# Patient Record
Sex: FEMALE | Race: WHITE | NOT HISPANIC OR LATINO | Employment: FULL TIME | URBAN - METROPOLITAN AREA
[De-identification: names, ages, dates, MRNs, and addresses within clinical notes are randomized per-mention and may not be internally consistent; named-entity substitution may affect disease eponyms.]

---

## 2017-01-10 ENCOUNTER — ALLSCRIPTS OFFICE VISIT (OUTPATIENT)
Dept: OTHER | Facility: OTHER | Age: 67
End: 2017-01-10

## 2017-03-15 ENCOUNTER — GENERIC CONVERSION - ENCOUNTER (OUTPATIENT)
Dept: OTHER | Facility: OTHER | Age: 67
End: 2017-03-15

## 2017-03-15 LAB
A/G RATIO (HISTORICAL): 2 (ref 1.2–2.2)
ALBUMIN SERPL BCP-MCNC: 4.2 G/DL (ref 3.6–4.8)
ALP SERPL-CCNC: 92 IU/L (ref 39–117)
ALT SERPL W P-5'-P-CCNC: 13 IU/L (ref 0–32)
AST SERPL W P-5'-P-CCNC: 17 IU/L (ref 0–40)
BILIRUB SERPL-MCNC: 0.5 MG/DL (ref 0–1.2)
BUN SERPL-MCNC: 14 MG/DL (ref 8–27)
BUN/CREA RATIO (HISTORICAL): 23 (ref 11–26)
CALCIUM SERPL-MCNC: 9.5 MG/DL (ref 8.7–10.3)
CHLORIDE SERPL-SCNC: 103 MMOL/L (ref 96–106)
CHOLEST SERPL-MCNC: 193 MG/DL (ref 100–199)
CHOLEST/HDLC SERPL: 2.9 RATIO UNITS (ref 0–4.4)
CO2 SERPL-SCNC: 24 MMOL/L (ref 18–29)
CREAT SERPL-MCNC: 0.61 MG/DL (ref 0.57–1)
EGFR AFRICAN AMERICAN (HISTORICAL): 108 ML/MIN/1.73
EGFR-AMERICAN CALC (HISTORICAL): 93 ML/MIN/1.73
GLUCOSE SERPL-MCNC: 101 MG/DL (ref 65–99)
HDLC SERPL-MCNC: 67 MG/DL
INTERPRETATION (HISTORICAL): NORMAL
LDLC SERPL CALC-MCNC: 110 MG/DL (ref 0–99)
POTASSIUM SERPL-SCNC: 3.9 MMOL/L (ref 3.5–5.2)
SODIUM SERPL-SCNC: 142 MMOL/L (ref 134–144)
TOT. GLOBULIN, SERUM (HISTORICAL): 2.1 G/DL (ref 1.5–4.5)
TOTAL PROTEIN (HISTORICAL): 6.3 G/DL (ref 6–8.5)
TRIGL SERPL-MCNC: 80 MG/DL (ref 0–149)
VLDLC SERPL CALC-MCNC: 16 MG/DL (ref 5–40)

## 2017-03-16 ENCOUNTER — GENERIC CONVERSION - ENCOUNTER (OUTPATIENT)
Dept: OTHER | Facility: OTHER | Age: 67
End: 2017-03-16

## 2017-03-21 ENCOUNTER — ALLSCRIPTS OFFICE VISIT (OUTPATIENT)
Dept: OTHER | Facility: OTHER | Age: 67
End: 2017-03-21

## 2017-03-21 DIAGNOSIS — Z12.31 ENCOUNTER FOR SCREENING MAMMOGRAM FOR MALIGNANT NEOPLASM OF BREAST: ICD-10-CM

## 2017-03-24 ENCOUNTER — HOSPITAL ENCOUNTER (EMERGENCY)
Facility: HOSPITAL | Age: 67
Discharge: HOME | End: 2017-03-24
Attending: EMERGENCY MEDICINE
Payer: COMMERCIAL

## 2017-03-24 VITALS
RESPIRATION RATE: 20 BRPM | HEART RATE: 89 BPM | SYSTOLIC BLOOD PRESSURE: 162 MMHG | TEMPERATURE: 98.6 F | DIASTOLIC BLOOD PRESSURE: 77 MMHG | OXYGEN SATURATION: 98 %

## 2017-03-24 DIAGNOSIS — B02.9 SHINGLES RASH: Primary | ICD-10-CM

## 2017-03-24 PROCEDURE — 99282 EMERGENCY DEPT VISIT SF MDM: CPT

## 2017-03-24 RX ORDER — ACYCLOVIR 800 MG/1
800 TABLET ORAL
Qty: 35 TABLET | Refills: 0 | Status: ON HOLD | OUTPATIENT
Start: 2017-03-24 | End: 2018-01-12 | Stop reason: ALTCHOICE

## 2017-03-24 RX ORDER — LISINOPRIL 10 MG/1
10 TABLET ORAL DAILY
COMMUNITY
End: 2018-04-25 | Stop reason: SDUPTHER

## 2017-03-24 RX ORDER — SIMVASTATIN 10 MG
20 TABLET ORAL
COMMUNITY
End: 2018-04-21 | Stop reason: SDUPTHER

## 2017-03-30 ENCOUNTER — ALLSCRIPTS OFFICE VISIT (OUTPATIENT)
Dept: OTHER | Facility: OTHER | Age: 67
End: 2017-03-30

## 2017-06-19 ENCOUNTER — GENERIC CONVERSION - ENCOUNTER (OUTPATIENT)
Dept: OTHER | Facility: OTHER | Age: 67
End: 2017-06-19

## 2017-06-20 ENCOUNTER — GENERIC CONVERSION - ENCOUNTER (OUTPATIENT)
Dept: OTHER | Facility: OTHER | Age: 67
End: 2017-06-20

## 2017-06-20 LAB
A/G RATIO (HISTORICAL): 2 (ref 1.2–2.2)
ALBUMIN SERPL BCP-MCNC: 4.3 G/DL (ref 3.6–4.8)
ALP SERPL-CCNC: 86 IU/L (ref 39–117)
ALT SERPL W P-5'-P-CCNC: 18 IU/L (ref 0–32)
AST SERPL W P-5'-P-CCNC: 21 IU/L (ref 0–40)
BILIRUB SERPL-MCNC: 0.4 MG/DL (ref 0–1.2)
BUN SERPL-MCNC: 15 MG/DL (ref 8–27)
BUN/CREA RATIO (HISTORICAL): 24 (ref 12–28)
CALCIUM SERPL-MCNC: 9.6 MG/DL (ref 8.7–10.3)
CHLORIDE SERPL-SCNC: 102 MMOL/L (ref 96–106)
CHOLEST SERPL-MCNC: 192 MG/DL (ref 100–199)
CHOLEST/HDLC SERPL: 2.3 RATIO UNITS (ref 0–4.4)
CO2 SERPL-SCNC: 22 MMOL/L (ref 18–29)
CREAT SERPL-MCNC: 0.62 MG/DL (ref 0.57–1)
EGFR AFRICAN AMERICAN (HISTORICAL): 106 ML/MIN/1.73
EGFR-AMERICAN CALC (HISTORICAL): 92 ML/MIN/1.73
GLUCOSE SERPL-MCNC: 97 MG/DL (ref 65–99)
HDLC SERPL-MCNC: 82 MG/DL
INTERPRETATION (HISTORICAL): NORMAL
LDLC SERPL CALC-MCNC: 99 MG/DL (ref 0–99)
POTASSIUM SERPL-SCNC: 4.3 MMOL/L (ref 3.5–5.2)
SODIUM SERPL-SCNC: 144 MMOL/L (ref 134–144)
TOT. GLOBULIN, SERUM (HISTORICAL): 2.2 G/DL (ref 1.5–4.5)
TOTAL PROTEIN (HISTORICAL): 6.5 G/DL (ref 6–8.5)
TRIGL SERPL-MCNC: 53 MG/DL (ref 0–149)
VLDLC SERPL CALC-MCNC: 11 MG/DL (ref 5–40)

## 2017-06-30 ENCOUNTER — ALLSCRIPTS OFFICE VISIT (OUTPATIENT)
Dept: OTHER | Facility: OTHER | Age: 67
End: 2017-06-30

## 2017-11-20 ENCOUNTER — HOSPITAL ENCOUNTER (OUTPATIENT)
Dept: RADIOLOGY | Facility: HOSPITAL | Age: 67
Discharge: HOME | End: 2017-11-20
Attending: FAMILY MEDICINE
Payer: COMMERCIAL

## 2017-11-20 DIAGNOSIS — Z12.31 ENCOUNTER FOR SCREENING MAMMOGRAM FOR MALIGNANT NEOPLASM OF BREAST: ICD-10-CM

## 2017-11-20 PROCEDURE — 77063 BREAST TOMOSYNTHESIS BI: CPT

## 2017-11-20 PROCEDURE — G0202 SCR MAMMO BI INCL CAD: HCPCS

## 2017-11-22 ENCOUNTER — GENERIC CONVERSION - ENCOUNTER (OUTPATIENT)
Dept: OTHER | Facility: OTHER | Age: 67
End: 2017-11-22

## 2017-11-22 DIAGNOSIS — R92.8 OTHER ABNORMAL AND INCONCLUSIVE FINDINGS ON DIAGNOSTIC IMAGING OF BREAST: ICD-10-CM

## 2017-11-24 ENCOUNTER — HOSPITAL ENCOUNTER (OUTPATIENT)
Dept: RADIOLOGY | Facility: HOSPITAL | Age: 67
Discharge: HOME | End: 2017-11-24
Attending: FAMILY MEDICINE
Payer: COMMERCIAL

## 2017-11-24 ENCOUNTER — GENERIC CONVERSION - ENCOUNTER (OUTPATIENT)
Dept: OTHER | Facility: OTHER | Age: 67
End: 2017-11-24

## 2017-11-24 DIAGNOSIS — R92.8 ABNORMAL SCREENING MAMMOGRAM: ICD-10-CM

## 2017-11-24 PROCEDURE — 76642 ULTRASOUND BREAST LIMITED: CPT

## 2017-11-30 ENCOUNTER — GENERIC CONVERSION - ENCOUNTER (OUTPATIENT)
Dept: OTHER | Facility: OTHER | Age: 67
End: 2017-11-30

## 2017-11-30 ENCOUNTER — GENERIC CONVERSION - ENCOUNTER (OUTPATIENT)
Dept: FAMILY MEDICINE CLINIC | Facility: CLINIC | Age: 67
End: 2017-11-30

## 2017-11-30 ENCOUNTER — HOSPITAL ENCOUNTER (OUTPATIENT)
Dept: RADIOLOGY | Facility: HOSPITAL | Age: 67
Discharge: HOME | End: 2017-11-30
Attending: FAMILY MEDICINE
Payer: COMMERCIAL

## 2017-11-30 ENCOUNTER — HOSPITAL ENCOUNTER (OUTPATIENT)
Dept: RADIOLOGY | Facility: HOSPITAL | Age: 67
Discharge: HOME | End: 2017-11-30
Payer: COMMERCIAL

## 2017-11-30 VITALS
RESPIRATION RATE: 18 BRPM | HEART RATE: 80 BPM | SYSTOLIC BLOOD PRESSURE: 163 MMHG | OXYGEN SATURATION: 99 % | DIASTOLIC BLOOD PRESSURE: 68 MMHG

## 2017-11-30 DIAGNOSIS — N63.20 BREAST MASS, LEFT: ICD-10-CM

## 2017-11-30 PROCEDURE — 88341 IMHCHEM/IMCYTCHM EA ADD ANTB: CPT | Performed by: RADIOLOGY

## 2017-11-30 PROCEDURE — 88361 TUMOR IMMUNOHISTOCHEM/COMPUT: CPT | Performed by: RADIOLOGY

## 2017-11-30 PROCEDURE — 19083 BX BREAST 1ST LESION US IMAG: CPT

## 2017-11-30 PROCEDURE — 88342 IMHCHEM/IMCYTCHM 1ST ANTB: CPT | Performed by: RADIOLOGY

## 2017-11-30 PROCEDURE — 88305 TISSUE EXAM BY PATHOLOGIST: CPT | Performed by: RADIOLOGY

## 2017-11-30 NOTE — SEDATION DOCUMENTATION
Left breast mass bx done  Hydromark marker placed  Pt  Tolerated well  Steristrips and DSD to site after pressure to site  No bleeding noted  Discharge instructions reviewed with pt  And copy given to her  Ice pack provided

## 2017-12-06 ENCOUNTER — GENERIC CONVERSION - ENCOUNTER (OUTPATIENT)
Dept: OTHER | Facility: OTHER | Age: 67
End: 2017-12-06

## 2017-12-13 ENCOUNTER — GENERIC CONVERSION - ENCOUNTER (OUTPATIENT)
Dept: OTHER | Facility: OTHER | Age: 67
End: 2017-12-13

## 2017-12-27 RX ORDER — RIBOFLAVIN (VITAMIN B2) 100 MG
100 TABLET ORAL DAILY
COMMUNITY
End: 2018-02-22 | Stop reason: ALTCHOICE

## 2017-12-27 RX ORDER — MELATONIN
1000 3 TIMES WEEKLY
COMMUNITY
End: 2018-04-26 | Stop reason: ALTCHOICE

## 2017-12-27 NOTE — PRE-PROCEDURE INSTRUCTIONS
Pre-Surgery Instructions:   Medication Instructions    Ascorbic Acid (VITAMIN C) 100 MG tablet Instructed patient per Anesthesia Guidelines   cholecalciferol (VITAMIN D3) 1,000 units tablet Instructed patient per Anesthesia Guidelines   lisinopril (ZESTRIL) 10 mg tablet Instructed patient per Anesthesia Guidelines   simvastatin (ZOCOR) 10 mg tablet Instructed patient per Anesthesia Guidelines      Pre-op and bathing instructions given

## 2017-12-28 ENCOUNTER — TRANSCRIBE ORDERS (OUTPATIENT)
Dept: LAB | Facility: CLINIC | Age: 67
End: 2017-12-28

## 2017-12-28 ENCOUNTER — GENERIC CONVERSION - ENCOUNTER (OUTPATIENT)
Dept: OTHER | Facility: OTHER | Age: 67
End: 2017-12-28

## 2017-12-28 ENCOUNTER — APPOINTMENT (OUTPATIENT)
Dept: RADIOLOGY | Facility: CLINIC | Age: 67
End: 2017-12-28
Payer: COMMERCIAL

## 2017-12-28 ENCOUNTER — GENERIC CONVERSION - ENCOUNTER (OUTPATIENT)
Dept: SURGICAL ONCOLOGY | Facility: CLINIC | Age: 67
End: 2017-12-28

## 2017-12-28 ENCOUNTER — OFFICE VISIT (OUTPATIENT)
Dept: LAB | Facility: CLINIC | Age: 67
End: 2017-12-28
Payer: COMMERCIAL

## 2017-12-28 DIAGNOSIS — C50.212 MALIGNANT NEOPLASM OF UPPER-INNER QUADRANT OF LEFT FEMALE BREAST, UNSPECIFIED ESTROGEN RECEPTOR STATUS (HCC): Primary | ICD-10-CM

## 2017-12-28 DIAGNOSIS — C50.212 MALIGNANT NEOPLASM OF UPPER-INNER QUADRANT OF LEFT FEMALE BREAST, UNSPECIFIED ESTROGEN RECEPTOR STATUS (HCC): ICD-10-CM

## 2017-12-28 PROCEDURE — 93005 ELECTROCARDIOGRAM TRACING: CPT

## 2017-12-28 PROCEDURE — 71020 HB X-RAY EXAM CHEST 2 VIEWS: CPT

## 2017-12-29 ENCOUNTER — GENERIC CONVERSION - ENCOUNTER (OUTPATIENT)
Dept: OTHER | Facility: OTHER | Age: 67
End: 2017-12-29

## 2017-12-29 LAB
ATRIAL RATE: 60 BPM
BASOPHILS # BLD AUTO: 0 %
BASOPHILS # BLD AUTO: 0 X10E3/UL (ref 0–0.2)
DEPRECATED RDW RBC AUTO: 13.6 % (ref 12.3–15.4)
EOSINOPHIL # BLD AUTO: 0.1 X10E3/UL (ref 0–0.4)
EOSINOPHIL # BLD AUTO: 2 %
HCT VFR BLD AUTO: 40.6 % (ref 34–46.6)
HGB BLD-MCNC: 13.8 G/DL (ref 11.1–15.9)
IMM.GRANULOCYTES (CD4/8) (HISTORICAL): 0 %
IMM.GRANULOCYTES (CD4/8) (HISTORICAL): 0 X10E3/UL (ref 0–0.1)
LYMPHOCYTES # BLD AUTO: 1.6 X10E3/UL (ref 0.7–3.1)
LYMPHOCYTES # BLD AUTO: 35 %
MCH RBC QN AUTO: 31.3 PG (ref 26.6–33)
MCHC RBC AUTO-ENTMCNC: 34 G/DL (ref 31.5–35.7)
MCV RBC AUTO: 92 FL (ref 79–97)
MONOCYTES # BLD AUTO: 0.4 X10E3/UL (ref 0.1–0.9)
MONOCYTES (HISTORICAL): 8 %
NEUTROPHILS # BLD AUTO: 2.5 X10E3/UL (ref 1.4–7)
NEUTROPHILS # BLD AUTO: 55 %
P AXIS: 58 DEGREES
PLATELET # BLD AUTO: 241 X10E3/UL (ref 150–379)
PR INTERVAL: 218 MS
QRS AXIS: 40 DEGREES
QRSD INTERVAL: 90 MS
QT INTERVAL: 416 MS
QTC INTERVAL: 416 MS
RBC (HISTORICAL): 4.41 X10E6/UL (ref 3.77–5.28)
T WAVE AXIS: 56 DEGREES
VENTRICULAR RATE: 60 BPM
WBC # BLD AUTO: 4.6 X10E3/UL (ref 3.4–10.8)

## 2017-12-30 LAB
A/G RATIO (HISTORICAL): 2.4 (ref 1.2–2.2)
ALBUMIN SERPL BCP-MCNC: 4.4 G/DL (ref 3.6–4.8)
ALP SERPL-CCNC: 92 IU/L (ref 39–117)
ALT SERPL W P-5'-P-CCNC: 36 IU/L (ref 0–32)
AST SERPL W P-5'-P-CCNC: 31 IU/L (ref 0–40)
BILIRUB SERPL-MCNC: 0.5 MG/DL (ref 0–1.2)
BUN SERPL-MCNC: 13 MG/DL (ref 8–27)
BUN/CREA RATIO (HISTORICAL): 22 (ref 12–28)
CALCIUM SERPL-MCNC: 9.7 MG/DL (ref 8.7–10.3)
CHLORIDE SERPL-SCNC: 100 MMOL/L (ref 96–106)
CO2 SERPL-SCNC: 27 MMOL/L (ref 18–29)
CREAT SERPL-MCNC: 0.6 MG/DL (ref 0.57–1)
EGFR AFRICAN AMERICAN (HISTORICAL): 108 ML/MIN/1.73
EGFR-AMERICAN CALC (HISTORICAL): 93 ML/MIN/1.73
GLUCOSE SERPL-MCNC: 108 MG/DL (ref 65–99)
POTASSIUM SERPL-SCNC: 4.5 MMOL/L (ref 3.5–5.2)
SODIUM SERPL-SCNC: 141 MMOL/L (ref 134–144)
TOT. GLOBULIN, SERUM (HISTORICAL): 1.8 G/DL (ref 1.5–4.5)
TOTAL PROTEIN (HISTORICAL): 6.2 G/DL (ref 6–8.5)

## 2018-01-05 ENCOUNTER — GENERIC CONVERSION - ENCOUNTER (OUTPATIENT)
Dept: OTHER | Facility: OTHER | Age: 68
End: 2018-01-05

## 2018-01-05 ENCOUNTER — APPOINTMENT (OUTPATIENT)
Dept: RADIATION ONCOLOGY | Facility: HOSPITAL | Age: 68
End: 2018-01-05
Attending: RADIOLOGY
Payer: COMMERCIAL

## 2018-01-05 PROCEDURE — G0463 HOSPITAL OUTPT CLINIC VISIT: HCPCS | Performed by: RADIOLOGY

## 2018-01-05 PROCEDURE — 99215 OFFICE O/P EST HI 40 MIN: CPT | Performed by: RADIOLOGY

## 2018-01-10 NOTE — RESULT NOTES
Message   pt has an appt on 5/16 with me   will discuss at that time  RL     Verified Results  (1) CBC/PLT/DIFF [id number] 07:46AM [facility]     Test Name Result Flag Reference   WBC 4 3 x10E3/uL  3 4-10 8   RBC 4 29 x10E6/uL  3 77-5 28   Hemoglobin 13 4 g/dL  11 1-15 9   Hematocrit 40 4 %  34 0-46  6   MCV 94 fL  79-97   MCH 31 2 pg  26 6-33 0   MCHC 33 2 g/dL  31 5-35 7   RDW 13 8 %  12 3-15 4   Platelets 244 L19H2/PT  150-379   Neutrophils 54 %     Lymphs 36 %     Monocytes 9 %     Eos 1 %     Basos 0 %     Neutrophils (Absolute) 2 3 x10E3/uL  1 4-7 0   Lymphs (Absolute) 1 6 x10E3/uL  0 7-3 1   Monocytes(Absolute) 0 4 x10E3/uL  0 1-0 9   Eos (Absolute) 0 1 x10E3/uL  0 0-0 4   Baso (Absolute) 0 0 x10E3/uL  0 0-0 2   Immature Granulocytes 0 %     Immature Grans (Abs) 0 0 x10E3/uL  0 0-0 1     (1) LIPID PANEL FASTING W DIRECT LDL REFLEX [id number] 07:46AM [facility]     Test Name Result Flag Reference   Cholesterol, Total 221 mg/dL H 100-199   Triglycerides 48 mg/dL  0-149   HDL Cholesterol 87 mg/dL  >39   According to ATP-III Guidelines, HDL-C >59 mg/dL is considered a  negative risk factor for CHD     LDL Cholesterol Calc 124 mg/dL H 0-99     (1) TSH [id number] 07:46AM [facility]     Test Name Result Flag Reference   TSH 2 030 uIU/mL  0 450-4 500     (1) URINALYSIS (will reflex a microscopy if leukocytes, occult blood, protein or nitrites are not within normal limits) [id number] 07:46AM [facility]     Test Name Result Flag Reference   Specific Gravity 1 005  1 005-1 030   pH 7 0  5 0-7 5   Urine-Color Yellow  Yellow   Appearance Clear  Clear   WBC Esterase Trace A Negative   Protein Negative  Negative/Trace   Glucose Negative  Negative   Ketones Negative  Negative   Occult Blood Negative  Negative   Bilirubin Negative  Negative   Urobilinogen,Semi-Qn 0 2 EU/dL  0 2-1 0   Nitrite, Urine Negative  Negative   Microscopic Examination See below:     WBC 0-5 /hpf  0 -  5   RBC 0-2 /hpf  0 -  2 Epithelial Cells (non renal) None seen /hpf  0 - 10   Mucus Threads Present  Not Estab  Bacteria None seen  None seen/Few     (1) VITAMIN D 25-HYDROXY [id number] 07:46AM Scarlet Khanna     Test Name Result Flag Reference   Vitamin D, 25-Hydroxy 33 9 ng/mL  30 0-100 0   Vitamin D deficiency has been defined by the [address] practice guideline as a  level of serum 25-OH vitamin D less than 20 ng/mL (1,2)  The Endocrine Society went on to further define vitamin D  insufficiency as a level between 21 and 29 ng/mL (2)  1  IOM ([place] of Medicine)  2010  Dietary reference     intakes for calcium and D  [facility]: The     [place]  2  Marisabel MF, Liz TAMAYO, Geraldine FIELD, et al      Evaluation, treatment, and prevention of vitamin D     deficiency: an Endocrine Society clinical practice     guideline  JC  2011 Jul; 96(7):1911-30       (1) HEMOGLOBIN A1C [id number] 07:46AM Scarlet Khanna     Test Name Result Flag Reference   Hemoglobin A1c 6 0 % H 4 8-5 6   Pre-diabetes: 5 7 - 6 4           Diabetes: >6 4           Glycemic control for adults with diabetes: <7 0     (1) COMPREHENSIVE METABOLIC PANEL [id number] 45:52SO Scarlet Khanna     Test Name Result Flag Reference   Glucose, Serum 116 mg/dL H 65-99   BUN 15 mg/dL  8-27   Creatinine, Serum 0 59 mg/dL  0 57-1 00   eGFR If NonAfricn Am 94 mL/min/1 73  >59   eGFR If Africn Am 109 mL/min/1 73  >59   BUN/Creatinine Ratio 25  11-26   Sodium, Serum 139 mmol/L  134-144   Potassium, Serum 3 9 mmol/L  3 5-5 2   Chloride, Serum 102 mmol/L     Carbon Dioxide, Total 22 mmol/L  18-29   Calcium, Serum 9 3 mg/dL  8 7-10 3   Protein, Total, Serum 6 5 g/dL  6 0-8 5   Albumin, Serum 4 6 g/dL  3 6-4 8   Globulin, Total 1 9 g/dL  1 5-4 5   A/G Ratio 2 4  1 1-2 5   Bilirubin, Total 0 5 mg/dL  0 0-1 2   Alkaline Phosphatase, S 104 IU/L     AST (SGOT) 38 IU/L  0-40   ALT (SGPT) 42 IU/L H 0-32 Signatures   Electronically signed by : Yonatan Bishop DO; May 11 2016  8:14AM EST                       (Author)

## 2018-01-10 NOTE — RESULT NOTES
Verified Results  US GUIDED BREAST BIOPSY LEFT COMPLETE [id number] 10:23AM Cass Wilson     Test Name Result Flag Reference   US GUIDED BREAST BIOPSY LEFT COMPLETE (Report)     Post biopsy  US Guided Breast Biopsy Left: November 30, 2017 - Check In #:    [de-identified]   Technologist: Ricky Betancourt, [facility]   75-year-old woman presenting for ultrasound-guided biopsy left    breast 11:00 mass  After informed consent was obtained, the patient was positioned    for an ultrasound-guided core biopsy of the left breast  The    previously described 11:00 mass was again identified with    ultrasound  Chlorhexidine was used as sterile prep and 1%    lidocaine as local anesthetic  After a small skin incision was    made, a Apertioe 12 gauge core needle was advanced to the    lesion under ultrasound guidance from a lateral approach  3 core   biopsies were then obtained with ultrasound confirming the core    needle traversing the lesion  The core specimens were then sent    to Pathology for further evaluation  A hydromark open coil clip    was then deployed in order to clare the biopsy site to facilitate    future intervention if necessary  Post-biopsy mammogram demonstrates clip concordance with    previously described mammographic finding  The patient tolerated the procedure well and left the department    in stable condition  Impression:   1  Uncomplicated ultrasound-guided core needle biopsy left    breast 11:00 mass, with placement of a hydromark open coil clip  Pathology Results: Pending     Mammo Post Biopsy: Left Breast - November 30, 2017 - Check In #:    [de-identified]   CC and MLO view(s) were taken of the left breast        Recommendation:   Pathology pending       Transcription Location: [facility]: [id number]

## 2018-01-12 ENCOUNTER — HOSPITAL ENCOUNTER (OUTPATIENT)
Facility: HOSPITAL | Age: 68
Setting detail: OUTPATIENT SURGERY
Discharge: HOME | End: 2018-01-12
Attending: SURGERY | Admitting: SURGERY
Payer: COMMERCIAL

## 2018-01-12 ENCOUNTER — APPOINTMENT (OUTPATIENT)
Dept: RADIATION ONCOLOGY | Facility: HOSPITAL | Age: 68
End: 2018-01-12
Attending: RADIOLOGY
Payer: COMMERCIAL

## 2018-01-12 ENCOUNTER — HOSPITAL ENCOUNTER (OUTPATIENT)
Dept: NUCLEAR MEDICINE | Facility: HOSPITAL | Age: 68
Setting detail: OUTPATIENT SURGERY
Discharge: HOME | End: 2018-01-12
Attending: SURGERY
Payer: COMMERCIAL

## 2018-01-12 ENCOUNTER — APPOINTMENT (OUTPATIENT)
Dept: MAMMOGRAPHY | Facility: HOSPITAL | Age: 68
End: 2018-01-12
Payer: COMMERCIAL

## 2018-01-12 ENCOUNTER — CONVERSION ENCOUNTER (OUTPATIENT)
Dept: MAMMOGRAPHY | Facility: HOSPITAL | Age: 68
End: 2018-01-12

## 2018-01-12 ENCOUNTER — GENERIC CONVERSION - ENCOUNTER (OUTPATIENT)
Dept: OTHER | Facility: OTHER | Age: 68
End: 2018-01-12

## 2018-01-12 ENCOUNTER — HOSPITAL ENCOUNTER (OUTPATIENT)
Dept: MAMMOGRAPHY | Facility: HOSPITAL | Age: 68
Setting detail: OUTPATIENT SURGERY
Discharge: HOME | End: 2018-01-12
Attending: SURGERY
Payer: COMMERCIAL

## 2018-01-12 ENCOUNTER — ANESTHESIA EVENT (OUTPATIENT)
Dept: PERIOP | Facility: HOSPITAL | Age: 68
End: 2018-01-12
Payer: COMMERCIAL

## 2018-01-12 ENCOUNTER — ANESTHESIA (OUTPATIENT)
Dept: PERIOP | Facility: HOSPITAL | Age: 68
End: 2018-01-12
Payer: COMMERCIAL

## 2018-01-12 ENCOUNTER — GENERIC CONVERSION - ENCOUNTER (OUTPATIENT)
Dept: SURGICAL ONCOLOGY | Facility: CLINIC | Age: 68
End: 2018-01-12

## 2018-01-12 VITALS
HEART RATE: 70 BPM | BODY MASS INDEX: 25.1 KG/M2 | DIASTOLIC BLOOD PRESSURE: 65 MMHG | HEIGHT: 64 IN | OXYGEN SATURATION: 98 % | RESPIRATION RATE: 16 BRPM | WEIGHT: 147 LBS | SYSTOLIC BLOOD PRESSURE: 144 MMHG | TEMPERATURE: 98.3 F

## 2018-01-12 VITALS
WEIGHT: 142 LBS | TEMPERATURE: 97 F | RESPIRATION RATE: 16 BRPM | DIASTOLIC BLOOD PRESSURE: 80 MMHG | SYSTOLIC BLOOD PRESSURE: 130 MMHG | HEART RATE: 82 BPM | HEIGHT: 63 IN | BODY MASS INDEX: 25.16 KG/M2

## 2018-01-12 DIAGNOSIS — C50.212 BREAST CANCER OF UPPER-INNER QUADRANT OF LEFT FEMALE BREAST (HCC): ICD-10-CM

## 2018-01-12 DIAGNOSIS — C50.212 MALIGNANT NEOPLASM OF UPPER-INNER QUADRANT OF LEFT FEMALE BREAST (HCC): ICD-10-CM

## 2018-01-12 PROCEDURE — 88307 TISSUE EXAM BY PATHOLOGIST: CPT | Performed by: SURGERY

## 2018-01-12 PROCEDURE — 88361 TUMOR IMMUNOHISTOCHEM/COMPUT: CPT | Performed by: SURGERY

## 2018-01-12 PROCEDURE — 88367 INSITU HYBRIDIZATION AUTO: CPT | Performed by: SURGERY

## 2018-01-12 PROCEDURE — 88341 IMHCHEM/IMCYTCHM EA ADD ANTB: CPT | Performed by: SURGERY

## 2018-01-12 PROCEDURE — 88333 PATH CONSLTJ SURG CYTO XM 1: CPT | Performed by: SURGERY

## 2018-01-12 PROCEDURE — 88342 IMHCHEM/IMCYTCHM 1ST ANTB: CPT | Performed by: SURGERY

## 2018-01-12 PROCEDURE — 19281 PERQ DEVICE BREAST 1ST IMAG: CPT

## 2018-01-12 PROCEDURE — A9541 TC99M SULFUR COLLOID: HCPCS

## 2018-01-12 PROCEDURE — 78195 LYMPH SYSTEM IMAGING: CPT

## 2018-01-12 RX ORDER — MIDAZOLAM HYDROCHLORIDE 1 MG/ML
INJECTION INTRAMUSCULAR; INTRAVENOUS AS NEEDED
Status: DISCONTINUED | OUTPATIENT
Start: 2018-01-12 | End: 2018-01-12 | Stop reason: SURG

## 2018-01-12 RX ORDER — SODIUM CHLORIDE 9 MG/ML
75 INJECTION, SOLUTION INTRAVENOUS CONTINUOUS
Status: DISCONTINUED | OUTPATIENT
Start: 2018-01-12 | End: 2018-01-12 | Stop reason: HOSPADM

## 2018-01-12 RX ORDER — SODIUM CHLORIDE 9 MG/ML
100 INJECTION, SOLUTION INTRAVENOUS CONTINUOUS
Status: DISCONTINUED | OUTPATIENT
Start: 2018-01-12 | End: 2018-01-12 | Stop reason: HOSPADM

## 2018-01-12 RX ORDER — OXYCODONE HYDROCHLORIDE AND ACETAMINOPHEN 5; 325 MG/1; MG/1
1 TABLET ORAL EVERY 4 HOURS PRN
Status: DISCONTINUED | OUTPATIENT
Start: 2018-01-12 | End: 2018-01-12 | Stop reason: HOSPADM

## 2018-01-12 RX ORDER — FENTANYL CITRATE/PF 50 MCG/ML
25 SYRINGE (ML) INJECTION
Status: DISCONTINUED | OUTPATIENT
Start: 2018-01-12 | End: 2018-01-12 | Stop reason: HOSPADM

## 2018-01-12 RX ORDER — SODIUM CHLORIDE 9 MG/ML
INJECTION, SOLUTION INTRAVENOUS CONTINUOUS PRN
Status: DISCONTINUED | OUTPATIENT
Start: 2018-01-12 | End: 2018-01-12 | Stop reason: SURG

## 2018-01-12 RX ORDER — FENTANYL CITRATE 50 UG/ML
INJECTION, SOLUTION INTRAMUSCULAR; INTRAVENOUS AS NEEDED
Status: DISCONTINUED | OUTPATIENT
Start: 2018-01-12 | End: 2018-01-12 | Stop reason: SURG

## 2018-01-12 RX ORDER — LIDOCAINE WITH 8.4% SOD BICARB 0.9%(10ML)
5 SYRINGE (ML) INJECTION ONCE
Status: COMPLETED | OUTPATIENT
Start: 2018-01-12 | End: 2018-01-12

## 2018-01-12 RX ORDER — LIDOCAINE HYDROCHLORIDE 10 MG/ML
INJECTION, SOLUTION INFILTRATION; PERINEURAL AS NEEDED
Status: DISCONTINUED | OUTPATIENT
Start: 2018-01-12 | End: 2018-01-12 | Stop reason: SURG

## 2018-01-12 RX ORDER — ONDANSETRON 2 MG/ML
INJECTION INTRAMUSCULAR; INTRAVENOUS AS NEEDED
Status: DISCONTINUED | OUTPATIENT
Start: 2018-01-12 | End: 2018-01-12 | Stop reason: SURG

## 2018-01-12 RX ORDER — ALBUTEROL SULFATE 2.5 MG/3ML
2.5 SOLUTION RESPIRATORY (INHALATION) ONCE AS NEEDED
Status: DISCONTINUED | OUTPATIENT
Start: 2018-01-12 | End: 2018-01-12 | Stop reason: HOSPADM

## 2018-01-12 RX ORDER — PROPOFOL 10 MG/ML
INJECTION, EMULSION INTRAVENOUS AS NEEDED
Status: DISCONTINUED | OUTPATIENT
Start: 2018-01-12 | End: 2018-01-12 | Stop reason: SURG

## 2018-01-12 RX ORDER — EPHEDRINE SULFATE 50 MG/ML
INJECTION, SOLUTION INTRAVENOUS AS NEEDED
Status: DISCONTINUED | OUTPATIENT
Start: 2018-01-12 | End: 2018-01-12 | Stop reason: SURG

## 2018-01-12 RX ORDER — BUPIVACAINE HYDROCHLORIDE AND EPINEPHRINE 2.5; 5 MG/ML; UG/ML
INJECTION, SOLUTION INFILTRATION; PERINEURAL AS NEEDED
Status: DISCONTINUED | OUTPATIENT
Start: 2018-01-12 | End: 2018-01-12 | Stop reason: HOSPADM

## 2018-01-12 RX ORDER — METHYLENE BLUE 10 MG/ML
INJECTION INTRAVENOUS AS NEEDED
Status: DISCONTINUED | OUTPATIENT
Start: 2018-01-12 | End: 2018-01-12 | Stop reason: HOSPADM

## 2018-01-12 RX ADMIN — FENTANYL CITRATE 25 MCG: 50 INJECTION INTRAMUSCULAR; INTRAVENOUS at 13:38

## 2018-01-12 RX ADMIN — MIDAZOLAM HYDROCHLORIDE 2 MG: 1 INJECTION, SOLUTION INTRAMUSCULAR; INTRAVENOUS at 13:01

## 2018-01-12 RX ADMIN — FENTANYL CITRATE 25 MCG: 50 INJECTION, SOLUTION INTRAMUSCULAR; INTRAVENOUS at 15:28

## 2018-01-12 RX ADMIN — ONDANSETRON 4 MG: 2 INJECTION INTRAMUSCULAR; INTRAVENOUS at 14:45

## 2018-01-12 RX ADMIN — LIDOCAINE HYDROCHLORIDE 50 MG: 10 INJECTION, SOLUTION INFILTRATION; PERINEURAL at 13:15

## 2018-01-12 RX ADMIN — SODIUM CHLORIDE: 0.9 INJECTION, SOLUTION INTRAVENOUS at 10:51

## 2018-01-12 RX ADMIN — DEXAMETHASONE SODIUM PHOSPHATE 8 MG: 10 INJECTION INTRAMUSCULAR; INTRAVENOUS at 13:30

## 2018-01-12 RX ADMIN — HYDROMORPHONE HYDROCHLORIDE 0.5 MG: 1 INJECTION, SOLUTION INTRAMUSCULAR; INTRAVENOUS; SUBCUTANEOUS at 15:00

## 2018-01-12 RX ADMIN — SODIUM CHLORIDE: 0.9 INJECTION, SOLUTION INTRAVENOUS at 13:15

## 2018-01-12 RX ADMIN — EPHEDRINE SULFATE 5 MG: 50 INJECTION, SOLUTION INTRAMUSCULAR; INTRAVENOUS; SUBCUTANEOUS at 13:40

## 2018-01-12 RX ADMIN — FENTANYL CITRATE 25 MCG: 50 INJECTION INTRAMUSCULAR; INTRAVENOUS at 13:45

## 2018-01-12 RX ADMIN — EPHEDRINE SULFATE 5 MG: 50 INJECTION, SOLUTION INTRAMUSCULAR; INTRAVENOUS; SUBCUTANEOUS at 14:35

## 2018-01-12 RX ADMIN — LIDOCAINE HYDROCHLORIDE 2 ML: 10 INJECTION, SOLUTION INFILTRATION; PERINEURAL at 11:45

## 2018-01-12 RX ADMIN — EPHEDRINE SULFATE 5 MG: 50 INJECTION, SOLUTION INTRAMUSCULAR; INTRAVENOUS; SUBCUTANEOUS at 14:26

## 2018-01-12 RX ADMIN — EPHEDRINE SULFATE 5 MG: 50 INJECTION, SOLUTION INTRAMUSCULAR; INTRAVENOUS; SUBCUTANEOUS at 14:22

## 2018-01-12 RX ADMIN — OXYCODONE HYDROCHLORIDE AND ACETAMINOPHEN 1 TABLET: 5; 325 TABLET ORAL at 16:15

## 2018-01-12 RX ADMIN — FENTANYL CITRATE 25 MCG: 50 INJECTION INTRAMUSCULAR; INTRAVENOUS at 14:41

## 2018-01-12 RX ADMIN — CEFAZOLIN SODIUM 1000 MG: 1 SOLUTION INTRAVENOUS at 13:15

## 2018-01-12 RX ADMIN — EPHEDRINE SULFATE 10 MG: 50 INJECTION, SOLUTION INTRAMUSCULAR; INTRAVENOUS; SUBCUTANEOUS at 14:11

## 2018-01-12 RX ADMIN — FENTANYL CITRATE 25 MCG: 50 INJECTION INTRAMUSCULAR; INTRAVENOUS at 13:23

## 2018-01-12 RX ADMIN — PROPOFOL 200 MG: 10 INJECTION, EMULSION INTRAVENOUS at 13:15

## 2018-01-12 RX ADMIN — FENTANYL CITRATE 25 MCG: 50 INJECTION, SOLUTION INTRAMUSCULAR; INTRAVENOUS at 15:21

## 2018-01-12 NOTE — RESULT NOTES
Discussion/Summary   Please schedule an office appointment for followup with Dr Pinon Fearing  Verified Results  (1) COMPREHENSIVE METABOLIC PANEL [id number] 18:72UI Aminta Serve     Test Name Result Flag Reference   Glucose, Serum 97 mg/dL  65-99   BUN 15 mg/dL  8-27   Creatinine, Serum 0 62 mg/dL  0 57-1 00   BUN/Creatinine Ratio 24  12-28   Sodium, Serum 144 mmol/L  134-144   Potassium, Serum 4 3 mmol/L  3 5-5 2   Chloride, Serum 102 mmol/L     Carbon Dioxide, Total 22 mmol/L  18-29   Calcium, Serum 9 6 mg/dL  8 7-10 3   Protein, Total, Serum 6 5 g/dL  6 0-8 5   Albumin, Serum 4 3 g/dL  3 6-4 8   Globulin, Total 2 2 g/dL  1 5-4 5   A/G Ratio 2 0  1 2-2 2   Bilirubin, Total 0 4 mg/dL  0 0-1 2   Alkaline Phosphatase, S 86 IU/L     AST (SGOT) 21 IU/L  0-40   ALT (SGPT) 18 IU/L  0-32   eGFR If NonAfricn Am 92 mL/min/1 73  >59   eGFR If Africn Am 106 mL/min/1 73  >59     (1) LIPID PANEL, FASTING [id number] 08:34AM Aminta Serve     Test Name Result Flag Reference   Cholesterol, Total 192 mg/dL  100-199   Triglycerides 53 mg/dL  0-149   HDL Cholesterol 82 mg/dL  >39   VLDL Cholesterol Kayode 11 mg/dL  5-40   LDL Cholesterol Calc 99 mg/dL  0-99   T  Chol/HDL Ratio 2 3 ratio units  0 0-4 4   T  Chol/HDL Ratio                                                             Men  Women                                               1/2 Avg  Risk  3 4    3 3                                                   Avg Risk  5 0    4 4                                                2X Avg  Risk  9 6    7 1                                                3X Avg  Risk 23 4   11 0

## 2018-01-12 NOTE — DISCHARGE INSTRUCTIONS
POST-OPERATIVE CARE INSTRUCTIONS    Wound Closure/Dressing: Your wound is closed with:                ?  Steri strips-white pieces of tape that hold your incision together along with surgical glue                ? Dissolvable sutures-underneath the skin       Wound care:                  ? Steri strips will fall off on their own in 1-2 weeks                ? You may shower using soap and water to clean your wound  Gently pat dry  Other:                   ? You can begin wearing your own bra when it feels comfortable  ?  You may resume using deodorant the day after surgery                   Call our office at [phone] if you have any  of the followin  Redness, swelling, heat, unusual drainage or heavy bleeding from wound  2  Fever greater than 101 °  3  Pain not relieved by medication        POST OP VISIT ON    Call office [phone] for your post op visit appointment

## 2018-01-12 NOTE — OP NOTE
OPERATIVE REPORT  PATIENT NAME: Lesley Payne    :  1950  MRN: [id number]  Pt Location: AN OR ROOM 02    SURGERY DATE: 2018    Surgeon(s) and Role:     * Kimani Negrete MD - Milly Deleon MD - Primary    Preop Diagnosis:  Malignant neoplasm of 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 ([facility] [address] ) [C50 212]    Post-Op Diagnosis Codes:     * Malignant neoplasm of 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 ([facility] [address] ) [C50 212]    Procedure(s) (LRB):  BREAST LUMPECTOMY; BREAST NEEDLE LOCALIZATION (NEEDLE LOC AT 1130);  FROZEN SECTION (Left)  LYMPHOSCINTIGRAPHY; INTRAOPERATIVE LYMPHATIC MAPPING; SENTINEL LYMPH NODE BIOPSY (INJECT AT 1215) (Left)  BREAST IORT BY DR PATEL (Left)    Specimen(s):  ID Type Source Tests Collected by Time Destination   1 : Left Axillary [place] Lymph Node Tissue Axillary lymph node TISSUE EXAM Gonzalo Keane MD 2018 1344    2 : Left Breast Lumpectomy Tissue Breast, Left TISSUE EXAM Gonzalo Keane MD 2018 1346    3 : Left Breast Lumpectomy New Margin Lateral Tissue Breast, Left TISSUE EXAM Gonzalo Keane MD 2018 1347    4 : Left Breast Lumpectomy New Margin Medial Tissue Breast, Left TISSUE EXAM Gonzalo Keane MD 2018 1347    5 : Left Breast Lumpectomy New Margin Posterior Tissue Breast, Left TISSUE EXAM Gonzalo Keane MD 2018 1347    6 : Left Breast Lumpectomy New Margin Inferior Tissue Breast, Left TISSUE Edwin Day MD 2018 1347    7 : Left Breast Lumpectomy New Margin Superior Tissue Breast, Left TISSUE EXAM Gonzalo Keane MD 2018 1347        Estimated Blood Loss:   Minimal    Drains:       Anesthesia Type:   General    Operative Indications:  Malignant neoplasm of 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 ([facility] [address] ) [C50 212]      Operative Findings:  sln blue and hot and negative on touch prep, grossly normal, good specimen radiograph, IORT 4 cm applicator    Complications:   None    Procedure and Technique:  Lumpectomy  The patient was brought to the operation room and placed under general anesthesia  Attention was paid to ensure appropriate padding and correct positioning  The left breast was injected intradermally with 0 2cc of methylene blue  This area was vigorously massaged to facilitate identification of the sentinel lymph node (SLN)  The breast and axillary region were then prepped and draped in a sterile fashion  I initiated a time-out, identifying the patient, the correct side and the above procedure  All parties agreed with the time out  The planned incision was then injected with 0 25% Marcaine and epinephrine for local anesthesia  The incision was sharply incised  The localizing wire was used to guide the dissection  Superior, inferior, medial and lateral planes were developed around the localizing wire and the specimen truncated posteriorly with electrocautery just beyond the tip of the wire  The specimen was then inked for orientation purposes  A specimen radiograph was taken in two dimensions and additional margins were removed to optimize complete removal of the tumor  The additional margins were inked on the most distal area  Final posterior is muscle, anterior is skin  All specimens were sent to pathology for permanent analysis  Superficial bleeding controlled with electrocautery and the wound was extensively irrigated  SLN Biopsy  The previously marked location of the sentinel lymph node was injected with 0 25% Marcaine and epinephrine  A curivilinear incision was made and dissection was taken down into the axillar proper with electrocautery  The meron counter was used to help localize the SLN  The sentinel lymph node was identified and removed with electrocautery  SLN Findings  The SLN was blue and radioactive  The lymph node was grossly normal and sent for for touch prep which showed no cancer        IORT and intraoperative Ultrasound  The lumpectomy cavity was measured and the 4 o cm IORT applicator was chosen, positioned and the applicator filled the cavity very well  The applicator was attached to IORT arm/generator and positioned in the wound  A prolene pursestring suture was placed and secured over the applicator  Intraoperative ultrasound was used to measure skin to applicator distances in the superior, medial, inferior and lateral projections  The closest measurement was medial which was 1 64 cm  Radiation oncology approved the treatment plan and shielding was placed over the breast   IORT was initiated and delivered 20 Gy  Following this, the applicator was removed and the breast and sentinel node site were extensively irrigated and closed in 2 layers         I was present for the entire procedure    Patient Disposition:  PACU     SIGNATURE: Leslie Gómez MD  DATE: January 12, 2018  TIME: 3:05 PM

## 2018-01-12 NOTE — ANESTHESIA POSTPROCEDURE EVALUATION
Post-Op Assessment Note      CV Status:  Stable    Mental Status:  Alert and awake    Hydration Status:  Euvolemic    PONV Controlled:  Controlled    Airway Patency:  Patent    Post Op Vitals Reviewed: Yes          Staff: CRNA           BP   153/69   Temp  97 1   Pulse  78   Resp   18   SpO2   100%

## 2018-01-13 VITALS
BODY MASS INDEX: 26.4 KG/M2 | DIASTOLIC BLOOD PRESSURE: 80 MMHG | TEMPERATURE: 97.4 F | HEART RATE: 84 BPM | HEIGHT: 63 IN | WEIGHT: 149 LBS | SYSTOLIC BLOOD PRESSURE: 126 MMHG | RESPIRATION RATE: 18 BRPM

## 2018-01-13 NOTE — RESULT NOTES
Verified Results  (LC) Hgb A1c with eAG Estimation [id number] 07:44AM Unruly Juvenal     Test Name Result Flag Reference   Hemoglobin A1c 5 9 % H 4 8-5 6   Pre-diabetes: 5 7 - 6 4           Diabetes: >6 4           Glycemic control for adults with diabetes: <7 0   Estim  Avg Glu (eAG) 123 mg/dL       (1) COMPREHENSIVE METABOLIC PANEL [id number] 61:12JQ [employer]     Test Name Result Flag Reference   Glucose, Serum 99 mg/dL  65-99   BUN 17 mg/dL  8-27   Creatinine, Serum 0 64 mg/dL  0 57-1 00   eGFR If NonAfricn Am 92 mL/min/1 73  >59   eGFR If Africn Am 106 mL/min/1 73  >59   BUN/Creatinine Ratio 27 H 11-26   Sodium, Serum 140 mmol/L  136-144   **Effective December 12, 2016 the reference interval**                   for Sodium, Serum will be changing to:                                                             134 - 144   Potassium, Serum 4 4 mmol/L  3 5-5 2   Chloride, Serum 100 mmol/L     **Effective December 12, 2016 the reference interval**                   for Chloride, Serum will be changing to:                                                              96 - 106   Carbon Dioxide, Total 25 mmol/L  18-29   Calcium, Serum 9 3 mg/dL  8 7-10 3   Protein, Total, Serum 6 3 g/dL  6 0-8 5   Albumin, Serum 4 6 g/dL  3 6-4 8   Globulin, Total 1 7 g/dL  1 5-4 5   A/G Ratio 2 7 H 1 1-2 5   Bilirubin, Total 0 5 mg/dL  0 0-1 2   Alkaline Phosphatase, S 91 IU/L     AST (SGOT) 30 IU/L  0-40   ALT (SGPT) 32 IU/L  0-32     (LC) Lipid Panel [id number] 07:44AM [facility]     Test Name Result Flag Reference   Cholesterol, Total 212 mg/dL H 100-199   Triglycerides 73 mg/dL  0-149   HDL Cholesterol 79 mg/dL  >39   VLDL Cholesterol Kayode 15 mg/dL  5-40   LDL Cholesterol Calc 118 mg/dL H 0-99       Discussion/Summary   Your labs are stable  Please follow up as we discussed at your last appointment    Dr Tre Hernandez

## 2018-01-14 VITALS
BODY MASS INDEX: 26.58 KG/M2 | SYSTOLIC BLOOD PRESSURE: 138 MMHG | TEMPERATURE: 98.9 F | HEIGHT: 63 IN | DIASTOLIC BLOOD PRESSURE: 70 MMHG | RESPIRATION RATE: 16 BRPM | HEART RATE: 82 BPM | WEIGHT: 150 LBS

## 2018-01-14 VITALS
SYSTOLIC BLOOD PRESSURE: 130 MMHG | HEIGHT: 63 IN | BODY MASS INDEX: 26.58 KG/M2 | RESPIRATION RATE: 18 BRPM | TEMPERATURE: 98.2 F | HEART RATE: 76 BPM | DIASTOLIC BLOOD PRESSURE: 72 MMHG | WEIGHT: 150 LBS

## 2018-01-15 NOTE — RESULT NOTES
Verified Results  (1) COMPREHENSIVE METABOLIC PANEL [id number] 61:78TX [facility]     Test Name Result Flag Reference   ALT (SGPT) 13 IU/L  0-32   AST (SGOT) 17 IU/L  0-40   Alkaline Phosphatase, S 92 IU/L     Bilirubin, Total 0 5 mg/dL  0 0-1 2   A/G Ratio 2 0  1 2-2 2   **Please note reference interval change**   Globulin, Total 2 1 g/dL  1 5-4 5   Albumin, Serum 4 2 g/dL  3 6-4 8   Protein, Total, Serum 6 3 g/dL  6 0-8 5   Calcium, Serum 9 5 mg/dL  8 7-10 3   Carbon Dioxide, Total 24 mmol/L  18-29   Chloride, Serum 103 mmol/L     Potassium, Serum 3 9 mmol/L  3 5-5 2   Sodium, Serum 142 mmol/L  134-144   BUN/Creatinine Ratio 23  11-26   eGFR If Africn Am 108 mL/min/1 73  >59   eGFR If NonAfricn Am 93 mL/min/1 73  >59   Creatinine, Serum 0 61 mg/dL  0 57-1 00   BUN 14 mg/dL  8-27   Glucose, Serum 101 mg/dL H 65-99     () Cardiovascular Risk Assessment [id number] 09:09AM [facility]     Test Name Result Flag Reference   PDF Image   Interpretation Note     Supplement report is available  (1) LIPID PANEL, FASTING [id number] 09:09AM [facility]     Test Name Result Flag Reference   T  Chol/HDL Ratio 2 9 ratio units  0 0-4 4   T  Chol/HDL Ratio                                                             Men  Women                                               1/2 Avg  Risk  3 4    3 3                                                   Avg Risk  5 0    4 4                                                2X Avg  Risk  9 6    7 1                                                3X Avg  Risk 23 4   11 0   LDL Cholesterol Calc 110 mg/dL H 0-99   VLDL Cholesterol Kayode 16 mg/dL  5-40   HDL Cholesterol 67 mg/dL  >39   Triglycerides 80 mg/dL  0-149   Cholesterol, Total 193 mg/dL  100-199       Discussion/Summary   Please keep your office visit as scheduled with Dr Didier Fox to review your test results

## 2018-01-16 NOTE — RESULT NOTES
Discussion/Summary   further tetsing ordered see task from today     Verified Results  [address] & CAD 20Nov2017 04:57PM Karel Colindres Order Number: [id number]    - Patient Instructions: To schedule this appointment, please contact Central Scheduling at [phone]  Do not wear any perfume, powder, lotion or deodorant on breast or underarm area  Please bring your doctors order, referral (if needed) and insurance information with you on the day of the test  Failure to bring this information may result in this test being rescheduled  Arrive 15 minutes prior to your appointment time to register  On the day of your test, please bring any prior mammogram or breast studies with you that were not performed at a [facility]  Failure to bring prior exams may result in your test needing to be rescheduled  Test Name Result Flag Reference   MAMMO SCREENING BILATERAL W 3D & CAD (Report)     Patient History:   Patient is postmenopausal    Family history of breast cancer under age 48 in maternal aunt,    breast cancer under age 48 in maternal aunt, ovarian cancer at    age 48 or over in maternal aunt  Benign FNA biopsy of the right breast    Patient's BMI is 25 7  Reason for exam: screening, asymptomatic  Screening     Mammo Screening Bilateral W DBT and CAD: November 20, 2017 -    Check In #: [de-identified]   2D/3D Procedure   3D views: Bilateral MLO, CC, and XCCL view(s) were taken  2D views: Bilateral MLO and CC view(s) were taken  Technologist: Loretta Veloz   Prior study comparison: March 3, 2016, mammo screening bilateral    W CAD performed at [place] [address]  February 11, 2015, bilateral screening mammogram performed at [place] [address]  February 11, 2013, bilateral screening   mammogram performed at [place] [address]  There are scattered fibroglandular densities   No dominant soft tissue mass, architectural distortion or suspicious    calcifications are noted in the right breast  The skin and    nipple contours are within normal limits  Within the left breast, a suspicious spiculated density at the    12:00 location noted, 4 cm posterior to the nipple, for which    ultrasound recommended  If this is sonographically occult,    follow-up compression and lateral mammography of the breast    recommended  Needs additional imaging  ACR BI-RADSï¾® Assessments: BiRad:0 - Incomplete: needs additional    imaging evaluation - Left     Recommendation:   Further imaging of the left breast    Routine screening mammogram of the right breast in 1 year  A breast health care nurse from our facility will be contacting    the patient regarding the need for additional imaging  The patient is scheduled in a reminder system for screening    mammography  8-10% of cancers will be missed on mammography  Management of a    palpable abnormality must be based on clinical grounds  Patients    will be notified of their results via letter from our facility  Accredited by [employer] of Radiology and FDA  Transcription Location: [facility] 98: [id number]     Risk Value(s):   Tyrer-Cuzick 10 Year: 2 800%, Tyrer-Cuzick Lifetime: 4 800%,    Myriad Table: 7 2%, CHERIE 5 Year: 1 4%, NCI Lifetime: 4 4%, MRS    : Based on personal and/or family history,    consideration of hereditary risk assessment may be warranted     Signed by:   Chaka Jessica MD   11/21/17

## 2018-01-17 NOTE — RESULT NOTES
Verified Results  MAMMO POST BIOPSY [id number] 11:16AM Clarisa Ovalle     Test Name Result Flag Reference   MAMMO POST BIOPSY (Report)     Post biopsy  US Guided Breast Biopsy Left: November 30, 2017 - Check In #:    [de-identified]   Technologist: Maco Diego RDMS   68-year-old woman presenting for ultrasound-guided biopsy left    breast 11:00 mass  After informed consent was obtained, the patient was positioned    for an ultrasound-guided core biopsy of the left breast  The    previously described 11:00 mass was again identified with    ultrasound  Chlorhexidine was used as sterile prep and 1%    lidocaine as local anesthetic  After a small skin incision was    made, a [employer] 12 gauge core needle was advanced to the    lesion under ultrasound guidance from a lateral approach  3 core   biopsies were then obtained with ultrasound confirming the core    needle traversing the lesion  The core specimens were then sent    to Pathology for further evaluation  A hydromark open coil clip    was then deployed in order to clare the biopsy site to facilitate    future intervention if necessary  Post-biopsy mammogram demonstrates clip concordance with    previously described mammographic finding  The patient tolerated the procedure well and left the department    in stable condition  Impression:   1  Uncomplicated ultrasound-guided core needle biopsy left    breast 11:00 mass, with placement of a hydromark open coil clip  Pathology Results: Pending     Mammo Post Biopsy: Left Breast - November 30, 2017 - Check In #:    [de-identified]   CC and MLO view(s) were taken of the left breast        Recommendation:   Pathology pending       Transcription Location: [facility] Sifuentes 98: [id number]

## 2018-01-18 NOTE — RESULT NOTES
Discussion/Summary   called pt to discuss results  left message for her to call back  looks like she is aware of the results and the need for a biopsy as the report states that they discussed this with her already  I will order the study in the computer and she can go forward as needed     Verified Results  *US BREAST LEFT LIMITED (DIAGNOSTIC) [id number] 02:32PM Elaine Sherman     Test Name Result Flag Reference   US BREAST LEFT LIMITED (Report)     Patient History:   Patient is postmenopausal    Family history of breast cancer under age 48 in maternal aunt,    breast cancer under age 48 in maternal aunt, ovarian cancer at    age 48 or over in maternal aunt  Benign FNA biopsy of the right breast    Patient's BMI is 25 7  Reason for exam: addl eval requested from prior study  Abnormal mammogram  Attention left upper breast      US Breast Left Limited: November 24, 2017 - Check In #: [de-identified]   Standard views  Technologist: Kathi Bae RDMS   Ultrasound of the left upper breast was performed and correlated    with recent screening mammogram which demonstrated spiculated    small masslike density at about the 11 o'clock position of the    left breast      The ultrasound demonstrates a concordant concerning appearing    hypoechoic slightly shadowing irregular mass at the 11 o'clock    position 4 cm from nipple  It measures 10 x 7 x 8 mm  It    appears to disrupt a tissue plane and is therefore concerning for   malignancy  Ultrasound-guided core biopsy is recommended for    confirmation  Suspicious appearing small hypoechoic mass at the 11    o'clock position of the left breast      ACR BI-RADSï¾® Assessments: BiRad:5 - Highly suggestive of    malignancy     Recommendation:   Ultrasound-guided biopsy of the left breast    I discussed today's findings and recommendations with the    patient  The patient is scheduled in a reminder system for screening    mammography       Transcription Location: [facility]: [id number]     Risk Value(s):   Tyrer-Cuzick 10 Year: 2 800%, Tyrer-Cuzick Lifetime: 4 800%,    Myriad Table: 7 2%, CHERIE 5 Year: 1 4%, NCI Lifetime: 4 4%, MRS    : Based on personal and/or family history,    consideration of hereditary risk assessment may be warranted     Signed by:   Kala Tse MD   17       Plan  Abnormal mammogram    · US GUIDED BREAST BIOPSY LEFT COMPLETE; Status:Hold For - Scheduling;  Requested [id number];

## 2018-01-23 VITALS
WEIGHT: 150.38 LBS | RESPIRATION RATE: 16 BRPM | TEMPERATURE: 97.6 F | HEART RATE: 72 BPM | HEIGHT: 64 IN | SYSTOLIC BLOOD PRESSURE: 132 MMHG | BODY MASS INDEX: 25.67 KG/M2 | OXYGEN SATURATION: 99 % | DIASTOLIC BLOOD PRESSURE: 62 MMHG

## 2018-01-23 NOTE — RESULT NOTES
Discussion/Summary   pt will be seeing DR Duc Zafar - i discussed with her yesterday     Verified Results  MAMMO PATHOLOGY REPORT (NO CHARGE) [id number] 09:47AM Rene Maki     Test Name Result Flag Reference   MAMMO PATHOLOGY REPORT (NO CHARGE) (Report)     Mammo Pathology Letter: Left Breast - November 30, 2017 - Check    In #: [de-identified]   Technologist: Jaden Mendoza,     Thank you for the recent referral of the above named patient [facility]    ultrasound-guided core biopsy performed by my colleague, Dr Nirali Silva  The results of her recent procedure have been made    available to me from pathology  The results are consistent with invasive breast carcinoma of no    special type  Receptor studies are pending  The pathology is concordant with the radiographic appearance  The   recommendation is for surgical consultation  I discussed this    personally with the patient at 12:17 PM on 12/4/2017  Our nurse navigators, depending on your known office preferences,   will ensure that the results and recommendations have been    communicated to the patient  If there is any clarification    needed, please do not hesitate to contact the Elissa [facility] D[facility] Hooper at [phone]       Thank you,     Lorri Manley MD     Transcription Location: [facility] 98: [id number]

## 2018-01-24 VITALS
TEMPERATURE: 97.9 F | BODY MASS INDEX: 26.4 KG/M2 | RESPIRATION RATE: 16 BRPM | DIASTOLIC BLOOD PRESSURE: 80 MMHG | SYSTOLIC BLOOD PRESSURE: 154 MMHG | HEART RATE: 78 BPM | WEIGHT: 149 LBS | HEIGHT: 63 IN

## 2018-01-26 ENCOUNTER — OFFICE VISIT (OUTPATIENT)
Dept: SURGICAL ONCOLOGY | Facility: CLINIC | Age: 68
End: 2018-01-26

## 2018-01-26 VITALS
HEIGHT: 64 IN | WEIGHT: 147 LBS | HEART RATE: 65 BPM | TEMPERATURE: 97.8 F | RESPIRATION RATE: 16 BRPM | BODY MASS INDEX: 25.1 KG/M2 | DIASTOLIC BLOOD PRESSURE: 80 MMHG | SYSTOLIC BLOOD PRESSURE: 100 MMHG

## 2018-01-26 DIAGNOSIS — C50.211 MALIGNANT NEOPLASM OF UPPER-INNER QUADRANT OF RIGHT BREAST IN FEMALE, ESTROGEN RECEPTOR POSITIVE (HCC): Primary | ICD-10-CM

## 2018-01-26 DIAGNOSIS — Z17.0 MALIGNANT NEOPLASM OF UPPER-INNER QUADRANT OF RIGHT BREAST IN FEMALE, ESTROGEN RECEPTOR POSITIVE (HCC): Primary | ICD-10-CM

## 2018-01-26 PROBLEM — C50.212 MALIGNANT NEOPLASM OF UPPER-INNER QUADRANT OF LEFT BREAST IN FEMALE, ESTROGEN RECEPTOR POSITIVE (HCC): Status: ACTIVE | Noted: 2017-12-07

## 2018-01-26 PROCEDURE — 99024 POSTOP FOLLOW-UP VISIT: CPT | Performed by: SURGERY

## 2018-01-26 RX ORDER — ASCORBIC ACID 500 MG
1 TABLET ORAL DAILY
COMMUNITY
End: 2018-01-26 | Stop reason: SDUPTHER

## 2018-01-26 RX ORDER — SIMVASTATIN 20 MG
1 TABLET ORAL DAILY
COMMUNITY
End: 2018-01-26 | Stop reason: SDUPTHER

## 2018-01-26 RX ORDER — MULTIVITAMIN WITH IRON
1 TABLET ORAL DAILY
COMMUNITY
End: 2018-02-22 | Stop reason: ALTCHOICE

## 2018-01-26 RX ORDER — LISINOPRIL 10 MG/1
1 TABLET ORAL DAILY
COMMUNITY
Start: 2014-11-06 | End: 2018-01-26 | Stop reason: SDUPTHER

## 2018-01-26 NOTE — LETTER
January 26, 2018     Eros Shah DO  [address] [address]    Patient: Terrell Hawthorne   YOB: 1950   Date of Visit: 1/26/2018       Dear Dr Mayra Serra:    Thank you for referring Darryle Mayco to me for evaluation  Below are my notes for this consultation  If you have questions, please do not hesitate to call me  I look forward to following your patient along with you  Sincerely,        Ina Reed MD        CC: No Recipients  Ina Reed MD  1/26/2018 12:48 PM  Sign at close encounter               Surgical Oncology Follow Up       [address] [place] [address]  1950  [id number]  [facility]  [facility] [address] [phone]    Chief Complaint   Patient presents with    Post-op     Pt has no new complains today  Assessment/Plan     Advance Care Planning/Advance Directives:  Discussed disease status, cancer treatment plans and/or cancer treatment goals with the patient  Malignant neoplasm of 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, estrogen receptor positive ([facility] [address] )    1/12/2018 Surgery     Left needle localization lumpectomy with sentinel lymph node biopsy  Grade 3  T1c NO Grade 3   Repeat HER 2 indicated and was Negative  ER 90  TX 90  Her 2 negative           Malignant neoplasm of upper-inner quadrant of right breast in female, estrogen receptor positive ([address] )    11/30/2017 Initial Diagnosis     Malignant neoplasm of upper-inner quadrant of right breast in female, estrogen receptor positive ([facility] [address] )         1/12/2018 Surgery     Left needle localization, lumpectomy and sentinel lymph node biopsy  ER 90  TX 90  Her 2 retested due to change in grade, negative            History of Present Illness:  Patient presents for a postoperative visit    Her tumor size was 11 mm staging information as above  She had no problems with her surgery  Review of Systems   Constitutional: Negative for activity change, appetite change, fatigue and unexpected weight change  HENT: Negative for ear pain, tinnitus, trouble swallowing and voice change  Eyes: Negative for pain and visual disturbance  Respiratory: Negative for cough, shortness of breath, wheezing and stridor  Cardiovascular: Negative for chest pain, palpitations and leg swelling  Gastrointestinal: Negative for abdominal distention, abdominal pain and blood in stool  Endocrine: Negative for cold intolerance and heat intolerance  Genitourinary: Negative for difficulty urinating, dysuria, flank pain and hematuria  Musculoskeletal: Negative for arthralgias, back pain, gait problem and joint swelling  Skin: Negative for color change, rash and wound  Allergic/Immunologic: Negative for immunocompromised state  Neurological: Negative for dizziness, seizures, speech difficulty, weakness and headaches  Hematological: Negative for adenopathy  Psychiatric/Behavioral: Negative for confusion           Patient Active Problem List   Diagnosis    Anxiety    Benign essential HTN    Elevated HDL    Hyperlipidemia LDL goal <130    Insomnia    Leiomyoma of uterus    Malignant neoplasm of 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, estrogen receptor positive ([address] )    Osteopenia    Vitamin D deficiency    Malignant neoplasm of upper-inner quadrant of right breast in female, estrogen receptor positive ([address] )     Past Medical History:   Diagnosis Date    Cancer ([facility] [address] )     left breast    Hemorrhoid     Hyperlipidemia     Hypertension     Insomnia     Leiomyoma of uterus     Osteopenia     Shingles     Tick bite      Past Surgical History:   Procedure Laterality Date    BREAST BIOPSY Left     BREAST LUMPECTOMY Left     CYST REMOVAL      right eyelid    DILATION AND CURETTAGE OF UTERUS      INTRAOPERATIVE RADIATION THERAPY (IORT) Left 1/12/2018    Procedure: BREAST IORT BY DR Robbin Allen;  Surgeon: Enedelia Hewitt MD;  [facility];  Service: Surgical Oncology    TN BIOPSY/EXCISION, LYMPH NODE(S) Left 1/12/2018    Procedure: LYMPHOSCINTIGRAPHY; INTRAOPERATIVE LYMPHATIC MAPPING; SENTINEL LYMPH NODE BIOPSY (INJECT AT 8047); Surgeon: Enedelia Hewitt MD;  [facility];  Service: Surgical Oncology    TN PERQ DEVICE PLACEMT BREAST LOC 1ST LES W YOLISE Left 1/12/2018    Procedure: BREAST LUMPECTOMY; BREAST NEEDLE LOCALIZATION (NEEDLE LOC AT 5363); FROZEN SECTION;  Surgeon: Enedelia Hewitt MD;  [facility];  Service: Surgical Oncology    SENTINEL LYMPH NODE BIOPSY Left      Family History   Problem Relation Age of Onset    Colon cancer Paternal Grandfather     Breast cancer Maternal Aunt     Prostate cancer Maternal Uncle     Colon cancer Maternal Uncle      Social History     Social History    Marital status: /Civil Union     Spouse name: N/A    Number of children: N/A    Years of education: N/A     Occupational History    Not on file       Social History Main Topics    Smoking status: Former Smoker    Smokeless tobacco: Never Used      Comment: quit 10 years ago    Alcohol use 4 2 oz/week     4 Glasses of wine, 3 Cans of beer per week    Drug use: No    Sexual activity: Not on file     Other Topics Concern    Not on file     Social History Narrative    No narrative on file       Current Outpatient Prescriptions:     lisinopril (ZESTRIL) 10 mg tablet, Take 10 mg by mouth daily, Disp: , Rfl:     Ascorbic Acid (VITAMIN C) 100 MG tablet, Take 100 mg by mouth daily, Disp: , Rfl:     cholecalciferol (VITAMIN D3) 1,000 units tablet, Take 1,000 Units by mouth daily, Disp: , Rfl:     Magnesium 250 MG TABS, Take 1 tablet by mouth daily, Disp: , Rfl:     simvastatin (ZOCOR) 10 mg tablet, Take 20 mg by mouth daily at bedtime  , Disp: , Rfl:   No Known Allergies  Vitals:    01/26/18 1110   BP: 100/80   Pulse: 65   Resp: 16 Temp: 97 8 °F (36 6 °C)       Physical Exam   Pulmonary/Chest: Left breast exhibits skin change  Discussion/Summary:    Pathologically this is a stage I T1 N0 breast cancer  The patient was originally grade 2-3 now she has a grade 3  She has a follow-up visit with Radiation Oncology are ready scheduled  I will coordinate appointment with Dr Manisha Llanes  Given her grade 3 but node negative I have recommended a mammaprint test to help determine her risk for distant recurrent disease  I will see her back in 3 months  We went through the process of informed consent for the mammo print trial   All questions were answered the patient's satisfaction  She has a copy of the consent form

## 2018-01-26 NOTE — PROGRESS NOTES
Surgical Oncology Follow Up       [address] [facility]  [facility] [place]  [address] [place] [address]  1950  [id number]  [address] [facility]  [facility] [place] [id number] [place] [id number]    Chief Complaint   Patient presents with    Post-op     Pt has no new complains today  Assessment/Plan     Advance Care Planning/Advance Directives:  Discussed disease status, cancer treatment plans and/or cancer treatment goals with the patient  Malignant neoplasm of 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, estrogen receptor positive ([facility] [address] )    1/12/2018 Surgery     Left needle localization lumpectomy with sentinel lymph node biopsy  Grade 3  T1c NO Grade 3   Repeat HER 2 indicated and was Negative  ER 90  TN 90  Her 2 negative           Malignant neoplasm of upper-inner quadrant of right breast in female, estrogen receptor positive ([facility] [address] )    11/30/2017 Initial Diagnosis     Malignant neoplasm of upper-inner quadrant of right breast in female, estrogen receptor positive ([facility] [address] )         1/12/2018 Surgery     Left needle localization, lumpectomy and sentinel lymph node biopsy  ER 90  TN 90  Her 2 retested due to change in grade, negative            History of Present Illness:  Patient presents for a postoperative visit  Her tumor size was 11 mm staging information as above  She had no problems with her surgery  Review of Systems   Constitutional: Negative for activity change, appetite change, fatigue and unexpected weight change  HENT: Negative for ear pain, tinnitus, trouble swallowing and voice change  Eyes: Negative for pain and visual disturbance  Respiratory: Negative for cough, shortness of breath, wheezing and stridor  Cardiovascular: Negative for chest pain, palpitations and leg swelling     Gastrointestinal: Negative for abdominal distention, abdominal pain and blood in stool    Endocrine: Negative for cold intolerance and heat intolerance  Genitourinary: Negative for difficulty urinating, dysuria, flank pain and hematuria  Musculoskeletal: Negative for arthralgias, back pain, gait problem and joint swelling  Skin: Negative for color change, rash and wound  Allergic/Immunologic: Negative for immunocompromised state  Neurological: Negative for dizziness, seizures, speech difficulty, weakness and headaches  Hematological: Negative for adenopathy  Psychiatric/Behavioral: Negative for confusion  Patient Active Problem List   Diagnosis    Anxiety    Benign essential HTN    Elevated HDL    Hyperlipidemia LDL goal <130    Insomnia    Leiomyoma of uterus    Malignant neoplasm of 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, estrogen receptor positive ([facility] [address] )    Osteopenia    Vitamin D deficiency    Malignant neoplasm of upper-inner quadrant of right breast in female, estrogen receptor positive ([facility] [address] )     Past Medical History:   Diagnosis Date    Cancer ([facility] )     left breast    Hemorrhoid     Hyperlipidemia     Hypertension     Insomnia     Leiomyoma of uterus     Osteopenia     Shingles     Tick bite      Past Surgical History:   Procedure Laterality Date    BREAST BIOPSY Left     BREAST LUMPECTOMY Left     CYST REMOVAL      right eyelid    DILATION AND CURETTAGE OF UTERUS      INTRAOPERATIVE RADIATION THERAPY (IORT) Left 1/12/2018    Procedure: BREAST IORT BY DR Napoleon Rangel;  Surgeon: Olivier Mercer MD;  [facility];  Service: Surgical Oncology    UT BIOPSY/EXCISION, LYMPH NODE(S) Left 1/12/2018    Procedure: LYMPHOSCINTIGRAPHY; INTRAOPERATIVE LYMPHATIC MAPPING; SENTINEL LYMPH NODE BIOPSY (INJECT AT 1215); Surgeon: Olivier Mercer MD;  [facility];  Service: Surgical Oncology    UT PERQ DEVICE PLACEMT BREAST LOC 1ST LES W ALINNCE Left 1/12/2018    Procedure: BREAST LUMPECTOMY; BREAST NEEDLE LOCALIZATION (NEEDLE LOC AT 2330);  FROZEN SECTION;  Surgeon: Delmy Rios MD;  [facility];  Service: Surgical Oncology    SENTINEL LYMPH NODE BIOPSY Left      Family History   Problem Relation Age of Onset    Colon cancer Paternal Grandfather     Breast cancer Maternal Aunt     Prostate cancer Maternal Uncle     Colon cancer Maternal Uncle      Social History     Social History    Marital status: /Civil Union     Spouse name: N/A    Number of children: N/A    Years of education: N/A     Occupational History    Not on file  Social History Main Topics    Smoking status: Former Smoker    Smokeless tobacco: Never Used      Comment: quit 10 years ago    Alcohol use 4 2 oz/week     4 Glasses of wine, 3 Cans of beer per week    Drug use: No    Sexual activity: Not on file     Other Topics Concern    Not on file     Social History Narrative    No narrative on file       Current Outpatient Prescriptions:     lisinopril (ZESTRIL) 10 mg tablet, Take 10 mg by mouth daily, Disp: , Rfl:     Ascorbic Acid (VITAMIN C) 100 MG tablet, Take 100 mg by mouth daily, Disp: , Rfl:     cholecalciferol (VITAMIN D3) 1,000 units tablet, Take 1,000 Units by mouth daily, Disp: , Rfl:     Magnesium 250 MG TABS, Take 1 tablet by mouth daily, Disp: , Rfl:     simvastatin (ZOCOR) 10 mg tablet, Take 20 mg by mouth daily at bedtime  , Disp: , Rfl:   No Known Allergies  Vitals:    01/26/18 1110   BP: 100/80   Pulse: 65   Resp: 16   Temp: 97 8 °F (36 6 °C)       Physical Exam   Pulmonary/Chest: Left breast exhibits skin change  Discussion/Summary:    Pathologically this is a stage I T1 N0 breast cancer  The patient was originally grade 2-3 now she has a grade 3  She has a follow-up visit with Radiation Oncology are ready scheduled  I will coordinate appointment with Dr Jaxon Giraldo  Given her grade 3 but node negative I have recommended a mammaprint test to help determine her risk for distant recurrent disease  I will see her back in 3 months  We went through the process of informed consent for the mammo print trial   All questions were answered the patient's satisfaction  She has a copy of the consent form

## 2018-01-31 ENCOUNTER — DOCUMENTATION (OUTPATIENT)
Dept: HEMATOLOGY ONCOLOGY | Facility: CLINIC | Age: 68
End: 2018-01-31

## 2018-01-31 NOTE — PROGRESS NOTES
Cancer Counselor note: reviewed patient's Distress Screening-currently rates her distress at #0-available for supportive services for patient as indicated

## 2018-02-06 LAB — SCAN RESULT: NORMAL

## 2018-02-09 ENCOUNTER — RADIATION ONCOLOGY FOLLOW-UP (OUTPATIENT)
Dept: RADIATION ONCOLOGY | Facility: HOSPITAL | Age: 68
End: 2018-02-09

## 2018-02-09 ENCOUNTER — APPOINTMENT (OUTPATIENT)
Dept: RADIATION ONCOLOGY | Facility: HOSPITAL | Age: 68
End: 2018-02-09
Attending: RADIOLOGY

## 2018-02-09 VITALS
TEMPERATURE: 97.7 F | DIASTOLIC BLOOD PRESSURE: 78 MMHG | BODY MASS INDEX: 25.4 KG/M2 | WEIGHT: 148.8 LBS | HEIGHT: 64 IN | SYSTOLIC BLOOD PRESSURE: 134 MMHG | RESPIRATION RATE: 16 BRPM | HEART RATE: 66 BPM

## 2018-02-09 DIAGNOSIS — Z17.0 MALIGNANT NEOPLASM OF UPPER-INNER QUADRANT OF LEFT BREAST IN FEMALE, ESTROGEN RECEPTOR POSITIVE (HCC): Primary | ICD-10-CM

## 2018-02-09 DIAGNOSIS — C50.212 MALIGNANT NEOPLASM OF UPPER-INNER QUADRANT OF LEFT BREAST IN FEMALE, ESTROGEN RECEPTOR POSITIVE (HCC): Primary | ICD-10-CM

## 2018-02-09 NOTE — PROGRESS NOTES
Follow-Up - Radiation Oncology   Diana Hampton 1950 69 y o  female [id number]      History of Present Illness       HPI: Diana Hampton returns today following her lumpectomy, sentinel node biopsy, and intraoperative radiation  She denies any significant residual discomfort in the breast and is essentially without complaints  Historical Information   Previous Oncology History:      Malignant neoplasm of 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, estrogen receptor positive ([facility] [place] [address] )    1/12/2018 Surgery     Left needle localization lumpectomy with sentinel lymph node biopsy  Grade 3  T1c NO Grade 3   Repeat HER 2 indicated and was Negative  ER 90  OR 90  Her 2 negative         1/12/2018 -  Radiation     IORT left breast to a dose of 2000cGy              Past Medical History:   Diagnosis Date    Cancer ([facility] [place] [address] )     left breast    Hemorrhoid     Hyperlipidemia     Hypertension     Insomnia     Leiomyoma of uterus     Osteopenia     Shingles     Tick bite      Past Surgical History:   Procedure Laterality Date    BREAST BIOPSY Left     BREAST LUMPECTOMY Left     CYST REMOVAL      right eyelid    DILATION AND CURETTAGE OF UTERUS      INTRAOPERATIVE RADIATION THERAPY (IORT) Left 1/12/2018    Procedure: BREAST IORT BY DR Son Godoy;  Surgeon: Leonard Pinzon MD;  [facility];  Service: Surgical Oncology    OR BIOPSY/EXCISION, LYMPH NODE(S) Left 1/12/2018    Procedure: LYMPHOSCINTIGRAPHY; INTRAOPERATIVE LYMPHATIC MAPPING; SENTINEL LYMPH NODE BIOPSY (INJECT AT 1215); Surgeon: Leonard Pinzon MD;  [facility];  Service: Surgical Oncology    OR PERQ DEVICE PLACEMT BREAST LOC 1ST LES W [place] Left 1/12/2018    Procedure: BREAST LUMPECTOMY; BREAST NEEDLE LOCALIZATION (NEEDLE LOC AT 5608);  FROZEN SECTION;  Surgeon: Leonard Pinzon MD;  [facility];  Service: Surgical Oncology    SENTINEL LYMPH NODE BIOPSY Left        Social History   History   Alcohol Use    4 2 oz/week    4 Glasses of wine, 3 Cans of beer per week     History   Drug Use No     History   Smoking Status    Former Smoker   Smokeless Tobacco    Never Used     Comment: quit 10 years ago         Meds/Allergies     Current Outpatient Prescriptions:     Ascorbic Acid (VITAMIN C) 100 MG tablet, Take 100 mg by mouth daily, Disp: , Rfl:     cholecalciferol (VITAMIN D3) 1,000 units tablet, Take 1,000 Units by mouth daily, Disp: , Rfl:     lisinopril (ZESTRIL) 10 mg tablet, Take 10 mg by mouth daily, Disp: , Rfl:     Magnesium 250 MG TABS, Take 1 tablet by mouth daily, Disp: , Rfl:     simvastatin (ZOCOR) 10 mg tablet, Take 20 mg by mouth daily at bedtime  , Disp: , Rfl:   No Known Allergies      Review of Systems  Review of Systems   Constitutional: Negative  HENT: Negative  Eyes: Negative  Respiratory: Negative  Cardiovascular: Negative  Gastrointestinal: Negative  Endocrine: Negative  Musculoskeletal: Negative  Skin: Negative  Allergic/Immunologic: Negative  Neurological: Negative  Hematological: Negative  Psychiatric/Behavioral: Sleep disturbance: Improved since her surgery  Objective   /78 (BP Location: Right arm, Patient Position: Sitting, Cuff Size: Standard)   Pulse 66   Temp 97 7 °F (36 5 °C) (Temporal)   Resp 16   Ht 5' 4" (1 626 m)   Wt 67 5 kg (148 lb 12 8 oz)   BMI 25 54 kg/m²   Karnofsky: 90 - Able to carry on normal activity; minor signs or symptoms of disease   Physical Exam  The patient presents today no apparent distress  Sclera anicteric  No cervical or supraclavicular lymphadenopathy  Normal S1-S2 regular rate and rhythm  Lungs clear to auscultation bilaterally  The right breast is within normal limits  There is a well-healed lumpectomy scar in the upper outer quadrant of the left breast with no significant underlying palpable seroma or hematoma  No axillary lymphadenopathy  Small residual axillary seroma      No results found for this or any previous visit (from the past 672 hour(s))  Nm Lymphatic Breast    Result Date: 1/12/2018  Narrative: SENTINEL NODE LYMPHOSCINTIGRAPHY INDICATION: Left breast carcinoma  Localize sentinel node FINDINGS: 0 501 mCi Tc-99m sulfur colloid (0 6 cc volume) was administered in divided doses by myself intradermally in the left breast  Scintigraphic images were obtained over the left hemithorax and axilla in multiple projections  A left axillary sentinel node was identified  Using scintigraphic guidance, the corresponding skin site was marked with an indelible marker  The patient was transferred to the operating room in satisfactory condition  Impression: [place] lymph node localized to the left axilla  Workstation performed: [id number]     Mammo Needle Localization Left ([employer])    Result Date: 1/15/2018  Narrative: Mammo Needle Localization Left ([employer]): January 12, 2018 - Check In #: [de-identified] CC, CC, ML, and ML view(s) were taken of the left breast  Technologist: KAYDEN Regalado (KAYDEN)(M) Indication: 69year old; left breast carcinoma  Preoperative needle localization  Comparison: Prior imaging studies of the left breast Procedure: After verifying patient and side of interest and initialing side of concern (time out procedure), utilizing digital mammographic guidance and sterile technique, a needle localization procedure of the stereotactic clip in the 11 o'clock position was performed from a superior approach  The localizing wire was positioned immediately adjacent to the clip  The patient tolerated the procedure well, leaving the department in satisfactory condition, with annotated guidance images available on PACS   Impression: Successful needle localization procedure of left breast stereotactic clip Transcription Location: KAYDEN [address]: [id number]    Mammo (historical)    Result Date: 1/15/2018  Narrative: Mammo Needle Localization Left ([employer]): January 12, 2018 -  Check In #: [de-identified]  CC, CC, ML, and ML view(s) were taken of the left breast   Technologist: KAYDEN Marie (R)(M)  Indication:  69year old; left breast carcinoma  Preoperative needle localization  Comparison:  Prior imaging studies of the left breast  Procedure:  After verifying patient and side of interest and initialing side  of concern (time out procedure), utilizing digital mammographic  guidance and sterile technique, a needle localization procedure  of the stereotactic clip in the 11 o'clock position was performed  from a superior approach  The localizing wire was positioned  immediately adjacent to the clip  The patient tolerated the  procedure well, leaving the department in satisfactory condition,  with annotated guidance images available on PACS  Impression:  Successful needle localization procedure of left breast  stereotactic clip  Transcription Location: KAYDEN [address]: [id number]    Mammo Breast Specimen (no Charge)    Result Date: 1/15/2018  Narrative: Mammo Breast Specimen No Charge: Left Breast - January 12, 2018 - Check In #: [de-identified] CC and ML view(s) were taken of the left breast  INDICATION: Evaluation of left breast surgical specimen TECHNIQUE: 2 digital images submitted; images marked with respect to specimen margins  FINDINGS: The specimen includes the localizing wire  The stereotactic clip in question is included in the specimen  Surrounding soft tissue density noted IMPRESSION: Left breast surgical specimen includes the localizing wire and stereotactic clip Transcription Location: [address] [facility] Signing Station: [id number]          Assessment/Plan      Patsy Terry returns today approximately 1 month after lumpectomy, sentinel node biopsy, and intraoperative radiation for a stage I ER/IN positive, Her 2-, grade 3 invasive mammary carcinoma of the left breast   Her disease was actually multi focal but all final margins were negative  There is no evidence of lymphovascular or perineural invasion    Due to the fact that the tumor was grade 3, a Mammaprint test has been sent and she will be following up with Medical Oncology within the next few weeks  Should her tumor return as genetically high risk, she may receive adjuvant systemic therapy  Should she have a high risk mammo print, we would also recommend adjuvant whole breast radiation following completion of systemic therapy  If her mammaprint returns as low risk, and she proceeds with hormonal therapy alone, then we would not recommend any additional radiation  She has been given a tentative follow-up appointment for 3 months

## 2018-02-09 NOTE — PROGRESS NOTES
Rodríguez White  1950   Ms Mallory Murrieta is a 69 y o  female       Chief Complaint   Patient presents with    Breast Cancer       Malignant neoplasm of 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, estrogen receptor positive ([facility] [address] )    Staging form: Breast, AJCC 8th Edition    - Pathologic stage from 1/12/2018: Stage IA (pT1c, pN0(sn), cM0, G3, ER: Positive, WV: Positive, HER2: Negative) - Unsigned    Staging form: Breast, AJCC 7th Edition    - Clinical: No stage assigned - Unsigned    Malignant neoplasm of upper-inner quadrant of right breast in female, estrogen receptor positive ([facility] [address] )    Staging form: Breast, AJCC 8th Edition    - Clinical stage from 11/30/2017: Stage IA (cT1c, cN0, cM0, G3, ER: Positive, WV: Positive, HER2: Negative) - Unsigned    - Pathologic stage from 1/12/2018: Stage IA (pT1c, pN0(sn), cM0, G3, ER: Positive, WV: Positive, HER2: Negative) - Unsigned    Oncology History     1/12/18 lumpectomy with IORT    2/16/17 Appointment with Dr Dee Oropeza        Malignant neoplasm of 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, estrogen receptor positive ([facility] [address] )    1/12/2018 Surgery     Left needle localization lumpectomy with sentinel lymph node biopsy  Grade 3  T1c NO Grade 3   Repeat HER 2 indicated and was Negative  ER 90  WV 90  Her 2 negative         1/12/2018 -  Radiation     IORT left breast to a dose of 2000cGy            Interval History: 1/12/18 lumpectomy with IORT    2/16/17 Appointment with Dr Dee Oropeza        [unfilled]  [facility]   Topic Date Due    Hepatitis C Screening  1950    PAP SMEAR  1950    DTaP,Tdap,and Td Vaccines (1 - Tdap) 03/30/1955    GLAUCOMA SCREENING 67+ YR  03/30/2015    INFLUENZA VACCINE  09/01/2017    COLONOSCOPY  02/20/2018    MAMMOGRAM  01/12/2019    PNEUMOCOCCAL POLYSACCHARIDE VACCINE AGE 72 AND OVER  Completed       Patient Active Problem List   Diagnosis    Anxiety    Benign essential HTN    Elevated HDL    Hyperlipidemia LDL goal <130    Insomnia    Leiomyoma of uterus    Malignant neoplasm of 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, estrogen receptor positive ([facility] [address]    Osteopenia    Vitamin D deficiency    Malignant neoplasm of upper-inner quadrant of right breast in female, estrogen receptor positive ([facility] [address] )     Past Medical History:   Diagnosis Date    [facility]     left breast    Hemorrhoid     Hyperlipidemia     Hypertension     Insomnia     Leiomyoma of uterus     Osteopenia     Shingles     Tick bite      Past Surgical History:   Procedure Laterality Date    BREAST BIOPSY Left     BREAST LUMPECTOMY Left     CYST REMOVAL      right eyelid    DILATION AND CURETTAGE OF UTERUS      INTRAOPERATIVE RADIATION THERAPY (IORT) Left 1/12/2018    Procedure: BREAST IORT BY DR Rachel Carreno;  Surgeon: Kevin Ayon MD;  [facility];  Service: Surgical Oncology    MS BIOPSY/EXCISION, LYMPH NODE(S) Left 1/12/2018    Procedure: LYMPHOSCINTIGRAPHY; INTRAOPERATIVE LYMPHATIC MAPPING; SENTINEL LYMPH NODE BIOPSY (INJECT AT 1215); Surgeon: Kevin Ayon MD;  [facility];  Service: Surgical Oncology    MS PERQ DEVICE PLACEMT BREAST LOC 1ST LES W [place] Left 1/12/2018    Procedure: BREAST LUMPECTOMY; BREAST NEEDLE LOCALIZATION (NEEDLE LOC AT 3699); FROZEN SECTION;  Surgeon: Kevin Ayon MD;  [facility];  Service: Surgical Oncology    SENTINEL LYMPH NODE BIOPSY Left      Family History   Problem Relation Age of Onset    Colon cancer Paternal Grandfather     Breast cancer Maternal Aunt     Prostate cancer Maternal Uncle     Colon cancer Maternal Uncle      Social History     Social History    Marital status: /Civil Union     Spouse name: N/A    Number of children: N/A    Years of education: N/A     Occupational History    Not on file       Social History Main Topics    Smoking status: Former Smoker    Smokeless tobacco: Never Used      Comment: quit 10 years ago    Alcohol use 4 2 oz/week     4 Glasses of wine, 3 Cans of beer per week    Drug use: No    Sexual activity: Not on file     Other Topics Concern    Not on file     Social History Narrative    No narrative on file       Current Outpatient Prescriptions:     Ascorbic Acid (VITAMIN C) 100 MG tablet, Take 100 mg by mouth daily, Disp: , Rfl:     cholecalciferol (VITAMIN D3) 1,000 units tablet, Take 1,000 Units by mouth daily, Disp: , Rfl:     lisinopril (ZESTRIL) 10 mg tablet, Take 10 mg by mouth daily, Disp: , Rfl:     Magnesium 250 MG TABS, Take 1 tablet by mouth daily, Disp: , Rfl:     simvastatin (ZOCOR) 10 mg tablet, Take 20 mg by mouth daily at bedtime  , Disp: , Rfl:   No Known Allergies    Review of Systems:  Review of Systems   Constitutional: Negative  HENT: Negative  Eyes: Negative  Respiratory: Negative  Cardiovascular: Negative  Gastrointestinal: Negative  Endocrine: Negative  Musculoskeletal: Negative  Skin: Negative  Allergic/Immunologic: Negative  Neurological: Negative  Hematological: Negative  Psychiatric/Behavioral: Sleep disturbance: Improved since her surgery  Vitals:    02/09/18 1541   BP: 134/78   BP Location: Right arm   Patient Position: Sitting   Cuff Size: Standard   Pulse: 66   Resp: 16   Temp: 97 7 °F (36 5 °C)   TempSrc: Temporal   Weight: 67 5 kg (148 lb 12 8 oz)   Height: 5' 4" (1 626 m)       Imaging:Nm Lymphatic Breast    Result Date: 1/12/2018  Narrative: SENTINEL NODE LYMPHOSCINTIGRAPHY INDICATION: Left breast carcinoma  Localize sentinel node FINDINGS: 0 501 mCi Tc-99m sulfur colloid (0 6 cc volume) was administered in divided doses by myself intradermally in the left breast  Scintigraphic images were obtained over the left hemithorax and axilla in multiple projections  A left axillary sentinel node was identified  Using scintigraphic guidance, the corresponding skin site was marked with an indelible marker    The patient was transferred to the operating room in satisfactory condition  Impression: [place] lymph node localized to the left axilla  Workstation performed: [id number]     Mammo Needle Localization Left ([employer])    Result Date: 1/15/2018  Narrative: Mammo Needle Localization Left ([employer]): January 12, 2018 - Check In #: [de-identified] CC, CC, ML, and ML view(s) were taken of the left breast  Technologist: KAYDEN Euceda (KAYDEN)(M) Indication: 69year old; left breast carcinoma  Preoperative needle localization  Comparison: Prior imaging studies of the left breast Procedure: After verifying patient and side of interest and initialing side of concern (time out procedure), utilizing digital mammographic guidance and sterile technique, a needle localization procedure of the stereotactic clip in the 11 o'clock position was performed from a superior approach  The localizing wire was positioned immediately adjacent to the clip  The patient tolerated the procedure well, leaving the department in satisfactory condition, with annotated guidance images available on PACS  Impression: Successful needle localization procedure of left breast stereotactic clip Transcription Location: KAYDEN [address]: [id number]    Mammo (historical)    Result Date: 1/15/2018  Narrative: Mammo Needle Localization Left ([employer]): January 12, 2018 -  Check In #: [de-identified]  CC, CC, ML, and ML view(s) were taken of the left breast   Technologist: KAYDEN Euceda (KAYDEN)(M)  Indication:  69year old; left breast carcinoma  Preoperative needle localization  Comparison:  Prior imaging studies of the left breast  Procedure:  After verifying patient and side of interest and initialing side  of concern (time out procedure), utilizing digital mammographic  guidance and sterile technique, a needle localization procedure  of the stereotactic clip in the 11 o'clock position was performed  from a superior approach  The localizing wire was positioned  immediately adjacent to the clip   The patient tolerated the  procedure well, leaving the department in satisfactory condition,  with annotated guidance images available on PACS  Impression:  Successful needle localization procedure of left breast  stereotactic clip  Transcription Location: KAYDEN [address]: [id number]    Mammo Breast Specimen (no Charge)    Result Date: 1/15/2018  Narrative: Mammo Breast Specimen No Charge: Left Breast - January 12, 2018 - Check In #: [de-identified] CC and ML view(s) were taken of the left breast  INDICATION: Evaluation of left breast surgical specimen TECHNIQUE: 2 digital images submitted; images marked with respect to specimen margins  FINDINGS: The specimen includes the localizing wire  The stereotactic clip in question is included in the specimen   Surrounding soft tissue density noted IMPRESSION: Left breast surgical specimen includes the localizing wire and stereotactic clip Transcription Location: [address] Signing Station: [id number]

## 2018-02-16 ENCOUNTER — OFFICE VISIT (OUTPATIENT)
Dept: HEMATOLOGY ONCOLOGY | Facility: CLINIC | Age: 68
End: 2018-02-16
Payer: COMMERCIAL

## 2018-02-16 VITALS
TEMPERATURE: 97.7 F | BODY MASS INDEX: 25.64 KG/M2 | DIASTOLIC BLOOD PRESSURE: 88 MMHG | RESPIRATION RATE: 16 BRPM | WEIGHT: 150.2 LBS | SYSTOLIC BLOOD PRESSURE: 134 MMHG | HEIGHT: 64 IN | HEART RATE: 69 BPM | OXYGEN SATURATION: 97 %

## 2018-02-16 DIAGNOSIS — C50.212 MALIGNANT NEOPLASM OF UPPER-INNER QUADRANT OF LEFT BREAST IN FEMALE, ESTROGEN RECEPTOR POSITIVE (HCC): Primary | ICD-10-CM

## 2018-02-16 DIAGNOSIS — Z17.0 MALIGNANT NEOPLASM OF UPPER-INNER QUADRANT OF LEFT BREAST IN FEMALE, ESTROGEN RECEPTOR POSITIVE (HCC): Primary | ICD-10-CM

## 2018-02-16 PROCEDURE — 99245 OFF/OP CONSLTJ NEW/EST HI 55: CPT | Performed by: INTERNAL MEDICINE

## 2018-02-16 RX ORDER — METOCLOPRAMIDE 10 MG/1
10 TABLET ORAL 4 TIMES DAILY
Qty: 30 TABLET | Refills: 1 | Status: SHIPPED | OUTPATIENT
Start: 2018-02-16 | End: 2018-02-22 | Stop reason: ALTCHOICE

## 2018-02-16 NOTE — LETTER
February 16, 2018     Remi Hampton MD  [facility] [id number]    Patient: Geno Cedeno   YOB: 1950   Date of Visit: 2/16/2018       Dear Dr Adonay Botello:    Thank you for referring Jon Zee to me for evaluation  Below are my notes for this consultation  If you have questions, please do not hesitate to call me  I look forward to following your patient along with you  Sincerely,        Layla Bro MD        CC: MD Layla Santiago MD  2/16/2018 12:13 PM  Sign at close encounter  Hematology / Oncology Outpatient Consult Note    Geno Cedeno 69 y o  female [id number]         Date:  2/16/2018    Assessment / Plan:  A 68-year-old postmenopausal woman with newly diagnosed stage IA left breast cancer, grade 3, ER 90% positive, MT 90% positive, HER2 negative disease  She underwent lumpectomy with sentinel lymph node biopsy, resulting in TE  Her tumor was found to be high risk, based on a MammaPrint  She presents today with her  to discuss adjuvant treatment options  We had extensive discussion regarding the diagnosis, tumor phenotype, interpretation of MammaPrint, prognosis and treatment options  With high-grade disease as well as MammaPrint high risk, adjuvant chemotherapy is indicated  I recommended her to have Taxotere and cyclophosphamide with Neulasta support every 3 weeks x4 cycles  Side effects of this regimen was thoroughly discussed, including but not limited to alopecia, nausea, vomiting, neutropenia, risk of infection, allergic reaction, neuropathy as well as peripheral edema  She understood and wished to proceed  After the chemotherapy, she would need radiation therapy as well as adjuvant hormonal therapy with aromatase inhibitor  She was instructed to give us a call immediately if she has fever above 100 4  She is She is going to start adjuvant chemotherapy and February 22, 2018    All the patient and her 's questions were answered to their satisfaction  Subjective:     HPI:  A 68-year-old postmenopausal woman who was found to have abnormality in her left breast, based on a screening mammography  Therefore, she underwent ultrasound-guided biopsy in November 30, 2017  She was found to have invasive ductal carcinoma, grade 2  This was ER 90% positive, IN 90% positive, HER2 negative disease  Subsequently, she was seen by Dr Cecile Ricci who did lumpectomy in January 12, 2018  Lumpectomy specimen showed 11 x 10 x 8 mm of invasive ductal carcinoma, grade 3  There was no evidence of lymphovascular invasion  One sentinel lymph node was negative for metastatic disease  Because of the grade escalation, HER2 IHC was repeated which was 2+  However, HER2 fish was negative for gene amplification  Dr Cecile Ricci sent her tumor tissue for MammaPrint which came back high risk disease  She presents today to discuss adjuvant treatment options  She has no complaint of pain  Her weight is stable  She denied any respiratory symptoms  She has no significant past medical history  She has no major surgery in the past   Her performance status is normal         Interval History:          Objective:     Primary Diagnosis:    Left breast cancer, stage IA, (pT1c, pN0, M0) grade 3, ER 90% positive, IN 90% positive, HER2 negative disease  MammaPrint high risk  Diagnosed in January 2018      Cancer Staging:  Malignant neoplasm of 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, estrogen receptor positive ([facility] [address] )    Staging form: Breast, AJCC 8th Edition    - Pathologic stage from 1/12/2018: Stage IA (pT1c, pN0(sn), cM0, G3, ER: Positive, IN: Positive, HER2: Negative) - Unsigned    Staging form: Breast, AJCC 7th Edition    - Clinical: No stage assigned - Unsigned    Malignant neoplasm of upper-inner quadrant of right breast in female, estrogen receptor positive ([facility] [address] )    Staging form: Breast, AJCC 8th Edition    - Clinical stage from 11/30/2017: Stage IA (cT1c, cN0, cM0, G3, ER: Positive, IL: Positive, HER2: Negative) - Unsigned    - Pathologic stage from 1/12/2018: Stage IA (pT1c, pN0(sn), cM0, G3, ER: Positive, IL: Positive, HER2: Negative) - Unsigned      Previous Hematologic/ Oncologic Treatment:         Current Hematologic/ Oncologic Treatment:      Adjuvant chemotherapy with Taxotere and cyclophosphamide to be started in February 22, 2018  Disease Status:     TE status post lumpectomy with sentinel lymph node biopsy  Test Results:    Pathology:    11 x 10 x 8 mm of invasive ductal carcinoma, grade 3  No evidence of lymphovascular invasion  One sentinel lymph node was negative for metastatic disease  ER 90% positive, IL 90% positive, her 2 2+ disease  HER2 fish was negative for gene amplification  MammaPrint high risk  Stage IA (pT1c, pN0, M0)    Radiology:    Chest x-ray was negative for pulmonary disease  Laboratory:    CBC and CMP are within normal limits  Physical Exam:      General Appearance:    Alert, oriented        Eyes:    PERRL   Ears:    Normal external ear canals, both ears   Nose:   Nares normal, septum midline   Throat:   Mucosa moist  Pharynx without injection  Neck:   Supple       Lungs:     Clear to auscultation bilaterally   Chest Wall:    No tenderness or deformity    Heart:    Regular rate and rhythm       Abdomen:     Soft, non-tender, bowel sounds +, no organomegaly           Extremities:   Extremities no cyanosis or edema       Skin:   no rash or icterus  Lymph nodes:   Cervical, supraclavicular, and axillary nodes normal   Neurologic:   CNII-XII intact, normal strength, sensation and reflexes     Throughout          Breast exam: Lumpectomy scar at upper aspect her left breast with no palpable abnormalities  Right breast exam is negative  ROS: Review of Systems   All other systems reviewed and are negative  Imaging: No results found        Labs:   Lab Results   Component Value Date    WBC 4 6 12/29/2017    HGB 13 8 12/29/2017    HCT 40 6 12/29/2017    MCV 92 12/29/2017     12/29/2017     Lab Results   Component Value Date     12/29/2017    K 4 5 12/29/2017     12/29/2017    CO2 27 12/29/2017    BUN 13 12/29/2017    CREATININE 0 60 12/29/2017    GLUCOSE 108 (H) 12/29/2017    CALCIUM 9 7 12/29/2017    AST 31 12/29/2017    ALT 36 (H) 12/29/2017    ALKPHOS 92 12/29/2017    PROT 6 2 12/29/2017    BILITOT 0 5 12/29/2017         Vital Sign:    Body surface area is 1 73 meters squared      Wt Readings from Last 3 Encounters:   02/16/18 68 1 kg (150 lb 3 2 oz)   02/09/18 67 5 kg (148 lb 12 8 oz)   01/26/18 66 7 kg (147 lb)        Temp Readings from Last 3 Encounters:   02/16/18 97 7 °F (36 5 °C) (Tympanic)   02/09/18 97 7 °F (36 5 °C) (Temporal)   01/26/18 97 8 °F (36 6 °C)        BP Readings from Last 3 Encounters:   02/16/18 134/88   02/09/18 134/78   01/26/18 100/80         Pulse Readings from Last 3 Encounters:   02/16/18 69   02/09/18 66   01/26/18 65     @LASTSAO2(3)@    Active Problems:   Patient Active Problem List   Diagnosis    Anxiety    Benign essential HTN    Elevated HDL    Hyperlipidemia LDL goal <130    Insomnia    Leiomyoma of uterus    Malignant neoplasm of 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, estrogen receptor positive ([address] )    Osteopenia    Vitamin D deficiency    Malignant neoplasm of upper-inner quadrant of right breast in female, estrogen receptor positive ([address] )       Past Medical History:   Past Medical History:   Diagnosis Date    Cancer ([address] )     left breast    Hemorrhoid     Hyperlipidemia     Hypertension     Insomnia     Leiomyoma of uterus     Osteopenia     Shingles     Tick bite        Surgical History:   Past Surgical History:   Procedure Laterality Date    BREAST BIOPSY Left     BREAST LUMPECTOMY Left     CYST REMOVAL      right eyelid    DILATION AND CURETTAGE OF UTERUS      INTRAOPERATIVE RADIATION THERAPY (IORT) Left 1/12/2018    Procedure: BREAST IORT BY DR Adrian Vidales;  Surgeon: Kym Griffiths MD;  [facility];  Service: Surgical Oncology    IA BIOPSY/EXCISION, LYMPH NODE(S) Left 1/12/2018    Procedure: LYMPHOSCINTIGRAPHY; INTRAOPERATIVE LYMPHATIC MAPPING; SENTINEL LYMPH NODE BIOPSY (INJECT AT 2199); Surgeon: Kym Griffiths MD;  [facility];  Service: Surgical Oncology    IA PERQ DEVICE PLACEMT BREAST LOC 1ST LES W GUIDNCE Left 1/12/2018    Procedure: BREAST LUMPECTOMY; BREAST NEEDLE LOCALIZATION (NEEDLE LOC AT 5625); FROZEN SECTION;  Surgeon: Kym Griffiths MD;  [facility];  Service: Surgical Oncology    SENTINEL LYMPH NODE BIOPSY Left        Family History:    Family History   Problem Relation Age of Onset    Colon cancer Paternal Grandfather     Breast cancer Maternal Aunt     Ovarian cancer Maternal Aunt     Prostate cancer Maternal Uncle     Colon cancer Maternal Uncle     Hypertension Mother     Heart disease Mother     Thyroid disease Mother     Leukemia Father        Cancer-related family history includes Breast cancer in her maternal aunt; Colon cancer in her maternal uncle and paternal grandfather; Ovarian cancer in her maternal aunt; Prostate cancer in her maternal uncle  Social History:   Social History     Social History    Marital status: /Civil Union     Spouse name: N/A    Number of children: N/A    Years of education: N/A     Occupational History    Not on file       Social History Main Topics    Smoking status: Former Smoker    Smokeless tobacco: Former User      Comment: quit 10 years ago    Alcohol use 4 2 oz/week     4 Glasses of wine, 3 Cans of beer per week    Drug use: No    Sexual activity: Not on file     Other Topics Concern    Not on file     Social History Narrative    No narrative on file       Current Medications:   Current Outpatient Prescriptions   Medication Sig Dispense Refill    cholecalciferol (VITAMIN D3) 1,000 units tablet Take 1,000 Units by mouth daily      lisinopril (ZESTRIL) 10 mg tablet Take 10 mg by mouth daily      simvastatin (ZOCOR) 10 mg tablet Take 20 mg by mouth daily at bedtime        Ascorbic Acid (VITAMIN C) 100 MG tablet Take 100 mg by mouth daily      Magnesium 250 MG TABS Take 1 tablet by mouth daily      metoclopramide (REGLAN) 10 mg tablet Take 1 tablet (10 mg total) by mouth 4 (four) times a day 30 tablet 1     No current facility-administered medications for this visit          Allergies: No Known Allergies

## 2018-02-16 NOTE — PROGRESS NOTES
Hematology / Oncology Outpatient Consult Note    Sherry Moore 69 y o  female [id number]         Date:  2/16/2018    Assessment / Plan:  A 77-year-old postmenopausal woman with newly diagnosed stage IA left breast cancer, grade 3, ER 90% positive, MN 90% positive, HER2 negative disease  She underwent lumpectomy with sentinel lymph node biopsy, resulting in TE  Her tumor was found to be high risk, based on a MammaPrint  She presents today with her  to discuss adjuvant treatment options  We had extensive discussion regarding the diagnosis, tumor phenotype, interpretation of MammaPrint, prognosis and treatment options  With high-grade disease as well as MammaPrint high risk, adjuvant chemotherapy is indicated  I recommended her to have Taxotere and cyclophosphamide with Neulasta support every 3 weeks x4 cycles  Side effects of this regimen was thoroughly discussed, including but not limited to alopecia, nausea, vomiting, neutropenia, risk of infection, allergic reaction, neuropathy as well as peripheral edema  She understood and wished to proceed  After the chemotherapy, she would need radiation therapy as well as adjuvant hormonal therapy with aromatase inhibitor  She was instructed to give us a call immediately if she has fever above 100 4  She is She is going to start adjuvant chemotherapy and February 22, 2018  All the patient and her 's questions were answered to their satisfaction  Subjective:     HPI:  A 77-year-old postmenopausal woman who was found to have abnormality in her left breast, based on a screening mammography  Therefore, she underwent ultrasound-guided biopsy in November 30, 2017  She was found to have invasive ductal carcinoma, grade 2  This was ER 90% positive, MN 90% positive, HER2 negative disease  Subsequently, she was seen by Dr Kristina Noyola who did lumpectomy in January 12, 2018    Lumpectomy specimen showed 11 x 10 x 8 mm of invasive ductal carcinoma, grade 3  There was no evidence of lymphovascular invasion  One sentinel lymph node was negative for metastatic disease  Because of the grade escalation, HER2 IHC was repeated which was 2+  However, HER2 fish was negative for gene amplification  Dr Bharathi Queen sent her tumor tissue for MammaPrint which came back high risk disease  She presents today to discuss adjuvant treatment options  She has no complaint of pain  Her weight is stable  She denied any respiratory symptoms  She has no significant past medical history  She has no major surgery in the past   Her performance status is normal         Interval History:          Objective:     Primary Diagnosis:    Left breast cancer, stage IA, (pT1c, pN0, M0) grade 3, ER 90% positive, OK 90% positive, HER2 negative disease  MammaPrint high risk  Diagnosed in January 2018  Cancer Staging:  Malignant neoplasm of 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, estrogen receptor positive ([facility] [address] )    Staging form: Breast, AJCC 8th Edition    - Pathologic stage from 1/12/2018: Stage IA (pT1c, pN0(sn), cM0, G3, ER: Positive, OK: Positive, HER2: Negative) - Unsigned    Staging form: Breast, AJCC 7th Edition    - Clinical: No stage assigned - Unsigned    Malignant neoplasm of upper-inner quadrant of right breast in female, estrogen receptor positive ([facility] [address] )    Staging form: Breast, AJCC 8th Edition    - Clinical stage from 11/30/2017: Stage IA (cT1c, cN0, cM0, G3, ER: Positive, OK: Positive, HER2: Negative) - Unsigned    - Pathologic stage from 1/12/2018: Stage IA (pT1c, pN0(sn), cM0, G3, ER: Positive, OK: Positive, HER2: Negative) - Unsigned      Previous Hematologic/ Oncologic Treatment:         Current Hematologic/ Oncologic Treatment:      Adjuvant chemotherapy with Taxotere and cyclophosphamide to be started in February 22, 2018  Disease Status:     TE status post lumpectomy with sentinel lymph node biopsy      Test Results:    Pathology:    11 x 10 x 8 mm of invasive ductal carcinoma, grade 3  No evidence of lymphovascular invasion  One sentinel lymph node was negative for metastatic disease  ER 90% positive, NJ 90% positive, her 2 2+ disease  HER2 fish was negative for gene amplification  MammaPrint high risk  Stage IA (pT1c, pN0, M0)    Radiology:    Chest x-ray was negative for pulmonary disease  Laboratory:    CBC and CMP are within normal limits  Physical Exam:      General Appearance:    Alert, oriented        Eyes:    PERRL   Ears:    Normal external ear canals, both ears   Nose:   Nares normal, septum midline   Throat:   Mucosa moist  Pharynx without injection  Neck:   Supple       Lungs:     Clear to auscultation bilaterally   Chest Wall:    No tenderness or deformity    Heart:    Regular rate and rhythm       Abdomen:     Soft, non-tender, bowel sounds +, no organomegaly           Extremities:   Extremities no cyanosis or edema       Skin:   no rash or icterus  Lymph nodes:   Cervical, supraclavicular, and axillary nodes normal   Neurologic:   CNII-XII intact, normal strength, sensation and reflexes     Throughout          Breast exam: Lumpectomy scar at upper aspect her left breast with no palpable abnormalities  Right breast exam is negative  ROS: Review of Systems   All other systems reviewed and are negative  Imaging: No results found  Labs:   Lab Results   Component Value Date    WBC 4 6 12/29/2017    HGB 13 8 12/29/2017    HCT 40 6 12/29/2017    MCV 92 12/29/2017     12/29/2017     Lab Results   Component Value Date     12/29/2017    K 4 5 12/29/2017     12/29/2017    CO2 27 12/29/2017    BUN 13 12/29/2017    CREATININE 0 60 12/29/2017    GLUCOSE 108 (H) 12/29/2017    CALCIUM 9 7 12/29/2017    AST 31 12/29/2017    ALT 36 (H) 12/29/2017    ALKPHOS 92 12/29/2017    PROT 6 2 12/29/2017    BILITOT 0 5 12/29/2017         Vital Sign:    Body surface area is 1 73 meters squared      Wt Readings from Last 3 Encounters:   02/16/18 68 1 kg (150 lb 3 2 oz)   02/09/18 67 5 kg (148 lb 12 8 oz)   01/26/18 66 7 kg (147 lb)        Temp Readings from Last 3 Encounters:   02/16/18 97 7 °F (36 5 °C) (Tympanic)   02/09/18 97 7 °F (36 5 °C) (Temporal)   01/26/18 97 8 °F (36 6 °C)        BP Readings from Last 3 Encounters:   02/16/18 134/88   02/09/18 134/78   01/26/18 100/80         Pulse Readings from Last 3 Encounters:   02/16/18 69   02/09/18 66   01/26/18 65     @LASTSAO2(3)@    Active Problems:   Patient Active Problem List   Diagnosis    Anxiety    Benign essential HTN    Elevated HDL    Hyperlipidemia LDL goal <130    Insomnia    Leiomyoma of uterus    Malignant neoplasm of 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, estrogen receptor positive ([facility] [address] )    Osteopenia    Vitamin D deficiency    Malignant neoplasm of upper-inner quadrant of right breast in female, estrogen receptor positive ([facility] [address] )       Past Medical History:   Past Medical History:   Diagnosis Date    Cancer ([facility] [address] )     left breast    Hemorrhoid     Hyperlipidemia     Hypertension     Insomnia     Leiomyoma of uterus     Osteopenia     Shingles     Tick bite        Surgical History:   Past Surgical History:   Procedure Laterality Date    BREAST BIOPSY Left     BREAST LUMPECTOMY Left     CYST REMOVAL      right eyelid    DILATION AND CURETTAGE OF UTERUS      INTRAOPERATIVE RADIATION THERAPY (IORT) Left 1/12/2018    Procedure: BREAST IORT BY DR Robbin Allen;  Surgeon: Enedelia Hewitt MD;  [facility];  Service: Surgical Oncology    UT BIOPSY/EXCISION, LYMPH NODE(S) Left 1/12/2018    Procedure: LYMPHOSCINTIGRAPHY; INTRAOPERATIVE LYMPHATIC MAPPING; SENTINEL LYMPH NODE BIOPSY (INJECT AT 1215); Surgeon: Enedelia Hewitt MD;  [facility];  Service: Surgical Oncology    UT PERQ DEVICE PLACEMT BREAST LOC 1ST LES W [place] Left 1/12/2018    Procedure: BREAST LUMPECTOMY; BREAST NEEDLE LOCALIZATION (NEEDLE LOC AT 1127);  FROZEN SECTION; Surgeon: Christel Castillo MD;  [facility];  Service: Surgical Oncology    SENTINEL LYMPH NODE BIOPSY Left        Family History:    Family History   Problem Relation Age of Onset    Colon cancer Paternal Grandfather     Breast cancer Maternal Aunt     Ovarian cancer Maternal Aunt     Prostate cancer Maternal Uncle     Colon cancer Maternal Uncle     Hypertension Mother     Heart disease Mother     Thyroid disease Mother     Leukemia Father        Cancer-related family history includes Breast cancer in her maternal aunt; Colon cancer in her maternal uncle and paternal grandfather; Ovarian cancer in her maternal aunt; Prostate cancer in her maternal uncle  Social History:   Social History     Social History    Marital status: /Civil Union     Spouse name: N/A    Number of children: N/A    Years of education: N/A     Occupational History    Not on file  Social History Main Topics    Smoking status: Former Smoker    Smokeless tobacco: Former User      Comment: quit 10 years ago    Alcohol use 4 2 oz/week     4 Glasses of wine, 3 Cans of beer per week    Drug use: No    Sexual activity: Not on file     Other Topics Concern    Not on file     Social History Narrative    No narrative on file       Current Medications:   Current Outpatient Prescriptions   Medication Sig Dispense Refill    cholecalciferol (VITAMIN D3) 1,000 units tablet Take 1,000 Units by mouth daily      lisinopril (ZESTRIL) 10 mg tablet Take 10 mg by mouth daily      simvastatin (ZOCOR) 10 mg tablet Take 20 mg by mouth daily at bedtime        Ascorbic Acid (VITAMIN C) 100 MG tablet Take 100 mg by mouth daily      Magnesium 250 MG TABS Take 1 tablet by mouth daily      metoclopramide (REGLAN) 10 mg tablet Take 1 tablet (10 mg total) by mouth 4 (four) times a day 30 tablet 1     No current facility-administered medications for this visit          Allergies: No Known Allergies

## 2018-02-19 ENCOUNTER — LAB (OUTPATIENT)
Dept: LAB | Facility: HOSPITAL | Age: 68
End: 2018-02-19
Attending: INTERNAL MEDICINE
Payer: COMMERCIAL

## 2018-02-19 ENCOUNTER — TRANSCRIBE ORDERS (OUTPATIENT)
Dept: ADMINISTRATIVE | Facility: HOSPITAL | Age: 68
End: 2018-02-19

## 2018-02-19 DIAGNOSIS — C50.212 MALIGNANT NEOPLASM OF UPPER-INNER QUADRANT OF LEFT BREAST IN FEMALE, ESTROGEN RECEPTOR POSITIVE (HCC): ICD-10-CM

## 2018-02-19 DIAGNOSIS — Z17.0 MALIGNANT NEOPLASM OF UPPER-INNER QUADRANT OF LEFT BREAST IN FEMALE, ESTROGEN RECEPTOR POSITIVE (HCC): ICD-10-CM

## 2018-02-19 LAB
ALBUMIN SERPL BCP-MCNC: 3.8 G/DL (ref 3.5–5)
ALP SERPL-CCNC: 101 U/L (ref 46–116)
ALT SERPL W P-5'-P-CCNC: 36 U/L (ref 12–78)
ANION GAP SERPL CALCULATED.3IONS-SCNC: 5 MMOL/L (ref 4–13)
AST SERPL W P-5'-P-CCNC: 22 U/L (ref 5–45)
BASOPHILS # BLD AUTO: 0 THOUSANDS/ΜL (ref 0–0.1)
BASOPHILS NFR BLD AUTO: 1 % (ref 0–1)
BILIRUB SERPL-MCNC: 0.4 MG/DL (ref 0.2–1)
BUN SERPL-MCNC: 18 MG/DL (ref 5–25)
CALCIUM SERPL-MCNC: 9.4 MG/DL (ref 8.3–10.1)
CHLORIDE SERPL-SCNC: 106 MMOL/L (ref 100–108)
CO2 SERPL-SCNC: 33 MMOL/L (ref 21–32)
CREAT SERPL-MCNC: 0.62 MG/DL (ref 0.6–1.3)
EOSINOPHIL # BLD AUTO: 0.1 THOUSAND/ΜL (ref 0–0.61)
EOSINOPHIL NFR BLD AUTO: 1 % (ref 0–6)
ERYTHROCYTE [DISTWIDTH] IN BLOOD BY AUTOMATED COUNT: 13.7 % (ref 11.6–15.1)
GFR SERPL CREATININE-BSD FRML MDRD: 92 ML/MIN/1.73SQ M
GLUCOSE SERPL-MCNC: 78 MG/DL (ref 65–140)
HCT VFR BLD AUTO: 40.5 % (ref 37–47)
HGB BLD-MCNC: 13.5 G/DL (ref 12–16)
LYMPHOCYTES # BLD AUTO: 1.6 THOUSANDS/ΜL (ref 0.6–4.47)
LYMPHOCYTES NFR BLD AUTO: 34 % (ref 14–44)
MCH RBC QN AUTO: 31.3 PG (ref 27–31)
MCHC RBC AUTO-ENTMCNC: 33.4 G/DL (ref 31.4–37.4)
MCV RBC AUTO: 94 FL (ref 82–98)
MONOCYTES # BLD AUTO: 0.4 THOUSAND/ΜL (ref 0.17–1.22)
MONOCYTES NFR BLD AUTO: 9 % (ref 4–12)
NEUTROPHILS # BLD AUTO: 2.7 THOUSANDS/ΜL (ref 1.85–7.62)
NEUTS SEG NFR BLD AUTO: 55 % (ref 43–75)
NRBC BLD AUTO-RTO: 0 /100 WBCS
PLATELET # BLD AUTO: 242 THOUSANDS/UL (ref 130–400)
PMV BLD AUTO: 7.2 FL (ref 8.9–12.7)
POTASSIUM SERPL-SCNC: 3.9 MMOL/L (ref 3.5–5.3)
PROT SERPL-MCNC: 6.9 G/DL (ref 6.4–8.2)
RBC # BLD AUTO: 4.32 MILLION/UL (ref 4.2–5.4)
SODIUM SERPL-SCNC: 144 MMOL/L (ref 136–145)
WBC # BLD AUTO: 4.8 THOUSAND/UL (ref 4.8–10.8)

## 2018-02-19 PROCEDURE — 85025 COMPLETE CBC W/AUTO DIFF WBC: CPT

## 2018-02-19 PROCEDURE — 80053 COMPREHEN METABOLIC PANEL: CPT

## 2018-02-19 PROCEDURE — 36415 COLL VENOUS BLD VENIPUNCTURE: CPT

## 2018-02-21 RX ORDER — SODIUM CHLORIDE 9 MG/ML
20 INJECTION, SOLUTION INTRAVENOUS ONCE
Status: COMPLETED | OUTPATIENT
Start: 2018-02-22 | End: 2018-02-22

## 2018-02-22 ENCOUNTER — DOCUMENTATION (OUTPATIENT)
Dept: INFUSION CENTER | Facility: CLINIC | Age: 68
End: 2018-02-22

## 2018-02-22 ENCOUNTER — HOSPITAL ENCOUNTER (OUTPATIENT)
Dept: INFUSION CENTER | Facility: HOSPITAL | Age: 68
Discharge: HOME | End: 2018-02-22
Payer: COMMERCIAL

## 2018-02-22 VITALS
BODY MASS INDEX: 25.52 KG/M2 | SYSTOLIC BLOOD PRESSURE: 159 MMHG | TEMPERATURE: 96.4 F | WEIGHT: 149.47 LBS | HEIGHT: 64 IN | DIASTOLIC BLOOD PRESSURE: 70 MMHG | HEART RATE: 89 BPM | RESPIRATION RATE: 20 BRPM | OXYGEN SATURATION: 98 %

## 2018-02-22 PROCEDURE — 96367 TX/PROPH/DG ADDL SEQ IV INF: CPT

## 2018-02-22 PROCEDURE — 96413 CHEMO IV INFUSION 1 HR: CPT

## 2018-02-22 PROCEDURE — 96417 CHEMO IV INFUS EACH ADDL SEQ: CPT

## 2018-02-22 RX ADMIN — DOCETAXEL 130 MG: 80 INJECTION, SOLUTION, CONCENTRATE INTRAVENOUS at 11:34

## 2018-02-22 RX ADMIN — SODIUM CHLORIDE 20 ML/HR: 0.9 INJECTION, SOLUTION INTRAVENOUS at 11:03

## 2018-02-22 RX ADMIN — DEXAMETHASONE SODIUM PHOSPHATE: 10 INJECTION, SOLUTION INTRAMUSCULAR; INTRAVENOUS at 11:03

## 2018-02-22 RX ADMIN — CYCLOPHOSPHAMIDE 1000 MG: 1 INJECTION, POWDER, FOR SOLUTION INTRAVENOUS; ORAL at 12:45

## 2018-02-22 NOTE — SOCIAL WORK
Pt's Distress Thermometer showed pt rated her distress level as a 4 with the following problems noted: work, fears, nervousness, worry, dry/itchy skin, sleep and tingling in hands/feet  I called the pt and reviewed my role as a cancer counselor with her  She was open to thinking about arranging a time to meet and took my contact info  Offered ongoing support to the pt

## 2018-02-23 ENCOUNTER — HOSPITAL ENCOUNTER (OUTPATIENT)
Dept: INFUSION CENTER | Facility: HOSPITAL | Age: 68
Discharge: HOME | End: 2018-02-23
Payer: COMMERCIAL

## 2018-02-23 VITALS
DIASTOLIC BLOOD PRESSURE: 68 MMHG | HEART RATE: 66 BPM | TEMPERATURE: 97.6 F | RESPIRATION RATE: 20 BRPM | OXYGEN SATURATION: 96 % | SYSTOLIC BLOOD PRESSURE: 150 MMHG

## 2018-02-23 PROCEDURE — 96372 THER/PROPH/DIAG INJ SC/IM: CPT

## 2018-02-23 RX ADMIN — PEGFILGRASTIM 6 MG: 6 INJECTION SUBCUTANEOUS at 14:03

## 2018-02-23 NOTE — PLAN OF CARE
Knowledge Deficit     Patient/family/caregiver demonstrates understanding of disease process, treatment plan, medications, and discharge instructions Progressing        SAFETY ADULT     Patient will remain free of falls Progressing

## 2018-02-26 ENCOUNTER — TELEPHONE (OUTPATIENT)
Dept: HEMATOLOGY ONCOLOGY | Facility: CLINIC | Age: 68
End: 2018-02-26

## 2018-02-26 NOTE — TELEPHONE ENCOUNTER
Has fever of 99 4, yesterday 99 8  Asking if OK to take Tylenol  Told she could take Tylenol  Also wondering if OK to use Vitamin C  She was taking extra orange juice and lime juice, and her stomach is irritated   Advised it is OK to use Vitamin C and Zinc

## 2018-03-13 ENCOUNTER — APPOINTMENT (OUTPATIENT)
Dept: LAB | Facility: HOSPITAL | Age: 68
End: 2018-03-13
Attending: INTERNAL MEDICINE
Payer: COMMERCIAL

## 2018-03-13 DIAGNOSIS — C50.919 MALIGNANT NEOPLASM OF FEMALE BREAST, UNSPECIFIED ESTROGEN RECEPTOR STATUS, UNSPECIFIED LATERALITY, UNSPECIFIED SITE OF BREAST (HCC): ICD-10-CM

## 2018-03-13 DIAGNOSIS — C50.919 MALIGNANT NEOPLASM OF FEMALE BREAST, UNSPECIFIED ESTROGEN RECEPTOR STATUS, UNSPECIFIED LATERALITY, UNSPECIFIED SITE OF BREAST (HCC): Primary | ICD-10-CM

## 2018-03-13 LAB
ALBUMIN SERPL BCP-MCNC: 3.5 G/DL (ref 3.5–5)
ALP SERPL-CCNC: 96 U/L (ref 46–116)
ALT SERPL W P-5'-P-CCNC: 37 U/L (ref 12–78)
ANION GAP SERPL CALCULATED.3IONS-SCNC: 7 MMOL/L (ref 4–13)
AST SERPL W P-5'-P-CCNC: 20 U/L (ref 5–45)
BASOPHILS # BLD AUTO: 0 THOUSANDS/ΜL (ref 0–0.1)
BASOPHILS NFR BLD AUTO: 1 % (ref 0–1)
BILIRUB SERPL-MCNC: 0.3 MG/DL (ref 0.2–1)
BUN SERPL-MCNC: 16 MG/DL (ref 5–25)
CALCIUM SERPL-MCNC: 9.3 MG/DL (ref 8.3–10.1)
CHLORIDE SERPL-SCNC: 106 MMOL/L (ref 100–108)
CO2 SERPL-SCNC: 30 MMOL/L (ref 21–32)
CREAT SERPL-MCNC: 0.52 MG/DL (ref 0.6–1.3)
EOSINOPHIL # BLD AUTO: 0 THOUSAND/ΜL (ref 0–0.61)
EOSINOPHIL NFR BLD AUTO: 0 % (ref 0–6)
ERYTHROCYTE [DISTWIDTH] IN BLOOD BY AUTOMATED COUNT: 14 % (ref 11.6–15.1)
GFR SERPL CREATININE-BSD FRML MDRD: 98 ML/MIN/1.73SQ M
GLUCOSE SERPL-MCNC: 97 MG/DL (ref 65–140)
HCT VFR BLD AUTO: 37.4 % (ref 37–47)
HGB BLD-MCNC: 12.3 G/DL (ref 12–16)
LYMPHOCYTES # BLD AUTO: 1.2 THOUSANDS/ΜL (ref 0.6–4.47)
LYMPHOCYTES NFR BLD AUTO: 28 % (ref 14–44)
MCH RBC QN AUTO: 30.8 PG (ref 27–31)
MCHC RBC AUTO-ENTMCNC: 33 G/DL (ref 31.4–37.4)
MCV RBC AUTO: 93 FL (ref 82–98)
MONOCYTES # BLD AUTO: 0.5 THOUSAND/ΜL (ref 0.17–1.22)
MONOCYTES NFR BLD AUTO: 11 % (ref 4–12)
NEUTROPHILS # BLD AUTO: 2.7 THOUSANDS/ΜL (ref 1.85–7.62)
NEUTS SEG NFR BLD AUTO: 61 % (ref 43–75)
NRBC BLD AUTO-RTO: 0 /100 WBCS
PLATELET # BLD AUTO: 346 THOUSANDS/UL (ref 130–400)
PMV BLD AUTO: 7.3 FL (ref 8.9–12.7)
POTASSIUM SERPL-SCNC: 4 MMOL/L (ref 3.5–5.3)
PROT SERPL-MCNC: 6.3 G/DL (ref 6.4–8.2)
RBC # BLD AUTO: 4 MILLION/UL (ref 4.2–5.4)
SODIUM SERPL-SCNC: 143 MMOL/L (ref 136–145)
WBC # BLD AUTO: 4.4 THOUSAND/UL (ref 4.8–10.8)

## 2018-03-13 PROCEDURE — 36415 COLL VENOUS BLD VENIPUNCTURE: CPT

## 2018-03-13 PROCEDURE — 85025 COMPLETE CBC W/AUTO DIFF WBC: CPT

## 2018-03-13 PROCEDURE — 80053 COMPREHEN METABOLIC PANEL: CPT

## 2018-03-14 ENCOUNTER — OFFICE VISIT (OUTPATIENT)
Dept: HEMATOLOGY ONCOLOGY | Facility: CLINIC | Age: 68
End: 2018-03-14
Payer: COMMERCIAL

## 2018-03-14 ENCOUNTER — TELEPHONE (OUTPATIENT)
Dept: HEMATOLOGY ONCOLOGY | Facility: CLINIC | Age: 68
End: 2018-03-14

## 2018-03-14 VITALS
SYSTOLIC BLOOD PRESSURE: 144 MMHG | DIASTOLIC BLOOD PRESSURE: 68 MMHG | HEART RATE: 76 BPM | OXYGEN SATURATION: 96 % | HEIGHT: 64 IN | RESPIRATION RATE: 18 BRPM | TEMPERATURE: 96.3 F | BODY MASS INDEX: 25.61 KG/M2 | WEIGHT: 150 LBS

## 2018-03-14 DIAGNOSIS — Z17.0 MALIGNANT NEOPLASM OF UPPER-INNER QUADRANT OF LEFT BREAST IN FEMALE, ESTROGEN RECEPTOR POSITIVE (HCC): Primary | ICD-10-CM

## 2018-03-14 DIAGNOSIS — C50.212 MALIGNANT NEOPLASM OF UPPER-INNER QUADRANT OF LEFT BREAST IN FEMALE, ESTROGEN RECEPTOR POSITIVE (HCC): Primary | ICD-10-CM

## 2018-03-14 PROCEDURE — 99214 OFFICE O/P EST MOD 30 MIN: CPT | Performed by: INTERNAL MEDICINE

## 2018-03-14 RX ORDER — SODIUM CHLORIDE 9 MG/ML
20 INJECTION, SOLUTION INTRAVENOUS ONCE
Status: COMPLETED | OUTPATIENT
Start: 2018-03-15 | End: 2018-03-15

## 2018-03-14 RX ORDER — ASCORBIC ACID 500 MG
500 TABLET ORAL DAILY
COMMUNITY
End: 2018-08-06 | Stop reason: ALTCHOICE

## 2018-03-14 NOTE — PROGRESS NOTES
Hematology / Oncology Outpatient Follow Up Note    Linus Arauz 69 y o  female  FNN:[id number]         Date:  3/14/2018    Assessment / Plan:  A 68-year-old postmenopausal woman with newly diagnosed stage IA left breast cancer, grade 3, ER 90% positive, CT 90% positive, HER2 negative disease  She underwent lumpectomy with sentinel lymph node biopsy, resulting in TE  Her tumor was found to be high risk, based on a MammaPrint  she is currently on adjuvant chemotherapy with Taxotere and cyclophosphamide which she tolerated very well  She has adequate ANC to have 2nd cycle of treatment, tomorrow followed by Neulasta support  Clinically, she has no evidence recurrent disease  I will see her again in 3 weeks before her 3rd cycle treatment  She is in agreement with my recommendations            Subjective:      HPI:  A 68-year-old postmenopausal woman who was found to have abnormality in her left breast, based on a screening mammography  Therefore, she underwent ultrasound-guided biopsy in 2017  She was found to have invasive ductal carcinoma, grade 2  This was ER 90% positive, CT 90% positive, HER2 negative disease  Subsequently, she was seen by Dr Zandra Cooks who did lumpectomy in 2018  Lumpectomy specimen showed 11 x 10 x 8 mm of invasive ductal carcinoma, grade 3  There was no evidence of lymphovascular invasion  One sentinel lymph node was negative for metastatic disease  Because of the grade escalation, HER2 IHC was repeated which was 2+  However, HER2 fish was negative for gene amplification  Dr Zandra Cooks sent her tumor tissue for MammaPrint which came back high risk disease  She presents today to discuss adjuvant treatment options  She has no complaint of pain  Her weight is stable  She denied any respiratory symptoms  She has no significant past medical history    She has no major surgery in the past   Her performance status is normal            Interval History:   A 77-year-old postmenopausal woman with newly diagnosed stage IA left breast cancer, grade 3, ER 90% positive, MO 90% positive, HER2 negative disease  She underwent lumpectomy with sentinel lymph node biopsy, resulting in TE  Her tumor was found to be high risk, based on a MammaPrint  therefore, she started adjuvant chemotherapy with Taxotere and cyclophosphamide which she tolerated very well  She has very minimal nausea  She did not throw up  She had been very minor bone achiness which lasted for a day, after the Neulasta shot  She has no history of neutropenic fever  She had no mouth sore or diarrhea  Her performance status is normal   She is going to have 2nd cycle treatment, tomorrow            Objective:      Primary Diagnosis:     Left breast cancer, stage IA, (pT1c, pN0, M0) grade 3, ER 90% positive, MO 90% positive, HER2 negative disease  MammaPrint high risk  Diagnosed in January 2018      Cancer Staging:  Malignant neoplasm of 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, estrogen receptor positive ([facility] [address] )    Staging form: Breast, AJCC 8th Edition    - Pathologic stage from 1/12/2018: Stage IA (pT1c, pN0(sn), cM0, G3, ER: Positive, MO: Positive, HER2: Negative) - Unsigned    Staging form: Breast, AJCC 7th Edition    - Clinical: No stage assigned - Unsigned     Malignant neoplasm of upper-inner quadrant of right breast in female, estrogen receptor positive ([facility] [address] )    Staging form: Breast, AJCC 8th Edition    - Clinical stage from 11/30/2017: Stage IA (cT1c, cN0, cM0, G3, ER: Positive, MO: Positive, HER2: Negative) - Unsigned    - Pathologic stage from 1/12/2018: Stage IA (pT1c, pN0(sn), cM0, G3, ER: Positive, MO: Positive, HER2: Negative) - Unsigned        Previous Hematologic/ Oncologic Treatment:            Current Hematologic/ Oncologic Treatment:       Adjuvant chemotherapy with Taxotere and cyclophosphamide    Second cycle to be given tomorrow      Disease Status:      TE status post lumpectomy with sentinel lymph node biopsy      Test Results:     Pathology:     11 x 10 x 8 mm of invasive ductal carcinoma, grade 3  No evidence of lymphovascular invasion  One sentinel lymph node was negative for metastatic disease  ER 90% positive, MI 90% positive, her 2 2+ disease  HER2 fish was negative for gene amplification  MammaPrint high risk  Stage IA (pT1c, pN0, M0)     Radiology:     Chest x-ray was negative for pulmonary disease      Laboratory:     CBC and CMP are within normal limits      Physical Exam:        General Appearance:    Alert, oriented          Eyes:    PERRL   Ears:    Normal external ear canals, both ears   Nose:   Nares normal, septum midline   Throat:   Mucosa moist  Pharynx without injection  Neck:   Supple         Lungs:     Clear to auscultation bilaterally   Chest Wall:    No tenderness or deformity    Heart:    Regular rate and rhythm         Abdomen:     Soft, non-tender, bowel sounds +, no organomegaly               Extremities:   Extremities no cyanosis or edema         Skin:   no rash or icterus  Lymph nodes:   Cervical, supraclavicular, and axillary nodes normal   Neurologic:   CNII-XII intact, normal strength, sensation and reflexes     Throughout             Breast exam: Lumpectomy scar at upper aspect her left breast with no palpable abnormalities  Right breast exam is negative  ROS: Review of Systems   All other systems reviewed and are negative  Imaging: No results found        Labs:   Lab Results   Component Value Date    WBC 4 40 (L) 03/13/2018    HGB 12 3 03/13/2018    HCT 37 4 03/13/2018    MCV 93 03/13/2018     03/13/2018     Lab Results   Component Value Date     03/13/2018    K 4 0 03/13/2018     03/13/2018    CO2 30 03/13/2018    ANIONGAP 7 03/13/2018    BUN 16 03/13/2018    CREATININE 0 52 (L) 03/13/2018    GLUCOSE 97 03/13/2018    CALCIUM 9 3 03/13/2018    AST 20 03/13/2018    ALT 37 03/13/2018 ALKPHOS 96 03/13/2018    PROT 6 3 (L) 03/13/2018    BILITOT 0 30 03/13/2018    EGFR 98 03/13/2018       No results found for: HCG    No results found for: IRON, TIBC, FERRITIN    No results found for: [id number]    No results found for: FOLATE      Current Medications: Reviewed  Allergies: Reviewed  PMH/FH/SH:  Reviewed      Vital Sign:    Body surface area is 1 73 meters squared      Wt Readings from Last 3 Encounters:   03/14/18 68 kg (150 lb)   02/22/18 67 8 kg (149 lb 7 6 oz)   02/16/18 68 1 kg (150 lb 3 2 oz)        Temp Readings from Last 3 Encounters:   03/14/18 (!) 96 3 °F (35 7 °C) (Tympanic)   02/23/18 97 6 °F (36 4 °C) (Tympanic)   02/22/18 (!) 96 4 °F (35 8 °C) (Tympanic)        BP Readings from Last 3 Encounters:   03/14/18 144/68   02/23/18 150/68   02/22/18 159/70         Pulse Readings from Last 3 Encounters:   03/14/18 76   02/23/18 66   02/22/18 89     @LASTSAO2(3)@

## 2018-03-15 ENCOUNTER — HOSPITAL ENCOUNTER (OUTPATIENT)
Dept: INFUSION CENTER | Facility: HOSPITAL | Age: 68
Discharge: HOME | End: 2018-03-15
Payer: COMMERCIAL

## 2018-03-15 VITALS
DIASTOLIC BLOOD PRESSURE: 62 MMHG | TEMPERATURE: 97.5 F | RESPIRATION RATE: 18 BRPM | BODY MASS INDEX: 25.78 KG/M2 | WEIGHT: 151.01 LBS | OXYGEN SATURATION: 99 % | HEART RATE: 76 BPM | SYSTOLIC BLOOD PRESSURE: 140 MMHG | HEIGHT: 64 IN

## 2018-03-15 PROCEDURE — 96417 CHEMO IV INFUS EACH ADDL SEQ: CPT

## 2018-03-15 PROCEDURE — 96367 TX/PROPH/DG ADDL SEQ IV INF: CPT

## 2018-03-15 PROCEDURE — 96413 CHEMO IV INFUSION 1 HR: CPT

## 2018-03-15 RX ORDER — MAGNESIUM OXIDE/MAG AA CHELATE 133 MG
1 TABLET ORAL
COMMUNITY
End: 2018-03-15 | Stop reason: CLARIF

## 2018-03-15 RX ORDER — MAGNESIUM 30 MG
30 TABLET ORAL
COMMUNITY
End: 2018-08-06 | Stop reason: ALTCHOICE

## 2018-03-15 RX ADMIN — CYCLOPHOSPHAMIDE 1000 MG: 1 INJECTION, POWDER, FOR SOLUTION INTRAVENOUS; ORAL at 13:03

## 2018-03-15 RX ADMIN — DOCETAXEL 130 MG: 80 INJECTION, SOLUTION, CONCENTRATE INTRAVENOUS at 11:46

## 2018-03-15 RX ADMIN — DEXAMETHASONE SODIUM PHOSPHATE: 10 INJECTION, SOLUTION INTRAMUSCULAR; INTRAVENOUS at 11:06

## 2018-03-15 RX ADMIN — SODIUM CHLORIDE 20 ML/HR: 0.9 INJECTION, SOLUTION INTRAVENOUS at 11:00

## 2018-03-16 ENCOUNTER — HOSPITAL ENCOUNTER (OUTPATIENT)
Dept: INFUSION CENTER | Facility: HOSPITAL | Age: 68
Discharge: HOME | End: 2018-03-16
Payer: COMMERCIAL

## 2018-03-16 VITALS
DIASTOLIC BLOOD PRESSURE: 65 MMHG | RESPIRATION RATE: 20 BRPM | TEMPERATURE: 98.4 F | HEART RATE: 73 BPM | SYSTOLIC BLOOD PRESSURE: 142 MMHG | OXYGEN SATURATION: 99 %

## 2018-03-16 PROCEDURE — 96372 THER/PROPH/DIAG INJ SC/IM: CPT

## 2018-03-16 RX ADMIN — PEGFILGRASTIM 6 MG: 6 INJECTION SUBCUTANEOUS at 11:06

## 2018-04-03 ENCOUNTER — TRANSCRIBE ORDERS (OUTPATIENT)
Dept: ADMINISTRATIVE | Facility: HOSPITAL | Age: 68
End: 2018-04-03

## 2018-04-03 ENCOUNTER — APPOINTMENT (OUTPATIENT)
Dept: LAB | Facility: HOSPITAL | Age: 68
End: 2018-04-03
Attending: INTERNAL MEDICINE
Payer: COMMERCIAL

## 2018-04-03 DIAGNOSIS — C50.919 MALIGNANT NEOPLASM OF FEMALE BREAST, UNSPECIFIED ESTROGEN RECEPTOR STATUS, UNSPECIFIED LATERALITY, UNSPECIFIED SITE OF BREAST (HCC): ICD-10-CM

## 2018-04-03 LAB
ALBUMIN SERPL BCP-MCNC: 3.6 G/DL (ref 3.5–5)
ALP SERPL-CCNC: 113 U/L (ref 46–116)
ALT SERPL W P-5'-P-CCNC: 60 U/L (ref 12–78)
ANION GAP SERPL CALCULATED.3IONS-SCNC: 6 MMOL/L (ref 4–13)
AST SERPL W P-5'-P-CCNC: 31 U/L (ref 5–45)
BASOPHILS # BLD AUTO: 0.1 THOUSANDS/ΜL (ref 0–0.1)
BASOPHILS NFR BLD AUTO: 3 % (ref 0–1)
BILIRUB SERPL-MCNC: 0.4 MG/DL (ref 0.2–1)
BUN SERPL-MCNC: 20 MG/DL (ref 5–25)
CALCIUM SERPL-MCNC: 9 MG/DL (ref 8.3–10.1)
CHLORIDE SERPL-SCNC: 105 MMOL/L (ref 100–108)
CO2 SERPL-SCNC: 29 MMOL/L (ref 21–32)
CREAT SERPL-MCNC: 0.61 MG/DL (ref 0.6–1.3)
EOSINOPHIL # BLD AUTO: 0 THOUSAND/ΜL (ref 0–0.61)
EOSINOPHIL NFR BLD AUTO: 0 % (ref 0–6)
ERYTHROCYTE [DISTWIDTH] IN BLOOD BY AUTOMATED COUNT: 15.6 % (ref 11.6–15.1)
GFR SERPL CREATININE-BSD FRML MDRD: 92 ML/MIN/1.73SQ M
GLUCOSE SERPL-MCNC: 101 MG/DL (ref 65–140)
HCT VFR BLD AUTO: 36 % (ref 37–47)
HGB BLD-MCNC: 12.2 G/DL (ref 12–16)
LYMPHOCYTES # BLD AUTO: 0.9 THOUSANDS/ΜL (ref 0.6–4.47)
LYMPHOCYTES NFR BLD AUTO: 21 % (ref 14–44)
MCH RBC QN AUTO: 31.8 PG (ref 27–31)
MCHC RBC AUTO-ENTMCNC: 33.8 G/DL (ref 31.4–37.4)
MCV RBC AUTO: 94 FL (ref 82–98)
MONOCYTES # BLD AUTO: 0.4 THOUSAND/ΜL (ref 0.17–1.22)
MONOCYTES NFR BLD AUTO: 9 % (ref 4–12)
NEUTROPHILS # BLD AUTO: 3 THOUSANDS/ΜL (ref 1.85–7.62)
NEUTS SEG NFR BLD AUTO: 67 % (ref 43–75)
NRBC BLD AUTO-RTO: 0 /100 WBCS
PLATELET # BLD AUTO: 312 THOUSANDS/UL (ref 130–400)
PMV BLD AUTO: 7.8 FL (ref 8.9–12.7)
POTASSIUM SERPL-SCNC: 3.9 MMOL/L (ref 3.5–5.3)
PROT SERPL-MCNC: 6.2 G/DL (ref 6.4–8.2)
RBC # BLD AUTO: 3.83 MILLION/UL (ref 4.2–5.4)
SODIUM SERPL-SCNC: 140 MMOL/L (ref 136–145)
WBC # BLD AUTO: 4.5 THOUSAND/UL (ref 4.8–10.8)

## 2018-04-03 PROCEDURE — 85025 COMPLETE CBC W/AUTO DIFF WBC: CPT

## 2018-04-03 PROCEDURE — 36415 COLL VENOUS BLD VENIPUNCTURE: CPT

## 2018-04-03 PROCEDURE — 80053 COMPREHEN METABOLIC PANEL: CPT

## 2018-04-04 ENCOUNTER — OFFICE VISIT (OUTPATIENT)
Dept: HEMATOLOGY ONCOLOGY | Facility: CLINIC | Age: 68
End: 2018-04-04
Payer: COMMERCIAL

## 2018-04-04 VITALS
BODY MASS INDEX: 25.61 KG/M2 | SYSTOLIC BLOOD PRESSURE: 130 MMHG | WEIGHT: 150 LBS | TEMPERATURE: 96.8 F | DIASTOLIC BLOOD PRESSURE: 62 MMHG | OXYGEN SATURATION: 98 % | HEART RATE: 65 BPM | HEIGHT: 64 IN

## 2018-04-04 DIAGNOSIS — C50.212 MALIGNANT NEOPLASM OF UPPER-INNER QUADRANT OF LEFT BREAST IN FEMALE, ESTROGEN RECEPTOR POSITIVE (HCC): Primary | ICD-10-CM

## 2018-04-04 DIAGNOSIS — Z17.0 MALIGNANT NEOPLASM OF UPPER-INNER QUADRANT OF LEFT BREAST IN FEMALE, ESTROGEN RECEPTOR POSITIVE (HCC): Primary | ICD-10-CM

## 2018-04-04 PROCEDURE — 99214 OFFICE O/P EST MOD 30 MIN: CPT | Performed by: INTERNAL MEDICINE

## 2018-04-04 RX ORDER — ANASTROZOLE 1 MG/1
1 TABLET ORAL DAILY
Qty: 90 TABLET | Refills: 1 | Status: SHIPPED | OUTPATIENT
Start: 2018-04-04 | End: 2018-08-01 | Stop reason: SDUPTHER

## 2018-04-04 RX ORDER — SODIUM CHLORIDE 9 MG/ML
20 INJECTION, SOLUTION INTRAVENOUS ONCE
Status: COMPLETED | OUTPATIENT
Start: 2018-04-05 | End: 2018-04-05

## 2018-04-04 NOTE — PROGRESS NOTES
Hematology / Oncology Outpatient Follow Up Note    Chloé Young 77 y o  female AGF: FX         Date:  2018    Assessment / Plan:  A 70-year-old postmenopausal woman with newly diagnosed stage IA left breast cancer, grade 3, ER 90% positive, OR 90% positive, HER2 negative disease   She underwent lumpectomy with sentinel lymph node biopsy, resulting in TE   Her tumor was found to be high risk, based on a MammaPrint    She is currently on adjuvant chemotherapy with Taxotere and cyclophosphamide which she tolerated very well  Clinically, she has no evidence recurrent disease  She has adequate ANC to have 3rd cycle of adjuvant chemotherapy, tomorrow followed by Neulasta support  She is going to have last cycle of adjuvant chemotherapy in 2018  I will refer her to Dr Lenell Leyden for adjuvant radiation therapy  We discussed adjuvant hormonal therapy  Since she is postmenopausal, aromatase inhibitor is indicated  I recommended her to start anastrozole 1 mg once a day in mid May 2018  Side effects of anastrozole was thoroughly discussed, including but not limited to hot flashes, musculoskeletal symptoms and bone mineral density loss  I recommended her to take calcium vitamin-D on a regular basis  I will see her again in 4 months with baseline DEXA scan    She is in agreement with my recommendations         Subjective:      HPI:  A 57-year-old postmenopausal woman who was found to have abnormality in her left breast, based on a screening mammography  Ariane Hernandez, she underwent ultrasound-guided biopsy in 2017   She was found to have invasive ductal carcinoma, grade 2   This was ER 90% positive, OR 90% positive, HER2 negative disease   Subsequently, she was seen by Dr Noah Knight who did lumpectomy in 2018   Lumpectomy specimen showed 11 x 10 x 8 mm of invasive ductal carcinoma, grade 3   There was no evidence of lymphovascular invasion   One sentinel lymph node was negative for metastatic disease   Because of the grade escalation, HER2 IHC was repeated which was 2+   However, HER2 fish was negative for gene amplification   Dr Donald Llamas sent her tumor tissue for MammaPrint which came back high risk disease   She presents today to discuss adjuvant treatment options   She has no complaint of pain   Her weight is stable   She denied any respiratory symptoms   She has no significant past medical history   She has no major surgery in the past   Her performance status is normal            Interval History:   A 68-year-old postmenopausal woman with stage IA left breast cancer, grade 3, ER 90% positive, [place] 90% positive, HER2 negative disease   She underwent lumpectomy with sentinel lymph node biopsy, resulting in TE   Her tumor was found to be high risk, based on a MammaPrint   Therefore, she started adjuvant chemotherapy with Taxotere and cyclophosphamide which she tolerated very well  She presents today for 3rd cycle of adjuvant chemotherapy  She has no history of neutropenic fever  She has no nausea or vomiting  She has mild fatigue  She denied neuropathy  She has no respiratory symptoms    Her performance status is normal            Objective:      Primary Diagnosis:     Left breast cancer, stage IA, (pT1c, pN0, M0) grade 3, ER 90% positive, [place] 90% positive, HER2 negative disease   MammaPrint high risk   Diagnosed in January 2018      Cancer Staging:  Malignant neoplasm of 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, estrogen receptor positive ([facility] [address] )    Staging form: Breast, AJCC 8th Edition    - Pathologic stage from 1/12/2018: Stage IA (pT1c, pN0(sn), cM0, G3, ER: Positive, [place]: Positive, HER2: Negative) - Unsigned    Staging form: Breast, AJCC 7th Edition    - Clinical: No stage assigned - Unsigned     Malignant neoplasm of upper-inner quadrant of right breast in female, estrogen receptor positive ([facility] [address] )    Staging form: Breast, AJCC 8th Edition    - Clinical stage from 11/30/2017: Stage IA (cT1c, cN0, cM0, G3, ER: Positive, [place]: Positive, HER2: Negative) - Unsigned    - Pathologic stage from 1/12/2018: Stage IA (pT1c, pN0(sn), cM0, G3, ER: Positive, [place]: Positive, HER2: Negative) - Unsigned        Previous Hematologic/ Oncologic Treatment:            Current Hematologic/ Oncologic Treatment:       Adjuvant chemotherapy with Taxotere and cyclophosphamide  3rd cycle to be given tomorrow      Disease Status:      TE status post lumpectomy with sentinel lymph node biopsy      Test Results:     Pathology:     11 x 10 x 8 mm of invasive ductal carcinoma, grade 3   No evidence of lymphovascular invasion   One sentinel lymph node was negative for metastatic disease   ER 90% positive, [place] 90% positive, her 2 2+ disease  Connie Gomez fish was negative for gene amplification   MammaPrint high risk  Stage IA (pT1c, pN0, M0)     Radiology:     Chest x-ray was negative for pulmonary disease      Laboratory:     CBC and CMP are within normal limits      Physical Exam:        General Appearance:    Alert, oriented          Eyes:    PERRL   Ears:    Normal external ear canals, both ears   Nose:   Nares normal, septum midline   Throat:   Mucosa moist  Pharynx without injection  Neck:   Supple         Lungs:     Clear to auscultation bilaterally   Chest Wall:    No tenderness or deformity    Heart:    Regular rate and rhythm         Abdomen:     Soft, non-tender, bowel sounds +, no organomegaly               Extremities:   Extremities no cyanosis or edema         Skin:   no rash or icterus  Lymph nodes:   Cervical, supraclavicular, and axillary nodes normal   Neurologic:   CNII-XII intact, normal strength, sensation and reflexes     Throughout             Breast exam: Lumpectomy scar at upper aspect her left breast with no palpable abnormalities   Right breast exam is negative  ROS: Review of Systems   All other systems reviewed and are negative            Imaging: No results found  Labs:   Lab Results   Component Value Date    WBC 4 50 (L) 04/03/2018    HGB 12 2 04/03/2018    HCT 36 0 (L) 04/03/2018    MCV 94 04/03/2018     04/03/2018     Lab Results   Component Value Date     04/03/2018    K 3 9 04/03/2018     04/03/2018    CO2 29 04/03/2018    ANIONGAP 6 04/03/2018    BUN 20 04/03/2018    CREATININE 0 61 04/03/2018    GLUCOSE 101 04/03/2018    CALCIUM 9 0 04/03/2018    AST 31 04/03/2018    ALT 60 04/03/2018    ALKPHOS 113 04/03/2018    PROT 6 2 (L) 04/03/2018    BILITOT 0 40 04/03/2018    EGFR 92 04/03/2018         Current Medications: Reviewed  Allergies: Reviewed  PMH/FH/SH:  Reviewed      Vital Sign:    Body surface area is 1 72 meters squared      Wt Readings from Last 3 Encounters:   04/04/18 68 kg (150 lb)   03/15/18 68 5 kg (151 lb 0 2 oz)   03/14/18 68 kg (150 lb)        Temp Readings from Last 3 Encounters:   04/04/18 (!) 96 8 °F (36 °C)   03/16/18 98 4 °F (36 9 °C) (Temporal)   03/15/18 97 5 °F (36 4 °C) (Tympanic)        BP Readings from Last 3 Encounters:   04/04/18 130/62   03/16/18 142/65   03/15/18 140/62         Pulse Readings from Last 3 Encounters:   04/04/18 65   03/16/18 73   03/15/18 76     @LASTSAO2(3)@

## 2018-04-05 ENCOUNTER — HOSPITAL ENCOUNTER (OUTPATIENT)
Dept: INFUSION CENTER | Facility: HOSPITAL | Age: 68
Discharge: HOME | End: 2018-04-05
Payer: COMMERCIAL

## 2018-04-05 VITALS
BODY MASS INDEX: 25.74 KG/M2 | SYSTOLIC BLOOD PRESSURE: 130 MMHG | HEART RATE: 61 BPM | WEIGHT: 150.79 LBS | TEMPERATURE: 96.3 F | DIASTOLIC BLOOD PRESSURE: 59 MMHG | RESPIRATION RATE: 18 BRPM | HEIGHT: 64 IN

## 2018-04-05 PROCEDURE — 96375 TX/PRO/DX INJ NEW DRUG ADDON: CPT

## 2018-04-05 PROCEDURE — 96413 CHEMO IV INFUSION 1 HR: CPT

## 2018-04-05 PROCEDURE — 96417 CHEMO IV INFUS EACH ADDL SEQ: CPT

## 2018-04-05 RX ADMIN — DOCETAXEL 130 MG: 20 INJECTION, SOLUTION, CONCENTRATE INTRAVENOUS at 11:50

## 2018-04-05 RX ADMIN — DEXAMETHASONE SODIUM PHOSPHATE: 10 INJECTION, SOLUTION INTRAMUSCULAR; INTRAVENOUS at 11:15

## 2018-04-05 RX ADMIN — CYCLOPHOSPHAMIDE 1000 MG: 1 INJECTION, POWDER, FOR SOLUTION INTRAVENOUS; ORAL at 12:51

## 2018-04-05 RX ADMIN — SODIUM CHLORIDE 20 ML/HR: 0.9 INJECTION, SOLUTION INTRAVENOUS at 11:15

## 2018-04-05 NOTE — PROGRESS NOTES
Pt tolerated Taxotere and cytoxan well  No adverse events  AVS refused   Aware of neulasta appointment tomorrow at 2pm

## 2018-04-06 ENCOUNTER — HOSPITAL ENCOUNTER (OUTPATIENT)
Dept: INFUSION CENTER | Facility: HOSPITAL | Age: 68
Discharge: HOME | End: 2018-04-06
Payer: COMMERCIAL

## 2018-04-06 VITALS
OXYGEN SATURATION: 97 % | RESPIRATION RATE: 18 BRPM | SYSTOLIC BLOOD PRESSURE: 154 MMHG | HEART RATE: 70 BPM | DIASTOLIC BLOOD PRESSURE: 71 MMHG | TEMPERATURE: 97.8 F

## 2018-04-06 PROCEDURE — 96372 THER/PROPH/DIAG INJ SC/IM: CPT

## 2018-04-06 RX ADMIN — PEGFILGRASTIM 6 MG: 6 INJECTION SUBCUTANEOUS at 13:48

## 2018-04-10 DIAGNOSIS — R52 PAIN: Primary | ICD-10-CM

## 2018-04-10 RX ORDER — OXYCODONE HYDROCHLORIDE AND ACETAMINOPHEN 5; 325 MG/1; MG/1
1 TABLET ORAL EVERY 6 HOURS PRN
Qty: 30 TABLET | Refills: 0 | Status: SHIPPED | OUTPATIENT
Start: 2018-04-10 | End: 2018-05-04 | Stop reason: ALTCHOICE

## 2018-04-18 PROBLEM — B02.9 SHINGLES: Status: ACTIVE | Noted: 2017-03-30

## 2018-04-18 PROBLEM — R25.2 MUSCLE CRAMPS AT NIGHT: Status: ACTIVE | Noted: 2017-06-30

## 2018-04-18 PROBLEM — R92.8 ABNORMAL MAMMOGRAM: Status: ACTIVE | Noted: 2017-11-22

## 2018-04-21 DIAGNOSIS — E78.5 HYPERLIPIDEMIA LDL GOAL <130: Primary | ICD-10-CM

## 2018-04-22 RX ORDER — SIMVASTATIN 10 MG
20 TABLET ORAL
Qty: 90 TABLET | Refills: 1 | Status: SHIPPED | OUTPATIENT
Start: 2018-04-22 | End: 2018-04-25 | Stop reason: SDUPTHER

## 2018-04-24 ENCOUNTER — TRANSCRIBE ORDERS (OUTPATIENT)
Dept: ADMINISTRATIVE | Facility: HOSPITAL | Age: 68
End: 2018-04-24

## 2018-04-24 ENCOUNTER — TELEPHONE (OUTPATIENT)
Dept: FAMILY MEDICINE CLINIC | Facility: CLINIC | Age: 68
End: 2018-04-24

## 2018-04-24 ENCOUNTER — APPOINTMENT (OUTPATIENT)
Dept: LAB | Facility: HOSPITAL | Age: 68
End: 2018-04-24
Attending: INTERNAL MEDICINE
Payer: COMMERCIAL

## 2018-04-24 DIAGNOSIS — C50.919 MALIGNANT NEOPLASM OF FEMALE BREAST, UNSPECIFIED ESTROGEN RECEPTOR STATUS, UNSPECIFIED LATERALITY, UNSPECIFIED SITE OF BREAST (HCC): ICD-10-CM

## 2018-04-24 DIAGNOSIS — E78.5 HYPERLIPIDEMIA LDL GOAL <130: Primary | ICD-10-CM

## 2018-04-24 LAB
ALBUMIN SERPL BCP-MCNC: 3.5 G/DL (ref 3.5–5)
ALP SERPL-CCNC: 93 U/L (ref 46–116)
ALT SERPL W P-5'-P-CCNC: 45 U/L (ref 12–78)
ANION GAP SERPL CALCULATED.3IONS-SCNC: 7 MMOL/L (ref 4–13)
AST SERPL W P-5'-P-CCNC: 29 U/L (ref 5–45)
BASOPHILS # BLD AUTO: 0 THOUSANDS/ΜL (ref 0–0.1)
BASOPHILS NFR BLD AUTO: 1 % (ref 0–1)
BILIRUB SERPL-MCNC: 0.4 MG/DL (ref 0.2–1)
BUN SERPL-MCNC: 15 MG/DL (ref 5–25)
CALCIUM SERPL-MCNC: 8.8 MG/DL (ref 8.3–10.1)
CHLORIDE SERPL-SCNC: 104 MMOL/L (ref 100–108)
CO2 SERPL-SCNC: 29 MMOL/L (ref 21–32)
CREAT SERPL-MCNC: 0.54 MG/DL (ref 0.6–1.3)
EOSINOPHIL # BLD AUTO: 0 THOUSAND/ΜL (ref 0–0.61)
EOSINOPHIL NFR BLD AUTO: 0 % (ref 0–6)
ERYTHROCYTE [DISTWIDTH] IN BLOOD BY AUTOMATED COUNT: 16.7 % (ref 11.6–15.1)
GFR SERPL CREATININE-BSD FRML MDRD: 96 ML/MIN/1.73SQ M
GLUCOSE P FAST SERPL-MCNC: 99 MG/DL (ref 65–99)
HCT VFR BLD AUTO: 34.8 % (ref 37–47)
HGB BLD-MCNC: 11.4 G/DL (ref 12–16)
LYMPHOCYTES # BLD AUTO: 1.1 THOUSANDS/ΜL (ref 0.6–4.47)
LYMPHOCYTES NFR BLD AUTO: 29 % (ref 14–44)
MCH RBC QN AUTO: 31.4 PG (ref 27–31)
MCHC RBC AUTO-ENTMCNC: 32.9 G/DL (ref 31.4–37.4)
MCV RBC AUTO: 96 FL (ref 82–98)
MONOCYTES # BLD AUTO: 0.5 THOUSAND/ΜL (ref 0.17–1.22)
MONOCYTES NFR BLD AUTO: 12 % (ref 4–12)
NEUTROPHILS # BLD AUTO: 2.3 THOUSANDS/ΜL (ref 1.85–7.62)
NEUTS SEG NFR BLD AUTO: 59 % (ref 43–75)
NRBC BLD AUTO-RTO: 0 /100 WBCS
PLATELET # BLD AUTO: 266 THOUSANDS/UL (ref 130–400)
PMV BLD AUTO: 7.7 FL (ref 8.9–12.7)
POTASSIUM SERPL-SCNC: 4 MMOL/L (ref 3.5–5.3)
PROT SERPL-MCNC: 6.4 G/DL (ref 6.4–8.2)
RBC # BLD AUTO: 3.64 MILLION/UL (ref 4.2–5.4)
SODIUM SERPL-SCNC: 140 MMOL/L (ref 136–145)
WBC # BLD AUTO: 3.9 THOUSAND/UL (ref 4.8–10.8)

## 2018-04-24 PROCEDURE — 85025 COMPLETE CBC W/AUTO DIFF WBC: CPT

## 2018-04-24 PROCEDURE — 80053 COMPREHEN METABOLIC PANEL: CPT

## 2018-04-24 PROCEDURE — 36415 COLL VENOUS BLD VENIPUNCTURE: CPT

## 2018-04-24 RX ORDER — SODIUM CHLORIDE 9 MG/ML
20 INJECTION, SOLUTION INTRAVENOUS ONCE
Status: COMPLETED | OUTPATIENT
Start: 2018-04-26 | End: 2018-04-26

## 2018-04-25 ENCOUNTER — OFFICE VISIT (OUTPATIENT)
Dept: SURGICAL ONCOLOGY | Facility: CLINIC | Age: 68
End: 2018-04-25
Payer: COMMERCIAL

## 2018-04-25 VITALS
DIASTOLIC BLOOD PRESSURE: 64 MMHG | HEIGHT: 64 IN | WEIGHT: 152 LBS | HEART RATE: 74 BPM | BODY MASS INDEX: 25.95 KG/M2 | TEMPERATURE: 98.1 F | RESPIRATION RATE: 18 BRPM | SYSTOLIC BLOOD PRESSURE: 154 MMHG

## 2018-04-25 DIAGNOSIS — I10 BENIGN ESSENTIAL HTN: Primary | ICD-10-CM

## 2018-04-25 DIAGNOSIS — C50.212 MALIGNANT NEOPLASM OF UPPER-INNER QUADRANT OF LEFT BREAST IN FEMALE, ESTROGEN RECEPTOR POSITIVE (HCC): Primary | ICD-10-CM

## 2018-04-25 DIAGNOSIS — C50.211 MALIGNANT NEOPLASM OF UPPER-INNER QUADRANT OF RIGHT BREAST IN FEMALE, ESTROGEN RECEPTOR POSITIVE (HCC): ICD-10-CM

## 2018-04-25 DIAGNOSIS — Z17.0 MALIGNANT NEOPLASM OF UPPER-INNER QUADRANT OF RIGHT BREAST IN FEMALE, ESTROGEN RECEPTOR POSITIVE (HCC): ICD-10-CM

## 2018-04-25 DIAGNOSIS — Z17.0 MALIGNANT NEOPLASM OF UPPER-INNER QUADRANT OF LEFT BREAST IN FEMALE, ESTROGEN RECEPTOR POSITIVE (HCC): Primary | ICD-10-CM

## 2018-04-25 PROCEDURE — 99214 OFFICE O/P EST MOD 30 MIN: CPT | Performed by: SURGERY

## 2018-04-25 RX ORDER — SIMVASTATIN 20 MG
20 TABLET ORAL
Qty: 90 TABLET | Refills: 1 | Status: SHIPPED | OUTPATIENT
Start: 2018-04-25 | End: 2018-11-12 | Stop reason: SDUPTHER

## 2018-04-25 RX ORDER — IBUPROFEN 200 MG
1 CAPSULE ORAL DAILY
COMMUNITY
End: 2018-04-26 | Stop reason: ALTCHOICE

## 2018-04-25 NOTE — TELEPHONE ENCOUNTER
[facility] pharmacy left a message on refill hotline regarding pt's simvastatin  It was sent over for 10 mg 2 tablets at bedtime  They stated pt usually takes 20 mg tablet once a day at bedtime  Please send a new script to the pharmacy for the 20 mg 1 daily at bedtime   Mook Paredes

## 2018-04-25 NOTE — LETTER
April 25, 2018     Cyn Lay,   [address] [facility] [address]    Patient: Kris Mccain   YOB: 1950   Date of Visit: 4/25/2018       Dear Dr Didier Fox:    Thank you for referring Roque Orr to me for evaluation  Below are my notes for this consultation  If you have questions, please do not hesitate to call me  I look forward to following your patient along with you  Sincerely,        Lars French MD        CC: No Recipients  Lars French MD  4/25/2018 11:17 AM  Sign at close encounter     Surgical Oncology Follow Up       [address] [place] [address] [address] [facility]  1950  [id number]  [address] [place] [address],[address]  [facility] [address] [address]    Chief Complaint   Patient presents with    Breast Cancer     3 month follow up          Assessment & Plan:   Assessment/Plan   No evidence of local regional discern recurrent disease, left shoulder pain, Dr Estefany Chacon aware  Plan finished chemotherapy, anastrozole, adjuvant radiation therapy whole breast, six-month follow up with us  Cancer History:        Malignant neoplasm of 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, estrogen receptor positive ([address] )    1/12/2018 Surgery     Left needle localization lumpectomy with sentinel lymph node biopsy  Grade 3  T1c NO Grade 3   Repeat HER 2 indicated and was Negative  ER 90  IN 90  Her 2 negative         1/12/2018 -  Radiation     IORT left breast to a dose of 2000cGy         2/2/2018 Genomic Testing     Mammaprint high risk           2/22/2018 -  Chemotherapy     Cyclophosphamide and docetaxel (TC) for four doses  Dr Estefany Chacon            History of Present Illness:   See above     Interval History:   The patient's tumor came back as mammo print high risk  Consequently she is currently undergoing chemotherapy with Dr Estefany Chacon    She has an appointment with Radiation Oncology for whole breast radiation therapy  Patient also states that over the last couple of weeks she has developed shoulder pain  She has been treated with Percocet which Amanda Marcus rates her symptoms though it makes her constipated so the patient is taking Aleve intermittently which seems to help though she still has relatively significant shoulder pain  Dr Sal Lara thinks this may be related to some of her medications  Otherwise the patient has no complaints referable to her breast     Review of Systems:   Review of Systems   Constitutional: Negative for activity change, appetite change, fatigue and unexpected weight change  HENT: Negative for ear pain, tinnitus, trouble swallowing and voice change  Eyes: Negative for pain and visual disturbance  Respiratory: Negative for cough, shortness of breath, wheezing and stridor  Cardiovascular: Negative for chest pain, palpitations and leg swelling  Gastrointestinal: Negative for abdominal distention, abdominal pain and blood in stool  Endocrine: Negative for cold intolerance and heat intolerance  Genitourinary: Negative for difficulty urinating, dysuria, flank pain and hematuria  Musculoskeletal: Positive for arthralgias (Left shoulder)  Negative for back pain, gait problem and joint swelling  Skin: Negative for color change, rash and wound  Allergic/Immunologic: Negative for immunocompromised state  Neurological: Negative for dizziness, seizures, speech difficulty, weakness and headaches  Hematological: Negative for adenopathy  Psychiatric/Behavioral: Negative for confusion         Past Medical History     Patient Active Problem List   Diagnosis    Anxiety    Benign essential HTN    Elevated HDL    Hyperlipidemia LDL goal <130    Insomnia    Leiomyoma of uterus    Malignant neoplasm of 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, estrogen receptor positive ([address] )    Osteopenia    Vitamin D deficiency    Abnormal glucose    Abnormal mammogram    Cervical polyp    Hemorrhoids    Menopause    Muscle cramps at night    Overweight (BMI 25 0-29  9)    Prediabetes    Shingles     Past Medical History:   Diagnosis Date    Cancer ([address] )     left breast    Hemorrhoid     Hyperlipidemia     Hypertension     Insomnia     Leiomyoma of uterus     Osteopenia     Shingles     Tick bite      Past Surgical History:   Procedure Laterality Date    BREAST BIOPSY Left     BREAST LUMPECTOMY Left     CYST REMOVAL      right eyelid    DILATION AND CURETTAGE OF UTERUS      INTRAOPERATIVE RADIATION THERAPY (IORT) Left 1/12/2018    Procedure: BREAST IORT BY DR Marquez Mehta;  Surgeon: Candelario Jeronimo MD;  [facility];  Service: Surgical Oncology    WA BIOPSY/EXCISION, LYMPH NODE(S) Left 1/12/2018    Procedure: LYMPHOSCINTIGRAPHY; INTRAOPERATIVE LYMPHATIC MAPPING; SENTINEL LYMPH NODE BIOPSY (INJECT AT 9926); Surgeon: Candelario Jeronimo MD;  [facility];  Service: Surgical Oncology    WA PERQ DEVICE PLACEMT BREAST LOC 1ST LES W YOLISE Left 1/12/2018    Procedure: BREAST LUMPECTOMY; BREAST NEEDLE LOCALIZATION (NEEDLE LOC AT 0110); FROZEN SECTION;  Surgeon: Candelario Jeronimo MD;  [facility];  Service: Surgical Oncology    SENTINEL LYMPH NODE BIOPSY Left      Family History   Problem Relation Age of Onset    Colon cancer Paternal Grandfather     Breast cancer Maternal Aunt     Ovarian cancer Maternal Aunt     Prostate cancer Maternal Uncle     Colon cancer Maternal Uncle     Hypertension Mother     Heart disease Mother     Thyroid disease Mother     Leukemia Father      Social History     Social History    Marital status: /Civil Union     Spouse name: N/A    Number of children: N/A    Years of education: N/A     Occupational History    Not on file       Social History Main Topics    Smoking status: Former Smoker    Smokeless tobacco: Former User      Comment: quit 10 years ago    Alcohol use 4 2 oz/week     4 Glasses of wine, 3 Cans of beer per week    Drug use: No    Sexual activity: Not on file     Other Topics Concern    Not on file     Social History Narrative    No narrative on file       Current Outpatient Prescriptions:     calcium carbonate (OS-LINDSEY) 1250 (500 Ca) MG tablet, Take 1 tablet by mouth daily, Disp: , Rfl:     anastrozole (ARIMIDEX) 1 mg tablet, Take 1 tablet (1 mg total) by mouth daily, Disp: 90 tablet, Rfl: 1    ascorbic acid (VITAMIN C) 500 mg tablet, Take 500 mg by mouth daily, Disp: , Rfl:     cholecalciferol (VITAMIN D3) 1,000 units tablet, Take 1,000 Units by mouth 3 (three) times a week  , Disp: , Rfl:     lisinopril (ZESTRIL) 10 mg tablet, Take 10 mg by mouth daily, Disp: , Rfl:     magnesium 30 MG tablet, Take 30 mg by mouth daily at bedtime, Disp: , Rfl:     oxyCODONE-acetaminophen (PERCOCET) 5-325 mg per tablet, Take 1 tablet by mouth every 6 (six) hours as needed for moderate pain Earliest Fill Date: 4/10/18 Max Daily Amount: 4 tablets, Disp: 30 tablet, Rfl: 0    simvastatin (ZOCOR) 20 mg tablet, Take 1 tablet (20 mg total) by mouth daily at bedtime, Disp: 90 tablet, Rfl: 1  No current facility-administered medications for this visit       Facility-Administered Medications Ordered in Other Visits:     [START ON 4/26/2018] alteplase (CATHFLO) injection 2 mg, 2 mg, Intracatheter, Once PRN, MD Yeison Pedroza  [START ON 4/26/2018] cyclophosphamide (CYTOXAN) 1,000 mg in sodium chloride 0 9 % 250 mL IVPB, 1,000 mg, Intravenous, Once, MD Yeison Pedroza  [START ON 4/26/2018] DOCEtaxel (TAXOTERE) 130 mg in sodium chloride 0 9 % 250 mL chemo infusion, 130 mg, Intravenous, Once, MD Yeison Pedroza  [START ON 4/27/2018] heparin lock flush 100 units/mL injection 300 Units, 300 Units, Intracatheter, Q1H PRN, Lucio Champion MD    [START ON 4/26/2018] heparin lock flush 100 units/mL injection 300 Units, 300 Units, Intracatheter, Q1H PRN, MD Yeison Pedroza [START ON 4/26/2018] ondansetron (ZOFRAN) 16 mg, dexamethasone (DECADRON) 10 mg in sodium chloride 0 9 % 50 mL IVPB, , Intravenous, Once, Amaris Hi MD  [employer]  [START ON 4/27/2018] pegfilgrastim (NEULASTA) subcutaneous injection 6 mg, 6 mg, Subcutaneous, Once, Amaris Hi MD  [employer]  [START ON 4/26/2018] sodium chloride 0 9 % infusion, 20 mL/hr, Intravenous, Once, Amaris Hi MD  No Known Allergies    Physical Exam:     Vitals:    04/25/18 1038   BP: 154/64   Pulse: 74   Resp: 18   Temp: 98 1 °F (36 7 °C)     Physical Exam   Constitutional: She is oriented to person, place, and time  She appears well-developed and well-nourished  HENT:   Head: Normocephalic and atraumatic  Mouth/Throat: Oropharynx is clear and moist    Eyes: EOM are normal  Pupils are equal, round, and reactive to light  Neck: Normal range of motion  Neck supple  No JVD present  No tracheal deviation present  No thyromegaly present  Cardiovascular: Normal rate, regular rhythm, normal heart sounds and intact distal pulses  Exam reveals no gallop and no friction rub  No murmur heard  Pulmonary/Chest: Effort normal and breath sounds normal  No respiratory distress  She has no wheezes  She has no rales  Right breast exhibits no inverted nipple, no mass, no nipple discharge, no skin change and no tenderness  Left breast exhibits no inverted nipple, no mass, no nipple discharge, no skin change and no tenderness  Breasts are symmetrical      Both breasts were examined in the sitting and supine position  There are no worrisome skin lesions, no nipple retraction and no nipple discharge  There are no dominant masses, axillary adenopathy or supraclavicular adenopathy on either side  Abdominal: Soft  She exhibits no distension and no mass  There is no hepatomegaly  There is no tenderness  There is no rebound and no guarding  Musculoskeletal: Normal range of motion  She exhibits no edema or tenderness     Lymphadenopathy:     She has no cervical adenopathy  Neurological: She is alert and oriented to person, place, and time  No cranial nerve deficit  Skin: Skin is warm and dry  No rash noted  No erythema  Psychiatric: She has a normal mood and affect  Her behavior is normal    Vitals reviewed  Results:       Discussion/Summary:     Based on today's history and physical exam, there is no evidence of local, regional or distant recurrence  The patients imaging is up to date without worrisome findings  Hence she is free of any clinical or radiologic evidence of recurrent disease  We will see the patient back in six  months based on the NCCN guidelines  The patient knows to contact us if she develops any signs or symptoms of recurrent disease  All questions were answered to the patients satisfaction  The patient will complete her last dose of chemotherapy  She will follow up with Radiation Oncology  We will see her back in 6 months and coordinate her mammogram at that time if Dr and Gillie Shaffer does not coordinated  I suggested the patient contact her family doctor if her shoulder pain persist for possible evaluation by orthopedics though it may well be related to her treatment  The patient has her prescription for anastrozole and will started in mid May  Advance Care Planning/Advance Directives:  I discussed the disease status, treatment plans and follow-up with the patient

## 2018-04-25 NOTE — TELEPHONE ENCOUNTER
Received generated refill request for pts lisinopril 10 mg 1 tablet daily sent to [facility], [place]

## 2018-04-25 NOTE — PROGRESS NOTES
Surgical Oncology Follow Up       [address] [facility],[address]  [facility] [place]  [address] [place] [address] 130 [facility]  1950  [id number]  [address] [place] [address],[address] [facility]  [facility] [place]  [address] [place] [address]    Chief Complaint   Patient presents with    Breast Cancer     3 month follow up          Assessment & Plan:   Assessment/Plan   No evidence of local regional discern recurrent disease, left shoulder pain, Dr Annabelle Matias aware  Plan finished chemotherapy, anastrozole, adjuvant radiation therapy whole breast, six-month follow up with us  Cancer History:        Malignant neoplasm of 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, estrogen receptor positive ([address] )    1/12/2018 Surgery     Left needle localization lumpectomy with sentinel lymph node biopsy  Grade 3  T1c NO Grade 3   Repeat HER 2 indicated and was Negative  ER 90  SD 90  Her 2 negative         1/12/2018 -  Radiation     IORT left breast to a dose of 2000cGy         2/2/2018 Genomic Testing     Mammaprint high risk           2/22/2018 -  Chemotherapy     Cyclophosphamide and docetaxel (TC) for four doses  Dr Annabelle Matias            History of Present Illness:   See above     Interval History:   The patient's tumor came back as mammo print high risk  Consequently she is currently undergoing chemotherapy with Dr Annabelle Matias  She has an appointment with Radiation Oncology for whole breast radiation therapy  Patient also states that over the last couple of weeks she has developed shoulder pain  She has been treated with Percocet which Anthony Doherty rates her symptoms though it makes her constipated so the patient is taking Aleve intermittently which seems to help though she still has relatively significant shoulder pain  Dr Annabelle Matias thinks this may be related to some of her medications    Otherwise the patient has no complaints referable to her breast     Review of Systems:   Review of Systems   Constitutional: Negative for activity change, appetite change, fatigue and unexpected weight change  HENT: Negative for ear pain, tinnitus, trouble swallowing and voice change  Eyes: Negative for pain and visual disturbance  Respiratory: Negative for cough, shortness of breath, wheezing and stridor  Cardiovascular: Negative for chest pain, palpitations and leg swelling  Gastrointestinal: Negative for abdominal distention, abdominal pain and blood in stool  Endocrine: Negative for cold intolerance and heat intolerance  Genitourinary: Negative for difficulty urinating, dysuria, flank pain and hematuria  Musculoskeletal: Positive for arthralgias (Left shoulder)  Negative for back pain, gait problem and joint swelling  Skin: Negative for color change, rash and wound  Allergic/Immunologic: Negative for immunocompromised state  Neurological: Negative for dizziness, seizures, speech difficulty, weakness and headaches  Hematological: Negative for adenopathy  Psychiatric/Behavioral: Negative for confusion  Past Medical History     Patient Active Problem List   Diagnosis    Anxiety    Benign essential HTN    Elevated HDL    Hyperlipidemia LDL goal <130    Insomnia    Leiomyoma of uterus    Malignant neoplasm of 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, estrogen receptor positive (HCC)    Osteopenia    Vitamin D deficiency    Abnormal glucose    Abnormal mammogram    Cervical polyp    Hemorrhoids    Menopause    Muscle cramps at night    Overweight (BMI 25 0-29  9)    Prediabetes    Shingles     Past Medical History:   Diagnosis Date    [facility] [address] )     left breast    Hemorrhoid     Hyperlipidemia     Hypertension     Insomnia     Leiomyoma of uterus     Osteopenia     Shingles     Tick bite      Past Surgical History:   Procedure Laterality Date    BREAST BIOPSY Left     BREAST LUMPECTOMY Left     CYST REMOVAL      right eyelid  DILATION AND CURETTAGE OF UTERUS      INTRAOPERATIVE RADIATION THERAPY (IORT) Left 1/12/2018    Procedure: BREAST IORT BY DR Ольга Rivera;  Surgeon: Susy Huitron MD;  [facility];  Service: Surgical Oncology    WA BIOPSY/EXCISION, LYMPH NODE(S) Left 1/12/2018    Procedure: LYMPHOSCINTIGRAPHY; INTRAOPERATIVE LYMPHATIC MAPPING; SENTINEL LYMPH NODE BIOPSY (INJECT AT 0832); Surgeon: Susy Huitron MD;  [facility];  Service: Surgical Oncology    WA PERQ DEVICE PLACEMT BREAST LOC 1ST LES W [place] Left 1/12/2018    Procedure: BREAST LUMPECTOMY; BREAST NEEDLE LOCALIZATION (NEEDLE LOC AT 3519); FROZEN SECTION;  Surgeon: Susy Huitron MD;  [facility];  Service: Surgical Oncology    SENTINEL LYMPH NODE BIOPSY Left      Family History   Problem Relation Age of Onset    Colon cancer Paternal Grandfather     Breast cancer Maternal Aunt     Ovarian cancer Maternal Aunt     Prostate cancer Maternal Uncle     Colon cancer Maternal Uncle     Hypertension Mother     Heart disease Mother     Thyroid disease Mother     Leukemia Father      Social History     Social History    Marital status: /Civil Union     Spouse name: N/A    Number of children: N/A    Years of education: N/A     Occupational History    Not on file       Social History Main Topics    Smoking status: Former Smoker    Smokeless tobacco: Former User      Comment: quit 10 years ago    Alcohol use 4 2 oz/week     4 Glasses of wine, 3 Cans of beer per week    Drug use: No    Sexual activity: Not on file     Other Topics Concern    Not on file     Social History Narrative    No narrative on file       Current Outpatient Prescriptions:     calcium carbonate (OS-LINDSEY) 1250 (500 Ca) MG tablet, Take 1 tablet by mouth daily, Disp: , Rfl:     anastrozole (ARIMIDEX) 1 mg tablet, Take 1 tablet (1 mg total) by mouth daily, Disp: 90 tablet, Rfl: 1    ascorbic acid (VITAMIN C) 500 mg tablet, Take 500 mg by mouth daily, Disp: , Rfl:   cholecalciferol (VITAMIN D3) 1,000 units tablet, Take 1,000 Units by mouth 3 (three) times a week  , Disp: , Rfl:     lisinopril (ZESTRIL) 10 mg tablet, Take 10 mg by mouth daily, Disp: , Rfl:     magnesium 30 MG tablet, Take 30 mg by mouth daily at bedtime, Disp: , Rfl:     oxyCODONE-acetaminophen (PERCOCET) 5-325 mg per tablet, Take 1 tablet by mouth every 6 (six) hours as needed for moderate pain Earliest Fill Date: 4/10/18 Max Daily Amount: 4 tablets, Disp: 30 tablet, Rfl: 0    simvastatin (ZOCOR) 20 mg tablet, Take 1 tablet (20 mg total) by mouth daily at bedtime, Disp: 90 tablet, Rfl: 1  No current facility-administered medications for this visit       Facility-Administered Medications Ordered in Other Visits:     [START ON 4/26/2018] alteplase (CATHFLO) injection 2 mg, 2 mg, Intracatheter, Once PRN, MD Flaca Camacho  [START ON 4/26/2018] cyclophosphamide (CYTOXAN) 1,000 mg in sodium chloride 0 9 % 250 mL IVPB, 1,000 mg, Intravenous, Once, MD Flaca Camacho  [START ON 4/26/2018] DOCEtaxel (TAXOTERE) 130 mg in sodium chloride 0 9 % 250 mL chemo infusion, 130 mg, Intravenous, Once, MD Flaca Camacho  [START ON 4/27/2018] heparin lock flush 100 units/mL injection 300 Units, 300 Units, Intracatheter, Q1H PRN, MD Flaca Camacho  [START ON 4/26/2018] heparin lock flush 100 units/mL injection 300 Units, 300 Units, Intracatheter, Q1H PRN, MD Flaca Camacho  [START ON 4/26/2018] ondansetron (ZOFRAN) 16 mg, dexamethasone (DECADRON) 10 mg in sodium chloride 0 9 % 50 mL IVPB, , Intravenous, Once, MD Flaca Camacho  [START ON 4/27/2018] pegfilgrastim (NEULASTA) subcutaneous injection 6 mg, 6 mg, Subcutaneous, Once, MD Flaca Camacho Ravel  [START ON 4/26/2018] sodium chloride 0 9 % infusion, 20 mL/hr, Intravenous, Once, Ron Jain MD  No Known Allergies    Physical Exam:     Vitals:    04/25/18 1038   BP: 154/64   Pulse: 74   Resp: 18   Temp: 98 1 °F (36 7 °C)     Physical Exam Constitutional: She is oriented to person, place, and time  She appears well-developed and well-nourished  HENT:   Head: Normocephalic and atraumatic  Mouth/Throat: Oropharynx is clear and moist    Eyes: EOM are normal  Pupils are equal, round, and reactive to light  Neck: Normal range of motion  Neck supple  No JVD present  No tracheal deviation present  No thyromegaly present  Cardiovascular: Normal rate, regular rhythm, normal heart sounds and intact distal pulses  Exam reveals no gallop and no friction rub  No murmur heard  Pulmonary/Chest: Effort normal and breath sounds normal  No respiratory distress  She has no wheezes  She has no rales  Right breast exhibits no inverted nipple, no mass, no nipple discharge, no skin change and no tenderness  Left breast exhibits no inverted nipple, no mass, no nipple discharge, no skin change and no tenderness  Breasts are symmetrical      Both breasts were examined in the sitting and supine position  There are no worrisome skin lesions, no nipple retraction and no nipple discharge  There are no dominant masses, axillary adenopathy or supraclavicular adenopathy on either side  Abdominal: Soft  She exhibits no distension and no mass  There is no hepatomegaly  There is no tenderness  There is no rebound and no guarding  Musculoskeletal: Normal range of motion  She exhibits no edema or tenderness  Lymphadenopathy:     She has no cervical adenopathy  Neurological: She is alert and oriented to person, place, and time  No cranial nerve deficit  Skin: Skin is warm and dry  No rash noted  No erythema  Psychiatric: She has a normal mood and affect  Her behavior is normal    Vitals reviewed  Results:       Discussion/Summary:     Based on today's history and physical exam, there is no evidence of local, regional or distant recurrence  The patients imaging is up to date without worrisome findings    Hence she is free of any clinical or radiologic evidence of recurrent disease  We will see the patient back in six  months based on the NCCN guidelines  The patient knows to contact us if she develops any signs or symptoms of recurrent disease  All questions were answered to the patients satisfaction  The patient will complete her last dose of chemotherapy  She will follow up with Radiation Oncology  We will see her back in 6 months and coordinate her mammogram at that time if Dr and Becky Méndez does not coordinated  I suggested the patient contact her family doctor if her shoulder pain persist for possible evaluation by orthopedics though it may well be related to her treatment  The patient has her prescription for anastrozole and will started in mid May  Advance Care Planning/Advance Directives:  I discussed the disease status, treatment plans and follow-up with the patient

## 2018-04-26 ENCOUNTER — TELEPHONE (OUTPATIENT)
Dept: HEMATOLOGY ONCOLOGY | Facility: CLINIC | Age: 68
End: 2018-04-26

## 2018-04-26 ENCOUNTER — HOSPITAL ENCOUNTER (OUTPATIENT)
Dept: INFUSION CENTER | Facility: HOSPITAL | Age: 68
Discharge: HOME | End: 2018-04-26
Payer: COMMERCIAL

## 2018-04-26 ENCOUNTER — OFFICE VISIT (OUTPATIENT)
Dept: FAMILY MEDICINE CLINIC | Facility: CLINIC | Age: 68
End: 2018-04-26
Payer: COMMERCIAL

## 2018-04-26 VITALS
SYSTOLIC BLOOD PRESSURE: 126 MMHG | WEIGHT: 153 LBS | HEIGHT: 64 IN | TEMPERATURE: 97.1 F | BODY MASS INDEX: 26.12 KG/M2 | HEART RATE: 84 BPM | DIASTOLIC BLOOD PRESSURE: 66 MMHG | RESPIRATION RATE: 16 BRPM

## 2018-04-26 VITALS
RESPIRATION RATE: 18 BRPM | HEIGHT: 64 IN | HEART RATE: 75 BPM | BODY MASS INDEX: 25.59 KG/M2 | SYSTOLIC BLOOD PRESSURE: 149 MMHG | TEMPERATURE: 97.9 F | DIASTOLIC BLOOD PRESSURE: 65 MMHG | OXYGEN SATURATION: 100 % | WEIGHT: 149.91 LBS

## 2018-04-26 DIAGNOSIS — M54.6 ACUTE LEFT-SIDED THORACIC BACK PAIN: Primary | ICD-10-CM

## 2018-04-26 DIAGNOSIS — I10 BENIGN ESSENTIAL HTN: ICD-10-CM

## 2018-04-26 DIAGNOSIS — E78.5 HYPERLIPIDEMIA LDL GOAL <130: ICD-10-CM

## 2018-04-26 PROBLEM — M54.9 LEFT-SIDED BACK PAIN: Status: ACTIVE | Noted: 2018-04-26

## 2018-04-26 PROCEDURE — 99213 OFFICE O/P EST LOW 20 MIN: CPT | Performed by: FAMILY MEDICINE

## 2018-04-26 PROCEDURE — 96367 TX/PROPH/DG ADDL SEQ IV INF: CPT

## 2018-04-26 PROCEDURE — 96417 CHEMO IV INFUS EACH ADDL SEQ: CPT

## 2018-04-26 PROCEDURE — 96413 CHEMO IV INFUSION 1 HR: CPT

## 2018-04-26 PROCEDURE — 93000 ELECTROCARDIOGRAM COMPLETE: CPT | Performed by: FAMILY MEDICINE

## 2018-04-26 RX ORDER — MELATONIN
1000 3 TIMES WEEKLY
COMMUNITY
End: 2018-09-12

## 2018-04-26 RX ORDER — LISINOPRIL 10 MG/1
10 TABLET ORAL DAILY
Qty: 90 TABLET | Refills: 1 | Status: SHIPPED | OUTPATIENT
Start: 2018-04-26 | End: 2018-10-24 | Stop reason: SDUPTHER

## 2018-04-26 RX ORDER — LISINOPRIL 10 MG/1
10 TABLET ORAL DAILY
Qty: 90 TABLET | Refills: 1 | Status: SHIPPED | OUTPATIENT
Start: 2018-04-26 | End: 2018-04-26 | Stop reason: SDUPTHER

## 2018-04-26 RX ORDER — CYCLOBENZAPRINE HCL 10 MG
10 TABLET ORAL
Qty: 21 TABLET | Refills: 0 | Status: SHIPPED | OUTPATIENT
Start: 2018-04-26 | End: 2018-08-06

## 2018-04-26 RX ORDER — PREDNISONE 20 MG/1
TABLET ORAL
Qty: 32 TABLET | Refills: 0 | Status: SHIPPED | OUTPATIENT
Start: 2018-04-26 | End: 2018-08-06

## 2018-04-26 RX ADMIN — SODIUM CHLORIDE 20 ML/HR: 0.9 INJECTION, SOLUTION INTRAVENOUS at 11:11

## 2018-04-26 RX ADMIN — DOCETAXEL 130 MG: 20 INJECTION, SOLUTION, CONCENTRATE INTRAVENOUS at 11:48

## 2018-04-26 RX ADMIN — DEXAMETHASONE SODIUM PHOSPHATE: 10 INJECTION, SOLUTION INTRAMUSCULAR; INTRAVENOUS at 11:11

## 2018-04-26 RX ADMIN — CYCLOPHOSPHAMIDE 1000 MG: 1 INJECTION, POWDER, FOR SOLUTION INTRAVENOUS; ORAL at 13:01

## 2018-04-26 NOTE — PROGRESS NOTES
Assessment/Plan:    Problem List Items Addressed This Visit     Left-sided back pain - Primary    Relevant Medications    predniSONE 20 mg tablet    cyclobenzaprine (FLEXERIL) 10 mg tablet    Other Relevant Orders    POCT ECG (Completed)    Ambulatory referral to Physical Therapy          There are no Patient Instructions on file for this visit  No Follow-up on file  Subjective:      Patient ID: Kris Mccain is a 77 y o  female  Chief Complaint   Patient presents with    shoulder pain     persistent--left side--lj       Pt states she will get a shot after her chemo - for WBC states it generally gives her bone pain   First time she got hip pain  States second time she did not get pain  Third time pt got pain after getting the shot she got pain in her left arm   Pt was given percocet by oncology  Pt states she saw surgeon yesterday and the surgeon felt she should see me  Pt states the pain is in her shoulder blade on the left side rad to the shoulder and rad down the arm  Pain is 8/10  Not constant  If she lies down and oes to sleep she does not have pain  mvmt does not make it worse while she is doing it but then it will after  No chest pain  No nausea no vomiting  The following portions of the patient's history were reviewed and updated as appropriate: allergies, current medications, past family history, past medical history, past social history, past surgical history and problem list     Review of Systems   Constitutional: Negative for activity change, appetite change, chills, diaphoresis, fatigue, fever and unexpected weight change  HENT: Negative for congestion, ear pain, postnasal drip and sinus pressure  Eyes: Negative for discharge and itching  Respiratory: Negative for choking, chest tightness and shortness of breath  Cardiovascular: Negative for chest pain, palpitations and leg swelling  Endocrine: Negative for cold intolerance and heat intolerance  Musculoskeletal: Positive for arthralgias, back pain and myalgias  Current Outpatient Prescriptions   Medication Sig Dispense Refill    ascorbic acid (VITAMIN C) 500 mg tablet Take 500 mg by mouth daily      CALCIUM CITRATE PO Take 1 tablet by mouth daily PT STATES TAKING 1200 MG DAILY      cholecalciferol (VITAMIN D3) 1,000 units tablet Take 1,000 Units by mouth 3 (three) times a week      lisinopril (ZESTRIL) 10 mg tablet Take 1 tablet (10 mg total) by mouth daily 90 tablet 1    oxyCODONE-acetaminophen (PERCOCET) 5-325 mg per tablet Take 1 tablet by mouth every 6 (six) hours as needed for moderate pain Earliest Fill Date: 4/10/18 Max Daily Amount: 4 tablets 30 tablet 0    simvastatin (ZOCOR) 20 mg tablet Take 1 tablet (20 mg total) by mouth daily at bedtime 90 tablet 1    anastrozole (ARIMIDEX) 1 mg tablet Take 1 tablet (1 mg total) by mouth daily 90 tablet 1    cyclobenzaprine (FLEXERIL) 10 mg tablet Take 1 tablet (10 mg total) by mouth daily at bedtime for 21 days 21 tablet 0    magnesium 30 MG tablet Take 30 mg by mouth daily at bedtime      predniSONE 20 mg tablet 4 tabs for three days, 3 tabs for three days, 2 tabs for three days, 1 tab for three days, 1/2 tab for 4 days 32 tablet 0     No current facility-administered medications for this visit        Facility-Administered Medications Ordered in Other Visits   Medication Dose Route Frequency Provider Last Rate Last Dose    alteplase (CATHFLO) injection 2 mg  2 mg Intracatheter Once PRN Pat Reyez MD        [START ON 4/27/2018] heparin lock flush 100 units/mL injection 300 Units  300 Units Intracatheter Q1H PRAIDAN Reyez MD        heparin lock flush 100 units/mL injection 300 Units  300 Units Intracatheter Q1H PRAIDAN Reyez MD       Learta January Dema Limbo ON 4/27/2018] pegfilgrastim (NEULASTA) subcutaneous injection 6 mg  6 mg Subcutaneous Once Pat Reyez MD           Objective:    /66   Pulse 84   Temp (!) 97 1 °F (36 2 °C)   Resp 16   Ht 5' 4" (1 626 m)   Wt 69 4 kg (153 lb)   BMI 26 26 kg/m²        Physical Exam   Constitutional: She appears well-developed and well-nourished  HENT:   Head: Normocephalic and atraumatic  Eyes: Conjunctivae and EOM are normal  Pupils are equal, round, and reactive to light  Neck: Normal range of motion  Cardiovascular: Normal rate  Pulmonary/Chest: No respiratory distress  Abdominal: Soft  Musculoskeletal:        Arms:  Nursing note and vitals reviewed  Suspect this is musculo pain given her history I would like an ekg    Which we will do now and it shows no signs of ischemia       Victorina Berger,

## 2018-04-27 ENCOUNTER — HOSPITAL ENCOUNTER (OUTPATIENT)
Dept: INFUSION CENTER | Facility: HOSPITAL | Age: 68
Discharge: HOME | End: 2018-04-27
Payer: COMMERCIAL

## 2018-04-27 PROCEDURE — 96372 THER/PROPH/DIAG INJ SC/IM: CPT

## 2018-04-27 RX ADMIN — PEGFILGRASTIM 6 MG: 6 INJECTION SUBCUTANEOUS at 13:56

## 2018-05-04 ENCOUNTER — RADIATION ONCOLOGY FOLLOW-UP (OUTPATIENT)
Dept: RADIATION ONCOLOGY | Facility: HOSPITAL | Age: 68
End: 2018-05-04

## 2018-05-04 ENCOUNTER — APPOINTMENT (OUTPATIENT)
Dept: RADIATION ONCOLOGY | Facility: HOSPITAL | Age: 68
End: 2018-05-04
Attending: RADIOLOGY
Payer: COMMERCIAL

## 2018-05-04 VITALS
WEIGHT: 152.4 LBS | HEIGHT: 64 IN | HEART RATE: 90 BPM | BODY MASS INDEX: 26.02 KG/M2 | SYSTOLIC BLOOD PRESSURE: 108 MMHG | DIASTOLIC BLOOD PRESSURE: 60 MMHG | TEMPERATURE: 98.2 F | RESPIRATION RATE: 16 BRPM | OXYGEN SATURATION: 98 %

## 2018-05-04 DIAGNOSIS — Z17.0 MALIGNANT NEOPLASM OF UPPER-INNER QUADRANT OF LEFT BREAST IN FEMALE, ESTROGEN RECEPTOR POSITIVE (HCC): Primary | ICD-10-CM

## 2018-05-04 DIAGNOSIS — C50.212 MALIGNANT NEOPLASM OF UPPER-INNER QUADRANT OF LEFT BREAST IN FEMALE, ESTROGEN RECEPTOR POSITIVE (HCC): Primary | ICD-10-CM

## 2018-05-04 PROCEDURE — 77332 RADIATION TREATMENT AID(S): CPT | Performed by: RADIOLOGY

## 2018-05-04 PROCEDURE — 99215 OFFICE O/P EST HI 40 MIN: CPT | Performed by: RADIOLOGY

## 2018-05-04 PROCEDURE — 77290 THER RAD SIMULAJ FIELD CPLX: CPT | Performed by: RADIOLOGY

## 2018-05-04 PROCEDURE — G0463 HOSPITAL OUTPT CLINIC VISIT: HCPCS | Performed by: RADIOLOGY

## 2018-05-04 NOTE — PROGRESS NOTES
Reji Argueta  1950   Ms Harry Desir is a 77 y o  female       Chief Complaint   Patient presents with    Follow-up     Limited consult  Cancer Staging  Malignant neoplasm of 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, estrogen receptor positive ([facility] [address] )  Staging form: Breast, AJCC 8th Edition  - Pathologic stage from 1/12/2018: Stage IA (pT1c, pN0(sn), cM0, G3, ER: Positive, ID: Positive, HER2: Negative) - Signed by Kaiser Hassan MD on 4/25/2018         Malignant neoplasm of 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, estrogen receptor positive ([facility] [address] )    1/12/2018 Surgery     Left needle localization lumpectomy with sentinel lymph node biopsy  Grade 3  T1c NO Grade 3   Repeat HER 2 indicated and was Negative  ER 90  ID 90  Her 2 negative         1/12/2018 -  Radiation     IORT left breast to a dose of 2000cGy         2/2/2018 Genomic Testing     Mammaprint high risk           2/22/2018 - 4/26/2018 Chemotherapy     Cyclophosphamide and docetaxel (TC) x four doses  Dr Mata Schneider recommends Anastrozole to start in mid-May 2018  Clinical Trial: no        Interval History: 1/12/18 Left breast lumpectomy, SLNB, IORT for stage I ER/ID positive, Her 2-, grade 3 invasive mammary carcinoma  2/9/18 FU Dr Christopher Castillo: Multi focal disease but all final margins were negative; no evidence of lymphovascular or perineural invasion  Due to the fact that the tumor was grade 3, a Mammaprint test has been sent and she will be following up with Medical Oncology  Should her tumor return as genetically high risk, she may receive adjuvant systemic therapy followed by adjuvant whole breast radiation  2/16/18 Dr Mata Schneider Consult: With high-grade disease as well as MammaPrint high risk, adjuvant chemotherapy is indicated  I recommended her to have Taxotere and cyclophosphamide with Neulasta support every 3 weeks x4 cycles  4/26/18 Chemo completed  To start anastrozole 1 mg once a day in mid May 2018      5/4/18 Consult Dr Froylan George for whole breast RT  Screening  Tobacco  Current tobacco user: no  If yes, brief counseling provided: NA    Hypertension  Hypertension screening performed: yes  Normotensive:  yes  If no, referred to PCP: n/a    Depression Screening  Screened for depression using PHQ-2: yes    Screened for depression using PHQ-9:  yes  Screening positive or negative:  negative  If score >4, was any of the following actions taken? Additional evaluation for depression, suicide risk assesment, referral to PCP or psychiatry, medication started:  n/a    Advanced Care Planning for Patients >65 years  Advanced Care Planning Discussed:  No  Patient declined 5 wishes packet  Patient named surrogate decision maker or care plan in chart: n/a      Health Maintenance   Topic Date Due    Hepatitis C Screening  1950    PAP SMEAR  1950    Depression Screening PHQ-9  03/30/1960    DTaP,Tdap,and Td Vaccines (1 - Tdap) 03/30/1969    Fall Risk  03/30/2013    Urinary Incontinence Screening  03/30/2013    GLAUCOMA SCREENING 67+ YR  03/30/2015    COLONOSCOPY  02/20/2018    INFLUENZA VACCINE  09/01/2018    MAMMOGRAM  01/12/2019    PNEUMOCOCCAL POLYSACCHARIDE VACCINE AGE 72 AND OVER  Completed       Patient Active Problem List   Diagnosis    Anxiety    Benign essential HTN    Elevated HDL    Hyperlipidemia LDL goal <130    Insomnia    Leiomyoma of uterus    Malignant neoplasm of 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, estrogen receptor positive (HCC)    Osteopenia    Vitamin D deficiency    Abnormal glucose    Abnormal mammogram    Cervical polyp    Hemorrhoids    Menopause    Muscle cramps at night    Overweight (BMI 25 0-29  9)    Prediabetes    Shingles    Left-sided back pain     Past Medical History:   Diagnosis Date    Abnormal blood chemistry     Abnormal glucose     Cancer (HCC)     left breast    Cervical polyp     Fracture of ankle     Hemorrhoid     Herpes zoster  Hyperglycemia     Hyperlipidemia     Hypertension     Insomnia     Leiomyoma of uterus     Osteopenia     Seborrheic keratosis     Shingles     Tick bite      Past Surgical History:   Procedure Laterality Date    ANKLE SURGERY      BREAST BIOPSY Left     BREAST LUMPECTOMY Left     CYST REMOVAL      right eyelid    DILATION AND CURETTAGE OF UTERUS      INTRAOPERATIVE RADIATION THERAPY (IORT) Left 1/12/2018    Procedure: BREAST IORT BY DR Juan Douglass;  Surgeon: Rebecca Shea MD;  [facility];  Service: Surgical Oncology    MT BIOPSY/EXCISION, LYMPH NODE(S) Left 1/12/2018    Procedure: LYMPHOSCINTIGRAPHY; INTRAOPERATIVE LYMPHATIC MAPPING; SENTINEL LYMPH NODE BIOPSY (INJECT AT 0424); Surgeon: Rebecca Shea MD;  [facility];  Service: Surgical Oncology    MT PERQ DEVICE PLACEMT BREAST LOC 1ST LES W [place] Left 1/12/2018    Procedure: BREAST LUMPECTOMY; BREAST NEEDLE LOCALIZATION (NEEDLE LOC AT 6981); FROZEN SECTION;  Surgeon: Rebecca Shea MD;  [facility];  Service: Surgical Oncology    SENTINEL LYMPH NODE BIOPSY Left      Family History   Problem Relation Age of Onset    Colon cancer Paternal Grandfather     Breast cancer Maternal Aunt     Ovarian cancer Maternal Aunt     Prostate cancer Maternal Uncle     Colon cancer Maternal Uncle     Hypertension Mother     Heart disease Mother     Thyroid disease Mother     Leukemia Father     Other Father      dyschromia    Emphysema Maternal Grandfather     Heart disease Paternal Grandmother     Colon cancer Paternal Aunt      Social History     Social History    Marital status: /Civil Union     Spouse name: N/A    Number of children: N/A    Years of education: N/A     Occupational History           Social History Main Topics    Smoking status: Former Smoker    Smokeless tobacco: Never Used      Comment: 2008 quit      Alcohol use 4 2 oz/week     4 Glasses of wine, 3 Cans of beer per week      Comment: social    Drug use: No    Sexual activity: Not on file     Other Topics Concern    Not on file     Social History Narrative    No narrative on file       Current Outpatient Prescriptions:     anastrozole (ARIMIDEX) 1 mg tablet, Take 1 tablet (1 mg total) by mouth daily, Disp: 90 tablet, Rfl: 1    ascorbic acid (VITAMIN C) 500 mg tablet, Take 500 mg by mouth daily, Disp: , Rfl:     CALCIUM CITRATE PO, Take 1 tablet by mouth daily PT STATES TAKING 1200 MG DAILY, Disp: , Rfl:     cholecalciferol (VITAMIN D3) 1,000 units tablet, Take 1,000 Units by mouth 3 (three) times a week, Disp: , Rfl:     cyclobenzaprine (FLEXERIL) 10 mg tablet, Take 1 tablet (10 mg total) by mouth daily at bedtime for 21 days, Disp: 21 tablet, Rfl: 0    lisinopril (ZESTRIL) 10 mg tablet, Take 1 tablet (10 mg total) by mouth daily, Disp: 90 tablet, Rfl: 1    magnesium 30 MG tablet, Take 30 mg by mouth daily at bedtime, Disp: , Rfl:     predniSONE 20 mg tablet, 4 tabs for three days, 3 tabs for three days, 2 tabs for three days, 1 tab for three days, 1/2 tab for 4 days, Disp: 32 tablet, Rfl: 0    simvastatin (ZOCOR) 20 mg tablet, Take 1 tablet (20 mg total) by mouth daily at bedtime, Disp: 90 tablet, Rfl: 1  No Known Allergies    Review of Systems:  Review of Systems   Constitutional: Negative  HENT: Negative  Eyes: Negative  Respiratory: Negative  Cardiovascular: Negative  Gastrointestinal: Negative  Endocrine: Negative  Genitourinary: Negative  Musculoskeletal:        Left shoulder radiating down her arm  PCP has her on flexeril  Skin: Negative  Allergic/Immunologic: Negative  Neurological: Negative  Hematological: Negative  Psychiatric/Behavioral: Sleep disturbance: baseline         Vitals:    05/04/18 1014   BP: 108/60   BP Location: Right arm   Patient Position: Sitting   Cuff Size: Standard   Pulse: 90   Resp: 16   Temp: 98 2 °F (36 8 °C)   TempSrc: Temporal   SpO2: 98%   Weight: 69 1 kg (152 lb 6 4 oz)   Height: 5' 4" (1 626 m)       Pain Score: 0-No pain    Imaging:No results found

## 2018-05-04 NOTE — PROGRESS NOTES
Follow-up - Radiation Oncology   Linda Braxton 1950 77 y o  female [id number]      History of Present Illness   Cancer Staging  Malignant neoplasm of 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, estrogen receptor positive ([facility] [address] )  Staging form: Breast, AJCC 8th Edition  - Pathologic stage from 1/12/2018: Stage IA (pT1c, pN0(sn), cM0, G3, ER: Positive, MT: Positive, HER2: Negative) - Signed by Sanjay Patterson MD on 4/25/2018      Linda Braxton returns today for a routine follow-up having completed adjuvant systemic therapy as described below  She will now proceed with CT planning in order to proceed with adjuvant radiation to the left breast       Interval History: 1/12/18 Left breast lumpectomy, SLNB, IORT for stage I ER/MT positive, Her 2-, grade 3 invasive mammary carcinoma  2/9/18 FU Dr Washington Brandt: Multi focal disease but all final margins were negative; no evidence of lymphovascular or perineural invasion  Due to the fact that the tumor was grade 3, a Mammaprint test has been sent and she will be following up with Medical Oncology  Should her tumor return as genetically high risk, she may receive adjuvant systemic therapy followed by adjuvant whole breast radiation  2/16/18 Dr Alicja Strong Consult: With high-grade disease as well as MammaPrint high risk, adjuvant chemotherapy is indicated  I recommended her to have Taxotere and cyclophosphamide with Neulasta support every 3 weeks x4 cycles  4/26/18 Chemo completed  To start anastrozole 1 mg once a day in mid May 2018  Today:  The patient presents today overall feeling well  She tolerated systemic therapy well  She does complain of a dull ache in the left trapezius region  She was having some pain extending all the way down the arm but was placed on Flexeril by her primary care physician and this has helped  She has also been referred to physical therapy which she is scheduled to begin shortly        Screening  Tobacco  Current tobacco user: no  If yes, brief counseling provided: NA    Hypertension  Hypertension screening performed: yes  Normotensive:  yes  If no, referred to PCP: n/a    Depression Screening  Screened for depression using PHQ-2: yes    Screened for depression using PHQ-9:  yes  Screening positive or negative:  negative  If score >4, was any of the following actions taken? Additional evaluation for depression, suicide risk assesment, referral to PCP or psychiatry, medication started:  n/a    Advanced Care Planning for Patients >65 years  Advanced Care Planning Discussed:  No  Patient declined 5 wishes packet  Patient named surrogate decision maker or care plan in chart: n/a          Historical Information      Malignant neoplasm of 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, estrogen receptor positive ([facility] [address] )    1/12/2018 Surgery     Left needle localization lumpectomy with sentinel lymph node biopsy  Grade 3  T1c NO Grade 3   Repeat HER 2 indicated and was Negative  ER 90  ND 90  Her 2 negative         1/12/2018 -  Radiation     IORT left breast to a dose of 2000cGy         2/2/2018 Genomic Testing     Mammaprint high risk           2/22/2018 - 4/26/2018 Chemotherapy     Cyclophosphamide and docetaxel (TC) x four doses  Dr Chang Arora recommends Anastrozole to start in mid-May 2018              Past Medical History:   Diagnosis Date    Abnormal blood chemistry     Abnormal glucose     Cancer (HCC)     left breast    Cervical polyp     Fracture of ankle     Hemorrhoid     Herpes zoster     Hyperglycemia     Hyperlipidemia     Hypertension     Insomnia     Leiomyoma of uterus     Osteopenia     Seborrheic keratosis     Shingles     Tick bite      Past Surgical History:   Procedure Laterality Date    ANKLE SURGERY      BREAST BIOPSY Left     BREAST LUMPECTOMY Left     CYST REMOVAL      right eyelid    DILATION AND CURETTAGE OF UTERUS      INTRAOPERATIVE RADIATION THERAPY (IORT) Left 1/12/2018    Procedure: BREAST IORT BY DR Panfilo Dupree;  Surgeon: Daylin Gasca MD;  [facility];  Service: Surgical Oncology    IA BIOPSY/EXCISION, LYMPH NODE(S) Left 1/12/2018    Procedure: LYMPHOSCINTIGRAPHY; INTRAOPERATIVE LYMPHATIC MAPPING; SENTINEL LYMPH NODE BIOPSY (INJECT AT 5661); Surgeon: Daylin Gasca MD;  [facility];  Service: Surgical Oncology    IA PERQ DEVICE PLACEMT BREAST LOC 1ST LES W [place] Left 1/12/2018    Procedure: BREAST LUMPECTOMY; BREAST NEEDLE LOCALIZATION (NEEDLE LOC AT 5666); FROZEN SECTION;  Surgeon: Daylin Gasca MD;  [facility];  Service: Surgical Oncology    SENTINEL LYMPH NODE BIOPSY Left        Social History   History   Alcohol Use    4 2 oz/week    4 Glasses of wine, 3 Cans of beer per week     Comment: social     History   Drug Use No     History   Smoking Status    Former Smoker   Smokeless Tobacco    Never Used     Comment: 2008 quit           Meds/Allergies     Current Outpatient Prescriptions:     anastrozole (ARIMIDEX) 1 mg tablet, Take 1 tablet (1 mg total) by mouth daily, Disp: 90 tablet, Rfl: 1    ascorbic acid (VITAMIN C) 500 mg tablet, Take 500 mg by mouth daily, Disp: , Rfl:     CALCIUM CITRATE PO, Take 1 tablet by mouth daily PT STATES TAKING 1200 MG DAILY, Disp: , Rfl:     cholecalciferol (VITAMIN D3) 1,000 units tablet, Take 1,000 Units by mouth 3 (three) times a week, Disp: , Rfl:     cyclobenzaprine (FLEXERIL) 10 mg tablet, Take 1 tablet (10 mg total) by mouth daily at bedtime for 21 days, Disp: 21 tablet, Rfl: 0    lisinopril (ZESTRIL) 10 mg tablet, Take 1 tablet (10 mg total) by mouth daily, Disp: 90 tablet, Rfl: 1    magnesium 30 MG tablet, Take 30 mg by mouth daily at bedtime, Disp: , Rfl:     predniSONE 20 mg tablet, 4 tabs for three days, 3 tabs for three days, 2 tabs for three days, 1 tab for three days, 1/2 tab for 4 days, Disp: 32 tablet, Rfl: 0    simvastatin (ZOCOR) 20 mg tablet, Take 1 tablet (20 mg total) by mouth daily at bedtime, Disp: 90 tablet, Rfl: 1  No Known Allergies      Review of Systems   Review of Systems   Constitutional: Negative  HENT: Negative  Eyes: Negative  Respiratory: Negative  Cardiovascular: Negative  Gastrointestinal: Negative  Endocrine: Negative  Genitourinary: Negative  Musculoskeletal:        Left shoulder radiating down her arm  PCP has her on flexeril  Skin: Negative  Allergic/Immunologic: Negative  Neurological: Negative  Hematological: Negative  Psychiatric/Behavioral: Sleep disturbance: baseline  OBJECTIVE:   /60 (BP Location: Right arm, Patient Position: Sitting, Cuff Size: Standard)   Pulse 90   Temp 98 2 °F (36 8 °C) (Temporal)   Resp 16   Ht 5' 4" (1 626 m)   Wt 69 1 kg (152 lb 6 4 oz)   SpO2 98%   BMI 26 16 kg/m²   Pain Assessment:  0  Karnofsky: 90 - Able to carry on normal activity; minor signs or symptoms of disease     Physical Exam   The patient presents today no apparent distress  Sclera anicteric  No cervical supraclavicular lymphadenopathy  Normal S1-S2 regular rate and rhythm  Lungs clear to auscultation bilaterally  The right breast within normal limits  The left breast has a well-healed lumpectomy scar in the upper outer quadrant  No visible or palpable abnormalities  No swelling of the upper extremities          RESULTS    Lab Results:   Recent Results (from the past 672 hour(s))   CBC and differential    Collection Time: 04/24/18  9:00 AM   Result Value Ref Range    WBC 3 90 (L) 4 80 - 10 80 Thousand/uL    RBC 3 64 (L) 4 20 - 5 40 Million/uL    Hemoglobin 11 4 (L) 12 0 - 16 0 g/dL    Hematocrit 34 8 (L) 37 0 - 47 0 %    MCV 96 82 - 98 fL    MCH 31 4 (H) 27 0 - 31 0 pg    MCHC 32 9 31 4 - 37 4 g/dL    RDW 16 7 (H) 11 6 - 15 1 %    MPV 7 7 (L) 8 9 - 12 7 fL    Platelets 723 748 - 475 Thousands/uL    nRBC 0 /100 WBCs    Neutrophils Relative 59 43 - 75 %    Lymphocytes Relative 29 14 - 44 %    Monocytes Relative 12 4 - 12 %    Eosinophils Relative 0 0 - 6 %    Basophils Relative 1 0 - 1 %    Neutrophils Absolute 2 30 1 85 - 7 62 Thousands/µL    Lymphocytes Absolute 1 10 0 60 - 4 47 Thousands/µL    Monocytes Absolute 0 50 0 17 - 1 22 Thousand/µL    Eosinophils Absolute 0 00 0 00 - 0 61 Thousand/µL    Basophils Absolute 0 00 0 00 - 0 10 Thousands/µL   Comprehensive metabolic panel    Collection Time: 04/24/18  9:00 AM   Result Value Ref Range    Sodium 140 136 - 145 mmol/L    Potassium 4 0 3 5 - 5 3 mmol/L    Chloride 104 100 - 108 mmol/L    CO2 29 21 - 32 mmol/L    Anion Gap 7 4 - 13 mmol/L    BUN 15 5 - 25 mg/dL    Creatinine 0 54 (L) 0 60 - 1 30 mg/dL    Glucose, Fasting 99 65 - 99 mg/dL    Calcium 8 8 8 3 - 10 1 mg/dL    AST 29 5 - 45 U/L    ALT 45 12 - 78 U/L    Alkaline Phosphatase 93 46 - 116 U/L    Total Protein 6 4 6 4 - 8 2 g/dL    Albumin 3 5 3 5 - 5 0 g/dL    Total Bilirubin 0 40 0 20 - 1 00 mg/dL    eGFR 96 ml/min/1 73sq m       Imaging Studies:No results found  Assessment/Plan:  Orders Placed This Encounter   Procedures   [address] Treatment        Reji Argueta is a 77y o  year old female with recently diagnosed stage IA T1c N0 M0 grade 3 ER/WI  Positive her 2 - invasive mammary carcinoma the left breast status post lumpectomy and sentinel node biopsy with intraoperative radiation therapy performed at the time of the procedure  Ultimately, her mammo print returned as high risk prompting adjuvant systemic therapy  In the setting of high risk grade 3 disease, we would recommend additional radiation to the entire left breast in the form of standard fractionation external beam radiation therapy to a total dose of 5000 centigray  The associated risks and toxicities of breast radiation were discussed patient in detail, including, not limited to, fatigue, erythema, hyperpigmentation, desquamation, subcutaneous fibrosis, rib fracture, pneumonitis, cardiac toxicity, lymphedema, and secondary malignancy    She will undergo CT simulation today with treatment to begin approximately 4 weeks status post completion of systemic therapy  Karuna London MD  5/4/2018,11:23 AM    Portions of the record may have been created with voice recognition software   Occasional wrong word or "sound a like" substitutions may have occurred due to the inherent limitations of voice recognition software   Read the chart carefully and recognize, using context, where substitutions have occurred

## 2018-05-10 ENCOUNTER — DOCUMENTATION (OUTPATIENT)
Dept: INFUSION CENTER | Facility: CLINIC | Age: 68
End: 2018-05-10

## 2018-05-10 PROCEDURE — 77295 3-D RADIOTHERAPY PLAN: CPT | Performed by: RADIOLOGY

## 2018-05-10 PROCEDURE — 77300 RADIATION THERAPY DOSE PLAN: CPT | Performed by: RADIOLOGY

## 2018-05-10 PROCEDURE — 77334 RADIATION TREATMENT AID(S): CPT | Performed by: RADIOLOGY

## 2018-05-11 ENCOUNTER — EVALUATION (OUTPATIENT)
Dept: PHYSICAL THERAPY | Facility: CLINIC | Age: 68
End: 2018-05-11
Payer: COMMERCIAL

## 2018-05-11 DIAGNOSIS — M54.12 CERVICAL RADICULAR PAIN: Primary | ICD-10-CM

## 2018-05-11 DIAGNOSIS — M54.6 ACUTE LEFT-SIDED THORACIC BACK PAIN: ICD-10-CM

## 2018-05-11 PROCEDURE — 97162 PT EVAL MOD COMPLEX 30 MIN: CPT | Performed by: PHYSICAL THERAPIST

## 2018-05-11 PROCEDURE — 97112 NEUROMUSCULAR REEDUCATION: CPT | Performed by: PHYSICAL THERAPIST

## 2018-05-11 PROCEDURE — G8985 CARRY GOAL STATUS: HCPCS | Performed by: PHYSICAL THERAPIST

## 2018-05-11 PROCEDURE — G8984 CARRY CURRENT STATUS: HCPCS | Performed by: PHYSICAL THERAPIST

## 2018-05-11 NOTE — PROGRESS NOTES
PT Evaluation     Today's date: 2018  Patient name: Kris Mccain  : 1950  MRN: [id number]  Referring provider: Carmen Mcclure DO  Dx:   Encounter Diagnosis     ICD-10-CM    1  Cervical radicular pain M54 12    2  Acute left-sided thoracic back pain M54 6 Ambulatory referral to Physical Therapy       Start Time: 0800  Stop Time: 0845  Total time in clinic (min): 45 minutes    Assessment  Impairments: abnormal or restricted ROM, activity intolerance, impaired physical strength, lacks appropriate home exercise program and pain with function    Assessment details: Kris Mccain is a 77 y o  female who presents with pain, decreased strength, decreased ROM, decreased joint mobility and postural  dysfunction  Due to these impairments, patient has difficulty performing ADL's and work-related activities  Patient's clinical presentation is consistent with their referring diagnosis of acute cervical pain with referral to L scapular region  Patient has been educated in home exercise program and plan of care   Patient would benefit from skilled physical therapy services to address their aforementioned functional limitations and progress towards prior level of function and independence with home exercise program      Understanding of Dx/Px/POC: excellent  Goals  Short term goals to be accomplished in 3 weeks:  STG1: Pt will be I with HEP  STG2: Pt will demo < mod loss thorughout C/S AROM painfree  STG3: Pt will deny pain with visual scanning while driving  STG4: Pt will be able to complete >3 hous of work wihtout pain provocation    Long term goals to be accomplished in 6 weeks:  LTG1: Pt will return to full day of work pain free  LTG2: Pt will demo full C/S AROM pain free      Plan  Patient would benefit from: skilled PT  Planned modality interventions: cryotherapy and thermotherapy: hydrocollator packs  Planned therapy interventions: manual therapy, neuromuscular re-education, self care, therapeutic activities, therapeutic exercise and home exercise program  Frequency: 2x week  Duration in weeks: 6  Treatment plan discussed with: patient  Plan details:  HEP development, stretching, strengthening, A/AA/PROM, joint mobilizations, posture education, STM/MI as needed to reduce muscle tension, muscle reeducation, PLOC discussed and agreed upon with patient  Subjective Evaluation    History of Present Illness  Mechanism of injury: Pt reports one month prev onset of L shldr/scapular pain  Pt felt insidious onset of symptoms, and felt correlation between neck movements and L sided scapular pain  Pt found that prolonged sitting provoked her symptoms while driving and working, less than one ile of driving  Work as a book keeper primarily computer work  Pt had taken muscle relaxers and steroids since MD appointment  Denies sleep disturbance  Pt found discomfort while driving and visually scanning  Actively treating breast cancer, has just completed chemo, will initiate radiation next week  Symptom pattern generally supra trap, prev at most distal to wrist, mostly recently remains above elbow     Pain  Current pain ratin  At best pain ratin  At worst pain ratin      Diagnostic Tests  No diagnostic tests performed  Treatments  Current treatment: medication  Patient Goals  Patient goals for therapy: increased strength, return to work, increased motion and decreased pain          Objective     Special Questions  Negative for night pain and disturbed sleep    Postural Observations  Seated posture: poor  Standing posture: poor        Neurological Testing     Sensation   Cervical/Thoracic   Left   Intact: light touch    Right   Intact: light touch    Additional Neurological Details  (+) NT L ULTT Median    Active Range of Motion     Additional Active Range of Motion Details  C/S AROM:    Extension: mod loss  Flexion WNL  R rot: min loss  L rot mod loss painful  R SB eric loss  L SB Eric loss painful    Strength/Myotome Testing     Additional Strength Details  B shldr strength WNL  Cervical/scap stabilizer strength Fair    Tests     Additional Tests Details  Repeated Motions:   Rep Retraction: P/NW       Flowsheet Rows      Most Recent Value   PT/OT G-Codes   Current Score  50   Projected Score  70   FOTO information reviewed  Yes   Assessment Type  Evaluation   G code set  Carrying, Moving & Handling Objects   Carrying, Moving and Handling Objects Current Status ()  CK   Carrying, Moving and Handling Objects Goal Status ()  CI          Precautions: Standard, Active breast CA (undergoing radiation), s/p L lumpectomy    Daily Treatment Diary     Manual  5/10                                                                                 Exercise Diary  1            Posture Edu 10'            C/S ret  10x            C/S ret w OP 5x                                                                                                                                                                                                               HEP Edu/initiate            Time: 13'                Modalities

## 2018-05-15 ENCOUNTER — TELEPHONE (OUTPATIENT)
Dept: HEMATOLOGY ONCOLOGY | Facility: CLINIC | Age: 68
End: 2018-05-15

## 2018-05-15 ENCOUNTER — APPOINTMENT (OUTPATIENT)
Dept: PHYSICAL THERAPY | Facility: CLINIC | Age: 68
End: 2018-05-15
Payer: COMMERCIAL

## 2018-05-15 NOTE — TELEPHONE ENCOUNTER
Patient would like to speak to you  Finished her chemo 3 weeks ago , on Saturday was in the sun and face broke out it a rash  What can she do for this

## 2018-05-15 NOTE — TELEPHONE ENCOUNTER
Pt called back  Spoke with her about rash  Started Sunday into Monday  Is red, pimply and does not itch  Recommended hydrocortisone cream  If not helpful then to call back  Verbalized understanding

## 2018-05-16 ENCOUNTER — OFFICE VISIT (OUTPATIENT)
Dept: PHYSICAL THERAPY | Facility: CLINIC | Age: 68
End: 2018-05-16
Payer: COMMERCIAL

## 2018-05-16 DIAGNOSIS — M54.12 CERVICAL RADICULAR PAIN: Primary | ICD-10-CM

## 2018-05-16 PROCEDURE — 97112 NEUROMUSCULAR REEDUCATION: CPT | Performed by: PHYSICAL THERAPIST

## 2018-05-16 PROCEDURE — 97140 MANUAL THERAPY 1/> REGIONS: CPT | Performed by: PHYSICAL THERAPIST

## 2018-05-16 NOTE — PROGRESS NOTES
Daily Note     Today's date: 2018  Patient name: Brooklyn Chen  : 1950  MRN: [id number]  Referring provider: Richard Hayes DO  Dx:   Encounter Diagnosis     ICD-10-CM    1  Cervical radicular pain M54 12        Start Time: 1445  Stop Time: 1530  Total time in clinic (min): 45 minutes    Subjective: "I think those exercises really helped and I fel better when I sit up straight, you were right "      Objective: See treatment diary below    Precautions: Standard, Active breast CA (undergoing radiation), s/p L lumpectomy    Daily Treatment Diary     Manual  5/10 5/16                        C/S txn -- 15'                                                      Exercise Diary  1 2           Posture Edu 10' 5'           C/S ret  10x 10x2           C/S ret w OP 5x 5x2           Rows -- Red  3x10           Shldr Ext -- Yellow  2x10           Sup OH Str -- Wand  3x10                                                                                                                                                                       HEP Edu/initiate Rev           Time: 13' 30'               Modalities              MHP  10'                                                 Assessment: Tolerated treatment well  Patient demo steady porgress and improved status at this time as a result of exercises  Pt also demo good technique throughout exercies with vrbal cues  Plan: Continue per plan of care

## 2018-05-18 ENCOUNTER — OFFICE VISIT (OUTPATIENT)
Dept: PHYSICAL THERAPY | Facility: CLINIC | Age: 68
End: 2018-05-18
Payer: COMMERCIAL

## 2018-05-18 DIAGNOSIS — M54.12 CERVICAL RADICULAR PAIN: Primary | ICD-10-CM

## 2018-05-18 PROCEDURE — 97112 NEUROMUSCULAR REEDUCATION: CPT | Performed by: PHYSICAL THERAPIST

## 2018-05-18 PROCEDURE — 97140 MANUAL THERAPY 1/> REGIONS: CPT | Performed by: PHYSICAL THERAPIST

## 2018-05-18 NOTE — PROGRESS NOTES
Daily Note     Today's date: 2018  Patient name: Ameya Win  : 1950  MRN: [id number]  Referring provider: Gretchen Vincent DO  Dx:   Encounter Diagnosis     ICD-10-CM    1  Cervical radicular pain M54 12        Start Time: 1055  Stop Time: 1140  Total time in clinic (min): 45 minutes    Subjective: Pt reports generally doing "well" with regards to neck pain however provocation into L shoulder while at work yesterday, resolved after work when she returned home  Pt also reports initiating new medication today, estrogen receptor medication per description  Objective: See treatment diary below  HEP updated  Precautions: Standard, Active breast CA (undergoing radiation), s/p L lumpectomy    Daily Treatment Diary     Manual  5/10 5/16 5/18                       C/S txn -- 15' 15'                                                     Exercise Diary  1 2 3          Posture Edu 10' 5' 5'          C/S ret  10x 10x2 10x          C/S ret w OP 5x 5x2 5x          Rows -- Red  3x10 Green  2x10          Shldr Ext -- Yellow  2x10 Red  2x10          Sup OH Str -- Wand  3x10 3x10          B ER Tband -- -- Red  10x          C/S ret with Ext -- -- 5x                                                                                                                                            HEP Edu/initiate Rev Rev          Time: 13' 30' 30'              Modalities              MHP  10' 10'                                                    Assessment: Tolerated treatment well  Patient demo good inc C/S ROM and good response to estalished sagittal repeated motions careplan  Plan: Continue per plan of care

## 2018-05-23 ENCOUNTER — OFFICE VISIT (OUTPATIENT)
Dept: PHYSICAL THERAPY | Facility: CLINIC | Age: 68
End: 2018-05-23
Payer: COMMERCIAL

## 2018-05-23 DIAGNOSIS — M54.12 CERVICAL RADICULAR PAIN: Primary | ICD-10-CM

## 2018-05-23 PROCEDURE — 97140 MANUAL THERAPY 1/> REGIONS: CPT | Performed by: PHYSICAL THERAPIST

## 2018-05-23 PROCEDURE — 97112 NEUROMUSCULAR REEDUCATION: CPT | Performed by: PHYSICAL THERAPIST

## 2018-05-23 NOTE — PROGRESS NOTES
Daily Note     Today's date: 2018  Patient name: Reji Argueta  : 1950  MRN: [id number]  Referring provider: Amanda Bruner DO  Dx:   Encounter Diagnosis     ICD-10-CM    1  Cervical radicular pain M54 12        Start Time: 0800  Stop Time: 0845  Total time in clinic (min): 45 minutes    Subjective: Pt reports she has self DC her muscle relaxers and feels 7/10 pain L side of C spine and L scapular pain, medial border  Pain inc with cervical flexion  Objective: See treatment diary below  Inc time spent performing manual intervention and reviewing progression of therapeutic exercises for the neck  Inc OP required for manual interventions today    Precautions: Standard, Active breast CA (undergoing radiation), s/p L lumpectomy    Daily Treatment Diary     Manual  5/10 5/16 5/18 5/23                      C/S txn -- 15' 15' 10'         C/S Ret/Ext -- -- -- 10'                                       Exercise Diary  1 2 3 4         Posture Edu 10' 5' 5' 5'         C/S ret  10x 10x2 10x 10x         C/S ret w OP 5x 5x2 5x --         Rows -- Red  3x10 Green  2x10 --         Shldr Ext -- Yellow  2x10 Red  2x10 --         Sup OH Str -- Wand  3x10 3x10 3x10         B ER Tband -- -- Red  10x --         C/S ret with Ext -- -- 5x 5x2                                                                                                                                           HEP Edu/initiate Rev Rev Rev         Time: 13' 30' 30' 25'             Modalities              [facility]  10' 10' 10'                                       Assessment: Tolerated treatment well  Patient demo jaqui need for inc manual overpressure due to tremendous stiffness in neck  Pt tolerates well as her symptoms dec/B abolish from shoulder region and onlytrace medial scapular border discomofrt after manaulinterventions  HEP progressed and perofrmed correctlty   Pt cont to demo inc [facility] however reports good compliance with postursal maanagement efforts  Plan: Continue per plan of care

## 2018-05-24 PROCEDURE — 77280 THER RAD SIMULAJ FIELD SMPL: CPT | Performed by: RADIOLOGY

## 2018-05-25 ENCOUNTER — OFFICE VISIT (OUTPATIENT)
Dept: PHYSICAL THERAPY | Facility: CLINIC | Age: 68
End: 2018-05-25
Payer: COMMERCIAL

## 2018-05-25 DIAGNOSIS — M54.12 CERVICAL RADICULAR PAIN: Primary | ICD-10-CM

## 2018-05-25 PROCEDURE — 97112 NEUROMUSCULAR REEDUCATION: CPT | Performed by: PHYSICAL THERAPIST

## 2018-05-25 NOTE — PROGRESS NOTES
Daily Note     Today's date: 2018  Patient name: Kris Mccain  : 1950  MRN: [id number]  Referring provider: Carmen Mcclure DO  Dx:   Encounter Diagnosis     ICD-10-CM    1  Cervical radicular pain M54 12        Start Time: 0800  Stop Time: 0845  Total time in clinic (min): 45 minutes    Subjective: Pt reports "those exercises are really helping I really don't have much pain left!" Pt reports pastor felt only with cervical flexion  Objective: See treatment diary below    Precautions: Standard, Active breast CA (undergoing radiation), s/p L lumpectomy    Daily Treatment Diary     Manual  5/10 5/16 5/18 5/23 5/25                     C/S txn -- 15' 15' 10' 5'        C/S Ret/Ext -- -- -- 10' 10'                                      Exercise Diary  1 2 3 4 5        Posture Edu 10' 5' 5' 5' 5'        C/S ret  10x 10x2 10x 10x 5x        C/S ret w OP 5x 5x2 5x -- --        Rows -- Red  3x10 Green  2x10 -- 2x10        Shldr Ext -- Yellow  2x10 Red  2x10 -- 2x10        Sup OH Str -- Wand  3x10 3x10 3x10 3x10        B ER Tband -- -- Red  10x -- --        C/S ret with Ext -- -- 5x 5x2 5x2                                                                                                                                          HEP Edu/initiate Rev Rev Rev Rev        Time: 13' 30' 30' 25' 30'            Modalities              [facility]  10' 10' 10' 10'                                          Assessment: Tolerated treatment well  Patient leave treatment sessions pain free and very pleased  Pt dmeo exclelent technique with HEP and great motivaiton  Plan: Continue per plan of care

## 2018-05-29 DIAGNOSIS — C50.212 MALIGNANT NEOPLASM OF UPPER-INNER QUADRANT OF LEFT BREAST IN FEMALE, ESTROGEN RECEPTOR POSITIVE (HCC): Primary | ICD-10-CM

## 2018-05-29 DIAGNOSIS — Z17.0 MALIGNANT NEOPLASM OF UPPER-INNER QUADRANT OF LEFT BREAST IN FEMALE, ESTROGEN RECEPTOR POSITIVE (HCC): Primary | ICD-10-CM

## 2018-05-29 PROCEDURE — 77387 GUIDANCE FOR RADJ TX DLVR: CPT | Performed by: RADIOLOGY

## 2018-05-29 PROCEDURE — 77412 RADIATION TX DELIVERY LVL 3: CPT | Performed by: RADIOLOGY

## 2018-05-30 ENCOUNTER — APPOINTMENT (OUTPATIENT)
Dept: PHYSICAL THERAPY | Facility: CLINIC | Age: 68
End: 2018-05-30
Payer: COMMERCIAL

## 2018-05-30 PROCEDURE — 77412 RADIATION TX DELIVERY LVL 3: CPT | Performed by: RADIOLOGY

## 2018-05-30 PROCEDURE — 77331 SPECIAL RADIATION DOSIMETRY: CPT | Performed by: RADIOLOGY

## 2018-05-30 PROCEDURE — 77387 GUIDANCE FOR RADJ TX DLVR: CPT | Performed by: RADIOLOGY

## 2018-05-31 PROCEDURE — 77412 RADIATION TX DELIVERY LVL 3: CPT | Performed by: RADIOLOGY

## 2018-05-31 PROCEDURE — 77387 GUIDANCE FOR RADJ TX DLVR: CPT | Performed by: RADIOLOGY

## 2018-06-01 ENCOUNTER — APPOINTMENT (OUTPATIENT)
Dept: RADIATION ONCOLOGY | Facility: HOSPITAL | Age: 68
End: 2018-06-01
Attending: RADIOLOGY
Payer: COMMERCIAL

## 2018-06-01 ENCOUNTER — APPOINTMENT (OUTPATIENT)
Dept: PHYSICAL THERAPY | Facility: CLINIC | Age: 68
End: 2018-06-01
Payer: COMMERCIAL

## 2018-06-01 DIAGNOSIS — Z17.0 MALIGNANT NEOPLASM OF UPPER-INNER QUADRANT OF LEFT BREAST IN FEMALE, ESTROGEN RECEPTOR POSITIVE (HCC): Primary | ICD-10-CM

## 2018-06-01 DIAGNOSIS — C50.212 MALIGNANT NEOPLASM OF UPPER-INNER QUADRANT OF LEFT BREAST IN FEMALE, ESTROGEN RECEPTOR POSITIVE (HCC): Primary | ICD-10-CM

## 2018-06-01 PROCEDURE — 77387 GUIDANCE FOR RADJ TX DLVR: CPT | Performed by: RADIOLOGY

## 2018-06-01 PROCEDURE — 77412 RADIATION TX DELIVERY LVL 3: CPT | Performed by: RADIOLOGY

## 2018-06-04 PROCEDURE — 77336 RADIATION PHYSICS CONSULT: CPT | Performed by: RADIOLOGY

## 2018-06-04 PROCEDURE — 77417 THER RADIOLOGY PORT IMAGE(S): CPT | Performed by: RADIOLOGY

## 2018-06-04 PROCEDURE — 77412 RADIATION TX DELIVERY LVL 3: CPT | Performed by: RADIOLOGY

## 2018-06-04 PROCEDURE — 77387 GUIDANCE FOR RADJ TX DLVR: CPT | Performed by: RADIOLOGY

## 2018-06-05 PROCEDURE — 77387 GUIDANCE FOR RADJ TX DLVR: CPT | Performed by: RADIOLOGY

## 2018-06-05 PROCEDURE — 77412 RADIATION TX DELIVERY LVL 3: CPT | Performed by: RADIOLOGY

## 2018-06-06 ENCOUNTER — APPOINTMENT (OUTPATIENT)
Dept: LAB | Facility: HOSPITAL | Age: 68
End: 2018-06-06
Attending: INTERNAL MEDICINE
Payer: COMMERCIAL

## 2018-06-06 ENCOUNTER — TRANSCRIBE ORDERS (OUTPATIENT)
Dept: ADMINISTRATIVE | Facility: HOSPITAL | Age: 68
End: 2018-06-06

## 2018-06-06 ENCOUNTER — OFFICE VISIT (OUTPATIENT)
Dept: PHYSICAL THERAPY | Facility: CLINIC | Age: 68
End: 2018-06-06
Payer: COMMERCIAL

## 2018-06-06 DIAGNOSIS — M54.12 CERVICAL RADICULAR PAIN: Primary | ICD-10-CM

## 2018-06-06 DIAGNOSIS — Z17.0 MALIGNANT NEOPLASM OF UPPER-INNER QUADRANT OF LEFT BREAST IN FEMALE, ESTROGEN RECEPTOR POSITIVE (HCC): ICD-10-CM

## 2018-06-06 DIAGNOSIS — C50.212 MALIGNANT NEOPLASM OF UPPER-INNER QUADRANT OF LEFT BREAST IN FEMALE, ESTROGEN RECEPTOR POSITIVE (HCC): ICD-10-CM

## 2018-06-06 DIAGNOSIS — C50.919 MALIGNANT NEOPLASM OF FEMALE BREAST, UNSPECIFIED ESTROGEN RECEPTOR STATUS, UNSPECIFIED LATERALITY, UNSPECIFIED SITE OF BREAST (HCC): ICD-10-CM

## 2018-06-06 LAB
ALBUMIN SERPL BCP-MCNC: 3.6 G/DL (ref 3.5–5)
ALP SERPL-CCNC: 80 U/L (ref 46–116)
ALT SERPL W P-5'-P-CCNC: 33 U/L (ref 12–78)
ANION GAP SERPL CALCULATED.3IONS-SCNC: 6 MMOL/L (ref 4–13)
AST SERPL W P-5'-P-CCNC: 20 U/L (ref 5–45)
BASOPHILS # BLD AUTO: 0.02 THOUSANDS/ΜL (ref 0–0.1)
BASOPHILS NFR BLD AUTO: 1 % (ref 0–1)
BILIRUB SERPL-MCNC: 0.4 MG/DL (ref 0.2–1)
BUN SERPL-MCNC: 14 MG/DL (ref 5–25)
CALCIUM SERPL-MCNC: 9.6 MG/DL (ref 8.3–10.1)
CHLORIDE SERPL-SCNC: 104 MMOL/L (ref 100–108)
CO2 SERPL-SCNC: 30 MMOL/L (ref 21–32)
CREAT SERPL-MCNC: 0.53 MG/DL (ref 0.6–1.3)
EOSINOPHIL # BLD AUTO: 0.09 THOUSAND/ΜL (ref 0–0.61)
EOSINOPHIL NFR BLD AUTO: 3 % (ref 0–6)
ERYTHROCYTE [DISTWIDTH] IN BLOOD BY AUTOMATED COUNT: 13.1 % (ref 11.6–15.1)
GFR SERPL CREATININE-BSD FRML MDRD: 97 ML/MIN/1.73SQ M
GLUCOSE SERPL-MCNC: 93 MG/DL (ref 65–140)
HCT VFR BLD AUTO: 41.1 % (ref 34.8–46.1)
HGB BLD-MCNC: 12.9 G/DL (ref 11.5–15.4)
IMM GRANULOCYTES # BLD AUTO: 0.01 THOUSAND/UL (ref 0–0.2)
IMM GRANULOCYTES NFR BLD AUTO: 0 % (ref 0–2)
LYMPHOCYTES # BLD AUTO: 0.76 THOUSANDS/ΜL (ref 0.6–4.47)
LYMPHOCYTES NFR BLD AUTO: 22 % (ref 14–44)
MCH RBC QN AUTO: 31.5 PG (ref 26.8–34.3)
MCHC RBC AUTO-ENTMCNC: 31.4 G/DL (ref 31.4–37.4)
MCV RBC AUTO: 101 FL (ref 82–98)
MONOCYTES # BLD AUTO: 0.37 THOUSAND/ΜL (ref 0.17–1.22)
MONOCYTES NFR BLD AUTO: 11 % (ref 4–12)
NEUTROPHILS # BLD AUTO: 2.17 THOUSANDS/ΜL (ref 1.85–7.62)
NEUTS SEG NFR BLD AUTO: 63 % (ref 43–75)
NRBC BLD AUTO-RTO: 0 /100 WBCS
PLATELET # BLD AUTO: 237 THOUSANDS/UL (ref 149–390)
PMV BLD AUTO: 9.6 FL (ref 8.9–12.7)
POTASSIUM SERPL-SCNC: 4.1 MMOL/L (ref 3.5–5.3)
PROT SERPL-MCNC: 6.7 G/DL (ref 6.4–8.2)
RBC # BLD AUTO: 4.09 MILLION/UL (ref 3.81–5.12)
SODIUM SERPL-SCNC: 140 MMOL/L (ref 136–145)
WBC # BLD AUTO: 3.42 THOUSAND/UL (ref 4.31–10.16)

## 2018-06-06 PROCEDURE — 80053 COMPREHEN METABOLIC PANEL: CPT

## 2018-06-06 PROCEDURE — G8984 CARRY CURRENT STATUS: HCPCS | Performed by: PHYSICAL THERAPIST

## 2018-06-06 PROCEDURE — 97140 MANUAL THERAPY 1/> REGIONS: CPT | Performed by: PHYSICAL THERAPIST

## 2018-06-06 PROCEDURE — 36415 COLL VENOUS BLD VENIPUNCTURE: CPT

## 2018-06-06 PROCEDURE — 77412 RADIATION TX DELIVERY LVL 3: CPT | Performed by: RADIOLOGY

## 2018-06-06 PROCEDURE — G8985 CARRY GOAL STATUS: HCPCS | Performed by: PHYSICAL THERAPIST

## 2018-06-06 PROCEDURE — 97112 NEUROMUSCULAR REEDUCATION: CPT | Performed by: PHYSICAL THERAPIST

## 2018-06-06 PROCEDURE — 77387 GUIDANCE FOR RADJ TX DLVR: CPT | Performed by: RADIOLOGY

## 2018-06-06 PROCEDURE — 85025 COMPLETE CBC W/AUTO DIFF WBC: CPT

## 2018-06-06 NOTE — PROGRESS NOTES
Daily Note     Today's date: 2018  Patient name: Fadia Burrows  : 1950  MRN: [id number]  Referring provider: Edilia Huber DO  Dx:   Encounter Diagnosis     ICD-10-CM    1  Cervical radicular pain M54 12        Start Time: 153  Stop Time: 161  Total time in clinic (min): 45 minutes    Subjective: Pt reports L shoulder tingling is frequent throughout her day while seated reading and while working on computer however her cervical exercise and functional mobility such as gardening, alleviate symptoms  Objective: See treatment diary below  Precautions: Standard, Active breast CA (undergoing radiation), s/p L lumpectomy    Daily Treatment Diary     Manual  5/10 5/16 5/18 5/23 5/25 6/6                    C/S txn -- 15' 15' 10' 5 15'       C/S Ret/Ext -- -- -- 10 10'                                    Exercise Diary  1 2 3 4 5 6       Posture Edu 10' 5' 5' 5' 5' 10'       C/S ret  10x 10x2 10x 10x 5x --       C/S ret w OP 5x 5x2 5x -- -- --       Rows -- Red  3x10 Green  2x10 -- 2x10 3x10       Shldr Ext -- Yellow  2x10 Red  2x10 -- 2x10 3x10       Sup OH Str -- Wand  3x10 3x10 3x10 3x10 3x10       B ER Tband -- -- Red  10x -- -- --       C/S ret with Ext -- -- 5x 5x2 5x2 5x2                                                                                                                                         HEP Edu/initiate Rev Rev Rev Rev Rev       Time: ' 30' 30' 25' 30' 30'           Modalities              [facility]  10' 10' 10' 10 10'                                         Assessment: Tolerated treatment well  Patient demo good cervical AROm however posture tolerance is poor, unable to maintain for porlonged periods, causing reinitiation of symptoms  Pt syptoms free for entirety of treatment  Plan: Continue per plan of care

## 2018-06-07 PROCEDURE — 77412 RADIATION TX DELIVERY LVL 3: CPT | Performed by: RADIOLOGY

## 2018-06-07 PROCEDURE — 77387 GUIDANCE FOR RADJ TX DLVR: CPT | Performed by: RADIOLOGY

## 2018-06-08 PROCEDURE — 77387 GUIDANCE FOR RADJ TX DLVR: CPT | Performed by: RADIOLOGY

## 2018-06-08 PROCEDURE — 77412 RADIATION TX DELIVERY LVL 3: CPT | Performed by: RADIOLOGY

## 2018-06-11 ENCOUNTER — OFFICE VISIT (OUTPATIENT)
Dept: PHYSICAL THERAPY | Facility: CLINIC | Age: 68
End: 2018-06-11
Payer: COMMERCIAL

## 2018-06-11 DIAGNOSIS — M54.12 CERVICAL RADICULAR PAIN: Primary | ICD-10-CM

## 2018-06-11 PROCEDURE — 77417 THER RADIOLOGY PORT IMAGE(S): CPT | Performed by: RADIOLOGY

## 2018-06-11 PROCEDURE — 77387 GUIDANCE FOR RADJ TX DLVR: CPT | Performed by: RADIOLOGY

## 2018-06-11 PROCEDURE — 97112 NEUROMUSCULAR REEDUCATION: CPT | Performed by: PHYSICAL THERAPIST

## 2018-06-11 PROCEDURE — 77412 RADIATION TX DELIVERY LVL 3: CPT | Performed by: RADIOLOGY

## 2018-06-11 PROCEDURE — 77336 RADIATION PHYSICS CONSULT: CPT | Performed by: RADIOLOGY

## 2018-06-11 NOTE — PROGRESS NOTES
Daily Note     Today's date: 2018  Patient name: Bogdan Carreno  : 1950  MRN: [id number]  Referring provider: Juju Nagy DO  Dx:   Encounter Diagnosis     ICD-10-CM    1  Cervical radicular pain M54 12        Start Time: 1100  Stop Time: 1145  Total time in clinic (min): 45 minutes    Subjective: Pt reports generally better however finds that residual symptoms are felt L supratrap and L deltoid  Objective: See treatment diary below    Precautions: Standard, Active breast CA (undergoing radiation), s/p L lumpectomy    Daily Treatment Diary     Manual  5/10 5/16 5/18 5/23 5/25 6/6 6/11                   C/S txn -- 15' 15' 10' 5' 15' 5'      C/S Ret/Ext -- -- -- 10' 10'  5'                                  Exercise Diary  1 2 3 4 5 6 7      Posture Edu 10' 5' 5' 5' 5' 10' 10'      C/S ret  10x 10x2 10x 10x 5x -- 5x2      C/S ret w OP 5x 5x2 5x -- -- -- 5x2      Rows -- Red  3x10 Green  2x10 -- 2x10 3x10 3x10      Shldr Ext -- Yellow  2x10 Red  2x10 -- 2x10 3x10 3x10      Sup OH Str -- Wand  3x10 3x10 3x10 3x10 3x10 3x10      B ER Tband -- -- Red  10x -- -- -- --      C/S ret with Ext -- -- 5x 5x2 5x2 5x2 5x2                                                                                                                                        HEP Edu/initiate Rev Rev Rev Rev Rev Rev      Time: ' 30' ' '  30'          Modalities              MHP  10' 10' 10' 10' 10' 10'                                          Assessment: Tolerated treatment well  Patient demo good ongoing progress with ets POC however postural deficits cont to elicit symptoms  all of which resolve mechanically  Pt also demo ongoing self limit of cervicla extension with manual intervention which imporves wiht inc repetitions  Good techniwque with HEP  Plan: Continue per plan of care

## 2018-06-12 PROCEDURE — 77412 RADIATION TX DELIVERY LVL 3: CPT | Performed by: RADIOLOGY

## 2018-06-12 PROCEDURE — 77387 GUIDANCE FOR RADJ TX DLVR: CPT | Performed by: RADIOLOGY

## 2018-06-13 ENCOUNTER — OFFICE VISIT (OUTPATIENT)
Dept: PHYSICAL THERAPY | Facility: CLINIC | Age: 68
End: 2018-06-13
Payer: COMMERCIAL

## 2018-06-13 DIAGNOSIS — M54.12 CERVICAL RADICULAR PAIN: Primary | ICD-10-CM

## 2018-06-13 PROCEDURE — 77387 GUIDANCE FOR RADJ TX DLVR: CPT | Performed by: RADIOLOGY

## 2018-06-13 PROCEDURE — 77412 RADIATION TX DELIVERY LVL 3: CPT | Performed by: RADIOLOGY

## 2018-06-13 PROCEDURE — 97112 NEUROMUSCULAR REEDUCATION: CPT | Performed by: PHYSICAL THERAPIST

## 2018-06-13 NOTE — PROGRESS NOTES
Daily Note     Today's date: 2018  Patient name: Christie King  : 1950  MRN: [id number]  Referring provider: Aldo Nielsen DO  Dx:   Encounter Diagnosis     ICD-10-CM    1  Cervical radicular pain M54 12        Start Time: 1400  Stop Time: 1445  Total time in clinic (min): 45 minutes    Subjective: Pt reports her symptoms are "usually better with the neck stretches " Pt has initiated gym activities this week without provocation of symptoms  Objective: See treatment diary below      Precautions: Standard, Active breast CA (undergoing radiation), s/p L lumpectomy    Daily Treatment Diary     Manual  5/10 5/16 5/18 5/23 5/25 6/6 6/11 6/13                  C/S txn -- 15' 15' 10' 5' 15' 5' 5'     C/S Ret/Ext -- -- -- 10' 10'  5' 5'                                 Exercise Diary  1 2 3 4 5 6 7 8     Posture Edu 10' 5' 5' 5' 5' 10' 10' 10'     C/S ret  10x 10x2 10x 10x 5x -- 5x2 --     C/S ret w OP 5x 5x2 5x -- -- -- 5x2 5x4     Rows -- Red  3x10 Green  2x10 -- 2x10 3x10 3x10 Blue 3x10     Shldr Ext -- Yellow  2x10 Red  2x10 -- 2x10 3x10 3x10 Green 3x10     Sup OH Str -- Wand  3x10 3x10 3x10 3x10 3x10 3x10 3x15     B ER Tband -- -- Red  10x -- -- -- -- Red 3x10     C/S ret with Ext -- -- 5x 5x2 5x2 5x2 5x2 5x4     Shldr Abd AROM -- -- -- -- -- -- -- 3x10                                                                                                                          HEP Edu/initiate Rev Rev Rev Rev Rev Rev Rev     Time: 13' 30' 30' 25' 30' 30' 30' 35'         Modalities              MHP  10' 10' 10' 10' 10' 10' 10'                                     Assessment: Tolerated treatment well  Patient demo good technique with HEP however ongoing thoracic kyphosis places cervical spine in protrusion, able to correct  Plan: Continue per plan of care

## 2018-06-14 PROCEDURE — 77387 GUIDANCE FOR RADJ TX DLVR: CPT | Performed by: RADIOLOGY

## 2018-06-14 PROCEDURE — 77412 RADIATION TX DELIVERY LVL 3: CPT | Performed by: RADIOLOGY

## 2018-06-15 PROCEDURE — 77412 RADIATION TX DELIVERY LVL 3: CPT | Performed by: RADIOLOGY

## 2018-06-15 PROCEDURE — 77387 GUIDANCE FOR RADJ TX DLVR: CPT | Performed by: RADIOLOGY

## 2018-06-18 ENCOUNTER — APPOINTMENT (OUTPATIENT)
Dept: PHYSICAL THERAPY | Facility: CLINIC | Age: 68
End: 2018-06-18
Payer: COMMERCIAL

## 2018-06-18 PROCEDURE — 77417 THER RADIOLOGY PORT IMAGE(S): CPT | Performed by: RADIOLOGY

## 2018-06-18 PROCEDURE — 77387 GUIDANCE FOR RADJ TX DLVR: CPT | Performed by: RADIOLOGY

## 2018-06-18 PROCEDURE — 77412 RADIATION TX DELIVERY LVL 3: CPT | Performed by: RADIOLOGY

## 2018-06-18 PROCEDURE — 77336 RADIATION PHYSICS CONSULT: CPT | Performed by: RADIOLOGY

## 2018-06-19 PROCEDURE — 77412 RADIATION TX DELIVERY LVL 3: CPT | Performed by: RADIOLOGY

## 2018-06-19 PROCEDURE — 77387 GUIDANCE FOR RADJ TX DLVR: CPT | Performed by: RADIOLOGY

## 2018-06-20 ENCOUNTER — APPOINTMENT (OUTPATIENT)
Dept: PHYSICAL THERAPY | Facility: CLINIC | Age: 68
End: 2018-06-20
Payer: COMMERCIAL

## 2018-06-20 PROCEDURE — 77387 GUIDANCE FOR RADJ TX DLVR: CPT | Performed by: RADIOLOGY

## 2018-06-20 PROCEDURE — G8985 CARRY GOAL STATUS: HCPCS | Performed by: PHYSICAL THERAPIST

## 2018-06-20 PROCEDURE — G8986 CARRY D/C STATUS: HCPCS | Performed by: PHYSICAL THERAPIST

## 2018-06-20 PROCEDURE — 77412 RADIATION TX DELIVERY LVL 3: CPT | Performed by: RADIOLOGY

## 2018-06-20 NOTE — PROGRESS NOTES
DC pt as of 6/20, pt presents to clinic to confirm she Is pain free and has been for days, is indeendently managing and has returned to gym successfully

## 2018-06-21 PROCEDURE — 77412 RADIATION TX DELIVERY LVL 3: CPT | Performed by: RADIOLOGY

## 2018-06-21 PROCEDURE — 77387 GUIDANCE FOR RADJ TX DLVR: CPT | Performed by: RADIOLOGY

## 2018-06-22 PROCEDURE — 77387 GUIDANCE FOR RADJ TX DLVR: CPT | Performed by: RADIOLOGY

## 2018-06-22 PROCEDURE — 77412 RADIATION TX DELIVERY LVL 3: CPT | Performed by: RADIOLOGY

## 2018-06-25 PROCEDURE — 77336 RADIATION PHYSICS CONSULT: CPT | Performed by: RADIOLOGY

## 2018-06-25 PROCEDURE — 77387 GUIDANCE FOR RADJ TX DLVR: CPT | Performed by: RADIOLOGY

## 2018-06-25 PROCEDURE — 77412 RADIATION TX DELIVERY LVL 3: CPT | Performed by: RADIOLOGY

## 2018-06-25 PROCEDURE — 77417 THER RADIOLOGY PORT IMAGE(S): CPT | Performed by: RADIOLOGY

## 2018-06-26 PROCEDURE — 77387 GUIDANCE FOR RADJ TX DLVR: CPT | Performed by: RADIOLOGY

## 2018-06-26 PROCEDURE — 77412 RADIATION TX DELIVERY LVL 3: CPT | Performed by: RADIOLOGY

## 2018-06-27 ENCOUNTER — APPOINTMENT (OUTPATIENT)
Dept: PHYSICAL THERAPY | Facility: CLINIC | Age: 68
End: 2018-06-27
Payer: COMMERCIAL

## 2018-06-27 PROCEDURE — 77412 RADIATION TX DELIVERY LVL 3: CPT | Performed by: RADIOLOGY

## 2018-06-27 PROCEDURE — 77387 GUIDANCE FOR RADJ TX DLVR: CPT | Performed by: RADIOLOGY

## 2018-06-28 PROCEDURE — 77412 RADIATION TX DELIVERY LVL 3: CPT | Performed by: RADIOLOGY

## 2018-06-28 PROCEDURE — 77387 GUIDANCE FOR RADJ TX DLVR: CPT | Performed by: RADIOLOGY

## 2018-06-29 PROCEDURE — 77412 RADIATION TX DELIVERY LVL 3: CPT | Performed by: RADIOLOGY

## 2018-06-29 PROCEDURE — 77387 GUIDANCE FOR RADJ TX DLVR: CPT | Performed by: RADIOLOGY

## 2018-07-02 ENCOUNTER — APPOINTMENT (OUTPATIENT)
Dept: RADIATION ONCOLOGY | Facility: HOSPITAL | Age: 68
End: 2018-07-02
Attending: RADIOLOGY
Payer: COMMERCIAL

## 2018-07-02 PROCEDURE — 77336 RADIATION PHYSICS CONSULT: CPT | Performed by: RADIOLOGY

## 2018-07-02 PROCEDURE — 77412 RADIATION TX DELIVERY LVL 3: CPT | Performed by: RADIOLOGY

## 2018-07-02 PROCEDURE — 77387 GUIDANCE FOR RADJ TX DLVR: CPT | Performed by: RADIOLOGY

## 2018-07-21 LAB
CHOLEST SERPL-MCNC: 190 MG/DL (ref 100–199)
HDLC SERPL-MCNC: 71 MG/DL
LDLC SERPL CALC-MCNC: 108 MG/DL (ref 0–99)
LDLC/HDLC SERPL: 1.5 RATIO (ref 0–3.2)
MICRODELETION SYND BLD/T FISH: NORMAL
SL AMB VLDL CHOLESTEROL CALC: 11 MG/DL (ref 5–40)
TRIGL SERPL-MCNC: 53 MG/DL (ref 0–149)

## 2018-07-25 ENCOUNTER — HOSPITAL ENCOUNTER (OUTPATIENT)
Dept: RADIOLOGY | Facility: HOSPITAL | Age: 68
Discharge: HOME | End: 2018-07-25
Attending: INTERNAL MEDICINE
Payer: COMMERCIAL

## 2018-07-25 ENCOUNTER — TRANSCRIBE ORDERS (OUTPATIENT)
Dept: ADMINISTRATIVE | Facility: HOSPITAL | Age: 68
End: 2018-07-25

## 2018-07-25 DIAGNOSIS — Z17.0 MALIGNANT NEOPLASM OF UPPER-INNER QUADRANT OF LEFT BREAST IN FEMALE, ESTROGEN RECEPTOR POSITIVE (HCC): ICD-10-CM

## 2018-07-25 DIAGNOSIS — C50.212 MALIGNANT NEOPLASM OF UPPER-INNER QUADRANT OF LEFT BREAST IN FEMALE, ESTROGEN RECEPTOR POSITIVE (HCC): ICD-10-CM

## 2018-07-25 PROCEDURE — 77080 DXA BONE DENSITY AXIAL: CPT

## 2018-08-01 ENCOUNTER — OFFICE VISIT (OUTPATIENT)
Dept: HEMATOLOGY ONCOLOGY | Facility: CLINIC | Age: 68
End: 2018-08-01
Payer: COMMERCIAL

## 2018-08-01 VITALS
WEIGHT: 144.5 LBS | HEART RATE: 71 BPM | SYSTOLIC BLOOD PRESSURE: 136 MMHG | OXYGEN SATURATION: 97 % | RESPIRATION RATE: 16 BRPM | TEMPERATURE: 98.3 F | BODY MASS INDEX: 24.67 KG/M2 | HEIGHT: 64 IN | DIASTOLIC BLOOD PRESSURE: 62 MMHG

## 2018-08-01 DIAGNOSIS — Z17.0 MALIGNANT NEOPLASM OF UPPER-INNER QUADRANT OF LEFT BREAST IN FEMALE, ESTROGEN RECEPTOR POSITIVE (HCC): Primary | ICD-10-CM

## 2018-08-01 DIAGNOSIS — C50.212 MALIGNANT NEOPLASM OF UPPER-INNER QUADRANT OF LEFT BREAST IN FEMALE, ESTROGEN RECEPTOR POSITIVE (HCC): Primary | ICD-10-CM

## 2018-08-01 PROCEDURE — 99214 OFFICE O/P EST MOD 30 MIN: CPT | Performed by: INTERNAL MEDICINE

## 2018-08-01 RX ORDER — ANASTROZOLE 1 MG/1
1 TABLET ORAL DAILY
Qty: 90 TABLET | Refills: 1 | Status: SHIPPED | OUTPATIENT
Start: 2018-08-01 | End: 2019-01-30 | Stop reason: SDUPTHER

## 2018-08-01 NOTE — PROGRESS NOTES
Hematology / Oncology Outpatient Follow Up Note    Pilar Sarkar 77 y o  female SMU:[id number] WFX:[id number]         Date:  8/1/2018    Assessment / Plan:  A 70-year-old postmenopausal woman with stage IA left breast cancer, grade 3, ER 90% positive, DE 90% positive, HER2 negative disease   She underwent lumpectomy with sentinel lymph node biopsy, resulting in TE   Her tumor was found to be high risk, based on a MammaPrint    She underwent adjuvant chemotherapy with Taxotere and cyclophosphamide x4, followed by adjuvant radiation therapy  She is currently on adjuvant hormonal therapy with anastrozole 1 mg once a day with minimal toxicity  Clinically, she has no evidence recurrent disease  I recommended her to continue with anastrozole 1 mg once a day  She has osteopenia for which she need to continue with calcium vitamin-D  She is in agreement with my recommendation    I will see her again in 6 months for routine follow-up         Subjective:      HPI:  A 68-year-old postmenopausal woman who was found to have abnormality in her left breast, based on a screening mammography   Therefore, she underwent ultrasound-guided biopsy in November 30, 2017   She was found to have invasive ductal carcinoma, grade 2   This was ER 90% positive, DE 90% positive, HER2 negative disease   Subsequently, she was seen by Dr Ellen Raymundo who did lumpectomy in January 12, 2018   Lumpectomy specimen showed 11 x 10 x 8 mm of invasive ductal carcinoma, grade 3   There was no evidence of lymphovascular invasion   One sentinel lymph node was negative for metastatic disease   Because of the grade escalation, HER2 IHC was repeated which was 2+   However, HER2 fish was negative for gene amplification   Dr Ellen Raymundo sent her tumor tissue for MammaPrint which came back high risk disease   She presents today to discuss adjuvant treatment options   She has no complaint of pain   Her weight is stable   She denied any respiratory symptoms   She has no significant past medical history   She has no major surgery in the past   Her performance status is normal            Interval History:   A 77year-old postmenopausal woman with stage IA left breast cancer, grade 3, ER 90% positive, ID 90% positive, HER2 negative disease   She underwent lumpectomy with sentinel lymph node biopsy, resulting in TE   Her tumor was found to be high risk, based on a MammaPrint   Therefore, she underwent adjuvant chemotherapy with Taxotere and cyclophosphamide x4 cycle, which was completed in April 2018  She tolerated chemotherapy very well  She had adjuvant radiation therapy with mild to moderate skin toxicity which was completed in July 2018  She is currently on adjuvant hormonal therapy with anastrozole  She presents today for routine follow-up  She has very occasional hot flashes  She denied musculoskeletal symptoms  Her weight is stable  She has no respiratory symptoms  She has no complaint of pain   Her performance status is normal        Objective:      Primary Diagnosis:     Left breast cancer, stage IA, (pT1c, pN0, M0) grade 3, ER 90% positive, ID 90% positive, HER2 negative disease   MammaPrint high risk   Diagnosed in January 2018      Cancer Staging:  Malignant neoplasm of 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, estrogen receptor positive ([facility] [address] )    Staging form: Breast, AJCC 8th Edition    - Pathologic stage from 1/12/2018: Stage IA (pT1c, pN0(sn), cM0, G3, ER: Positive, ID: Positive, HER2: Negative) - Unsigned    Staging form: Breast, AJCC 7th Edition    - Clinical: No stage assigned - Unsigned     Malignant neoplasm of upper-inner quadrant of right breast in female, estrogen receptor positive ([facility] [address] )    Staging form: Breast, AJCC 8th Edition    - Clinical stage from 11/30/2017: Stage IA (cT1c, cN0, cM0, G3, ER: Positive, ID: Positive, HER2: Negative) - Unsigned    - Pathologic stage from 1/12/2018: Stage IA (pT1c, pN0(sn), cM0, G3, ER: Positive, ID: Positive, HER2: Negative) - Unsigned        Previous Hematologic/ Oncologic Treatment:       Adjuvant chemotherapy with Taxotere and cyclophosphamide x4 cycle, completed in April 2018      Current Hematologic/ Oncologic Treatment:       Adjuvant hormonal therapy with anastrozole since May 2018      Disease Status:      TE status post lumpectomy with sentinel lymph node biopsy      Test Results:     Pathology:     11 x 10 x 8 mm of invasive ductal carcinoma, grade 3   No evidence of lymphovascular invasion   One sentinel lymph node was negative for metastatic disease   ER 90% positive, CT 90% positive, her 2 2+ disease  Bandar Bars fish was negative for gene amplification   MammaPrint high risk  Stage IA (pT1c, pN0, M0)     Radiology:     Chest x-ray was negative for pulmonary disease  DEXA scan in July 2018 showed T-score-2 0, consistent with osteopenia      Laboratory:     CBC and CMP are within normal limits      Physical Exam:        General Appearance:    Alert, oriented          Eyes:    PERRL   Ears:    Normal external ear canals, both ears   Nose:   Nares normal, septum midline   Throat:   Mucosa moist  Pharynx without injection  Neck:   Supple         Lungs:     Clear to auscultation bilaterally   Chest Wall:    No tenderness or deformity    Heart:    Regular rate and rhythm         Abdomen:     Soft, non-tender, bowel sounds +, no organomegaly               Extremities:   Extremities no cyanosis or edema         Skin:   no rash or icterus  Lymph nodes:   Cervical, supraclavicular, and axillary nodes normal   Neurologic:   CNII-XII intact, normal strength, sensation and reflexes     Throughout             Breast exam: Lumpectomy scar at upper aspect her left breast with no palpable abnormalities   Right breast exam is negative  ROS: Review of Systems   Constitutional:        Occasional hot flashes   All other systems reviewed and are negative            Imaging: Dxa Bone Density Spine Hip And Pelvis    Result Date: 7/25/2018  Narrative: CENTRAL  DXA SCAN CLINICAL HISTORY:   77year old post-menopausal  female risk factors include breast carcinoma with anastrozole use TECHNIQUE: Bone densitometry was performed using a K[employer]OZ bone densitometer  Regions of interest appear properly placed  There are no obvious fractures or other confounding variables which could limit the study  Degenerative changes of the lumbar spine and hip  This will falsely elevate the bone mineral densities in these regions  COMPARISON:  Prior study of February 11, 2013 RESULTS: LUMBAR SPINE:  L1, L2 and L4: BMD 1 015 gm/cm2 T-score -1 4 Z-score 0 3 LEFT TOTAL HIP: BMD 0 803 gm/cm2 T-score -1 6 Z-score -0 2 LEFT FEMORAL NECK: BMD 0 757 gm/cm2 T-score -2 0 Z-score -0 3 TOTAL RIGHT HIP: BMD 0 807 gm/sq-cm, T-score is -1 6 Z-score is -0 1                  FEMORAL NECK RIGHT HIP : BMD 0 757 gm/sq-cm, T-score is -2 0 Z-score is -0 3     Impression: 1  Based on the [facility] classification, the T-score of -2 0 in the left hip is consistent with low bone mineral density  2  When compared to the prior examination, there has been a  4 % INCREASE in the total bone mineral density of the lumbar spine and a 7 % DECREASE in the mean total bone mineral density of the bilateral hips  3   Any secondary causes of low bone mineral density should be excluded prior to treatment, if clinically indicated  4   A daily intake of at least 1200 mg calcium and 800 to 1000 IU of Vitamin D, as well as weight bearing and muscle strengthening exercise, fall prevention and avoidance of tobacco and excessive alcohol intake as basic preventive measures are suggested  5   Repeat DXA  in 18 - 24 months, on the same machine, as clinically indicated  The 10 year risk of hip fracture is 4%, with the 10 year risk of major osteoporotic fracture being 19%, as calculated by the [facility] fracture risk assessment tool (FRAX)    The current NOF guidelines recommend treating patients with FRAX 10 year risk score of  >3% for hip fracture and >20% for major osteoporotic fracture  WHO CLASSIFICATION: Normal (a T-score of -1 0 or higher) Low bone mineral density (a T-score of less than -1 0 but higher than -2 5) Osteoporosis (a T-score of -2 5 or less) Severe osteoporosis (a T-score of -2 5 or less with a fragility fracture)   Workstation performed: [id number]         Labs:   Lab Results   Component Value Date    WBC 3 42 (L) 06/06/2018    HGB 12 9 06/06/2018    HCT 41 1 06/06/2018     (H) 06/06/2018     06/06/2018     Lab Results   Component Value Date     06/06/2018    K 4 1 06/06/2018     06/06/2018    CO2 30 06/06/2018    ANIONGAP 6 06/06/2018    BUN 14 06/06/2018    CREATININE 0 53 (L) 06/06/2018    GLUCOSE 93 06/06/2018    GLUF 99 04/24/2018    CALCIUM 9 6 06/06/2018    AST 20 06/06/2018    ALT 33 06/06/2018    ALKPHOS 80 06/06/2018    PROT 6 7 06/06/2018    BILITOT 0 40 06/06/2018    EGFR 97 06/06/2018         Current Medications: Reviewed  Allergies: Reviewed  PMH/FH/SH:  Reviewed      Vital Sign:    Body surface area is 1 7 meters squared      Wt Readings from Last 3 Encounters:   08/01/18 65 5 kg (144 lb 8 oz)   05/04/18 69 1 kg (152 lb 6 4 oz)   04/26/18 68 kg (149 lb 14 6 oz)        Temp Readings from Last 3 Encounters:   08/01/18 98 3 °F (36 8 °C) (Oral)   05/04/18 98 2 °F (36 8 °C) (Temporal)   04/26/18 97 9 °F (36 6 °C) (Temporal)        BP Readings from Last 3 Encounters:   08/01/18 136/62   05/04/18 108/60   04/26/18 149/65         Pulse Readings from Last 3 Encounters:   08/01/18 71   05/04/18 90   04/26/18 75     @LASTSAO2(3)@

## 2018-08-06 ENCOUNTER — OFFICE VISIT (OUTPATIENT)
Dept: FAMILY MEDICINE CLINIC | Facility: CLINIC | Age: 68
End: 2018-08-06
Payer: COMMERCIAL

## 2018-08-06 ENCOUNTER — RADIATION ONCOLOGY FOLLOW-UP (OUTPATIENT)
Dept: RADIATION ONCOLOGY | Facility: HOSPITAL | Age: 68
End: 2018-08-06
Attending: RADIOLOGY
Payer: COMMERCIAL

## 2018-08-06 ENCOUNTER — CLINICAL SUPPORT (OUTPATIENT)
Dept: RADIATION ONCOLOGY | Facility: HOSPITAL | Age: 68
End: 2018-08-06
Payer: COMMERCIAL

## 2018-08-06 VITALS
SYSTOLIC BLOOD PRESSURE: 128 MMHG | RESPIRATION RATE: 16 BRPM | WEIGHT: 145 LBS | HEIGHT: 64 IN | TEMPERATURE: 96.8 F | BODY MASS INDEX: 24.75 KG/M2 | DIASTOLIC BLOOD PRESSURE: 60 MMHG | HEART RATE: 64 BPM

## 2018-08-06 VITALS
HEART RATE: 70 BPM | HEIGHT: 64 IN | DIASTOLIC BLOOD PRESSURE: 70 MMHG | RESPIRATION RATE: 16 BRPM | OXYGEN SATURATION: 97 % | TEMPERATURE: 97.7 F | WEIGHT: 144 LBS | SYSTOLIC BLOOD PRESSURE: 124 MMHG | BODY MASS INDEX: 24.59 KG/M2

## 2018-08-06 DIAGNOSIS — Z17.0 MALIGNANT NEOPLASM OF UPPER-INNER QUADRANT OF LEFT BREAST IN FEMALE, ESTROGEN RECEPTOR POSITIVE (HCC): Primary | ICD-10-CM

## 2018-08-06 DIAGNOSIS — I10 BENIGN ESSENTIAL HTN: Primary | ICD-10-CM

## 2018-08-06 DIAGNOSIS — C50.212 MALIGNANT NEOPLASM OF UPPER-INNER QUADRANT OF LEFT BREAST IN FEMALE, ESTROGEN RECEPTOR POSITIVE (HCC): Primary | ICD-10-CM

## 2018-08-06 DIAGNOSIS — Z17.0 MALIGNANT NEOPLASM OF UPPER-INNER QUADRANT OF LEFT BREAST IN FEMALE, ESTROGEN RECEPTOR POSITIVE (HCC): ICD-10-CM

## 2018-08-06 DIAGNOSIS — R73.03 PREDIABETES: ICD-10-CM

## 2018-08-06 DIAGNOSIS — E78.5 HYPERLIPIDEMIA LDL GOAL <130: ICD-10-CM

## 2018-08-06 DIAGNOSIS — C50.212 MALIGNANT NEOPLASM OF UPPER-INNER QUADRANT OF LEFT BREAST IN FEMALE, ESTROGEN RECEPTOR POSITIVE (HCC): ICD-10-CM

## 2018-08-06 DIAGNOSIS — Z11.59 NEED FOR HEPATITIS C SCREENING TEST: ICD-10-CM

## 2018-08-06 PROCEDURE — 3078F DIAST BP <80 MM HG: CPT | Performed by: FAMILY MEDICINE

## 2018-08-06 PROCEDURE — 99214 OFFICE O/P EST MOD 30 MIN: CPT | Performed by: FAMILY MEDICINE

## 2018-08-06 PROCEDURE — 99214 OFFICE O/P EST MOD 30 MIN: CPT | Performed by: RADIOLOGY

## 2018-08-06 PROCEDURE — G0463 HOSPITAL OUTPT CLINIC VISIT: HCPCS | Performed by: RADIOLOGY

## 2018-08-06 PROCEDURE — 3074F SYST BP LT 130 MM HG: CPT | Performed by: FAMILY MEDICINE

## 2018-08-06 NOTE — PROGRESS NOTES
Follow-up - Radiation Oncology   Christie King 1950 77 y o  female [id number]      History of Present Illness   Cancer Staging  Malignant neoplasm of 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, estrogen receptor positive ([facility] [address] )  Staging form: Breast, AJCC 8th Edition  - Pathologic stage from 1/12/2018: Stage IA (pT1c, pN0(sn), cM0, G3, ER: Positive, CA: Positive, HER2: Negative) - Signed by Rebecca Shea MD on 4/25/2018      Christie King returns today for routine scheduled follow-up visit  She is essentially without complaints at this time  She is on anastrozole and tolerating this well  Historical Information      Malignant neoplasm of 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, estrogen receptor positive ([facility] [address] )    1/12/2018 Surgery     Left needle localization lumpectomy with sentinel lymph node biopsy  Grade 3  T1c NO Grade 3   Repeat HER 2 indicated and was Negative  ER 90  CA 90  Her 2 negative         1/12/2018 -  Radiation     IORT left breast to a dose of 2000cGy         2/2/2018 Genomic Testing     Mammaprint high risk           2/22/2018 - 4/26/2018 Chemotherapy     Cyclophosphamide and docetaxel (TC) x four doses  Dr Mallory Bradley recommends Anastrozole to start in mid-May 2018          5/29/2018 - 7/2/2018 Radiation     Treatments:  Course: C1    Plan ID Energy Fractions Dose per Fraction (cGy) Dose Correction (cGy) Total Dose Delivered (cGy) Elapsed Days   BH L Breast 6X 25 / 25 200 0 5,000 34      Treatment Dates:  5/29/2018 - 7/2/2018               Past Medical History:   Diagnosis Date    Abnormal blood chemistry     Abnormal glucose     Cancer (HCC)     left breast    Cervical polyp     Fracture of ankle     Hemorrhoid     Herpes zoster     History of radiation therapy     Hyperglycemia     Hyperlipidemia     Hypertension     Insomnia     Leiomyoma of uterus     Osteopenia     Seborrheic keratosis     Shingles     Tick bite      Past Surgical History:   Procedure Laterality Date    ANKLE SURGERY      BREAST BIOPSY Left     BREAST LUMPECTOMY Left     CYST REMOVAL      right eyelid    DILATION AND CURETTAGE OF UTERUS      INTRAOPERATIVE RADIATION THERAPY (IORT) Left 1/12/2018    Procedure: BREAST IORT BY DR Jaida Lopez;  Surgeon: Mikael Umanzor MD;  [facility];  Service: Surgical Oncology    KY BIOPSY/EXCISION, LYMPH NODE(S) Left 1/12/2018    Procedure: LYMPHOSCINTIGRAPHY; INTRAOPERATIVE LYMPHATIC MAPPING; SENTINEL LYMPH NODE BIOPSY (INJECT AT 5995); Surgeon: Mikael Umanzor MD;  [facility];  Service: Surgical Oncology    KY PERQ DEVICE PLACEMT BREAST LOC 1ST LES W [place] Left 1/12/2018    Procedure: BREAST LUMPECTOMY; BREAST NEEDLE LOCALIZATION (NEEDLE LOC AT 2688); FROZEN SECTION;  Surgeon: Mikael Umanzor MD;  [facility];  Service: Surgical Oncology    SENTINEL LYMPH NODE BIOPSY Left        Social History   History   Alcohol Use    4 2 oz/week    4 Glasses of wine, 3 Cans of beer per week     Comment: social     History   Drug Use No     History   Smoking Status    Former Smoker   Smokeless Tobacco    Never Used     Comment: 2008 quit  Meds/Allergies     Current Outpatient Prescriptions:     anastrozole (ARIMIDEX) 1 mg tablet, Take 1 tablet (1 mg total) by mouth daily, Disp: 90 tablet, Rfl: 1    CALCIUM CITRATE PO, Take 1 tablet by mouth daily PT STATES TAKING 1200 MG DAILY, Disp: , Rfl:     cholecalciferol (VITAMIN D3) 1,000 units tablet, Take 1,000 Units by mouth 3 (three) times a week, Disp: , Rfl:     lisinopril (ZESTRIL) 10 mg tablet, Take 1 tablet (10 mg total) by mouth daily, Disp: 90 tablet, Rfl: 1    simvastatin (ZOCOR) 20 mg tablet, Take 1 tablet (20 mg total) by mouth daily at bedtime, Disp: 90 tablet, Rfl: 1  No Known Allergies      Review of Systems   Review of Systems   Constitutional: Positive for fatigue (3/10 )  HENT: Negative  Eyes: Negative  Respiratory: Positive for cough (occasional dry cough    Saw PCP today  No treatment required  )  Cardiovascular: Negative  Gastrointestinal: Negative  Endocrine: Negative  Genitourinary: Negative  Musculoskeletal: Negative  Skin:        Continues to use lotion to bilateral breast daily  Allergic/Immunologic: Negative  Neurological: Negative  Hematological: Negative  Psychiatric/Behavioral: Positive for sleep disturbance (baseline  )  Screening  Tobacco  Current tobacco user: no  If yes, brief counseling provided: NA    Hypertension  Hypertension screening performed: yes  Normotensive:  no  If no, referred to PCP: yes    Depression Screening  Screened for depression using PHQ-2: yes    Screened for depression using PHQ-9:  no  Screening positive or negative:  negative  If score >4, was any of the following actions taken? Additional evaluation for depression, suicide risk assesment, referral to PCP or psychiatry, medication started:  n/a    Advanced Care Planning for Patients >65 years  Advanced Care Planning Discussed:  no  Patient named surrogate decision maker or care plan in chart: n/a        OBJECTIVE:   /70 (BP Location: Right arm, Patient Position: Sitting, Cuff Size: Standard)   Pulse 70   Temp 97 7 °F (36 5 °C) (Temporal)   Resp 16   Ht 5' 4" (1 626 m)   Wt 65 3 kg (144 lb)   SpO2 97%   BMI 24 72 kg/m²   Pain Assessment:  0  Karnofsky: 90 - Able to carry on normal activity; minor signs or symptoms of disease     Physical Exam   The patient presents today no apparent distress  Sclera anicteric  No cervical or supraclavicular lymphadenopathy  The right breast within normal limits  The left breast is soft, nontender, with mild residual hyperpigmentation and mild scar tissue deep to the lumpectomy incision  No axillary lymphadenopathy  No swelling of the upper extremities  Normal speech  Normal affect          RESULTS    Lab Results:   Recent Results (from the past 672 hour(s))   Lipid Panel with Direct LDL reflex Collection Time: 07/20/18  7:50 AM   Result Value Ref Range    Cholesterol, Total 190 100 - 199 mg/dL    Triglycerides 53 0 - 149 mg/dL    HDL 71 >39 mg/dL    SL AMB VLDL CHOLESTEROL CALC 11 5 - 40 mg/dL    LDL Direct 108 (H) 0 - 99 mg/dL    LDl/HDL Ratio 1 5 0 0 - 3 2 ratio   Cardiovascular Report    Collection Time: 07/20/18  7:50 AM   Result Value Ref Range    SL AMB INTERPRETATION Note        Imaging Studies:Dxa Bone Density Spine Hip And Pelvis    Result Date: 7/25/2018  Narrative: CENTRAL  DXA SCAN CLINICAL HISTORY:   77year old post-menopausal  female risk factors include breast carcinoma with anastrozole use TECHNIQUE: Bone densitometry was performed using a [employer] bone densitometer  Regions of interest appear properly placed  There are no obvious fractures or other confounding variables which could limit the study  Degenerative changes of the lumbar spine and hip  This will falsely elevate the bone mineral densities in these regions  COMPARISON:  Prior study of February 11, 2013 RESULTS: LUMBAR SPINE:  L1, L2 and L4: BMD 1 015 gm/cm2 T-score -1 4 Z-score 0 3 LEFT TOTAL HIP: BMD 0 803 gm/cm2 T-score -1 6 Z-score -0 2 LEFT FEMORAL NECK: BMD 0 757 gm/cm2 T-score -2 0 Z-score -0 3 TOTAL RIGHT HIP: BMD 0 807 gm/sq-cm, T-score is -1 6 Z-score is -0 1                  FEMORAL NECK RIGHT HIP : BMD 0 757 gm/sq-cm, T-score is -2 0 Z-score is -0 3     Impression: 1  Based on the [facility] classification, the T-score of -2 0 in the left hip is consistent with low bone mineral density  2  When compared to the prior examination, there has been a  4 % INCREASE in the total bone mineral density of the lumbar spine and a 7 % DECREASE in the mean total bone mineral density of the bilateral hips  3   Any secondary causes of low bone mineral density should be excluded prior to treatment, if clinically indicated   4   A daily intake of at least 1200 mg calcium and 800 to 1000 IU of Vitamin D, as well as weight bearing and muscle strengthening exercise, fall prevention and avoidance of tobacco and excessive alcohol intake as basic preventive measures are suggested  5   Repeat DXA  in 18 - 24 months, on the same machine, as clinically indicated  The 10 year risk of hip fracture is 4%, with the 10 year risk of major osteoporotic fracture being 19%, as calculated by the [facility] fracture risk assessment tool (FRAX)  The current NOF guidelines recommend treating patients with FRAX 10 year risk score of  >3% for hip fracture and >20% for major osteoporotic fracture  WHO CLASSIFICATION: Normal (a T-score of -1 0 or higher) Low bone mineral density (a T-score of less than -1 0 but higher than -2 5) Osteoporosis (a T-score of -2 5 or less) Severe osteoporosis (a T-score of -2 5 or less with a fragility fracture)   Workstation performed: [id number]           Assessment/Plan:  No orders of the defined types were placed in this encounter  Carlene Thornton has done well in follow-up and has no clinical evidence of recurrent disease at this time  She was encouraged to continue with daily moisturizer application  She will undergo surveillance mammography in approximately 5 months return to our department for routine follow-up in 6 months  She will continue to follow-up with Medical and Surgical Oncology in the interim  Agnes Rosenthal MD  8/6/2018,1:43 PM    Portions of the record may have been created with voice recognition software   Occasional wrong word or "sound a like" substitutions may have occurred due to the inherent limitations of voice recognition software   Read the chart carefully and recognize, using context, where substitutions have occurred

## 2018-08-06 NOTE — PROGRESS NOTES
Eva Wallace  1950   Ms Brandon Coleman is a 77 y o  female       Chief Complaint   Patient presents with    Follow-up       Cancer Staging  Malignant neoplasm of 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, estrogen receptor positive ([facility] [address] )  Staging form: Breast, AJCC 8th Edition  - Pathologic stage from 1/12/2018: Stage IA (pT1c, pN0(sn), cM0, G3, ER: Positive, NE: Positive, HER2: Negative) - Signed by Prateek Elizabeth MD on 4/25/2018         Malignant neoplasm of 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, estrogen receptor positive ([facility] [address] )    1/12/2018 Surgery     Left needle localization lumpectomy with sentinel lymph node biopsy  Grade 3  T1c NO Grade 3   Repeat HER 2 indicated and was Negative  ER 90  NE 90  Her 2 negative         1/12/2018 -  Radiation     IORT left breast to a dose of 2000cGy         2/2/2018 Genomic Testing     Mammaprint high risk           2/22/2018 - 4/26/2018 Chemotherapy     Cyclophosphamide and docetaxel (TC) x four doses  Dr Yessica Quinn recommends Anastrozole to start in mid-May 2018          5/29/2018 - 7/2/2018 Radiation     Treatments:  Course: C1    Plan ID Energy Fractions Dose per Fraction (cGy) Dose Correction (cGy) Total Dose Delivered (cGy) Elapsed Days   BH L Breast 6X 25 / 25 200 0 5,000 34      Treatment Dates:  5/29/2018 - 7/2/2018  Clinical Trial: no        Interval History:Patient presents today for 1 month follow up post completion of left breast radiation 7/2/18 8/1/18 Med Onc Dr Yessica Quinn- no evidence recurrent disease  I recommended her to continue with anastrozole 1 mg once a day  She has osteopenia for which she need to continue with calcium vitamin-D  6 months for routine follow-up  10/24/18 Scheduled Surg Onc  Dr Amaya Damian appointment        Screening  Tobacco  Current tobacco user: no  If yes, brief counseling provided: NA    Hypertension  Hypertension screening performed: yes  Normotensive:  no  If no, referred to PCP: yes    Depression Screening  Screened for depression using PHQ-2: yes    Screened for depression using PHQ-9:  no  Screening positive or negative:  negative  If score >4, was any of the following actions taken? Additional evaluation for depression, suicide risk assesment, referral to PCP or psychiatry, medication started:  [id number] [facility] for Patients >65 years  Advanced Care Planning Discussed:  no  Patient named surrogate decision maker or care plan in chart: Kathie [id number] Maintenance   Topic Date Due    Hepatitis C Screening  1950    DTaP,Tdap,and Td Vaccines (1 - Tdap) 03/30/1969    Urinary Incontinence Screening  03/30/2013    GLAUCOMA SCREENING 65 + YR  03/30/2015    CRC Screening: Colonoscopy  02/20/2018    INFLUENZA VACCINE  09/01/2018    MAMMOGRAM  01/12/2019    Fall Risk  05/11/2019    Depression Screening PHQ-9  05/11/2019    PNEUMOCOCCAL POLYSACCHARIDE VACCINE AGE 72 AND OVER  Completed       Patient Active Problem List   Diagnosis    Anxiety    Benign essential HTN    Elevated HDL    Hyperlipidemia LDL goal <130    Insomnia    Leiomyoma of uterus    Malignant neoplasm of 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, estrogen receptor positive (HCC)    Osteopenia    Vitamin D deficiency    Abnormal glucose    Abnormal mammogram    Cervical polyp    Hemorrhoids    Menopause    Muscle cramps at night    Overweight (BMI 25 0-29  9)    Prediabetes    Shingles    Left-sided back pain     Past Medical History:   Diagnosis Date    Abnormal blood chemistry     Abnormal glucose     Cancer (HCC)     left breast    Cervical polyp     Fracture of ankle     Hemorrhoid     Herpes zoster     History of radiation therapy     Hyperglycemia     Hyperlipidemia     Hypertension     Insomnia     Leiomyoma of uterus     Osteopenia     Seborrheic keratosis     Shingles     Tick bite      Past Surgical History:   Procedure Laterality Date    ANKLE SURGERY      BREAST BIOPSY Left     BREAST LUMPECTOMY Left     CYST REMOVAL      right eyelid    DILATION AND CURETTAGE OF UTERUS      INTRAOPERATIVE RADIATION THERAPY (IORT) Left 1/12/2018    Procedure: BREAST IORT BY DR Svitlana Fleming;  Surgeon: Vannesa Ball MD;  [facility];  Service: Surgical Oncology    AR BIOPSY/EXCISION, LYMPH NODE(S) Left 1/12/2018    Procedure: LYMPHOSCINTIGRAPHY; INTRAOPERATIVE LYMPHATIC MAPPING; SENTINEL LYMPH NODE BIOPSY (INJECT AT 7044); Surgeon: Vannesa Ball MD;  [facility];  Service: Surgical Oncology    AR PERQ DEVICE PLACEMT BREAST LOC 1ST LES W GUIDNCE Left 1/12/2018    Procedure: BREAST LUMPECTOMY; BREAST NEEDLE LOCALIZATION (NEEDLE LOC AT 6374); FROZEN SECTION;  Surgeon: Vannesa Ball MD;  [facility];  Service: Surgical Oncology    SENTINEL LYMPH NODE BIOPSY Left      Family History   Problem Relation Age of Onset    Colon cancer Paternal Grandfather     Breast cancer Maternal Aunt     Ovarian cancer Maternal Aunt     Prostate cancer Maternal Uncle     Colon cancer Maternal Uncle     Hypertension Mother     Heart disease Mother     Thyroid disease Mother     Leukemia Father     Other Father         dyschromia    Emphysema Maternal Grandfather     Heart disease Paternal Grandmother     Colon cancer Paternal Aunt     Mental illness Neg Hx      Social History     Social History    Marital status: /Civil Union     Spouse name: N/A    Number of children: N/A    Years of education: N/A     Occupational History           Social History Main Topics    Smoking status: Former Smoker    Smokeless tobacco: Never Used      Comment: 2008 quit      Alcohol use 4 2 oz/week     4 Glasses of wine, 3 Cans of beer per week      Comment: social    Drug use: No    Sexual activity: Not on file     Other Topics Concern    Not on file     Social History Narrative    No narrative on file       Current Outpatient Prescriptions:     anastrozole (ARIMIDEX) 1 mg tablet, Take 1 tablet (1 mg total) by mouth daily, Disp: 90 tablet, Rfl: 1    CALCIUM CITRATE PO, Take 1 tablet by mouth daily PT STATES TAKING 1200 MG DAILY, Disp: , Rfl:     cholecalciferol (VITAMIN D3) 1,000 units tablet, Take 1,000 Units by mouth 3 (three) times a week, Disp: , Rfl:     lisinopril (ZESTRIL) 10 mg tablet, Take 1 tablet (10 mg total) by mouth daily, Disp: 90 tablet, Rfl: 1    simvastatin (ZOCOR) 20 mg tablet, Take 1 tablet (20 mg total) by mouth daily at bedtime, Disp: 90 tablet, Rfl: 1  No Known Allergies    Review of Systems:  Review of Systems   Constitutional: Positive for fatigue (3/10 )  HENT: Negative  Eyes: Negative  Respiratory: Positive for cough (occasional dry cough  Saw PCP today  No treatment required  )  Cardiovascular: Negative  Gastrointestinal: Negative  Endocrine: Negative  Genitourinary: Negative  Musculoskeletal: Negative  Skin:        Continues to use lotion to bilateral breast daily  Allergic/Immunologic: Negative  Neurological: Negative  Hematological: Negative  Psychiatric/Behavioral: Positive for sleep disturbance (baseline  )  Vitals:    08/06/18 1300   BP: 124/70   BP Location: Right arm   Patient Position: Sitting   Cuff Size: Standard   Pulse: 70   Resp: 16   Temp: 97 7 °F (36 5 °C)   TempSrc: Temporal   SpO2: 97%   Weight: 65 3 kg (144 lb)   Height: 5' 4" (1 626 m)       Pain Score: 0-No pain    Imaging:Dxa Bone Density Spine Hip And Pelvis    Result Date: 7/25/2018  Narrative: CENTRAL  DXA SCAN CLINICAL HISTORY:   77year old post-menopausal  female risk factors include breast carcinoma with anastrozole use TECHNIQUE: Bone densitometry was performed using a RealMatch bone densitometer  Regions of interest appear properly placed  There are no obvious fractures or other confounding variables which could limit the study  Degenerative changes of the lumbar spine and hip   This will falsely elevate the bone mineral densities in these regions  COMPARISON:  Prior study of February 11, 2013 RESULTS: LUMBAR SPINE:  L1, L2 and L4: BMD 1 015 gm/cm2 T-score -1 4 Z-score 0 3 LEFT TOTAL HIP: BMD 0 803 gm/cm2 T-score -1 6 Z-score -0 2 LEFT FEMORAL NECK: BMD 0 757 gm/cm2 T-score -2 0 Z-score -0 3 TOTAL RIGHT HIP: BMD 0 807 gm/sq-cm, T-score is -1 6 Z-score is -0 1                  FEMORAL NECK RIGHT HIP : BMD 0 757 gm/sq-cm, T-score is -2 0 Z-score is -0 3     Impression: 1  Based on the [facility] classification, the T-score of -2 0 in the left hip is consistent with low bone mineral density  2  When compared to the prior examination, there has been a  4 % INCREASE in the total bone mineral density of the lumbar spine and a 7 % DECREASE in the mean total bone mineral density of the bilateral hips  3   Any secondary causes of low bone mineral density should be excluded prior to treatment, if clinically indicated  4   A daily intake of at least 1200 mg calcium and 800 to 1000 IU of Vitamin D, as well as weight bearing and muscle strengthening exercise, fall prevention and avoidance of tobacco and excessive alcohol intake as basic preventive measures are suggested  5   Repeat DXA  in 18 - 24 months, on the same machine, as clinically indicated  The 10 year risk of hip fracture is 4%, with the 10 year risk of major osteoporotic fracture being 19%, as calculated by the [facility] fracture risk assessment tool (FRAX)  The current NOF guidelines recommend treating patients with FRAX 10 year risk score of  >3% for hip fracture and >20% for major osteoporotic fracture   WHO CLASSIFICATION: Normal (a T-score of -1 0 or higher) Low bone mineral density (a T-score of less than -1 0 but higher than -2 5) Osteoporosis (a T-score of -2 5 or less) Severe osteoporosis (a T-score of -2 5 or less with a fragility fracture)   Workstation performed: [id number]

## 2018-08-06 NOTE — PROGRESS NOTES
Assessment/Plan:    Problem List Items Addressed This Visit     Benign essential HTN - Primary    Relevant Orders    TSH, 3rd generation    CBC and differential    Hyperlipidemia LDL goal <130    Relevant Orders    TSH, 3rd generation    Lipid Panel with Direct LDL reflex    Comprehensive metabolic panel    CBC and differential    Malignant neoplasm of 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, estrogen receptor positive ([address] )    Relevant Orders    TSH, 3rd generation    CBC and differential    Prediabetes    Relevant Orders    TSH, 3rd generation    CBC and differential      Other Visit Diagnoses     Need for hepatitis C screening test        Relevant Orders    Hepatitis C antibody    TSH, 3rd generation    CBC and differential          There are no Patient Instructions on file for this visit  Return in about 6 months (around 2/6/2019) for Recheck  Subjective:      Patient ID: Bogdan Carreno is a 77 y o  female  Chief Complaint   Patient presents with    Follow-up     review labs davonte       Pt is here for her follow up of chronic conditions  Pt had labs done  Did go to the gym for her back pain and it worked out very well for her  [place]    Pt will be sched colon she has order already        The following portions of the patient's history were reviewed and updated as appropriate: allergies, current medications, past family history, past medical history, past social history, past surgical history and problem list     Review of Systems   Constitutional: Negative  Negative for activity change, appetite change, chills, diaphoresis and fatigue  HENT: Negative  Negative for dental problem, ear pain, sinus pressure and sore throat  Eyes: Negative  Negative for photophobia, pain, discharge, redness, itching and visual disturbance  Respiratory: Negative for apnea and chest tightness  Cardiovascular: Negative  Negative for chest pain, palpitations and leg swelling     Gastrointestinal: Negative  Negative for abdominal distention, abdominal pain, constipation and diarrhea  Endocrine: Negative  Negative for cold intolerance and heat intolerance  Genitourinary: Negative  Negative for difficulty urinating and dyspareunia  Musculoskeletal: Negative  Negative for arthralgias and back pain  Skin: Negative  Allergic/Immunologic: Negative for environmental allergies  Neurological: Negative  Negative for dizziness  Psychiatric/Behavioral: Negative  Negative for agitation  Current Outpatient Prescriptions   Medication Sig Dispense Refill    anastrozole (ARIMIDEX) 1 mg tablet Take 1 tablet (1 mg total) by mouth daily 90 tablet 1    ascorbic acid (VITAMIN C) 500 mg tablet Take 500 mg by mouth daily      CALCIUM CITRATE PO Take 1 tablet by mouth daily PT STATES TAKING 1200 MG DAILY      cholecalciferol (VITAMIN D3) 1,000 units tablet Take 1,000 Units by mouth 3 (three) times a week      lisinopril (ZESTRIL) 10 mg tablet Take 1 tablet (10 mg total) by mouth daily 90 tablet 1    simvastatin (ZOCOR) 20 mg tablet Take 1 tablet (20 mg total) by mouth daily at bedtime 90 tablet 1    magnesium 30 MG tablet Take 30 mg by mouth daily at bedtime       No current facility-administered medications for this visit  Objective:    /60   Pulse 64   Temp (!) 96 8 °F (36 °C)   Resp 16   Ht 5' 4" (1 626 m)   Wt 65 8 kg (145 lb)   BMI 24 89 kg/m²        Physical Exam   Constitutional: She appears well-developed and well-nourished  No distress  HENT:   Head: Normocephalic and atraumatic  Right Ear: External ear normal    Left Ear: External ear normal    Nose: Nose normal    Mouth/Throat: Oropharynx is clear and moist  No oropharyngeal exudate  Eyes: EOM are normal  Pupils are equal, round, and reactive to light  Right eye exhibits no discharge  Left eye exhibits no discharge  No scleral icterus  Neck: No thyromegaly present     Cardiovascular: Normal rate and normal heart sounds  No murmur heard  Pulmonary/Chest: Effort normal and breath sounds normal  No respiratory distress  She has no wheezes  Abdominal: Soft  Bowel sounds are normal  She exhibits no distension and no mass  There is no tenderness  There is no rebound and no guarding  Musculoskeletal: Normal range of motion  Neurological: She is alert  She displays normal reflexes  Coordination normal    Skin: Skin is warm and dry  No rash noted  She is not diaphoretic  No erythema  Psychiatric: She has a normal mood and affect  Her behavior is normal    Nursing note and vitals reviewed             Recent Results (from the past 672 hour(s))   Lipid Panel with Direct LDL reflex    Collection Time: 07/20/18  7:50 AM   Result Value Ref Range    Cholesterol, Total 190 100 - 199 mg/dL    Triglycerides 53 0 - 149 mg/dL    HDL 71 >39 mg/dL    SL AMB VLDL CHOLESTEROL CALC 11 5 - 40 mg/dL    LDL Direct 108 (H) 0 - 99 mg/dL    LDl/HDL Ratio 1 5 0 0 - 3 2 ratio   Cardiovascular Report    Collection Time: 07/20/18  7:50 AM   Result Value Ref Range    SL AMB INTERPRETATION Note          Wen Poole DO

## 2018-08-29 ENCOUNTER — OFFICE VISIT (OUTPATIENT)
Dept: GASTROENTEROLOGY | Facility: CLINIC | Age: 68
End: 2018-08-29
Payer: COMMERCIAL

## 2018-08-29 VITALS
SYSTOLIC BLOOD PRESSURE: 118 MMHG | BODY MASS INDEX: 24.41 KG/M2 | DIASTOLIC BLOOD PRESSURE: 70 MMHG | HEIGHT: 64 IN | TEMPERATURE: 97.6 F | HEART RATE: 69 BPM | WEIGHT: 143 LBS

## 2018-08-29 DIAGNOSIS — Z80.0 FAMILY HX OF COLON CANCER: Primary | ICD-10-CM

## 2018-08-29 PROCEDURE — 99244 OFF/OP CNSLTJ NEW/EST MOD 40: CPT | Performed by: INTERNAL MEDICINE

## 2018-08-29 NOTE — LETTER
August 29, 2018     Noah Smoker, DO  [address] [address]    Patient: Saintclair Rack   YOB: 1950   Date of Visit: 8/29/2018       Dear Dr Juanell Mortimer:    Thank you for referring Himanshu Castillo to me for evaluation  Below are my notes for this consultation  If you have questions, please do not hesitate to call me  I look forward to following your patient along with you  Sincerely,        Jeanie Porter MD        CC: No Recipients  Jeanie Porter MD  8/29/2018  2:30 PM  Sign at close encounter  Consultation - [facility]  Saintclair Rack 77 y o  female MRN: [id number]          Assessment & Plan:    Very pleasant 56-year-old female recently diagnosed and treated for breast cancer, here for evaluation for colonoscopy with a strong family history  1   Family history of colon cancer: In her paternal grandfather, paternal aunt and uncle  Patient's last colonoscopy was 5 years ago was normal  -we will schedule patient for colonoscopy due to her high risk for family history  -I discussed with her the risks of the procedure including bleeding, surgery, perforation, missed polyp detection rate      _____________________________________________________________        CC:   Family history of colon cancer, evaluation for colonoscopy    HPI:  Saintclair Rack is a 77 y  o female who was referred for evaluation of colonoscopy  As you know this is a very pleasant 56-year-old female recently diagnosed with breast cancer status post lumpectomy, radiation and chemotherapy, history of hypertension, hyperlipidemia, she has surgeries for her feet with some hardware  Patient is doing well with no significant GI complaints, denies any nausea, vomiting, heartburn, dysphagia, diarrhea, constipation, melena, rectal bleeding  Family history is notable for paternal grandfather with colon cancer as well as paternal aunt and uncle all with colon cancer      She denies any tobacco, rarely drinks  She works as a  at an [employer]  ROS:  The remainder of the ROS was negative except for the pertinent positives mentioned in HPI  Allergies: Patient has no known allergies  Medications:   Current Outpatient Prescriptions:     anastrozole (ARIMIDEX) 1 mg tablet, Take 1 tablet (1 mg total) by mouth daily, Disp: 90 tablet, Rfl: 1    CALCIUM CITRATE PO, Take 1 tablet by mouth daily PT STATES TAKING 1200 MG DAILY, Disp: , Rfl:     cholecalciferol (VITAMIN D3) 1,000 units tablet, Take 1,000 Units by mouth 3 (three) times a week, Disp: , Rfl:     lisinopril (ZESTRIL) 10 mg tablet, Take 1 tablet (10 mg total) by mouth daily, Disp: 90 tablet, Rfl: 1    simvastatin (ZOCOR) 20 mg tablet, Take 1 tablet (20 mg total) by mouth daily at bedtime, Disp: 90 tablet, Rfl: 1'    Past Medical History:   Diagnosis Date    Abnormal blood chemistry     Abnormal glucose     Cancer (HCC)     left breast    Cervical polyp     Fracture of ankle     Hemorrhoid     Herpes zoster     History of radiation therapy     Hyperglycemia     Hyperlipidemia     Hypertension     Insomnia     Leiomyoma of uterus     Osteopenia     Seborrheic keratosis     Shingles     Tick bite        Past Surgical History:   Procedure Laterality Date    ANKLE SURGERY      BREAST BIOPSY Left     BREAST LUMPECTOMY Left     CYST REMOVAL      right eyelid    DILATION AND CURETTAGE OF UTERUS      INTRAOPERATIVE RADIATION THERAPY (IORT) Left 1/12/2018    Procedure: BREAST IORT BY DR David Llamas;  Surgeon: Jacinta Swenson MD;  [facility];  Service: Surgical Oncology    DC BIOPSY/EXCISION, LYMPH NODE(S) Left 1/12/2018    Procedure: LYMPHOSCINTIGRAPHY; INTRAOPERATIVE LYMPHATIC MAPPING; SENTINEL LYMPH NODE BIOPSY (INJECT AT 1215);   Surgeon: Jacinta Swenson MD;  [facility];  Service: Surgical Oncology    DC PERQ DEVICE PLACEMT BREAST LOC 1ST VENUS Zhang Left 1/12/2018    Procedure: BREAST LUMPECTOMY; BREAST NEEDLE LOCALIZATION (NEEDLE LOC AT 1130); FROZEN SECTION;  Surgeon: Milagros Kohler MD;  [facility];  Service: Surgical Oncology    SENTINEL LYMPH NODE BIOPSY Left        Family History   Problem Relation Age of Onset    Colon cancer Paternal Grandfather     Breast cancer Maternal Aunt     Ovarian cancer Maternal Aunt     Prostate cancer Maternal Uncle     Colon cancer Maternal Uncle     Hypertension Mother     Heart disease Mother     Thyroid disease Mother     Leukemia Father     Other Father         dyschromia    Emphysema Maternal Grandfather     Heart disease Paternal Grandmother     Colon cancer Paternal Aunt     Mental illness Neg Hx         reports that she has quit smoking  She has never used smokeless tobacco  She reports that she drinks about 4 2 oz of alcohol per week   She reports that she does not use drugs            Physical Exam:     /70 (BP Location: Left arm, Patient Position: Sitting, Cuff Size: Standard)   Pulse 69   Temp 97 6 °F (36 4 °C) (Tympanic)   Ht 5' 4" (1 626 m)   Wt 64 9 kg (143 lb)   BMI 24 55 kg/m²      Gen: wn/wd, NAD, elderly female in good health  HEENT: anicteric, MMM, no cervical LAD  CVS: RRR, no m/r/g  CHEST: CTA b/l  ABD: +BS, soft, NT,ND, no hepatosplenomegaly  EXT: no c/c/e  NEURO: aaox3  SKIN: NO rashes

## 2018-08-29 NOTE — PROGRESS NOTES
Consultation - [facility]  Eva Wallace 77 y o  female MRN: [id number]          Assessment & Plan:    Very pleasant 77-year-old female recently diagnosed and treated for breast cancer, here for evaluation for colonoscopy with a strong family history  1   Family history of colon cancer: In her paternal grandfather, paternal aunt and uncle  Patient's last colonoscopy was 5 years ago was normal  -we will schedule patient for colonoscopy due to her high risk for family history  -I discussed with her the risks of the procedure including bleeding, surgery, perforation, missed polyp detection rate      _____________________________________________________________        CC:   Family history of colon cancer, evaluation for colonoscopy    HPI:  Eva Wallace is a 77 y  o female who was referred for evaluation of colonoscopy  As you know this is a very pleasant 77-year-old female recently diagnosed with breast cancer status post lumpectomy, radiation and chemotherapy, history of hypertension, hyperlipidemia, she has surgeries for her feet with some hardware  Patient is doing well with no significant GI complaints, denies any nausea, vomiting, heartburn, dysphagia, diarrhea, constipation, melena, rectal bleeding  Family history is notable for paternal grandfather with colon cancer as well as paternal aunt and uncle all with colon cancer  She denies any tobacco, rarely drinks  She works as a  at an [employer]  ROS:  The remainder of the ROS was negative except for the pertinent positives mentioned in HPI  Allergies: Patient has no known allergies      Medications:   Current Outpatient Prescriptions:     anastrozole (ARIMIDEX) 1 mg tablet, Take 1 tablet (1 mg total) by mouth daily, Disp: 90 tablet, Rfl: 1    CALCIUM CITRATE PO, Take 1 tablet by mouth daily PT STATES TAKING 1200 MG DAILY, Disp: , Rfl:     cholecalciferol (VITAMIN D3) 1,000 units tablet, Take 1,000 Units by mouth 3 (three) times a week, Disp: , Rfl:     lisinopril (ZESTRIL) 10 mg tablet, Take 1 tablet (10 mg total) by mouth daily, Disp: 90 tablet, Rfl: 1    simvastatin (ZOCOR) 20 mg tablet, Take 1 tablet (20 mg total) by mouth daily at bedtime, Disp: 90 tablet, Rfl: 1'    Past Medical History:   Diagnosis Date    Abnormal blood chemistry     Abnormal glucose     Cancer (HCC)     left breast    Cervical polyp     Fracture of ankle     Hemorrhoid     Herpes zoster     History of radiation therapy     Hyperglycemia     Hyperlipidemia     Hypertension     Insomnia     Leiomyoma of uterus     Osteopenia     Seborrheic keratosis     Shingles     Tick bite        Past Surgical History:   Procedure Laterality Date    ANKLE SURGERY      BREAST BIOPSY Left     BREAST LUMPECTOMY Left     CYST REMOVAL      right eyelid    DILATION AND CURETTAGE OF UTERUS      INTRAOPERATIVE RADIATION THERAPY (IORT) Left 1/12/2018    Procedure: BREAST IORT BY DR Panfilo Dupree;  Surgeon: Daylin Gasca MD;  [facility];  Service: Surgical Oncology    MD BIOPSY/EXCISION, LYMPH NODE(S) Left 1/12/2018    Procedure: LYMPHOSCINTIGRAPHY; INTRAOPERATIVE LYMPHATIC MAPPING; SENTINEL LYMPH NODE BIOPSY (INJECT AT 1215); Surgeon: Daylin Gasca MD;  [facility];  Service: Surgical Oncology    MD PERQ DEVICE PLACEMT BREAST LOC 1ST [place] Left 1/12/2018    Procedure: BREAST LUMPECTOMY; BREAST NEEDLE LOCALIZATION (NEEDLE LOC AT 1429);  FROZEN SECTION;  Surgeon: Daylin Gasca MD;  [facility];  Service: Surgical Oncology    SENTINEL LYMPH NODE BIOPSY Left        Family History   Problem Relation Age of Onset    Colon cancer Paternal Grandfather     Breast cancer Maternal Aunt     Ovarian cancer Maternal Aunt     Prostate cancer Maternal Uncle     Colon cancer Maternal Uncle     Hypertension Mother     Heart disease Mother     Thyroid disease Mother     Leukemia Father     Other Father dyschromia    Emphysema Maternal Grandfather     Heart disease Paternal Grandmother     Colon cancer Paternal Aunt     Mental illness Neg Hx         reports that she has quit smoking  She has never used smokeless tobacco  She reports that she drinks about 4 2 oz of alcohol per week   She reports that she does not use drugs            Physical Exam:     /70 (BP Location: Left arm, Patient Position: Sitting, Cuff Size: Standard)   Pulse 69   Temp 97 6 °F (36 4 °C) (Tympanic)   Ht 5' 4" (1 626 m)   Wt 64 9 kg (143 lb)   BMI 24 55 kg/m²     Gen: wn/wd, NAD, elderly female in good health  HEENT: anicteric, MMM, no cervical LAD  CVS: RRR, no m/r/g  CHEST: CTA b/l  ABD: +BS, soft, NT,ND, no hepatosplenomegaly  EXT: no c/c/e  NEURO: aaox3  SKIN: NO rashes

## 2018-09-12 ENCOUNTER — OFFICE VISIT (OUTPATIENT)
Dept: FAMILY MEDICINE CLINIC | Facility: CLINIC | Age: 68
End: 2018-09-12
Payer: COMMERCIAL

## 2018-09-12 VITALS
TEMPERATURE: 97.6 F | SYSTOLIC BLOOD PRESSURE: 110 MMHG | HEART RATE: 72 BPM | WEIGHT: 144 LBS | BODY MASS INDEX: 24.59 KG/M2 | HEIGHT: 64 IN | DIASTOLIC BLOOD PRESSURE: 70 MMHG | RESPIRATION RATE: 18 BRPM

## 2018-09-12 DIAGNOSIS — R10.9 RT FLANK PAIN: Primary | ICD-10-CM

## 2018-09-12 PROCEDURE — 3008F BODY MASS INDEX DOCD: CPT | Performed by: FAMILY MEDICINE

## 2018-09-12 PROCEDURE — 99213 OFFICE O/P EST LOW 20 MIN: CPT | Performed by: FAMILY MEDICINE

## 2018-09-12 PROCEDURE — 1160F RVW MEDS BY RX/DR IN RCRD: CPT | Performed by: FAMILY MEDICINE

## 2018-09-12 NOTE — PROGRESS NOTES
Assessment/Plan:    Problem List Items Addressed This Visit     None      Visit Diagnoses     Rt flank pain    -  Primary    Relevant Orders    Urinalysis with reflex to microscopic    Comprehensive metabolic panel    CBC and differential      if pain persists will get ct scan  Seems to be tender at the cartilage of the ribs  No rash  Pt has a colon in two days  Will get labs    There are no Patient Instructions on file for this visit  No Follow-up on file  Subjective:      Patient ID: Yovanny Madrid is a 77 y o  female  Chief Complaint   Patient presents with    Flank Pain     for the past few weeks saw Dr Marina Vazquez doing a colonoscopy on friday of this week  [id number]       Pt has rt flank discomfort, srtarted this starrted a few weeks ago  States it comes and goes  Does not hurt now  Pain is not a pain more of discomfort  No lump, no rash on skin  No blood in urine and stool        The following portions of the patient's history were reviewed and updated as appropriate: allergies, current medications, past family history, past medical history, past social history, past surgical history and problem list     Review of Systems   Constitutional: Negative  Negative for activity change, appetite change, chills, diaphoresis and fatigue  HENT: Negative  Negative for dental problem, ear pain, sinus pressure and sore throat  Eyes: Negative  Negative for photophobia, pain, discharge, redness, itching and visual disturbance  Respiratory: Negative for apnea and chest tightness  Cardiovascular: Negative  Negative for chest pain, palpitations and leg swelling  Gastrointestinal: Negative  Negative for abdominal distention, abdominal pain, constipation and diarrhea  Rt flank pain   Endocrine: Negative  Negative for cold intolerance and heat intolerance  Genitourinary: Negative  Negative for difficulty urinating and dyspareunia  Musculoskeletal: Negative    Negative for arthralgias and back pain    Skin: Negative  Allergic/Immunologic: Negative for environmental allergies  Neurological: Negative  Negative for dizziness  Psychiatric/Behavioral: Negative  Negative for agitation  Current Outpatient Prescriptions   Medication Sig Dispense Refill    anastrozole (ARIMIDEX) 1 mg tablet Take 1 tablet (1 mg total) by mouth daily 90 tablet 1    Calcium Carb-Cholecalciferol 600-1000 MG-UNIT CAPS Take by mouth      lisinopril (ZESTRIL) 10 mg tablet Take 1 tablet (10 mg total) by mouth daily (Patient taking differently: Take 10 mg by mouth every morning  ) 90 tablet 1    Na Sulfate-K Sulfate-Mg Sulf (SUPREP BOWEL PREP KIT) 17 5-3 13-1 6 GM/180ML SOLN Take 1 Bottle by mouth once for 1 dose 1 Bottle 0    simvastatin (ZOCOR) 20 mg tablet Take 1 tablet (20 mg total) by mouth daily at bedtime 90 tablet 1     No current facility-administered medications for this visit  Objective:    /70   Pulse 72   Temp 97 6 °F (36 4 °C)   Resp 18   Ht 5' 4" (1 626 m)   Wt 65 3 kg (144 lb)   BMI 24 72 kg/m²        Physical Exam   Constitutional: She appears well-developed and well-nourished  No distress  HENT:   Head: Normocephalic and atraumatic  Right Ear: External ear normal    Left Ear: External ear normal    Nose: Nose normal    Mouth/Throat: Oropharynx is clear and moist  No oropharyngeal exudate  Eyes: EOM are normal  Pupils are equal, round, and reactive to light  Right eye exhibits no discharge  Left eye exhibits no discharge  No scleral icterus  Neck: No thyromegaly present  Cardiovascular: Normal rate and normal heart sounds  No murmur heard  Pulmonary/Chest: Effort normal and breath sounds normal  No respiratory distress  She has no wheezes  Abdominal: Soft  Bowel sounds are normal  She exhibits no distension and no mass  There is no tenderness  There is no rebound and no guarding  Musculoskeletal: Normal range of motion  Arms:  Neurological: She is alert   She displays normal reflexes  Coordination normal    Skin: Skin is warm and dry  No rash noted  She is not diaphoretic  No erythema  Psychiatric: She has a normal mood and affect  Her behavior is normal    Nursing note and vitals reviewed               Pierre Barlow DO

## 2018-09-12 NOTE — PRE-PROCEDURE INSTRUCTIONS
Pre-Surgery Instructions:   Medication Instructions    anastrozole (ARIMIDEX) 1 mg tablet Patient was instructed by Physician and understands   Calcium Carb-Cholecalciferol 600-1000 MG-UNIT CAPS Patient was instructed by Physician and understands   lisinopril (ZESTRIL) 10 mg tablet Instructed patient per Anesthesia Guidelines   simvastatin (ZOCOR) 20 mg tablet Patient was instructed by Physician and understands

## 2018-09-14 ENCOUNTER — HOSPITAL ENCOUNTER (OUTPATIENT)
Facility: AMBULARY SURGERY CENTER | Age: 68
Setting detail: OUTPATIENT SURGERY
Discharge: HOME | End: 2018-09-14
Attending: INTERNAL MEDICINE | Admitting: INTERNAL MEDICINE
Payer: COMMERCIAL

## 2018-09-14 ENCOUNTER — TELEPHONE (OUTPATIENT)
Dept: FAMILY MEDICINE CLINIC | Facility: CLINIC | Age: 68
End: 2018-09-14

## 2018-09-14 ENCOUNTER — ANESTHESIA (OUTPATIENT)
Dept: GASTROENTEROLOGY | Facility: AMBULARY SURGERY CENTER | Age: 68
End: 2018-09-14
Payer: COMMERCIAL

## 2018-09-14 VITALS
OXYGEN SATURATION: 98 % | BODY MASS INDEX: 24.72 KG/M2 | HEART RATE: 69 BPM | WEIGHT: 144 LBS | DIASTOLIC BLOOD PRESSURE: 77 MMHG | RESPIRATION RATE: 18 BRPM | SYSTOLIC BLOOD PRESSURE: 119 MMHG | TEMPERATURE: 97.7 F

## 2018-09-14 DIAGNOSIS — R82.90 ABNORMAL URINALYSIS: Primary | ICD-10-CM

## 2018-09-14 DIAGNOSIS — Z80.0 FAMILY HX OF COLON CANCER: ICD-10-CM

## 2018-09-14 LAB
ALBUMIN SERPL-MCNC: 4.6 G/DL (ref 3.5–4.8)
ALBUMIN/GLOB SERPL: 2.6 {RATIO} (ref 1.2–2.2)
ALP SERPL-CCNC: 96 IU/L (ref 39–117)
ALT SERPL-CCNC: 24 IU/L (ref 0–32)
APPEARANCE UR: CLEAR
AST SERPL-CCNC: 23 IU/L (ref 0–40)
BACTERIA URNS QL MICRO: ABNORMAL
BASOPHILS # BLD AUTO: 0 X10E3/UL (ref 0–0.2)
BASOPHILS NFR BLD AUTO: 0 %
BILIRUB SERPL-MCNC: 0.5 MG/DL (ref 0–1.2)
BILIRUB UR QL STRIP: NEGATIVE
BUN SERPL-MCNC: 17 MG/DL (ref 8–27)
BUN/CREAT SERPL: 25 (ref 12–28)
CALCIUM SERPL-MCNC: 10.1 MG/DL (ref 8.7–10.3)
CHLORIDE SERPL-SCNC: 100 MMOL/L (ref 96–106)
CO2 SERPL-SCNC: 27 MMOL/L (ref 20–29)
COLOR UR: YELLOW
CREAT SERPL-MCNC: 0.68 MG/DL (ref 0.57–1)
EOSINOPHIL # BLD AUTO: 0.1 X10E3/UL (ref 0–0.4)
EOSINOPHIL NFR BLD AUTO: 2 %
EPI CELLS #/AREA URNS HPF: ABNORMAL /HPF
ERYTHROCYTE [DISTWIDTH] IN BLOOD BY AUTOMATED COUNT: 14.2 % (ref 12.3–15.4)
GLOBULIN SER-MCNC: 1.8 G/DL (ref 1.5–4.5)
GLUCOSE SERPL-MCNC: 97 MG/DL (ref 65–99)
GLUCOSE UR QL: NEGATIVE
HCT VFR BLD AUTO: 39.6 % (ref 34–46.6)
HGB BLD-MCNC: 13.5 G/DL (ref 11.1–15.9)
HGB UR QL STRIP: NEGATIVE
IMM GRANULOCYTES # BLD: 0 X10E3/UL (ref 0–0.1)
IMM GRANULOCYTES NFR BLD: 0 %
KETONES UR QL STRIP: NEGATIVE
LEUKOCYTE ESTERASE UR QL STRIP: ABNORMAL
LYMPHOCYTES # BLD AUTO: 0.7 X10E3/UL (ref 0.7–3.1)
LYMPHOCYTES NFR BLD AUTO: 20 %
MCH RBC QN AUTO: 31.3 PG (ref 26.6–33)
MCHC RBC AUTO-ENTMCNC: 34.1 G/DL (ref 31.5–35.7)
MCV RBC AUTO: 92 FL (ref 79–97)
MICRO URNS: ABNORMAL
MONOCYTES # BLD AUTO: 0.3 X10E3/UL (ref 0.1–0.9)
MONOCYTES NFR BLD AUTO: 10 %
NEUTROPHILS # BLD AUTO: 2.3 X10E3/UL (ref 1.4–7)
NEUTROPHILS NFR BLD AUTO: 68 %
NITRITE UR QL STRIP: NEGATIVE
PH UR STRIP: 7.5 [PH] (ref 5–7.5)
PLATELET # BLD AUTO: 213 X10E3/UL (ref 150–379)
POTASSIUM SERPL-SCNC: 4.5 MMOL/L (ref 3.5–5.2)
PROT SERPL-MCNC: 6.4 G/DL (ref 6–8.5)
PROT UR QL STRIP: NEGATIVE
RBC # BLD AUTO: 4.31 X10E6/UL (ref 3.77–5.28)
RBC #/AREA URNS HPF: ABNORMAL /HPF
SL AMB EGFR AFRICAN AMERICAN: 102 ML/MIN/1.73
SL AMB EGFR NON AFRICAN AMERICAN: 89 ML/MIN/1.73
SODIUM SERPL-SCNC: 140 MMOL/L (ref 134–144)
SP GR UR: 1.01 (ref 1–1.03)
UROBILINOGEN UR STRIP-ACNC: 0.2 EU/DL (ref 0.2–1)
WBC # BLD AUTO: 3.4 X10E3/UL (ref 3.4–10.8)
WBC #/AREA URNS HPF: ABNORMAL /HPF

## 2018-09-14 PROCEDURE — 88341 IMHCHEM/IMCYTCHM EA ADD ANTB: CPT | Performed by: PATHOLOGY

## 2018-09-14 PROCEDURE — 88342 IMHCHEM/IMCYTCHM 1ST ANTB: CPT | Performed by: PATHOLOGY

## 2018-09-14 PROCEDURE — 88305 TISSUE EXAM BY PATHOLOGIST: CPT | Performed by: PATHOLOGY

## 2018-09-14 PROCEDURE — 45380 COLONOSCOPY AND BIOPSY: CPT | Performed by: INTERNAL MEDICINE

## 2018-09-14 RX ORDER — PROPOFOL 10 MG/ML
INJECTION, EMULSION INTRAVENOUS AS NEEDED
Status: DISCONTINUED | OUTPATIENT
Start: 2018-09-14 | End: 2018-09-14 | Stop reason: SURG

## 2018-09-14 RX ORDER — MIDAZOLAM HYDROCHLORIDE 1 MG/ML
INJECTION INTRAMUSCULAR; INTRAVENOUS AS NEEDED
Status: DISCONTINUED | OUTPATIENT
Start: 2018-09-14 | End: 2018-09-14

## 2018-09-14 RX ORDER — SODIUM CHLORIDE, SODIUM LACTATE, POTASSIUM CHLORIDE, CALCIUM CHLORIDE 600; 310; 30; 20 MG/100ML; MG/100ML; MG/100ML; MG/100ML
INJECTION, SOLUTION INTRAVENOUS CONTINUOUS PRN
Status: DISCONTINUED | OUTPATIENT
Start: 2018-09-14 | End: 2018-09-14 | Stop reason: SURG

## 2018-09-14 RX ORDER — LIDOCAINE HYDROCHLORIDE 10 MG/ML
0.5 INJECTION, SOLUTION EPIDURAL; INFILTRATION; INTRACAUDAL; PERINEURAL ONCE AS NEEDED
Status: DISCONTINUED | OUTPATIENT
Start: 2018-09-14 | End: 2018-09-14 | Stop reason: HOSPADM

## 2018-09-14 RX ORDER — SULFAMETHOXAZOLE AND TRIMETHOPRIM 800; 160 MG/1; MG/1
1 TABLET ORAL EVERY 12 HOURS SCHEDULED
Qty: 6 TABLET | Refills: 0 | Status: SHIPPED | OUTPATIENT
Start: 2018-09-14 | End: 2018-09-17

## 2018-09-14 RX ORDER — SODIUM CHLORIDE, SODIUM LACTATE, POTASSIUM CHLORIDE, CALCIUM CHLORIDE 600; 310; 30; 20 MG/100ML; MG/100ML; MG/100ML; MG/100ML
100 INJECTION, SOLUTION INTRAVENOUS CONTINUOUS
Status: DISCONTINUED | OUTPATIENT
Start: 2018-09-14 | End: 2018-09-14 | Stop reason: HOSPADM

## 2018-09-14 RX ORDER — PROPOFOL 10 MG/ML
INJECTION, EMULSION INTRAVENOUS CONTINUOUS PRN
Status: DISCONTINUED | OUTPATIENT
Start: 2018-09-14 | End: 2018-09-14 | Stop reason: SURG

## 2018-09-14 RX ADMIN — PROPOFOL 100 MG: 10 INJECTION, EMULSION INTRAVENOUS at 09:44

## 2018-09-14 RX ADMIN — PROPOFOL 120 MCG/KG/MIN: 10 INJECTION, EMULSION INTRAVENOUS at 09:44

## 2018-09-14 RX ADMIN — SODIUM CHLORIDE, SODIUM LACTATE, POTASSIUM CHLORIDE, AND CALCIUM CHLORIDE: .6; .31; .03; .02 INJECTION, SOLUTION INTRAVENOUS at 09:31

## 2018-09-14 NOTE — OP NOTE
Colonoscopy Procedure Note    Procedure: Colonoscopy    Sedation: Monitored anesthesia care, check anesthesia records      ASA Class: 2    INDICATIONS:   Screening colonoscopy, family history of colon cancer    POST-OP DIAGNOSIS: See the impression below    Procedure Details     Prior colonoscopy: No prior colonoscopy  Informed consent was obtained for the procedure, including sedation  Risks of perforation, hemorrhage, adverse drug reaction and aspiration were discussed  The patient was placed in the left lateral decubitus position  Based on the pre-procedure assessment, including review of the patient's medical history, medications, allergies, and review of systems, she had been deemed to be an appropriate candidate for conscious sedation; she was therefore sedated with the medications listed below  The patient was monitored continuously with telemetry, pulse oximetry, blood pressure monitoring, and direct observations  A rectal examination was performed  The variable-stiffness pediatric colonoscope was inserted into the rectum and advanced under direct vision to the terminal ileum  The quality of the colonic preparation was good  A careful inspection was made as the colonoscope was withdrawn, including a retroflexed view of the rectum; findings and interventions are described below  Findings:     ileal nodule, incidentally found  Very soft, likely lipoma  Biopsies taken  Otherwise normal colonoscopy with good prep good visualization  Mild internal hemorrhoids on retroflexion           Complications: None; patient tolerated the procedure well  Impression:     incidental ileal nodule noted, biopsies taken  Mild internal hemorrhoids on retroflexion  Otherwise normal colonoscopy with good prep  Endo cuff was used    Recommendations:  Repeat colonoscopy in 5 years or sooner if clinically indicated      Repeat colonoscopy is being recommended at an interval of less than 10 years, this is because of a personal history of polyps or colon cancer       discharge home  Resume regular diet   Resume home medications  Follow up biopsy results   Call with any abdominal pain, bleeding, fevers

## 2018-09-14 NOTE — TELEPHONE ENCOUNTER
Please call pt looks like her UA was positive for white blood cells and some bacteria  This is probably incidental but given she had flank pain in the office I would like to escribe abx    Please call pt and let her know

## 2018-09-14 NOTE — H&P
History and Physical - [facility]  Sarah Sandoval 77 y o  female MRN: [id number]    HPI: Sarah Sandoval is a 77y o  year old female who presents for screening colonoscopy, family hx of colon cancer  Review of Systems    Historical Information   Past Medical History:   Diagnosis Date    Abnormal blood chemistry     Abnormal glucose     Cancer (HCC)     left breast    Cervical polyp     Fracture of ankle     Hemorrhoid     Herpes zoster     History of radiation therapy     Hyperglycemia     Hyperlipidemia     Hypertension     Insomnia     Leiomyoma of uterus     Osteopenia     Seborrheic keratosis     Shingles     Spider bite wound      Past Surgical History:   Procedure Laterality Date    ANKLE SURGERY Right     BREAST BIOPSY Left     BREAST LUMPECTOMY Left     CYST REMOVAL      right eyelid    DILATION AND CURETTAGE OF UTERUS      FRACTURE SURGERY      ankle plates and screws    INTRAOPERATIVE RADIATION THERAPY (IORT) Left 1/12/2018    Procedure: BREAST IORT BY DR Rashida Lara;  Surgeon: Jacklyn Calderon MD;  [facility];  Service: Surgical Oncology    MI BIOPSY/EXCISION, LYMPH NODE(S) Left 1/12/2018    Procedure: LYMPHOSCINTIGRAPHY; INTRAOPERATIVE LYMPHATIC MAPPING; SENTINEL LYMPH NODE BIOPSY (INJECT AT 1215); Surgeon: Jacklyn Calderon MD;  [facility];  Service: Surgical Oncology    MI PERQ DEVICE PLACEMT BREAST LOC 1ST [facility] [place] Left 1/12/2018    Procedure: BREAST LUMPECTOMY; BREAST NEEDLE LOCALIZATION (NEEDLE LOC AT 0913); FROZEN SECTION;  Surgeon: Jacklyn Calderon MD;  [facility];  Service: Surgical Oncology    SENTINEL LYMPH NODE BIOPSY Left      Social History   History   Alcohol Use    4 2 oz/week    4 Glasses of wine, 3 Cans of beer per week     Comment: social     History   Drug Use No     History   Smoking Status    Former Smoker   Smokeless Tobacco    Never Used     Comment: 2008 quit       Family History   Problem Relation Age of Onset  Colon cancer Paternal Grandfather     Breast cancer Maternal Aunt     Ovarian cancer Maternal Aunt     Prostate cancer Maternal Uncle     Colon cancer Maternal Uncle     Hypertension Mother     Heart disease Mother     Thyroid disease Mother     Leukemia Father     Other Father         dyschromia    Emphysema Maternal Grandfather     Heart disease Paternal Grandmother     Colon cancer Paternal Aunt     Mental illness Neg Hx        Meds/Allergies     Prescriptions Prior to Admission   Medication    anastrozole (ARIMIDEX) 1 mg tablet    Calcium Carb-Cholecalciferol 600-1000 MG-UNIT CAPS    lisinopril (ZESTRIL) 10 mg tablet    simvastatin (ZOCOR) 20 mg tablet       Allergies   Allergen Reactions    Adhesive [Medical Tape] Rash       Objective     Blood pressure 138/64, pulse 80, temperature 97 7 °F (36 5 °C), temperature source Tympanic, resp  rate 20, weight 65 3 kg (144 lb), SpO2 100 %  PHYSICAL EXAM    Gen: NAD  CV: RRR  CHEST: Clear  ABD: soft, NT/ND  EXT: no edema  Neuro: AAO      ASSESSMENT/PLAN:  This is a 77y o  year old female here for screening colonoscopy, family hx of colon cancer      PLAN:   Procedure: colonoscopy

## 2018-09-14 NOTE — TELEPHONE ENCOUNTER
Shaaron Bouton called back and she is aware of Dr Herb Fraga message and to start her antibiotic  She knows to follow up if s/s persist or worsen   No further action is required [facility] LPN

## 2018-09-14 NOTE — DISCHARGE INSTRUCTIONS
discharge home  Resume regular diet   Resume home medications  Follow up biopsy results   Call with any abdominal pain, bleeding, fevers

## 2018-09-14 NOTE — ANESTHESIA PREPROCEDURE EVALUATION
Review of Systems/Medical History  Patient summary reviewed  Chart reviewed      Cardiovascular  EKG reviewed, Hyperlipidemia, Hypertension ,   Comment: EKG: RSR, 1st degree Av block ,  Pulmonary  Smoker ex-smoker  ,        GI/Hepatic    Bowel prep            Endo/Other    Comment: Breast cancer, left sided s/p lumpectomy, chemo and radiation     GYN       Hematology   Musculoskeletal       Neurology   Psychology   Anxiety,              Physical Exam    Airway    Mallampati score: I  TM Distance: >3 FB  Neck ROM: full     Dental       Cardiovascular  Rhythm: regular, Rate: normal,     Pulmonary  Breath sounds clear to auscultation,     Other Findings        Anesthesia Plan  ASA Score- 2     Anesthesia Type- IV sedation with anesthesia with ASA Monitors  Additional Monitors:   Airway Plan:         Plan Factors-    Induction- intravenous  Postoperative Plan-     Informed Consent- Anesthetic plan and risks discussed with patient

## 2018-09-14 NOTE — ANESTHESIA POSTPROCEDURE EVALUATION
Post-Op Assessment Note      CV Status:  Stable    Mental Status:  Alert and awake    Hydration Status:  Euvolemic    PONV Controlled:  Controlled    Airway Patency:  Patent    Post Op Vitals Reviewed: Yes          Staff: Anesthesiologist           /53 (09/14/18 1010)    Temp      Pulse 65 (09/14/18 1010)   Resp 18 (09/14/18 1010)    SpO2 98 % (09/14/18 1010)

## 2018-09-25 ENCOUNTER — TELEPHONE (OUTPATIENT)
Dept: GASTROENTEROLOGY | Facility: CLINIC | Age: 68
End: 2018-09-25

## 2018-09-25 NOTE — TELEPHONE ENCOUNTER
----- Message from Sima Larsen MD sent at 9/24/2018  2:29 PM EDT -----  Please inform the patient biopsies from small intestine of the nodule were benign  This is a normal lymphoid follicle which is not unusual for the small bowel  Please have her call with any questions or concerns otherwise please put in for 10 year colonoscopy recall

## 2018-09-25 NOTE — TELEPHONE ENCOUNTER
Called and left a message for the patient to call the office back for results   Recall for 10 year colonoscopy is set

## 2018-10-22 PROBLEM — Z79.811 USE OF ANASTROZOLE (ARIMIDEX): Status: ACTIVE | Noted: 2018-10-22

## 2018-10-24 ENCOUNTER — OFFICE VISIT (OUTPATIENT)
Dept: SURGICAL ONCOLOGY | Facility: CLINIC | Age: 68
End: 2018-10-24
Payer: COMMERCIAL

## 2018-10-24 VITALS
RESPIRATION RATE: 16 BRPM | TEMPERATURE: 98.2 F | HEIGHT: 64 IN | BODY MASS INDEX: 24.92 KG/M2 | DIASTOLIC BLOOD PRESSURE: 70 MMHG | SYSTOLIC BLOOD PRESSURE: 144 MMHG | WEIGHT: 146 LBS | HEART RATE: 72 BPM

## 2018-10-24 DIAGNOSIS — I10 BENIGN ESSENTIAL HTN: ICD-10-CM

## 2018-10-24 DIAGNOSIS — Z79.811 USE OF ANASTROZOLE (ARIMIDEX): ICD-10-CM

## 2018-10-24 DIAGNOSIS — C50.212 MALIGNANT NEOPLASM OF UPPER-INNER QUADRANT OF LEFT BREAST IN FEMALE, ESTROGEN RECEPTOR POSITIVE (HCC): Primary | ICD-10-CM

## 2018-10-24 DIAGNOSIS — Z17.0 MALIGNANT NEOPLASM OF UPPER-INNER QUADRANT OF LEFT BREAST IN FEMALE, ESTROGEN RECEPTOR POSITIVE (HCC): Primary | ICD-10-CM

## 2018-10-24 PROCEDURE — 99214 OFFICE O/P EST MOD 30 MIN: CPT | Performed by: SURGERY

## 2018-10-24 PROCEDURE — 4040F PNEUMOC VAC/ADMIN/RCVD: CPT | Performed by: SURGERY

## 2018-10-24 RX ORDER — LISINOPRIL 10 MG/1
10 TABLET ORAL DAILY
Qty: 90 TABLET | Refills: 1 | Status: SHIPPED | OUTPATIENT
Start: 2018-10-24 | End: 2019-02-14

## 2018-10-24 RX ORDER — LISINOPRIL 10 MG/1
10 TABLET ORAL DAILY
Qty: 90 TABLET | Refills: 1 | Status: SHIPPED | OUTPATIENT
Start: 2018-10-24 | End: 2018-10-24 | Stop reason: SDUPTHER

## 2018-10-24 NOTE — PROGRESS NOTES
Surgical Oncology Follow Up       [address] [facility],[address] [place] [address]  [facility] [address]  1950  [id number]  [address] [place] [address]  [facility] [place] [id number]    Chief Complaint   Patient presents with    Breast Cancer     Pt is here for a six month follow up          Assessment & Plan:   Patient presents for a follow-up visit  She completed her radiation therapy in July  She finished her chemotherapy just prior to that which she tolerated relatively well  She is now on anastrozole and complains of some joint issues as well as mood changes  Clinical examination shows a relatively significant lumpectomy scar consistent with getting both intraoperative radiation therapy as well as external beam radiation therapy is not clinically suspicious  I have recommended a mammogram in December  We will see her back in 6 months  She is agreeable to seeing Lidia Dickson at her next visit  Cancer History:        Malignant neoplasm of 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, estrogen receptor positive ([facility] [address] )    1/12/2018 Surgery     Left needle localization lumpectomy with sentinel lymph node biopsy  Grade 3  T1c NO Grade 3   Repeat HER 2 indicated and was Negative  ER 90  CA 90  Her 2 negative         1/12/2018 -  Radiation     IORT left breast to a dose of 2000cGy         2/2/2018 Genomic Testing     Mammaprint high risk           2/22/2018 - 4/26/2018 Chemotherapy     Cyclophosphamide and docetaxel (TC) x four doses  Dr Thien Lucio recommends Anastrozole to start in mid-May 2018          5/29/2018 - 7/2/2018 Radiation     Treatments:  Course: C1    Plan ID Energy Fractions Dose per Fraction (cGy) Dose Correction (cGy) Total Dose Delivered (cGy) Elapsed Days   BH L Breast 6X 25 / 25 200 0 5,000 34      Treatment Dates:  5/29/2018 - 7/2/2018                 Interval History:   See above    Review of Systems:   Review of Systems   Constitutional: Negative for activity change, appetite change and fatigue  HENT: Negative  Eyes: Negative  Respiratory: Negative for cough, shortness of breath and wheezing  Cardiovascular: Negative for chest pain and leg swelling  Gastrointestinal: Negative  Endocrine: Negative  Genitourinary: Negative  Musculoskeletal:        No new changes or complaints of bone pain   Skin: Negative  Allergic/Immunologic: Negative  Neurological: Negative  Hematological: Negative  Psychiatric/Behavioral: Negative  Past Medical History     Patient Active Problem List   Diagnosis    Anxiety    Benign essential HTN    Elevated HDL    Hyperlipidemia LDL goal <130    Insomnia    Leiomyoma of uterus    Malignant neoplasm of 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, estrogen receptor positive (HCC)    Osteopenia    Vitamin D deficiency    Abnormal glucose    Abnormal mammogram    Cervical polyp    Hemorrhoids    Menopause    Muscle cramps at night    Overweight (BMI 25 0-29  9)    Prediabetes    Shingles    Left-sided back pain    Family hx of colon cancer    Use of anastrozole (Arimidex)     Past Medical History:   Diagnosis Date    Abnormal blood chemistry     Abnormal glucose     Cancer (HCC)     left breast    Cervical polyp     Fracture of ankle     Hemorrhoid     Herpes zoster     History of radiation therapy     Hyperglycemia     Hyperlipidemia     Hypertension     Insomnia     Leiomyoma of uterus     Osteopenia     Seborrheic keratosis     Shingles     Spider bite wound      Past Surgical History:   Procedure Laterality Date    ANKLE SURGERY Right     BREAST BIOPSY Left     BREAST LUMPECTOMY Left     CYST REMOVAL      right eyelid    DILATION AND CURETTAGE OF UTERUS      FRACTURE SURGERY      ankle plates and screws    INTRAOPERATIVE RADIATION THERAPY (IORT) Left 1/12/2018 Procedure: BREAST IORT BY DR Chapin Robles;  Surgeon: Ted Keene MD;  [facility];  Service: Surgical Oncology    OK BIOPSY/EXCISION, LYMPH NODE(S) Left 1/12/2018    Procedure: LYMPHOSCINTIGRAPHY; INTRAOPERATIVE LYMPHATIC MAPPING; SENTINEL LYMPH NODE BIOPSY (INJECT AT 0632); Surgeon: Ted Keene MD;  [facility];  Service: Surgical Oncology    OK COLONOSCOPY FLX DX W[facility] WHEN PFRMD N/A 9/14/2018    Procedure: COLONOSCOPY;  Surgeon: Slime Tong MD;  Location: [facility] GI LAB; Service: Gastroenterology    OK PERQ DEVICE PLACEMT BREAST LOC 1ST LES W GUIDNCE Left 1/12/2018    Procedure: BREAST LUMPECTOMY; BREAST NEEDLE LOCALIZATION (NEEDLE LOC AT 1130); FROZEN SECTION;  Surgeon: Ted Keene MD;  [facility];  Service: Surgical Oncology    SENTINEL LYMPH NODE BIOPSY Left      Family History   Problem Relation Age of Onset    Colon cancer Paternal Grandfather     Breast cancer Maternal Aunt     Ovarian cancer Maternal Aunt     Prostate cancer Maternal Uncle     Colon cancer Maternal Uncle     Hypertension Mother     Heart disease Mother     Thyroid disease Mother     Leukemia Father     Other Father         dyschromia    Emphysema Maternal Grandfather     Heart disease Paternal Grandmother     Colon cancer Paternal Aunt     Mental illness Neg Hx      Social History     Social History    Marital status: /Civil Union     Spouse name: N/A    Number of children: N/A    Years of education: N/A     Occupational History           Social History Main Topics    Smoking status: Former Smoker    Smokeless tobacco: Never Used      Comment: 2008 quit      Alcohol use 4 2 oz/week     4 Glasses of wine, 3 Cans of beer per week      Comment: social    Drug use: No    Sexual activity: Not on file     Other Topics Concern    Not on file     Social History Narrative    No narrative on file       Current Outpatient Prescriptions:     anastrozole (ARIMIDEX) 1 mg tablet, Take 1 tablet (1 mg total) by mouth daily, Disp: 90 tablet, Rfl: 1    Calcium Carb-Cholecalciferol 600-1000 MG-UNIT CAPS, Take by mouth, Disp: , Rfl:     lisinopril (ZESTRIL) 10 mg tablet, Take 1 tablet (10 mg total) by mouth daily (Patient taking differently: Take 10 mg by mouth every morning  ), Disp: 90 tablet, Rfl: 1    simvastatin (ZOCOR) 20 mg tablet, Take 1 tablet (20 mg total) by mouth daily at bedtime, Disp: 90 tablet, Rfl: 1  Allergies   Allergen Reactions    Adhesive [Medical Tape] Rash       Physical Exam:     Vitals:    10/24/18 0959   BP: 144/70   Pulse: 72   Resp: 16   Temp: 98 2 °F (36 8 °C)     Physical Exam   Constitutional: She is oriented to person, place, and time  She appears well-developed and well-nourished  HENT:   Head: Normocephalic and atraumatic  Mouth/Throat: Oropharynx is clear and moist    Eyes: Pupils are equal, round, and reactive to light  EOM are normal    Neck: Normal range of motion  Neck supple  No JVD present  No tracheal deviation present  No thyromegaly present  Cardiovascular: Normal rate, regular rhythm, normal heart sounds and intact distal pulses  Exam reveals no gallop and no friction rub  No murmur heard  Pulmonary/Chest: Effort normal and breath sounds normal  No respiratory distress  She has no wheezes  She has no rales  Examination of the left breast demonstrates a dominant mass in the upper outer quadrant consistent with her radiation and surgery  There is no axillary adenopathy  There are no other dominant masses  The skin is slightly thick consistent with her surgery and radiation treatments  Examination of the right breast demonstrate no skin changes nipple discharge dominant masses or axillary supraclavicular adenopathy  Abdominal: Soft  She exhibits no distension and no mass  There is no hepatomegaly  There is no tenderness  There is no rebound and no guarding  Musculoskeletal: Normal range of motion   She exhibits no edema or tenderness  Lymphadenopathy:     She has no cervical adenopathy  Neurological: She is alert and oriented to person, place, and time  No cranial nerve deficit  Skin: Skin is warm and dry  No rash noted  No erythema  Psychiatric: She has a normal mood and affect  Her behavior is normal    Vitals reviewed  Results:   No current imaging          Advance Care Planning/Advance Directives:  I discussed the disease status, treatment plans and follow-up with the patient

## 2018-10-24 NOTE — LETTER
October 24, 2018     Freeman KendyDO ashok  [address] [facility] [address]    Patient: Ami Sofia   YOB: 1950   Date of Visit: 10/24/2018       Dear Dr Jorge Burrell:    Thank you for referring Antolin Wall to me for evaluation  Below are my notes for this consultation  If you have questions, please do not hesitate to call me  I look forward to following your patient along with you  Sincerely,        Abner Holland MD        CC: No Recipients  Abner Holland MD  10/24/2018 10:26 AM  Sign at close encounter     Surgical Oncology Follow Up       [address] [place] [address]  1950  [id number]  [address]  [facility] [place] [id number]    Chief Complaint   Patient presents with    Breast Cancer     Pt is here for a six month follow up          Assessment & Plan:   Patient presents for a follow-up visit  She completed her radiation therapy in July  She finished her chemotherapy just prior to that which she tolerated relatively well  She is now on anastrozole and complains of some joint issues as well as mood changes  Clinical examination shows a relatively significant lumpectomy scar consistent with getting both intraoperative radiation therapy as well as external beam radiation therapy is not clinically suspicious  I have recommended a mammogram in December  We will see her back in 6 months  She is agreeable to seeing Jacki Lam at her next visit      Cancer History:        Malignant neoplasm of 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, estrogen receptor positive ([facility] [address] )    1/12/2018 Surgery     Left needle localization lumpectomy with sentinel lymph node biopsy  Grade 3  T1c NO Grade 3   Repeat HER 2 indicated and was Negative  ER 90  IL 90  Her 2 negative         1/12/2018 -  Radiation     IORT left breast to a dose of 2000cGy         2/2/2018 Genomic Testing     Mammaprint high risk           2/22/2018 - 4/26/2018 Chemotherapy     Cyclophosphamide and docetaxel (TC) x four doses  Dr Caden Yoo recommends Anastrozole to start in mid-May 2018          5/29/2018 - 7/2/2018 Radiation     Treatments:  Course: C1    Plan ID Energy Fractions Dose per Fraction (cGy) Dose Correction (cGy) Total Dose Delivered (cGy) Elapsed Days   BH L Breast 6X 25 / 25 200 0 5,000 34      Treatment Dates:  5/29/2018 - 7/2/2018  Interval History:   See above    Review of Systems:   Review of Systems   Constitutional: Negative for activity change, appetite change and fatigue  HENT: Negative  Eyes: Negative  Respiratory: Negative for cough, shortness of breath and wheezing  Cardiovascular: Negative for chest pain and leg swelling  Gastrointestinal: Negative  Endocrine: Negative  Genitourinary: Negative  Musculoskeletal:        No new changes or complaints of bone pain   Skin: Negative  Allergic/Immunologic: Negative  Neurological: Negative  Hematological: Negative  Psychiatric/Behavioral: Negative  Past Medical History     Patient Active Problem List   Diagnosis    Anxiety    Benign essential HTN    Elevated HDL    Hyperlipidemia LDL goal <130    Insomnia    Leiomyoma of uterus    Malignant neoplasm of 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, estrogen receptor positive (HCC)    Osteopenia    Vitamin D deficiency    Abnormal glucose    Abnormal mammogram    Cervical polyp    Hemorrhoids    Menopause    Muscle cramps at night    Overweight (BMI 25 0-29  9)    Prediabetes    Shingles    Left-sided back pain    Family hx of colon cancer    Use of anastrozole (Arimidex)     Past Medical History:   Diagnosis Date    Abnormal blood chemistry     Abnormal glucose     Cancer (HCC)     left breast    Cervical polyp     Fracture of ankle     Hemorrhoid     Herpes zoster     History of radiation therapy     Hyperglycemia     Hyperlipidemia     Hypertension     Insomnia     Leiomyoma of uterus     Osteopenia     Seborrheic keratosis     Shingles     Spider bite wound      Past Surgical History:   Procedure Laterality Date    ANKLE SURGERY Right     BREAST BIOPSY Left     BREAST LUMPECTOMY Left     CYST REMOVAL      right eyelid    DILATION AND CURETTAGE OF UTERUS      FRACTURE SURGERY      ankle plates and screws    INTRAOPERATIVE RADIATION THERAPY (IORT) Left 1/12/2018    Procedure: BREAST IORT BY DR Timur Serra;  Surgeon: Abner Silveira MD;  [facility];  Service: Surgical Oncology    IN BIOPSY/EXCISION, LYMPH NODE(S) Left 1/12/2018    Procedure: LYMPHOSCINTIGRAPHY; INTRAOPERATIVE LYMPHATIC MAPPING; SENTINEL LYMPH NODE BIOPSY (INJECT AT 7989); Surgeon: Abner Silveira MD;  [facility];  Service: Surgical Oncology    [facility] N/A 9/14/2018    Procedure: COLONOSCOPY;  Surgeon: Saray Jernigan MD;  [facility]; Service: Gastroenterology    IN PERQ DEVICE PLACEMT BREAST LOC 1ST LES W GUIDNCE Left 1/12/2018    Procedure: BREAST LUMPECTOMY; BREAST NEEDLE LOCALIZATION (NEEDLE LOC AT 1130);  FROZEN SECTION;  Surgeon: Abner Silveira MD;  [facility];  Service: Surgical Oncology    SENTINEL LYMPH NODE BIOPSY Left      Family History   Problem Relation Age of Onset    Colon cancer Paternal Grandfather     Breast cancer Maternal Aunt     Ovarian cancer Maternal Aunt     Prostate cancer Maternal Uncle     Colon cancer Maternal Uncle     Hypertension Mother     Heart disease Mother     Thyroid disease Mother     Leukemia Father     Other Father         dyschromia    Emphysema Maternal Grandfather     Heart disease Paternal Grandmother     Colon cancer Paternal Aunt     Mental illness Neg Hx      Social History     Social History    Marital status: /Civil Union     Spouse name: N/A    Number of children: N/A    Years of education: N/A     Occupational History           Social History Main Topics    Smoking status: Former Smoker    Smokeless tobacco: Never Used      Comment: 2008 quit   Alcohol use 4 2 oz/week     4 Glasses of wine, 3 Cans of beer per week      Comment: social    Drug use: No    Sexual activity: Not on file     Other Topics Concern    Not on file     Social History Narrative    No narrative on file       Current Outpatient Prescriptions:     anastrozole (ARIMIDEX) 1 mg tablet, Take 1 tablet (1 mg total) by mouth daily, Disp: 90 tablet, Rfl: 1    Calcium Carb-Cholecalciferol 600-1000 MG-UNIT CAPS, Take by mouth, Disp: , Rfl:     lisinopril (ZESTRIL) 10 mg tablet, Take 1 tablet (10 mg total) by mouth daily (Patient taking differently: Take 10 mg by mouth every morning  ), Disp: 90 tablet, Rfl: 1    simvastatin (ZOCOR) 20 mg tablet, Take 1 tablet (20 mg total) by mouth daily at bedtime, Disp: 90 tablet, Rfl: 1  Allergies   Allergen Reactions    Adhesive [Medical Tape] Rash       Physical Exam:     Vitals:    10/24/18 0959   BP: 144/70   Pulse: 72   Resp: 16   Temp: 98 2 °F (36 8 °C)     Physical Exam   Constitutional: She is oriented to person, place, and time  She appears well-developed and well-nourished  HENT:   Head: Normocephalic and atraumatic  Mouth/Throat: Oropharynx is clear and moist    Eyes: Pupils are equal, round, and reactive to light  EOM are normal    Neck: Normal range of motion  Neck supple  No JVD present  No tracheal deviation present  No thyromegaly present  Cardiovascular: Normal rate, regular rhythm, normal heart sounds and intact distal pulses  Exam reveals no gallop and no friction rub  No murmur heard  Pulmonary/Chest: Effort normal and breath sounds normal  No respiratory distress  She has no wheezes  She has no rales     Examination of the left breast demonstrates a dominant mass in the upper outer quadrant consistent with her radiation and surgery  There is no axillary adenopathy  There are no other dominant masses  The skin is slightly thick consistent with her surgery and radiation treatments  Examination of the right breast demonstrate no skin changes nipple discharge dominant masses or axillary supraclavicular adenopathy  Abdominal: Soft  She exhibits no distension and no mass  There is no hepatomegaly  There is no tenderness  There is no rebound and no guarding  Musculoskeletal: Normal range of motion  She exhibits no edema or tenderness  Lymphadenopathy:     She has no cervical adenopathy  Neurological: She is alert and oriented to person, place, and time  No cranial nerve deficit  Skin: Skin is warm and dry  No rash noted  No erythema  Psychiatric: She has a normal mood and affect  Her behavior is normal    Vitals reviewed  Results:   No current imaging          Advance Care Planning/Advance Directives:  I discussed the disease status, treatment plans and follow-up with the patient

## 2018-11-12 DIAGNOSIS — E78.5 HYPERLIPIDEMIA LDL GOAL <130: ICD-10-CM

## 2018-11-12 RX ORDER — SIMVASTATIN 20 MG
20 TABLET ORAL
Qty: 90 TABLET | Refills: 1 | Status: SHIPPED | OUTPATIENT
Start: 2018-11-12 | End: 2019-05-08 | Stop reason: SDUPTHER

## 2018-12-05 ENCOUNTER — HOSPITAL ENCOUNTER (OUTPATIENT)
Dept: RADIOLOGY | Facility: HOSPITAL | Age: 68
Discharge: HOME | End: 2018-12-05
Attending: SURGERY
Payer: COMMERCIAL

## 2018-12-05 ENCOUNTER — HOSPITAL ENCOUNTER (OUTPATIENT)
Dept: RADIOLOGY | Facility: HOSPITAL | Age: 68
Discharge: HOME | End: 2018-12-05
Payer: COMMERCIAL

## 2018-12-05 VITALS — BODY MASS INDEX: 25.27 KG/M2 | HEIGHT: 64 IN | WEIGHT: 148 LBS

## 2018-12-05 DIAGNOSIS — C50.212 MALIGNANT NEOPLASM OF UPPER-INNER QUADRANT OF LEFT BREAST IN FEMALE, ESTROGEN RECEPTOR POSITIVE (HCC): ICD-10-CM

## 2018-12-05 DIAGNOSIS — Z17.0 MALIGNANT NEOPLASM OF UPPER-INNER QUADRANT OF LEFT BREAST IN FEMALE, ESTROGEN RECEPTOR POSITIVE (HCC): ICD-10-CM

## 2018-12-05 DIAGNOSIS — R92.8 ABNORMAL MAMMOGRAM OF RIGHT BREAST: ICD-10-CM

## 2018-12-05 PROCEDURE — G0279 TOMOSYNTHESIS, MAMMO: HCPCS

## 2018-12-05 PROCEDURE — 76642 ULTRASOUND BREAST LIMITED: CPT

## 2018-12-05 PROCEDURE — 77066 DX MAMMO INCL CAD BI: CPT

## 2019-01-21 ENCOUNTER — RADIATION ONCOLOGY FOLLOW-UP (OUTPATIENT)
Dept: RADIATION ONCOLOGY | Facility: HOSPITAL | Age: 69
End: 2019-01-21
Attending: RADIOLOGY
Payer: COMMERCIAL

## 2019-01-21 ENCOUNTER — CLINICAL SUPPORT (OUTPATIENT)
Dept: RADIATION ONCOLOGY | Facility: HOSPITAL | Age: 69
End: 2019-01-21

## 2019-01-21 VITALS
HEIGHT: 64 IN | DIASTOLIC BLOOD PRESSURE: 60 MMHG | RESPIRATION RATE: 16 BRPM | OXYGEN SATURATION: 93 % | HEART RATE: 73 BPM | TEMPERATURE: 98.5 F | SYSTOLIC BLOOD PRESSURE: 120 MMHG | BODY MASS INDEX: 25.51 KG/M2 | WEIGHT: 149.4 LBS

## 2019-01-21 DIAGNOSIS — C50.212 MALIGNANT NEOPLASM OF UPPER-INNER QUADRANT OF LEFT BREAST IN FEMALE, ESTROGEN RECEPTOR POSITIVE (HCC): Primary | ICD-10-CM

## 2019-01-21 DIAGNOSIS — Z17.0 MALIGNANT NEOPLASM OF UPPER-INNER QUADRANT OF LEFT BREAST IN FEMALE, ESTROGEN RECEPTOR POSITIVE (HCC): Primary | ICD-10-CM

## 2019-01-21 PROCEDURE — 99215 OFFICE O/P EST HI 40 MIN: CPT | Performed by: RADIOLOGY

## 2019-01-21 PROCEDURE — G0463 HOSPITAL OUTPT CLINIC VISIT: HCPCS | Performed by: RADIOLOGY

## 2019-01-21 NOTE — PROGRESS NOTES
Shelly Hobson  1950   Ms Fanta Cervantes is a 77 y o  female       Chief Complaint   Patient presents with    Follow-up    Breast Cancer       Cancer Staging  Malignant neoplasm of 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, estrogen receptor positive ([facility] [address] )  Staging form: Breast, AJCC 8th Edition  - Pathologic stage from 1/12/2018: Stage IA (pT1c, pN0(sn), cM0, G3, ER: Positive, WY: Positive, HER2: Negative) - Signed by Aureliano Wilkins MD on 4/25/2018         Malignant neoplasm of 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, estrogen receptor positive ([facility] [address] )    1/12/2018 Surgery     Left needle localization lumpectomy with sentinel lymph node biopsy  Grade 3  T1c NO Grade 3   Repeat HER 2 indicated and was Negative  ER 90  WY 90  Her 2 negative         1/12/2018 -  Radiation     IORT left breast to a dose of 2000cGy         2/2/2018 Genomic Testing     Mammaprint high risk           2/22/2018 - 4/26/2018 Chemotherapy     Cyclophosphamide and docetaxel (TC) x four doses  Dr Caden Yoo         5/29/2018 - 7/2/2018 Radiation     Treatments:  Course: C1    Plan ID Energy Fractions Dose per Fraction (cGy) Dose Correction (cGy) Total Dose Delivered (cGy) Elapsed Days   BH L Breast 6X 25 / 25 200 0 5,000 34      Treatment Dates:  5/29/2018 - 7/2/2018 5/2018 -  Hormone Therapy     anastrozole            Clinical Trial: no    Interval History:  Cash Rangel returns today for a routine follow-up for Stage IA (pT1c, pN0(sn), cM0, G3, ER: Positive, WY: Positive, HER2: Negative) Left breast cancer  She underwent IORT, following lumpectomy, then was determined to high risk, and subsequently received adjuvant chemotherapy, followed by EBRT which ended 7/2/18 8/18/18 Returned for f/u in [address] [place] [address] and was without complaints  She was tolerating Anastrozole well       Oct 2018 She was noticing joint issues, and mood changes, which she relayed to Dr Livan Bay at her f/u     12/5/18 Mammo: LEFT  1) POST-SURGICAL FINDING  Mammo diagnostic bilateral w 3d & cad: There are post-surgical findings from a previous lumpectomy with radiation seen in the upper central region of the left breast in the middle depth  There has been no interval development of a suspicious mass, microcalcification, or architectural distortion       RIGHT  2) ASYMMETRY  Mammo diagnostic bilateral w 3d & cad: There is an asymmetry seen in the central region of the right breast in the middle depth  Several benign punctate calcifications in the breast are stable  US breast right limited (diagnostic):  Imaging between 8 o'clock and 10 o'clock locations of the breast confirm no suspicious solid or cystic mass lesions, areas of architectural distortion, or abnormal acoustic shadowing to suggest malignancy  The finding is consistent summation artifact of fibroglandular tissue  IMPRESSION:  No evidence for malignancy  Therapeutic changes in the left breast     2/13/19  F/U with Dr Jessica Tomlin  4/24/19  F/U with Dr Kristina Noyola      Screening  Tobacco  Current tobacco user: no  If yes, brief counseling provided: NA    Hypertension  Hypertension screening performed: yes  Normotensive:  yes  If no, referred to PCP: n/a    Depression Screening  Screened for depression using PHQ-2: yes    Screened for depression using PHQ-9:  no  Screening positive or negative:  negative  If score >4, was any of the following actions taken?    Additional evaluation for depression, suicide risk assesment, referral to PCP or psychiatry, medication started:  n/a    Advanced Care Planning for Patients >65 years  Advanced Care Planning Discussed:  no  Patient named surrogate decision maker or care plan in chart: yes    [unfilled]  Health Maintenance   Topic Date Due    Hepatitis C Screening  1950    DTaP,Tdap,and Td Vaccines (1 - Tdap) 03/30/1969    Urinary Incontinence Screening  03/30/2013    INFLUENZA VACCINE  07/01/2018    Fall Risk  08/06/2019    Depression Screening PHQ  08/06/2019  MAMMOGRAM  12/05/2019    CRC Screening: Colonoscopy  09/14/2028    Pneumococcal PPSV23/PCV13 65+ Years / High and Highest Risk  Completed       Patient Active Problem List   Diagnosis    Anxiety    Benign essential HTN    Elevated HDL    Hyperlipidemia LDL goal <130    Insomnia    Leiomyoma of uterus    Malignant neoplasm of 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, estrogen receptor positive (HCC)    Osteopenia    Vitamin D deficiency    Abnormal glucose    Abnormal mammogram    Cervical polyp    Hemorrhoids    Menopause    Muscle cramps at night    Overweight (BMI 25 0-29  9)    Prediabetes    Shingles    Left-sided back pain    Family hx of colon cancer    Use of anastrozole (Arimidex)     Past Medical History:   Diagnosis Date    Abnormal blood chemistry     Abnormal glucose     Breast cancer (HCC) 2018    Cancer (HCC)     left breast    Cervical polyp     Fracture of ankle     Hemorrhoid     Herpes zoster     History of chemotherapy     History of radiation therapy     Hyperglycemia     Hyperlipidemia     Hypertension     Insomnia     Leiomyoma of uterus     Osteopenia     Seborrheic keratosis     Shingles     Spider bite wound      Past Surgical History:   Procedure Laterality Date    ANKLE SURGERY Right     BREAST BIOPSY Left     BREAST LUMPECTOMY Left     CYST REMOVAL      right eyelid    DILATION AND CURETTAGE OF UTERUS      FRACTURE SURGERY      ankle plates and screws    INTRAOPERATIVE RADIATION THERAPY (IORT) Left 1/12/2018    Procedure: BREAST IORT BY DR Jw Rogers;  Surgeon: Aureliano Wilkins MD;  [facility];  Service: Surgical Oncology    MAMMO NEEDLE LOCALIZATION LEFT (ALL INC) Left 1/12/2018    VA BIOPSY/EXCISION, LYMPH NODE(S) Left 1/12/2018    Procedure: LYMPHOSCINTIGRAPHY; INTRAOPERATIVE LYMPHATIC MAPPING; SENTINEL LYMPH NODE BIOPSY (INJECT AT 1215);   Surgeon: Aureliano Wilkins MD;  [facility];  Service: Surgical Oncology    VA COLONOSCOPY FLX DX W/COLLJ SPEC WHEN PFRMD N/A 9/14/2018    Procedure: COLONOSCOPY;  Surgeon: Slime Tong MD;  Location: [facility] GI LAB; Service: Gastroenterology    [place] PERQ DEVICE PLACEMT BREAST LOC 1ST LES W GUIDNCE Left 1/12/2018    Procedure: BREAST LUMPECTOMY; BREAST NEEDLE LOCALIZATION (NEEDLE LOC AT 1130); FROZEN SECTION;  Surgeon: Ted Keene MD;  [facility];  Service: Surgical Oncology    SENTINEL LYMPH NODE BIOPSY Left     US GUIDED BREAST BIOPSY LEFT COMPLETE Left 11/30/2017     Family History   Problem Relation Age of Onset    Colon cancer Paternal Grandfather     Breast cancer Maternal Aunt 27    Ovarian cancer Maternal Aunt     Prostate cancer Maternal Uncle     Hypertension Mother     Heart disease Mother     Thyroid disease Mother     Leukemia Father     Other Father         dyschromia    Emphysema Maternal Grandfather     Heart disease Paternal Grandmother     Colon cancer Paternal Aunt     Breast cancer Maternal Aunt 36    Colon cancer Paternal Uncle     Mental illness Neg Hx      Social History     Social History    Marital status: /Civil Union     Spouse name: N/A    Number of children: N/A    Years of education: N/A     Occupational History           Social History Main Topics    Smoking status: Former Smoker    Smokeless tobacco: Never Used      Comment: 2008 quit      Alcohol use 4 2 oz/week     4 Glasses of wine, 3 Cans of beer per week      Comment: social    Drug use: No    Sexual activity: Not on file     Other Topics Concern    Not on file     Social History Narrative    No narrative on file       Current Outpatient Prescriptions:     anastrozole (ARIMIDEX) 1 mg tablet, Take 1 tablet (1 mg total) by mouth daily, Disp: 90 tablet, Rfl: 1    Calcium Carb-Cholecalciferol 600-1000 MG-UNIT CAPS, Take by mouth, Disp: , Rfl:     lisinopril (ZESTRIL) 10 mg tablet, Take 1 tablet (10 mg total) by mouth daily, Disp: 90 tablet, Rfl: 1   simvastatin (ZOCOR) 20 mg tablet, Take 1 tablet (20 mg total) by mouth daily at bedtime, Disp: 90 tablet, Rfl: 1  Allergies   Allergen Reactions    Adhesive [Medical Tape] Rash       Review of Systems:  Review of Systems   Constitutional: Negative  HENT: Negative  Eyes: Negative  Respiratory: Negative  Cardiovascular: Negative  Gastrointestinal: Negative  Endocrine: Negative  Genitourinary: Positive for urgency  Stress incontinence   Musculoskeletal: Positive for arthralgias and myalgias  Skin: Negative  Has been doing breast massage intermittently  Improved ROM with left arm; states "hardness" of left breast has improved  Allergic/Immunologic: Negative  Neurological: Positive for light-headedness (only with positional changes) and numbness (hands)  Hematological: Negative  Psychiatric/Behavioral: Positive for agitation and sleep disturbance  The patient is nervous/anxious (suffers from anxiety)  Vitals:    01/21/19 0844   BP: 120/60   Pulse: 73   Resp: 16   Temp: 98 5 °F (36 9 °C)   SpO2: 93%   Weight: 67 8 kg (149 lb 6 4 oz)   Height: 5' 4" (1 626 m)       Pain Score: 0-No pain    Imaging:No results found      Teaching

## 2019-01-21 NOTE — PROGRESS NOTES
Follow-up - Radiation Oncology   Shamar Edmonds 1950 77 y o  female [id number]      History of Present Illness   Cancer Staging  Malignant neoplasm of 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, estrogen receptor positive ([facility] [address] )  Staging form: Breast, AJCC 8th Edition  - Pathologic stage from 1/12/2018: Stage IA (pT1c, pN0(sn), cM0, G3, ER: Positive, AZ: Positive, HER2: Negative) - Signed by Dulce Berry MD on 4/25/2018      Shamar Edmonds returns today for routine scheduled follow-up visit  Overall she feels well  She does have some muscle aches and pains as well as mood changes from the Anastrozole but otherwise is without complaints  Interval History:  Tylor Caruso returns today for a routine follow-up for Stage IA (pT1c, pN0(sn), cM0, G3, ER: Positive, AZ: Positive, HER2: Negative) Left breast cancer  She underwent IORT, following lumpectomy, then was determined to high risk, and subsequently received adjuvant chemotherapy, followed by EBRT which ended 7/2/18 8/18/18 Returned for f/u in [address] [place] [address] and was without complaints  She was tolerating Anastrozole well  Oct 2018 She was noticing joint issues, and mood changes, which she relayed to Dr Lucy Sotelo at her f/u     12/5/18 Mammo: LEFT  1) POST-SURGICAL FINDING  Mammo diagnostic bilateral w 3d & cad: There are post-surgical findings from a previous lumpectomy with radiation seen in the upper central region of the left breast in the middle depth  There has been no interval development of a suspicious mass, microcalcification, or architectural distortion       RIGHT  2) ASYMMETRY  Mammo diagnostic bilateral w 3d & cad: There is an asymmetry seen in the central region of the right breast in the middle depth  Several benign punctate calcifications in the breast are stable      US breast right limited (diagnostic):  Imaging between 8 o'clock and 10 o'clock locations of the breast confirm no suspicious solid or cystic mass lesions, areas of architectural distortion, or abnormal acoustic shadowing to suggest malignancy  The finding is consistent summation artifact of fibroglandular tissue  IMPRESSION:  No evidence for malignancy  Therapeutic changes in the left breast     2/13/19  F/U with Dr Reilly Rosas  4/24/19  F/U with Dr Zandra Cooks  Historical Information      Malignant neoplasm of 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, estrogen receptor positive ([facility] [address] )    1/12/2018 Surgery     Left needle localization lumpectomy with sentinel lymph node biopsy  Grade 3  T1c NO Grade 3   Repeat HER 2 indicated and was Negative  ER 90  HI 90  Her 2 negative         1/12/2018 -  Radiation     IORT left breast to a dose of 2000cGy         2/2/2018 Genomic Testing     Mammaprint high risk           2/22/2018 - 4/26/2018 Chemotherapy     Cyclophosphamide and docetaxel (TC) x four doses    Dr Reilly Rosas         5/29/2018 - 7/2/2018 Radiation     Treatments:  Course: C1    Plan ID Energy Fractions Dose per Fraction (cGy) Dose Correction (cGy) Total Dose Delivered (cGy) Elapsed Days   BH L Breast 6X 25 / 25 200 0 5,000 34      Treatment Dates:  5/29/2018 - 7/2/2018 5/2018 -  Hormone Therapy     anastrozole            Past Medical History:   Diagnosis Date    Abnormal blood chemistry     Abnormal glucose     Breast cancer (HCC) 2018    Cancer ([facility] [address] )     left breast    Cervical polyp     Fracture of ankle     Hemorrhoid     Herpes zoster     History of chemotherapy     History of radiation therapy     Hyperglycemia     Hyperlipidemia     Hypertension     Insomnia     Leiomyoma of uterus     Osteopenia     Seborrheic keratosis     Shingles     Spider bite wound      Past Surgical History:   Procedure Laterality Date    ANKLE SURGERY Right     BREAST BIOPSY Left     BREAST LUMPECTOMY Left     CYST REMOVAL      right eyelid    DILATION AND CURETTAGE OF UTERUS      FRACTURE SURGERY      ankle plates and screws    INTRAOPERATIVE RADIATION THERAPY (IORT) Left 1/12/2018    Procedure: BREAST IORT BY DR Chris Pulido;  Surgeon: Corinne Loya MD;  [facility];  Service: Surgical Oncology    MAMMO NEEDLE LOCALIZATION LEFT (ALL INC) Left 1/12/2018    [place] BIOPSY/EXCISION, LYMPH NODE(S) Left 1/12/2018    Procedure: LYMPHOSCINTIGRAPHY; INTRAOPERATIVE LYMPHATIC MAPPING; SENTINEL LYMPH NODE BIOPSY (INJECT AT 4828); Surgeon: Corinne Loya MD;  [facility];  Service: Surgical Oncology    [place] COLONOSCOPY FLX DX W/COLLJ [facility] WHEN PFRMD N/A 9/14/2018    Procedure: COLONOSCOPY;  Surgeon: Joyce Lowery MD;  Location: [place] [address] GI LAB; Service: Gastroenterology    [place] PERQ DEVICE PLACEMT BREAST LOC 1ST LES W GUIDNCE Left 1/12/2018    Procedure: BREAST LUMPECTOMY; BREAST NEEDLE LOCALIZATION (NEEDLE LOC AT 1130); FROZEN SECTION;  Surgeon: Corinne Loya MD;  [facility];  Service: Surgical Oncology    SENTINEL LYMPH NODE BIOPSY Left     US GUIDED BREAST BIOPSY LEFT COMPLETE Left 11/30/2017       Social History   History   Alcohol Use    4 2 oz/week    4 Glasses of wine, 3 Cans of beer per week     Comment: social     History   Drug Use No     History   Smoking Status    Former Smoker   Smokeless Tobacco    Never Used     Comment: 2008 quit  Meds/Allergies     Current Outpatient Prescriptions:     anastrozole (ARIMIDEX) 1 mg tablet, Take 1 tablet (1 mg total) by mouth daily, Disp: 90 tablet, Rfl: 1    Calcium Carb-Cholecalciferol 600-1000 MG-UNIT CAPS, Take by mouth, Disp: , Rfl:     lisinopril (ZESTRIL) 10 mg tablet, Take 1 tablet (10 mg total) by mouth daily, Disp: 90 tablet, Rfl: 1    simvastatin (ZOCOR) 20 mg tablet, Take 1 tablet (20 mg total) by mouth daily at bedtime, Disp: 90 tablet, Rfl: 1  Allergies   Allergen Reactions    Adhesive [Medical Tape] Rash         Review of Systems   Review of Systems   Constitutional: Negative  HENT: Negative  Eyes: Negative  Respiratory: Negative  Cardiovascular: Negative  Gastrointestinal: Negative  Endocrine: Negative  Genitourinary: Positive for urgency  Stress incontinence   Musculoskeletal: Positive for arthralgias and myalgias  Skin: Negative  Has been doing breast massage intermittently  Improved ROM with left arm; states "hardness" of left breast has improved  Allergic/Immunologic: Negative  Neurological: Positive for light-headedness (only with positional changes) and numbness (hands)  Hematological: Negative  Psychiatric/Behavioral: Positive for agitation and sleep disturbance  The patient is nervous/anxious (suffers from anxiety)  Screening  Tobacco  Current tobacco user: no  If yes, brief counseling provided: NA    Hypertension  Hypertension screening performed: yes  Normotensive:  yes  If no, referred to PCP: n/a    Depression Screening  Screened for depression using PHQ-2: yes    Screened for depression using PHQ-9:  no  Screening positive or negative:  negative  If score >4, was any of the following actions taken? Additional evaluation for depression, suicide risk assesment, referral to PCP or psychiatry, medication started:  n/a    Advanced Care Planning for Patients >65 years  Advanced Care Planning Discussed:  no  Patient named surrogate decision maker or care plan in chart: yes      OBJECTIVE:   /60   Pulse 73   Temp 98 5 °F (36 9 °C)   Resp 16   Ht 5' 4" (1 626 m)   Wt 67 8 kg (149 lb 6 4 oz)   SpO2 93%   BMI 25 64 kg/m²   Pain Assessment:  0  Karnofsky: 90 - Able to carry on normal activity; minor signs or symptoms of disease     Physical Exam   The patient presents today no apparent distress  Sclera anicteric  No cervical supraclavicular lymphadenopathy  Lungs clear to auscultation bilaterally  Normal S1-S2 regular rate and rhythm  The right breast is within normal limits  The left breast is soft with mild subcutaneous fibrosis and scar tissue in the upper outer quadrant  No axillary lymphadenopathy  No lymphedema  RESULTS    Lab Results: No results found for this or any previous visit (from the past 672 hour(s))  Imaging Studies:No results found  Assessment/Plan:  No orders of the defined types were placed in this encounter  Gio Lee has done well in follow-up and has no clinical evidence of recurrent disease at this time  She will continue follow closely both Medical and Surgical Oncology return to our department in 1 year for routine follow-up  Simon Rojas MD  1/21/2019,9:21 AM    Portions of the record may have been created with voice recognition software   Occasional wrong word or "sound a like" substitutions may have occurred due to the inherent limitations of voice recognition software   Read the chart carefully and recognize, using context, where substitutions have occurred

## 2019-01-29 ENCOUNTER — APPOINTMENT (OUTPATIENT)
Dept: RADIOLOGY | Facility: CLINIC | Age: 69
End: 2019-01-29
Payer: COMMERCIAL

## 2019-01-29 ENCOUNTER — OFFICE VISIT (OUTPATIENT)
Dept: URGENT CARE | Facility: CLINIC | Age: 69
End: 2019-01-29
Payer: COMMERCIAL

## 2019-01-29 VITALS
WEIGHT: 153.6 LBS | TEMPERATURE: 97.9 F | DIASTOLIC BLOOD PRESSURE: 68 MMHG | HEART RATE: 62 BPM | RESPIRATION RATE: 16 BRPM | OXYGEN SATURATION: 97 % | BODY MASS INDEX: 26.22 KG/M2 | SYSTOLIC BLOOD PRESSURE: 155 MMHG | HEIGHT: 64 IN

## 2019-01-29 DIAGNOSIS — M79.641 PAIN OF RIGHT HAND: ICD-10-CM

## 2019-01-29 DIAGNOSIS — M79.641 PAIN OF RIGHT HAND: Primary | ICD-10-CM

## 2019-01-29 PROCEDURE — 3725F SCREEN DEPRESSION PERFORMED: CPT | Performed by: PHYSICIAN ASSISTANT

## 2019-01-29 PROCEDURE — 73130 X-RAY EXAM OF HAND: CPT

## 2019-01-29 PROCEDURE — 99213 OFFICE O/P EST LOW 20 MIN: CPT | Performed by: PHYSICIAN ASSISTANT

## 2019-01-29 NOTE — PROGRESS NOTES
[address]        NAME: Luis Rogers is a 77 y o  female  : 1950    MRN: [id number]  DATE: 2019  TIME: 8:45 AM    Assessment and Plan   Pain of right hand [M79 [id number]]  1  Pain of right hand  CANCELED: XR hand 2 vw right         Patient Instructions     Discussed condition with pt  X-ray does not reveal any sign of acute fracture or dislocation  She appears to have a contusion of the right hand  I rec rest, ice, ACE wrap, NSAIDs, and avoiding heavy lifting, strenuous activity, and repetitive motion activity with the right hand  Follow up with PCP in 3-5 days  Proceed to  ER if symptoms worsen  Chief Complaint     Chief Complaint   Patient presents with    Hand Injury     Pt slipped outside and fell, landing on right hand  C/o hand and wrist pain, visible brusing on palm and 3rd and 4th digits  History of Present Illness       Pt presents after falling and injuring her right hand around 3:00 PM yesterday  Was outside walking to her shed and she slipped on mud, falling onto an outstretched right hand  She reports pain, swelling, and bruising throughout  Denies any wrist pain  Has applied ice and taken Advil  Review of Systems   Review of Systems   Constitutional: Negative  Respiratory: Negative  Cardiovascular: Negative  Gastrointestinal: Negative  Genitourinary: Negative      Musculoskeletal:        Right hand injury [address] mechanism         Current Medications       Current Outpatient Prescriptions:     Calcium Carb-Cholecalciferol 600-1000 MG-UNIT CAPS, Take by mouth, Disp: , Rfl:     lisinopril (ZESTRIL) 10 mg tablet, Take 1 tablet (10 mg total) by mouth daily, Disp: 90 tablet, Rfl: 1    simvastatin (ZOCOR) 20 mg tablet, Take 1 tablet (20 mg total) by mouth daily at bedtime, Disp: 90 tablet, Rfl: 1    anastrozole (ARIMIDEX) 1 mg tablet, TAKE ONE TABLET BY MOUTH EVERY DAY, Disp: 90 tablet, Rfl: 1    anastrozole (ARIMIDEX) 1 mg tablet, TAKE ONE TABLET BY MOUTH EVERY DAY, Disp: 90 tablet, Rfl: 1    Current Allergies     Allergies as of 01/29/2019 - Reviewed 01/29/2019   Allergen Reaction Noted    Adhesive [medical tape] Rash 09/12/2018            The following portions of the patient's history were reviewed and updated as appropriate: allergies, current medications, past family history, past medical history, past social history, past surgical history and problem list      Past Medical History:   Diagnosis Date    Abnormal blood chemistry     Abnormal glucose     Breast cancer ([facility] [address] ) 2018    Cancer ([facility] [address] )     left breast    Cervical polyp     Fracture of ankle     Hemorrhoid     Herpes zoster     History of chemotherapy     History of radiation therapy     Hyperglycemia     Hyperlipidemia     Hypertension     Insomnia     Leiomyoma of uterus     Osteopenia     Seborrheic keratosis     Shingles     Spider bite wound        Past Surgical History:   Procedure Laterality Date    ANKLE SURGERY Right     BREAST BIOPSY Left     BREAST LUMPECTOMY Left     CYST REMOVAL      right eyelid    DILATION AND CURETTAGE OF UTERUS      FRACTURE SURGERY      ankle plates and screws    INTRAOPERATIVE RADIATION THERAPY (IORT) Left 1/12/2018    Procedure: BREAST IORT BY DR Marlo Rodriguez;  Surgeon: Kalee Devi MD;  [facility];  Service: Surgical Oncology    MAMMO NEEDLE LOCALIZATION LEFT (ALL INC) Left 1/12/2018    CT BIOPSY/EXCISION, LYMPH NODE(S) Left 1/12/2018    Procedure: LYMPHOSCINTIGRAPHY; INTRAOPERATIVE LYMPHATIC MAPPING; SENTINEL LYMPH NODE BIOPSY (INJECT AT 1215); Surgeon: Kalee Devi MD;  [facility];  Service: Surgical Oncology    CT COLONOSCOPY FLX DX W/[facility] WHEN PFRMD N/A 9/14/2018    Procedure: COLONOSCOPY;  Surgeon: Mary Stark MD;  Location: [facility];   Service: Gastroenterology    CT PERQ DEVICE PLACEMT BREAST LOC 1ST LES W GUIDNCE Left 1/12/2018    Procedure: BREAST LUMPECTOMY; BREAST NEEDLE LOCALIZATION (NEEDLE LOC AT 1130); FROZEN SECTION;  Surgeon: Ina Reed MD;  [facility];  Service: Surgical Oncology    SENTINEL LYMPH NODE BIOPSY Left     US GUIDED BREAST BIOPSY LEFT COMPLETE Left 11/30/2017       Family History   Problem Relation Age of Onset    Colon cancer Paternal Grandfather     Breast cancer Maternal Aunt 27    Ovarian cancer Maternal Aunt     Prostate cancer Maternal Uncle     Hypertension Mother     Heart disease Mother     Thyroid disease Mother     Leukemia Father     Other Father         dyschromia    Emphysema Maternal Grandfather     Heart disease Paternal Grandmother     Colon cancer Paternal Aunt     Breast cancer Maternal Aunt 44    Colon cancer Paternal Uncle     Mental illness Neg Hx          Medications have been verified  Objective   /68   Pulse 62   Temp 97 9 °F (36 6 °C)   Resp 16   Ht 5' 4" (1 626 m)   Wt 69 7 kg (153 lb 9 6 oz)   SpO2 97%   BMI 26 37 kg/m²        Physical Exam     Physical Exam   Constitutional: She is oriented to person, place, and time  She appears well-developed and well-nourished  No distress  Musculoskeletal:   Right hand with ecchymosis over the palmar surface across the metacarpals as well as STS and ecchymosis into the 3rd and 4th fingers with decreased AROM julian in flexion  Neurological: She is alert and oriented to person, place, and time  Psychiatric: She has a normal mood and affect  Vitals reviewed

## 2019-01-30 DIAGNOSIS — C50.212 MALIGNANT NEOPLASM OF UPPER-INNER QUADRANT OF LEFT BREAST IN FEMALE, ESTROGEN RECEPTOR POSITIVE (HCC): ICD-10-CM

## 2019-01-30 DIAGNOSIS — Z17.0 MALIGNANT NEOPLASM OF UPPER-INNER QUADRANT OF LEFT BREAST IN FEMALE, ESTROGEN RECEPTOR POSITIVE (HCC): ICD-10-CM

## 2019-01-30 RX ORDER — ANASTROZOLE 1 MG/1
TABLET ORAL
Qty: 90 TABLET | Refills: 1 | Status: SHIPPED | OUTPATIENT
Start: 2019-01-30 | End: 2019-08-03 | Stop reason: SDUPTHER

## 2019-02-03 DIAGNOSIS — Z17.0 MALIGNANT NEOPLASM OF UPPER-INNER QUADRANT OF LEFT BREAST IN FEMALE, ESTROGEN RECEPTOR POSITIVE (HCC): ICD-10-CM

## 2019-02-03 DIAGNOSIS — C50.212 MALIGNANT NEOPLASM OF UPPER-INNER QUADRANT OF LEFT BREAST IN FEMALE, ESTROGEN RECEPTOR POSITIVE (HCC): ICD-10-CM

## 2019-02-04 RX ORDER — ANASTROZOLE 1 MG/1
TABLET ORAL
Qty: 90 TABLET | Refills: 1 | Status: SHIPPED | OUTPATIENT
Start: 2019-02-04 | End: 2019-02-13

## 2019-02-06 LAB
ALBUMIN SERPL-MCNC: 4.4 G/DL (ref 3.5–4.8)
ALBUMIN/GLOB SERPL: 2.3 {RATIO} (ref 1.2–2.2)
ALP SERPL-CCNC: 93 IU/L (ref 39–117)
ALT SERPL-CCNC: 18 IU/L (ref 0–32)
AST SERPL-CCNC: 22 IU/L (ref 0–40)
BASOPHILS # BLD AUTO: 0 X10E3/UL (ref 0–0.2)
BASOPHILS NFR BLD AUTO: 0 %
BILIRUB SERPL-MCNC: 0.5 MG/DL (ref 0–1.2)
BUN SERPL-MCNC: 16 MG/DL (ref 8–27)
BUN/CREAT SERPL: 21 (ref 12–28)
CALCIUM SERPL-MCNC: 9.8 MG/DL (ref 8.7–10.3)
CHLORIDE SERPL-SCNC: 102 MMOL/L (ref 96–106)
CHOLEST SERPL-MCNC: 203 MG/DL (ref 100–199)
CO2 SERPL-SCNC: 24 MMOL/L (ref 20–29)
CREAT SERPL-MCNC: 0.77 MG/DL (ref 0.57–1)
EOSINOPHIL # BLD AUTO: 0.1 X10E3/UL (ref 0–0.4)
EOSINOPHIL NFR BLD AUTO: 1 %
ERYTHROCYTE [DISTWIDTH] IN BLOOD BY AUTOMATED COUNT: 13.6 % (ref 12.3–15.4)
GLOBULIN SER-MCNC: 1.9 G/DL (ref 1.5–4.5)
GLUCOSE SERPL-MCNC: 110 MG/DL (ref 65–99)
HCT VFR BLD AUTO: 39.1 % (ref 34–46.6)
HCV AB S/CO SERPL IA: <0.1 S/CO RATIO (ref 0–0.9)
HDLC SERPL-MCNC: 91 MG/DL
HGB BLD-MCNC: 12.8 G/DL (ref 11.1–15.9)
IMM GRANULOCYTES # BLD: 0 X10E3/UL (ref 0–0.1)
IMM GRANULOCYTES NFR BLD: 0 %
LDLC SERPL CALC-MCNC: 98 MG/DL (ref 0–99)
LDLC/HDLC SERPL: 1.1 RATIO (ref 0–3.2)
LYMPHOCYTES # BLD AUTO: 1 X10E3/UL (ref 0.7–3.1)
LYMPHOCYTES NFR BLD AUTO: 28 %
MCH RBC QN AUTO: 31.4 PG (ref 26.6–33)
MCHC RBC AUTO-ENTMCNC: 32.7 G/DL (ref 31.5–35.7)
MCV RBC AUTO: 96 FL (ref 79–97)
MICRODELETION SYND BLD/T FISH: NORMAL
MONOCYTES # BLD AUTO: 0.3 X10E3/UL (ref 0.1–0.9)
MONOCYTES NFR BLD AUTO: 8 %
NEUTROPHILS # BLD AUTO: 2.3 X10E3/UL (ref 1.4–7)
NEUTROPHILS NFR BLD AUTO: 63 %
PLATELET # BLD AUTO: 222 X10E3/UL (ref 150–379)
POTASSIUM SERPL-SCNC: 4.1 MMOL/L (ref 3.5–5.2)
PROT SERPL-MCNC: 6.3 G/DL (ref 6–8.5)
RBC # BLD AUTO: 4.08 X10E6/UL (ref 3.77–5.28)
SL AMB EGFR AFRICAN AMERICAN: 90 ML/MIN/1.73
SL AMB EGFR NON AFRICAN AMERICAN: 78 ML/MIN/1.73
SL AMB VLDL CHOLESTEROL CALC: 14 MG/DL (ref 5–40)
SODIUM SERPL-SCNC: 140 MMOL/L (ref 134–144)
TRIGL SERPL-MCNC: 72 MG/DL (ref 0–149)
TSH SERPL DL<=0.005 MIU/L-ACNC: 1.85 UIU/ML (ref 0.45–4.5)
WBC # BLD AUTO: 3.8 X10E3/UL (ref 3.4–10.8)

## 2019-02-09 DIAGNOSIS — Z17.0 MALIGNANT NEOPLASM OF UPPER-INNER QUADRANT OF LEFT BREAST IN FEMALE, ESTROGEN RECEPTOR POSITIVE (HCC): ICD-10-CM

## 2019-02-09 DIAGNOSIS — C50.212 MALIGNANT NEOPLASM OF UPPER-INNER QUADRANT OF LEFT BREAST IN FEMALE, ESTROGEN RECEPTOR POSITIVE (HCC): ICD-10-CM

## 2019-02-11 RX ORDER — ANASTROZOLE 1 MG/1
TABLET ORAL
Qty: 90 TABLET | Refills: 1 | Status: SHIPPED | OUTPATIENT
Start: 2019-02-11 | End: 2019-02-13

## 2019-02-13 ENCOUNTER — OFFICE VISIT (OUTPATIENT)
Dept: HEMATOLOGY ONCOLOGY | Facility: CLINIC | Age: 69
End: 2019-02-13
Payer: COMMERCIAL

## 2019-02-13 VITALS
TEMPERATURE: 95.7 F | HEIGHT: 64 IN | RESPIRATION RATE: 16 BRPM | BODY MASS INDEX: 25.78 KG/M2 | DIASTOLIC BLOOD PRESSURE: 70 MMHG | WEIGHT: 151 LBS | HEART RATE: 74 BPM | SYSTOLIC BLOOD PRESSURE: 128 MMHG

## 2019-02-13 DIAGNOSIS — C50.212 MALIGNANT NEOPLASM OF UPPER-INNER QUADRANT OF LEFT BREAST IN FEMALE, ESTROGEN RECEPTOR POSITIVE (HCC): Primary | ICD-10-CM

## 2019-02-13 DIAGNOSIS — Z17.0 MALIGNANT NEOPLASM OF UPPER-INNER QUADRANT OF LEFT BREAST IN FEMALE, ESTROGEN RECEPTOR POSITIVE (HCC): Primary | ICD-10-CM

## 2019-02-13 PROCEDURE — 99214 OFFICE O/P EST MOD 30 MIN: CPT | Performed by: INTERNAL MEDICINE

## 2019-02-13 RX ORDER — MAGNESIUM 30 MG
30 TABLET ORAL 2 TIMES DAILY
COMMUNITY
End: 2019-07-23 | Stop reason: ALTCHOICE

## 2019-02-13 NOTE — PROGRESS NOTES
Hematology / Oncology Outpatient Follow Up Note    Lamberto Aguiar 77 y o  female UVP:[id number] [facility]:[id number]         Date:  2/13/2019    Assessment / Plan:  A 70-year-old postmenopausal woman with stage IA left breast cancer, grade 3, ER 90% positive, AZ 90% positive, HER2 negative disease   She underwent lumpectomy with sentinel lymph node biopsy, resulting in TE   Her tumor was found to be high risk, based on a MammaPrint    She underwent adjuvant chemotherapy with Taxotere and cyclophosphamide x4, followed by adjuvant radiation therapy  She is currently on adjuvant hormonal therapy with anastrozole 1 mg once a day with minimal toxicity  Her personality change could be due to the anastrozole  She wished to stay on anastrozole for now  I am going to revisit this issue in 6 months when she come back and see me    Clinically, she has no evidence recurrent disease                        Subjective:      HPI:  A 70-year-old postmenopausal woman who was found to have abnormality in her left breast, based on a screening mammography   Therefore, she underwent ultrasound-guided biopsy in November 30, 2017   She was found to have invasive ductal carcinoma, grade 2   This was ER 90% positive, AZ 90% positive, HER2 negative disease   Subsequently, she was seen by Dr Brittany Webb who did lumpectomy in January 12, 2018   Lumpectomy specimen showed 11 x 10 x 8 mm of invasive ductal carcinoma, grade 3   There was no evidence of lymphovascular invasion   One sentinel lymph node was negative for metastatic disease   Because of the grade escalation, HER2 IHC was repeated which was 2+   However, HER2 fish was negative for gene amplification   Dr Brittany Webb sent her tumor tissue for MammaPrint which came back high risk disease   She presents today to discuss adjuvant treatment options   She has no complaint of pain   Her weight is stable   She denied any respiratory symptoms   She has no significant past medical history   She has no major surgery in the past   Her performance status is normal            Interval History:   A 77year-old postmenopausal woman with stage IA left breast cancer, grade 3, ER 90% positive, NC 90% positive, HER2 negative disease   She underwent lumpectomy with sentinel lymph node biopsy, resulting in TE   Her tumor was found to be high risk, based on a MammaPrint   Therefore, she underwent adjuvant chemotherapy with Taxotere and cyclophosphamide x4 cycle, which was completed in April 2018  She tolerated chemotherapy very well  She had adjuvant radiation therapy with mild to moderate skin toxicity which was completed in July 2018  She is currently on adjuvant hormonal therapy with anastrozole  She presents today for routine follow-up  She is tolerating anastrozole well  She has no hot flashes or musculoskeletal symptoms  However, she has noticed some personality change  She often say what she has in her mind without reservation  She denied any bone pain  Her weight is stable  She has no respiratory symptoms    Her performance status is normal                                               Objective:      Primary Diagnosis:     Left breast cancer, stage IA, (pT1c, pN0, M0) grade 3, ER 90% positive, NC 90% positive, HER2 negative disease   MammaPrint high risk   Diagnosed in January 2018      Cancer Staging:  Malignant neoplasm of 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, estrogen receptor positive ([facility] [address] )    Staging form: Breast, AJCC 8th Edition    - Pathologic stage from 1/12/2018: Stage IA (pT1c, pN0(sn), cM0, G3, ER: Positive, NC: Positive, HER2: Negative) - Unsigned    Staging form: Breast, AJCC 7th Edition    - Clinical: No stage assigned - Unsigned     Malignant neoplasm of upper-inner quadrant of right breast in female, estrogen receptor positive ([facility] [address] )    Staging form: Breast, AJCC 8th Edition    - Clinical stage from 11/30/2017: Stage IA (cT1c, cN0, cM0, G3, ER: Positive, NC: Positive, HER2: Negative) - Jey Lopez- Pathologic stage from 1/12/2018: Stage IA (pT1c, pN0(sn), cM0, G3, ER: Positive, MN: Positive, HER2: Negative) - Unsigned        Previous Hematologic/ Oncologic Treatment:       Adjuvant chemotherapy with Taxotere and cyclophosphamide x4 cycle, completed in April 2018      Current Hematologic/ Oncologic Treatment:       Adjuvant hormonal therapy with anastrozole since May 2018      Disease Status:      TE status post lumpectomy with sentinel lymph node biopsy      Test Results:     Pathology:     11 x 10 x 8 mm of invasive ductal carcinoma, grade 3   No evidence of lymphovascular invasion   One sentinel lymph node was negative for metastatic disease   ER 90% positive, MN 90% positive, her 2 2+ disease  Sarah Juan fish was negative for gene amplification   MammaPrint high risk  Stage IA (pT1c, pN0, M0)     Radiology:     Chest x-ray was negative for pulmonary disease  DEXA scan in July 2018 showed T-score-2 0, consistent with osteopenia  Mammography in December 2018 was benign  BI-RADS 2      Laboratory:     CBC and CMP are within normal limits      Physical Exam:        General Appearance:    Alert, oriented          Eyes:    PERRL   Ears:    Normal external ear canals, both ears   Nose:   Nares normal, septum midline   Throat:   Mucosa moist  Pharynx without injection  Neck:   Supple         Lungs:     Clear to auscultation bilaterally   Chest Wall:    No tenderness or deformity    Heart:    Regular rate and rhythm         Abdomen:     Soft, non-tender, bowel sounds +, no organomegaly               Extremities:   Extremities no cyanosis or edema         Skin:   no rash or icterus  Lymph nodes:   Cervical, supraclavicular, and axillary nodes normal   Neurologic:   CNII-XII intact, normal strength, sensation and reflexes     Throughout             Breast exam: Lumpectomy scar at upper aspect her left breast with no palpable abnormalities   Right breast exam is negative               ROS: Review of Systems   All other systems reviewed and are negative  Imaging: Xr Hand 3+ Vw Right    Result Date: 1/30/2019  Narrative: RIGHT HAND INDICATION:   [id number]: Pain in right hand  Fall 24 hours ago  Pain  COMPARISON:  None VIEWS:  XR HAND 3+ VW RIGHT Images: 3 For the purposes of institution wide universal language the following terms will apply: (thumb=1st digit/finger, index finger=2nd digit/finger, long finger=3rd digit/finger, ring=4th digit/finger and small finger=5th digit/finger) FINDINGS: There is no acute fracture or dislocation  No significant degenerative changes  No lytic or blastic lesions are seen  Soft tissues are unremarkable  Impression: No acute osseous abnormality  If symptoms persist, follow-up examination in 7-10 days is suggested  Workstation performed: [id number]         Labs:   Lab Results   Component Value Date    WBC 3 8 02/05/2019    HGB 12 8 02/05/2019    HCT 39 1 02/05/2019    MCV 96 02/05/2019     02/05/2019     Lab Results   Component Value Date     12/29/2017    K 4 1 02/05/2019     02/05/2019    CO2 24 02/05/2019    BUN 16 02/05/2019    CREATININE 0 53 (L) 06/06/2018    GLUCOSE 108 (H) 12/29/2017    GLUF 99 04/24/2018    CALCIUM 9 6 06/06/2018    AST 22 02/05/2019    ALT 18 02/05/2019    ALKPHOS 80 06/06/2018    PROT 6 2 12/29/2017    BILITOT 0 5 12/29/2017    EGFR 97 06/06/2018         Current Medications: Reviewed  Allergies: Reviewed  PMH/FH/SH:  Reviewed      Vital Sign:    Body surface area is 1 74 meters squared      Wt Readings from Last 3 Encounters:   02/13/19 68 5 kg (151 lb)   01/29/19 69 7 kg (153 lb 9 6 oz)   01/21/19 67 8 kg (149 lb 6 4 oz)        Temp Readings from Last 3 Encounters:   02/13/19 (!) 95 7 °F (35 4 °C) (Tympanic)   01/29/19 97 9 °F (36 6 °C)   01/21/19 98 5 °F (36 9 °C)        BP Readings from Last 3 Encounters:   02/13/19 128/70   01/29/19 155/68   01/21/19 120/60         Pulse Readings from Last 3 Encounters:   02/13/19 74 01/29/19 62   01/21/19 73     @LASTSAO2(3)@

## 2019-02-14 ENCOUNTER — APPOINTMENT (OUTPATIENT)
Dept: RADIOLOGY | Facility: CLINIC | Age: 69
End: 2019-02-14
Payer: COMMERCIAL

## 2019-02-14 ENCOUNTER — OFFICE VISIT (OUTPATIENT)
Dept: FAMILY MEDICINE CLINIC | Facility: CLINIC | Age: 69
End: 2019-02-14
Payer: COMMERCIAL

## 2019-02-14 VITALS
HEIGHT: 64 IN | BODY MASS INDEX: 25.95 KG/M2 | RESPIRATION RATE: 16 BRPM | DIASTOLIC BLOOD PRESSURE: 62 MMHG | WEIGHT: 152 LBS | SYSTOLIC BLOOD PRESSURE: 116 MMHG | TEMPERATURE: 98.2 F | HEART RATE: 84 BPM

## 2019-02-14 DIAGNOSIS — E78.2 MIXED HYPERLIPIDEMIA: ICD-10-CM

## 2019-02-14 DIAGNOSIS — M79.641 HAND PAIN, RIGHT: ICD-10-CM

## 2019-02-14 DIAGNOSIS — I10 BENIGN ESSENTIAL HTN: Primary | ICD-10-CM

## 2019-02-14 DIAGNOSIS — E78.89 ELEVATED HDL: ICD-10-CM

## 2019-02-14 DIAGNOSIS — R73.03 PREDIABETES: ICD-10-CM

## 2019-02-14 DIAGNOSIS — C50.212 MALIGNANT NEOPLASM OF UPPER-INNER QUADRANT OF LEFT BREAST IN FEMALE, ESTROGEN RECEPTOR POSITIVE (HCC): ICD-10-CM

## 2019-02-14 DIAGNOSIS — F41.9 ANXIETY: ICD-10-CM

## 2019-02-14 DIAGNOSIS — Z17.0 MALIGNANT NEOPLASM OF UPPER-INNER QUADRANT OF LEFT BREAST IN FEMALE, ESTROGEN RECEPTOR POSITIVE (HCC): ICD-10-CM

## 2019-02-14 DIAGNOSIS — E66.3 OVERWEIGHT (BMI 25.0-29.9): ICD-10-CM

## 2019-02-14 DIAGNOSIS — Z23 NEED FOR VACCINATION: ICD-10-CM
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
## 2019-02-14 PROCEDURE — 73130 X-RAY EXAM OF HAND: CPT

## 2019-02-14 PROCEDURE — 3078F DIAST BP <80 MM HG: CPT | Performed by: FAMILY MEDICINE

## 2019-02-14 PROCEDURE — 1160F RVW MEDS BY RX/DR IN RCRD: CPT | Performed by: FAMILY MEDICINE

## 2019-02-14 PROCEDURE — 90662 IIV NO PRSV INCREASED AG IM: CPT

## 2019-02-14 PROCEDURE — 1036F TOBACCO NON-USER: CPT | Performed by: FAMILY MEDICINE

## 2019-02-14 PROCEDURE — 3008F BODY MASS INDEX DOCD: CPT | Performed by: FAMILY MEDICINE

## 2019-02-14 PROCEDURE — 90471 IMMUNIZATION ADMIN: CPT

## 2019-02-14 PROCEDURE — 99214 OFFICE O/P EST MOD 30 MIN: CPT | Performed by: FAMILY MEDICINE

## 2019-02-14 PROCEDURE — 3074F SYST BP LT 130 MM HG: CPT | Performed by: FAMILY MEDICINE

## 2019-02-14 RX ORDER — RAMIPRIL 5 MG/1
5 CAPSULE ORAL DAILY
Qty: 90 CAPSULE | Refills: 1 | Status: SHIPPED | OUTPATIENT
Start: 2019-02-14 | End: 2019-08-06 | Stop reason: SDUPTHER

## 2019-02-14 NOTE — PATIENT INSTRUCTIONS

## 2019-02-14 NOTE — PROGRESS NOTES
Assessment/Plan:    Problem List Items Addressed This Visit        Cardiovascular and Mediastinum    Benign essential HTN - Primary    Relevant Medications    ramipril (ALTACE) 5 mg capsule       Other    Anxiety    Relevant Orders    TSH, 3rd generation    Mixed hyperlipidemia    Relevant Orders    Comprehensive metabolic panel    Lipid Panel with Direct LDL reflex    Malignant neoplasm of 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, estrogen receptor positive ([facility] [address] )    Relevant Orders    CBC    Overweight (BMI 25 0-29  9)    Prediabetes     Sugar 110 - suugest limiting carbs         Fall    RESOLVED: Elevated HDL      Other Visit Diagnoses     BMI 26 0-26 9,adult        Hand pain, right        Relevant Medications    diclofenac sodium (VOLTAREN) 1 %    Other Relevant Orders    XR hand 3+ vw right    Need for vaccination        Relevant Orders    influenza vaccine, [id number], high-dose, PF 0 5 mL, for patients 72 yr+ (FLUZONE HIGH-DOSE)      pt was seen by chemo doc for breast, they will address that she gets aggitated by the oral meds after she has been ioon it a year    BMI Counseling: Body mass index is 26 09 kg/m²  Discussed the patient's BMI with her  The BMI is above average  BMI counseling and education was provided to the patient  Nutrition recommendations include reducing portion sizes  Patient Instructions     Weight Management   AMBULATORY CARE:   Why it is important to manage your weight:  Being overweight increases your risk of health conditions such as heart disease, high blood pressure, type 2 diabetes, and certain types of cancer  It can also increase your risk for osteoarthritis, sleep apnea, and other respiratory problems  Aim for a slow, steady weight loss  Even a small amount of weight loss can lower your risk of health problems  How to lose weight safely:  A safe and healthy way to lose weight is to eat fewer calories and get regular exercise   You can lose up about 1 pound a week by decreasing the number of calories you eat by 500 calories each day  You can decrease calories by eating smaller portion sizes or by cutting out high-calorie foods  Read labels to find out how many calories are in the foods you eat  You can also burn calories with exercise such as walking, swimming, or biking  You will be more likely to keep weight off if you make these changes part of your lifestyle  Healthy meal plan for weight management:  A healthy meal plan includes a variety of foods, contains fewer calories, and helps you stay healthy  A healthy meal plan includes the following:  · Eat whole-grain foods more often  A healthy meal plan should contain fiber  Fiber is the part of grains, fruits, and vegetables that is not broken down by your body  Whole-grain foods are healthy and provide extra fiber in your diet  Some examples of whole-grain foods are whole-wheat breads and pastas, oatmeal, brown rice, and bulgur  · Eat a variety of vegetables every day  Include dark, leafy greens such as spinach, kale, kathe greens, and mustard greens  Eat yellow and orange vegetables such as carrots, sweet potatoes, and winter squash  · Eat a variety of fruits every day  Choose fresh or canned fruit (canned in its own juice or light syrup) instead of juice  Fruit juice has very little or no fiber  · Eat low-fat dairy foods  Drink fat-free (skim) milk or 1% milk  Eat fat-free yogurt and low-fat cottage cheese  Try low-fat cheeses such as mozzarella and other reduced-fat cheeses  · Choose meat and other protein foods that are low in fat  Choose beans or other legumes such as split peas or lentils  Choose fish, skinless poultry (chicken or turkey), or lean cuts of red meat (beef or pork)  Before you cook meat or poultry, cut off any visible fat  · Use less fat and oil  Try baking foods instead of frying them  Add less fat, such as margarine, sour cream, regular salad dressing and mayonnaise to foods  Eat fewer high-fat foods  Some examples of high-fat foods include french fries, doughnuts, ice cream, and cakes  · Eat fewer sweets  Limit foods and drinks that are high in sugar  This includes candy, cookies, regular soda, and sweetened drinks  Ways to decrease calories:   · Eat smaller portions  ¨ Use a small plate with smaller servings  ¨ Do not eat second helpings  ¨ When you eat at a restaurant, ask for a box and place half of your meal in the box before you eat  ¨ Share an entrée with someone else  · Replace high-calorie snacks with healthy, low-calorie snacks  ¨ Choose fresh fruit, vegetables, fat-free rice cakes, or air-popped popcorn instead of potato chips, nuts, or chocolate  ¨ Choose water or calorie-free drinks instead of soda or sweetened drinks  · Eat regular meals  Skipping meals can lead to overeating later in the day  Eat a healthy snack in place of a meal if you do not have time to eat a regular meal      · Do not shop for groceries when you are hungry  You may be more likely to make unhealthy food choices  Take a grocery list of healthy foods and shop after you have eaten  Exercise:  Exercise at least 30 minutes per day on most days of the week  Some examples of exercise include walking, biking, dancing, and swimming  You can also fit in more physical activity by taking the stairs instead of the elevator or parking farther away from stores  Ask your healthcare provider about the best exercise plan for you  Other things to consider as you try to lose weight:   · Be aware of situations that may give you the urge to overeat, such as eating while watching television  Find ways to avoid these situations  For example, read a book, go for a walk, or do crafts  · Meet with a weight loss support group or friends who are also trying to lose weight  This may help you stay motivated to continue working on your weight loss goals    © 2017 [employer] Information is for End User's use only and may not be sold, redistributed or otherwise used for commercial purposes  All illustrations and images included in CareNotes® are the copyrighted property of A [employer]  or [employer]  The above information is an  only  It is not intended as medical advice for individual conditions or treatments  Talk to your doctor, nurse or pharmacist before following any medical regimen to see if it is safe and effective for you  Return in about 6 months (around 8/14/2019) for Recheck  Subjective:      Patient ID: Karuna Del Valle is a 77 y o  female  Chief Complaint   Patient presents with    Follow-up     review new labs-lj    Hand Pain     right -fell        Pt is here for a follow up appt of her labs  Pt denies chest pain and sob    Pt states she slipped on mud in her yard broke her fall with her rt hand, went to urgent care was told she did not have fractures medial aspect of her hand hurts quite a bit and is swollen states she is having issues opening a jar  Thisd was Jan 21st      The following portions of the patient's history were reviewed and updated as appropriate: allergies, current medications, past family history, past medical history, past social history, past surgical history and problem list     Review of Systems   Constitutional: Negative  Negative for activity change, appetite change, chills, diaphoresis and fatigue  HENT: Negative  Negative for dental problem, ear pain, sinus pressure and sore throat  Eyes: Negative  Negative for photophobia, pain, discharge, redness, itching and visual disturbance  Respiratory: Negative for apnea and chest tightness  Cardiovascular: Negative  Negative for chest pain, palpitations and leg swelling  Gastrointestinal: Negative  Negative for abdominal distention, abdominal pain, constipation and diarrhea  Endocrine: Negative    Negative for cold intolerance and heat intolerance  Genitourinary: Negative  Negative for difficulty urinating and dyspareunia  Musculoskeletal: Positive for arthralgias  Negative for back pain  Skin: Negative  Allergic/Immunologic: Negative for environmental allergies  Neurological: Negative  Negative for dizziness  Psychiatric/Behavioral: Negative  Negative for agitation  Current Outpatient Medications   Medication Sig Dispense Refill    anastrozole (ARIMIDEX) 1 mg tablet TAKE ONE TABLET BY MOUTH EVERY DAY 90 tablet 1    Calcium Carb-Cholecalciferol 600-1000 MG-UNIT CAPS Take by mouth      magnesium 30 MG tablet Take 30 mg by mouth 2 (two) times a day      simvastatin (ZOCOR) 20 mg tablet Take 1 tablet (20 mg total) by mouth daily at bedtime 90 tablet 1    diclofenac sodium (VOLTAREN) 1 % Apply 2 g topically 4 (four) times a day for 30 days 240 g 0    ramipril (ALTACE) 5 mg capsule Take 1 capsule (5 mg total) by mouth daily 90 capsule 1     No current facility-administered medications for this visit  Objective:    /62   Pulse 84   Temp 98 2 °F (36 8 °C)   Resp 16   Ht 5' 4" (1 626 m)   Wt 68 9 kg (152 lb)   BMI 26 09 kg/m²        Physical Exam   Constitutional: She appears well-developed and well-nourished  No distress  HENT:   Head: Normocephalic and atraumatic  Right Ear: External ear normal    Left Ear: External ear normal    Nose: Nose normal    Mouth/Throat: Oropharynx is clear and moist  No oropharyngeal exudate  Eyes: Pupils are equal, round, and reactive to light  EOM are normal  Right eye exhibits no discharge  Left eye exhibits no discharge  No scleral icterus  Neck: No thyromegaly present  Cardiovascular: Normal rate and normal heart sounds  No murmur heard  Pulmonary/Chest: Effort normal and breath sounds normal  No respiratory distress  She has no wheezes  Abdominal: Soft  Bowel sounds are normal  She exhibits no distension and no mass  There is no tenderness   There is no rebound and no guarding  Musculoskeletal: Normal range of motion  Arms:  Neurological: She is alert  She displays normal reflexes  Coordination normal    Skin: Skin is warm and dry  No rash noted  She is not diaphoretic  No erythema  Psychiatric: She has a normal mood and affect  Her behavior is normal    Nursing note and vitals reviewed             Recent Results (from the past 672 hour(s))   Hepatitis C antibody    Collection Time: 02/05/19  8:08 AM   Result Value Ref Range    HEP C AB <0 1 0 0 - 0 9 s/co ratio   TSH, 3rd generation    Collection Time: 02/05/19  8:08 AM   Result Value Ref Range    TSH 1 850 0 450 - 4 500 uIU/mL   Lipid Panel with Direct LDL reflex    Collection Time: 02/05/19  8:08 AM   Result Value Ref Range    Cholesterol, Total 203 (H) 100 - 199 mg/dL    Triglycerides 72 0 - 149 mg/dL    HDL 91 >39 mg/dL    VLDL Cholesterol Calculated 14 5 - 40 mg/dL    LDL Direct 98 0 - 99 mg/dL    LDl/HDL Ratio 1 1 0 0 - 3 2 ratio   Comprehensive metabolic panel    Collection Time: 02/05/19  8:08 AM   Result Value Ref Range    Glucose, Random 110 (H) 65 - 99 mg/dL    BUN 16 8 - 27 mg/dL    Creatinine 0 77 0 57 - 1 00 mg/dL    eGFR Non African American 78 >59 mL/min/1 73    eGFR  90 >59 mL/min/1 73    SL AMB BUN/CREATININE RATIO 21 12 - 28    Sodium 140 134 - 144 mmol/L    Potassium 4 1 3 5 - 5 2 mmol/L    Chloride 102 96 - 106 mmol/L    CO2 24 20 - 29 mmol/L    CALCIUM 9 8 8 7 - 10 3 mg/dL    Protein, Total 6 3 6 0 - 8 5 g/dL    Albumin 4 4 3 5 - 4 8 g/dL    Globulin, Total 1 9 1 5 - 4 5 g/dL    Albumin/Globulin Ratio 2 3 (H) 1 2 - 2 2    TOTAL BILIRUBIN 0 5 0 0 - 1 2 mg/dL    Alk Phos Isoenzymes 93 39 - 117 IU/L    AST 22 0 - 40 IU/L    ALT 18 0 - 32 IU/L   CBC and differential    Collection Time: 02/05/19  8:08 AM   Result Value Ref Range    White Blood Cell Count 3 8 3 4 - 10 8 x10E3/uL    Red Blood Cell Count 4 08 3 77 - 5 28 x10E6/uL    Hemoglobin 12 8 11 1 - 15 9 g/dL    HCT 39 1 34 0 - 46 6 %    MCV 96 79 - 97 fL    MCH 31 4 26 6 - 33 0 pg    MCHC 32 7 31 5 - 35 7 g/dL    RDW 13 6 12 3 - 15 4 %    Platelet Count 715 587 - 379 x10E3/uL    Neutrophils 63 Not Estab  %    Lymphocytes 28 Not Estab  %    Monocytes 8 Not Estab  %    Eosinophils 1 Not Estab  %    Basophils PCT 0 Not Estab  %    Neutrophils (Absolute) 2 3 1 4 - 7 0 x10E3/uL    Lymphocytes (Absolute) 1 0 0 7 - 3 1 x10E3/uL    Monocytes (Absolute) 0 3 0 1 - 0 9 x10E3/uL    Eosinophils (Absolute) 0 1 0 0 - 0 4 x10E3/uL    Basophils ABS 0 0 0 0 - 0 2 x10E3/uL    Immature Granulocytes 0 Not Estab  %    Immature Granulocytes (Absolute) 0 0 0 0 - 0 1 x10E3/uL   Cardiovascular Report    Collection Time: 02/05/19  8:08 AM   Result Value Ref Range    Interpretation Note          Maria A Rosen DO  BMI Counseling: Body mass index is 26 09 kg/m²  Discussed the patient's BMI with her  The BMI is above average  BMI counseling and education was provided to the patient  Nutrition recommendations include reducing portion sizes

## 2019-05-02 ENCOUNTER — OFFICE VISIT (OUTPATIENT)
Dept: SURGICAL ONCOLOGY | Facility: CLINIC | Age: 69
End: 2019-05-02
Payer: COMMERCIAL

## 2019-05-02 VITALS
BODY MASS INDEX: 25.78 KG/M2 | RESPIRATION RATE: 18 BRPM | HEIGHT: 64 IN | DIASTOLIC BLOOD PRESSURE: 80 MMHG | WEIGHT: 151 LBS | HEART RATE: 68 BPM | SYSTOLIC BLOOD PRESSURE: 120 MMHG | TEMPERATURE: 97.6 F

## 2019-05-02 DIAGNOSIS — Z79.811 USE OF ANASTROZOLE (ARIMIDEX): ICD-10-CM

## 2019-05-02 DIAGNOSIS — C50.212 MALIGNANT NEOPLASM OF UPPER-INNER QUADRANT OF LEFT BREAST IN FEMALE, ESTROGEN RECEPTOR POSITIVE (HCC): Primary | ICD-10-CM

## 2019-05-02 DIAGNOSIS — Z17.0 MALIGNANT NEOPLASM OF UPPER-INNER QUADRANT OF LEFT BREAST IN FEMALE, ESTROGEN RECEPTOR POSITIVE (HCC): Primary | ICD-10-CM

## 2019-05-02 PROCEDURE — 99214 OFFICE O/P EST MOD 30 MIN: CPT | Performed by: NURSE PRACTITIONER

## 2019-05-08 DIAGNOSIS — E78.5 HYPERLIPIDEMIA LDL GOAL <130: ICD-10-CM

## 2019-05-08 DIAGNOSIS — I10 BENIGN ESSENTIAL HTN: ICD-10-CM

## 2019-05-08 RX ORDER — LISINOPRIL 10 MG/1
TABLET ORAL
Qty: 90 TABLET | Refills: 1 | Status: SHIPPED | OUTPATIENT
Start: 2019-05-08 | End: 2019-07-23 | Stop reason: ALTCHOICE

## 2019-05-08 RX ORDER — SIMVASTATIN 20 MG
TABLET ORAL
Qty: 90 TABLET | Refills: 1 | Status: SHIPPED | OUTPATIENT
Start: 2019-05-08 | End: 2019-08-15 | Stop reason: SDUPTHER

## 2019-05-09 DIAGNOSIS — E78.5 HYPERLIPIDEMIA LDL GOAL <130: ICD-10-CM

## 2019-05-09 RX ORDER — SIMVASTATIN 20 MG
TABLET ORAL
Qty: 90 TABLET | Refills: 1 | Status: SHIPPED | OUTPATIENT
Start: 2019-05-09 | End: 2019-07-23 | Stop reason: SDUPTHER

## 2019-07-23 ENCOUNTER — OFFICE VISIT (OUTPATIENT)
Dept: FAMILY MEDICINE CLINIC | Facility: CLINIC | Age: 69
End: 2019-07-23
Payer: MEDICARE

## 2019-07-23 VITALS
RESPIRATION RATE: 16 BRPM | WEIGHT: 154 LBS | HEIGHT: 64 IN | BODY MASS INDEX: 26.29 KG/M2 | SYSTOLIC BLOOD PRESSURE: 136 MMHG | TEMPERATURE: 98.3 F | DIASTOLIC BLOOD PRESSURE: 72 MMHG | HEART RATE: 72 BPM

## 2019-07-23 DIAGNOSIS — R21 RASH: ICD-10-CM
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
## 2019-07-23 PROCEDURE — 99213 OFFICE O/P EST LOW 20 MIN: CPT | Performed by: NURSE PRACTITIONER

## 2019-07-23 RX ORDER — CEPHALEXIN 500 MG/1
500 CAPSULE ORAL EVERY 12 HOURS SCHEDULED
Qty: 20 CAPSULE | Refills: 0 | Status: SHIPPED | OUTPATIENT
Start: 2019-07-23 | End: 2019-08-02

## 2019-07-23 RX ORDER — PREDNISONE 20 MG/1
20 TABLET ORAL DAILY
Qty: 7 TABLET | Refills: 0 | Status: SHIPPED | OUTPATIENT
Start: 2019-07-23 | End: 2019-08-15 | Stop reason: ALTCHOICE

## 2019-07-23 NOTE — PROGRESS NOTES
Assessment/Plan:         Diagnoses and all orders for this visit:    Bug bite, initial encounter  -     cephalexin (KEFLEX) 500 mg capsule; Take 1 capsule (500 mg total) by mouth every 12 (twelve) hours for 10 days  -     predniSONE 20 mg tablet; Take 1 tablet (20 mg total) by mouth daily    Rash  -     cephalexin (KEFLEX) 500 mg capsule; Take 1 capsule (500 mg total) by mouth every 12 (twelve) hours for 10 days  -     predniSONE 20 mg tablet; Take 1 tablet (20 mg total) by mouth daily          BMI Counseling: Body mass index is 26 43 kg/m²  Discussed the patient's BMI with her  The BMI is above average  BMI counseling and education was provided to the patient  Nutrition recommendations include reducing portion sizes, decreasing overall calorie intake, 3-5 servings of fruits/vegetables daily, reducing fast food intake, consuming healthier snacks, decreasing soda and/or juice intake, moderation in carbohydrate intake, increasing intake of lean protein, reducing intake of saturated fat and trans fat and reducing intake of cholesterol  Patient Instructions: Take medication with food  It is important that you take the entire course of antibiotics prescribed  May also take a probiotic of your choice to maintain healthy GI soniya  Can take some probiotic and yogurt with the medication  Take prednisone with food in morning and do not take any NSAID's while taking prednisone  Supportive care discussed and advised  Advised to RTO for any worsening and no improvement  Follow up for no improvement and worsening of conditions  Patient advised and educated when to see immediate medical care  Return if symptoms worsen or fail to improve        Future Appointments   Date Time Provider William Lee   7/23/2019  7:30 PM JENNIFER Shore [facility]   8/14/2019  1:30 PM Tan Copeland MD DON ONC [facility]   8/15/2019  9:15 AM Nolvia Sheridan DO [facility]   11/6/2019  8:30 AM Shahla Man JENNIFER [facility]           Subjective:      Patient ID: Garima Veras is a 68 y o  female  Chief Complaint   Patient presents with    Rash     on right lower forearm and starting on left, pt states it is itchy  pt was gardening  ac/cma        HPI  Patient stated that was working in yard couple of days ago  Stated that had bug bite and removed fly looking bug on right arm and then itchy rash noted which is spreading on other arm  Denies fever, chills and discharge from the area  Vitals:  /72   Pulse 72   Temp 98 3 °F (36 8 °C)   Resp 16   Ht 5' 4" (1 626 m)   Wt 69 9 kg (154 lb)   BMI 26 43 kg/m²     The following portions of the patient's history were reviewed and updated as appropriate: allergies, current medications, past family history, past medical history, past social history, past surgical history and problem list       Review of Systems   Constitutional: Negative  HENT: Negative  Respiratory: Negative  Cardiovascular: Negative  Gastrointestinal: Negative  Genitourinary: Negative  Musculoskeletal: Negative  Skin: Positive for rash  Neurological: Negative  Psychiatric/Behavioral: Negative  Objective:    Social History     Tobacco Use   Smoking Status Former Smoker   Smokeless Tobacco Never Used   Tobacco Comment    2008 quit  Allergies:    Allergies   Allergen Reactions    Adhesive [Medical Tape] Rash         Current Outpatient Medications   Medication Sig Dispense Refill    anastrozole (ARIMIDEX) 1 mg tablet TAKE ONE TABLET BY MOUTH EVERY DAY 90 tablet 1    Calcium Carb-Cholecalciferol 600-1000 MG-UNIT CAPS Take by mouth      ramipril (ALTACE) 5 mg capsule Take 1 capsule (5 mg total) by mouth daily 90 capsule 1    simvastatin (ZOCOR) 20 mg tablet TAKE ONE TABLET BY MOUTH EVERY DAY AT BEDTIME 90 tablet 1    cephalexin (KEFLEX) 500 mg capsule Take 1 capsule (500 mg total) by mouth every 12 (twelve) hours for 10 days 20 capsule 0    predniSONE 20 mg tablet Take 1 tablet (20 mg total) by mouth daily 7 tablet 0     No current facility-administered medications for this visit  Physical Exam   Constitutional: She is oriented to person, place, and time  She appears well-developed and well-nourished  Cardiovascular: Normal rate, regular rhythm and normal heart sounds  Pulmonary/Chest: Effort normal and breath sounds normal    Musculoskeletal: Normal range of motion  She exhibits no edema, tenderness or deformity  Neurological: She is alert and oriented to person, place, and time  Skin: Skin is warm and dry  Rash noted  Psychiatric: She has a normal mood and affect   Her behavior is normal  Judgment and thought content normal                    JENNIFER Reich

## 2019-07-23 NOTE — PATIENT INSTRUCTIONS
Take medication with food  It is important that you take the entire course of antibiotics prescribed  May also take a probiotic of your choice to maintain healthy GI soniya  Can take some probiotic and yogurt with the medication  Take prednisone with food in morning and do not take any NSAID's while taking prednisone  Supportive care discussed and advised  Advised to RTO for any worsening and no improvement  Follow up for no improvement and worsening of conditions  Patient advised and educated when to see immediate medical care

## 2019-08-03 DIAGNOSIS — C50.212 MALIGNANT NEOPLASM OF UPPER-INNER QUADRANT OF LEFT BREAST IN FEMALE, ESTROGEN RECEPTOR POSITIVE (HCC): ICD-10-CM

## 2019-08-03 DIAGNOSIS — Z17.0 MALIGNANT NEOPLASM OF UPPER-INNER QUADRANT OF LEFT BREAST IN FEMALE, ESTROGEN RECEPTOR POSITIVE (HCC): ICD-10-CM

## 2019-08-05 RX ORDER — ANASTROZOLE 1 MG/1
TABLET ORAL
Qty: 90 TABLET | Refills: 1 | Status: SHIPPED | OUTPATIENT
Start: 2019-08-05 | End: 2020-01-31

## 2019-08-06 DIAGNOSIS — I10 BENIGN ESSENTIAL HTN: ICD-10-CM

## 2019-08-06 RX ORDER — RAMIPRIL 5 MG/1
CAPSULE ORAL
Qty: 90 CAPSULE | Refills: 1 | Status: SHIPPED | OUTPATIENT
Start: 2019-08-06 | End: 2019-11-21 | Stop reason: SDUPTHER

## 2019-08-14 ENCOUNTER — OFFICE VISIT (OUTPATIENT)
Dept: HEMATOLOGY ONCOLOGY | Facility: CLINIC | Age: 69
End: 2019-08-14
Payer: MEDICARE

## 2019-08-14 VITALS
HEART RATE: 63 BPM | OXYGEN SATURATION: 97 % | DIASTOLIC BLOOD PRESSURE: 72 MMHG | SYSTOLIC BLOOD PRESSURE: 118 MMHG | WEIGHT: 151 LBS | TEMPERATURE: 97.2 F | RESPIRATION RATE: 18 BRPM | HEIGHT: 64 IN | BODY MASS INDEX: 25.78 KG/M2

## 2019-08-14 DIAGNOSIS — C50.212 MALIGNANT NEOPLASM OF UPPER-INNER QUADRANT OF LEFT BREAST IN FEMALE, ESTROGEN RECEPTOR POSITIVE (HCC): Primary | ICD-10-CM

## 2019-08-14 DIAGNOSIS — Z17.0 MALIGNANT NEOPLASM OF UPPER-INNER QUADRANT OF LEFT BREAST IN FEMALE, ESTROGEN RECEPTOR POSITIVE (HCC): Primary | ICD-10-CM

## 2019-08-14 LAB
ALBUMIN SERPL-MCNC: 4.3 G/DL (ref 3.5–4.8)
ALBUMIN/GLOB SERPL: 2.2 {RATIO} (ref 1.2–2.2)
ALP SERPL-CCNC: 90 IU/L (ref 39–117)
ALT SERPL-CCNC: 26 IU/L (ref 0–32)
AST SERPL-CCNC: 30 IU/L (ref 0–40)
BILIRUB SERPL-MCNC: 0.5 MG/DL (ref 0–1.2)
BUN SERPL-MCNC: 15 MG/DL (ref 8–27)
BUN/CREAT SERPL: 23 (ref 12–28)
CALCIUM SERPL-MCNC: 9.8 MG/DL (ref 8.7–10.3)
CHLORIDE SERPL-SCNC: 104 MMOL/L (ref 96–106)
CHOLEST SERPL-MCNC: 191 MG/DL (ref 100–199)
CO2 SERPL-SCNC: 25 MMOL/L (ref 20–29)
CREAT SERPL-MCNC: 0.66 MG/DL (ref 0.57–1)
ERYTHROCYTE [DISTWIDTH] IN BLOOD BY AUTOMATED COUNT: 13.8 % (ref 12.3–15.4)
GLOBULIN SER-MCNC: 2 G/DL (ref 1.5–4.5)
GLUCOSE SERPL-MCNC: 103 MG/DL (ref 65–99)
HCT VFR BLD AUTO: 37.7 % (ref 34–46.6)
HDLC SERPL-MCNC: 85 MG/DL
HGB BLD-MCNC: 12.5 G/DL (ref 11.1–15.9)
LDLC SERPL CALC-MCNC: 97 MG/DL (ref 0–99)
LDLC/HDLC SERPL: 1.1 RATIO (ref 0–3.2)
MCH RBC QN AUTO: 30.9 PG (ref 26.6–33)
MCHC RBC AUTO-ENTMCNC: 33.2 G/DL (ref 31.5–35.7)
MCV RBC AUTO: 93 FL (ref 79–97)
MICRODELETION SYND BLD/T FISH: NORMAL
PLATELET # BLD AUTO: 211 X10E3/UL (ref 150–450)
POTASSIUM SERPL-SCNC: 4.4 MMOL/L (ref 3.5–5.2)
PROT SERPL-MCNC: 6.3 G/DL (ref 6–8.5)
RBC # BLD AUTO: 4.04 X10E6/UL (ref 3.77–5.28)
SL AMB EGFR AFRICAN AMERICAN: 103 ML/MIN/1.73
SL AMB EGFR NON AFRICAN AMERICAN: 89 ML/MIN/1.73
SL AMB VLDL CHOLESTEROL CALC: 9 MG/DL (ref 5–40)
SODIUM SERPL-SCNC: 143 MMOL/L (ref 134–144)
TRIGL SERPL-MCNC: 47 MG/DL (ref 0–149)
TSH SERPL DL<=0.005 MIU/L-ACNC: 1.68 UIU/ML (ref 0.45–4.5)
WBC # BLD AUTO: 3.3 X10E3/UL (ref 3.4–10.8)

## 2019-08-14 PROCEDURE — 99214 OFFICE O/P EST MOD 30 MIN: CPT | Performed by: INTERNAL MEDICINE

## 2019-08-14 RX ORDER — MAGNESIUM 30 MG
30 TABLET ORAL 2 TIMES DAILY
COMMUNITY
End: 2019-11-21 | Stop reason: ALTCHOICE

## 2019-08-14 NOTE — PROGRESS NOTES
Hematology / Oncology Outpatient Follow Up Note    Cande Oswald 68 y o  female HUN:[id number] WZN:[id number]         Date:  8/14/2019    Assessment / Plan:  A 20-year-old postmenopausal woman with stage IA left breast cancer, grade 3, ER 90% positive, WI 90% positive, HER2 negative disease   She underwent lumpectomy with sentinel lymph node biopsy, resulting in TE   Her tumor was found to be high risk, based on a MammaPrint    She underwent adjuvant chemotherapy with Taxotere and cyclophosphamide x4, followed by adjuvant radiation therapy   She is currently on adjuvant hormonal therapy with anastrozole 1 mg once a day with minimal toxicity  Her previous symptom of personality change has improved  Clinically, she has no evidence recurrent disease  I recommended her to continue with anastrozole 1 milligram once a day  She is in agreement with my recommendation    I will see her again in a year for routine follow-up                     Subjective:      HPI:  A 20-year-old postmenopausal woman who was found to have abnormality in her left breast, based on a screening mammography   Therefore, she underwent ultrasound-guided biopsy in November 30, 2017   She was found to have invasive ductal carcinoma, grade 2   This was ER 90% positive, WI 90% positive, HER2 negative disease   Subsequently, she was seen by Dr Kyara Hernadez who did lumpectomy in January 12, 2018   Lumpectomy specimen showed 11 x 10 x 8 mm of invasive ductal carcinoma, grade 3   There was no evidence of lymphovascular invasion   One sentinel lymph node was negative for metastatic disease   Because of the grade escalation, HER2 IHC was repeated which was 2+   However, HER2 fish was negative for gene amplification   Dr Kyara Hernadez sent her tumor tissue for MammaPrint which came back high risk disease   She presents today to discuss adjuvant treatment options   She has no complaint of pain   Her weight is stable   She denied any respiratory symptoms   She has no significant past medical history   She has no major surgery in the past   Her performance status is normal            Interval History:   A 68year-old postmenopausal woman with stage IA left breast cancer, grade 3, ER 90% positive, DC 90% positive, HER2 negative disease   She underwent lumpectomy with sentinel lymph node biopsy, resulting in TE   Her tumor was found to be high risk, based on a MammaPrint   Therefore, she underwent adjuvant chemotherapy with Taxotere and cyclophosphamide x4 cycle, which was completed in April 2018   She tolerated chemotherapy very well  She had adjuvant radiation therapy with mild to moderate skin toxicity which was completed in July 2018   She is currently on adjuvant hormonal therapy with anastrozole  She presents today for routine follow-up  She previously had complained of personality change on anastrozole  Despite this, she wished to continue with anastrozole  In the last 6 months, her symptom is improved  She has no complaint hot flashes or musculoskeletal symptoms  She denied any pain  Her weight is stable  She has no respiratory symptoms    Her performance status is normal                                               Objective:      Primary Diagnosis:     Left breast cancer, stage IA, (pT1c, pN0, M0) grade 3, ER 90% positive, DC 90% positive, HER2 negative disease   MammaPrint high risk   Diagnosed in January 2018      Cancer Staging:  Malignant neoplasm of 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, estrogen receptor positive ([facility] [address] )    Staging form: Breast, AJCC 8th Edition    - Pathologic stage from 1/12/2018: Stage IA (pT1c, pN0(sn), cM0, G3, ER: Positive, DC: Positive, HER2: Negative) - Unsigned    Staging form: Breast, AJCC 7th Edition    - Clinical: No stage assigned - Unsigned     Malignant neoplasm of upper-inner quadrant of right breast in female, estrogen receptor positive ([facility] [address] )    Staging form: Breast, AJCC 8th Edition    - Clinical stage from 11/30/2017: Stage IA (cT1c, cN0, cM0, G3, ER: Positive, UT: Positive, HER2: Negative) - Unsigned    - Pathologic stage from 1/12/2018: Stage IA (pT1c, pN0(sn), cM0, G3, ER: Positive, UT: Positive, HER2: Negative) - Unsigned        Previous Hematologic/ Oncologic Treatment:       Adjuvant chemotherapy with Taxotere and cyclophosphamide x4 cycle, completed in April 2018      Current Hematologic/ Oncologic Treatment:       Adjuvant hormonal therapy with anastrozole since May 2018      Disease Status:      TE status post lumpectomy with sentinel lymph node biopsy      Test Results:     Pathology:     11 x 10 x 8 mm of invasive ductal carcinoma, grade 3   No evidence of lymphovascular invasion   One sentinel lymph node was negative for metastatic disease   ER 90% positive, UT 90% positive, her 2 2+ disease  [place] fish was negative for gene amplification   MammaPrint high risk  Stage IA (pT1c, pN0, M0)     Radiology:     Chest x-ray was negative for pulmonary disease  DEXA scan in July 2018 showed T-score-2 0, consistent with osteopenia  Mammography in December 2018 was benign  BI-RADS 2      Laboratory:     CBC and CMP are within normal limits      Physical Exam:        General Appearance:    Alert, oriented          Eyes:    PERRL   Ears:    Normal external ear canals, both ears   Nose:   Nares normal, septum midline   Throat:   Mucosa moist  Pharynx without injection  Neck:   Supple         Lungs:     Clear to auscultation bilaterally   Chest Wall:    No tenderness or deformity    Heart:    Regular rate and rhythm         Abdomen:     Soft, non-tender, bowel sounds +, no organomegaly               Extremities:   Extremities no cyanosis or edema         Skin:   no rash or icterus      Lymph nodes:   Cervical, supraclavicular, and axillary nodes normal   Neurologic:   CNII-XII intact, normal strength, sensation and reflexes     Throughout             Breast exam: Lumpectomy scar at upper aspect her left breast with no palpable abnormalities   Right breast exam is negative  ROS: Review of Systems   All other systems reviewed and are negative  Imaging: No results found  Labs:   Lab Results   Component Value Date    WBC 3 3 (L) 08/13/2019    HGB 12 5 08/13/2019    HCT 37 7 08/13/2019    MCV 93 08/13/2019     08/13/2019     Lab Results   Component Value Date     12/29/2017    K 4 4 08/13/2019     08/13/2019    CO2 25 08/13/2019    BUN 15 08/13/2019    CREATININE 0 66 08/13/2019    GLUCOSE 108 (H) 12/29/2017    GLUF 99 04/24/2018    CALCIUM 9 6 06/06/2018    AST 30 08/13/2019    ALT 26 08/13/2019    ALKPHOS 80 06/06/2018    PROT 6 2 12/29/2017    BILITOT 0 5 12/29/2017    EGFR 97 06/06/2018         Current Medications: Reviewed  Allergies: Reviewed  PMH/FH/SH:  Reviewed      Vital Sign:    Body surface area is 1 74 meters squared      Wt Readings from Last 3 Encounters:   08/14/19 68 5 kg (151 lb)   07/23/19 69 9 kg (154 lb)   05/02/19 68 5 kg (151 lb)        Temp Readings from Last 3 Encounters:   08/14/19 (!) 97 2 °F (36 2 °C) (Tympanic Core)   07/23/19 98 3 °F (36 8 °C)   05/02/19 97 6 °F (36 4 °C) (Temporal)        BP Readings from Last 3 Encounters:   08/14/19 118/72   07/23/19 136/72   05/02/19 120/80         Pulse Readings from Last 3 Encounters:   08/14/19 63   07/23/19 72   05/02/19 68     @LASTSAO2(3)@

## 2019-08-15 ENCOUNTER — OFFICE VISIT (OUTPATIENT)
Dept: FAMILY MEDICINE CLINIC | Facility: CLINIC | Age: 69
End: 2019-08-15
Payer: MEDICARE

## 2019-08-15 VITALS
WEIGHT: 151 LBS | HEIGHT: 64 IN | HEART RATE: 76 BPM | SYSTOLIC BLOOD PRESSURE: 130 MMHG | TEMPERATURE: 98 F | DIASTOLIC BLOOD PRESSURE: 70 MMHG | BODY MASS INDEX: 25.78 KG/M2 | RESPIRATION RATE: 16 BRPM

## 2019-08-15 DIAGNOSIS — R73.09 ABNORMAL GLUCOSE: ICD-10-CM

## 2019-08-15 DIAGNOSIS — R01.1 MURMUR: ICD-10-CM

## 2019-08-15 DIAGNOSIS — C50.212 MALIGNANT NEOPLASM OF UPPER-INNER QUADRANT OF LEFT BREAST IN FEMALE, ESTROGEN RECEPTOR POSITIVE (HCC): ICD-10-CM

## 2019-08-15 DIAGNOSIS — Z23 NEED FOR VACCINATION: ICD-10-CM

## 2019-08-15 DIAGNOSIS — E78.5 HYPERLIPIDEMIA LDL GOAL <130: ICD-10-CM

## 2019-08-15 DIAGNOSIS — I10 BENIGN ESSENTIAL HTN: Primary | ICD-10-CM

## 2019-08-15 DIAGNOSIS — Z17.0 MALIGNANT NEOPLASM OF UPPER-INNER QUADRANT OF LEFT BREAST IN FEMALE, ESTROGEN RECEPTOR POSITIVE (HCC): ICD-10-CM

## 2019-08-15 PROCEDURE — 99214 OFFICE O/P EST MOD 30 MIN: CPT | Performed by: FAMILY MEDICINE

## 2019-08-15 RX ORDER — SIMVASTATIN 40 MG
40 TABLET ORAL
Qty: 90 TABLET | Refills: 1 | Status: SHIPPED | OUTPATIENT
Start: 2019-08-15 | End: 2019-11-21 | Stop reason: SDUPTHER

## 2019-08-15 NOTE — PROGRESS NOTES
Assessment/Plan:    Problem List Items Addressed This Visit        Cardiovascular and Mediastinum    Benign essential HTN - Primary    Relevant Orders    CBC    VAS carotid complete study       Other    Malignant neoplasm of 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, estrogen receptor positive ([address] )    Relevant Orders    CBC    Abnormal glucose    Relevant Orders    Hemoglobin A1C    CBC    VAS carotid complete study      Other Visit Diagnoses     Hyperlipidemia LDL goal <130        Relevant Medications    simvastatin (ZOCOR) 40 mg tablet    Other Relevant Orders    Comprehensive metabolic panel    Lipid Panel with Direct LDL reflex    CBC    VAS carotid complete study    Need for vaccination        Relevant Medications    Zoster Vac Recomb Adjuvanted (SHINGRIX) 50 MCG/0 5ML SUSR    Murmur        Relevant Orders    Echo complete with contrast if indicated          BMI Counseling: Body mass index is 25 92 kg/m²  Discussed the patient's BMI with her  The BMI is above average  BMI counseling and education was provided to the patient  Nutrition recommendations include reducing portion sizes  There are no Patient Instructions on file for this visit  Return in about 3 months (around 11/15/2019) for AWV  Subjective:      Patient ID: Nitesh Ramirez is a 68 y o  female      Chief Complaint   Patient presents with    Follow-up     6 month [id number]       Pt is here for a 6 month follow up  Pt did have labs   No chest pain   Pt states sometimes she has SOB, not all the time but maybe when she goes up and down stairs at work    Pt was seen by oncology and was told did not need follow up for a year    PT is asking about getting carotid arteries checked      The following portions of the patient's history were reviewed and updated as appropriate: allergies, current medications, past family history, past medical history, past social history, past surgical history and problem list     Review of Systems Constitutional: Negative  Negative for activity change, appetite change, chills, diaphoresis and fatigue  HENT: Negative  Negative for dental problem, ear pain, sinus pressure and sore throat  Eyes: Negative  Negative for photophobia, pain, discharge, redness, itching and visual disturbance  Respiratory: Negative for apnea and chest tightness  Cardiovascular: Negative  Negative for chest pain, palpitations and leg swelling  Gastrointestinal: Negative  Negative for abdominal distention, abdominal pain, constipation and diarrhea  Endocrine: Negative  Negative for cold intolerance and heat intolerance  Genitourinary: Negative  Negative for difficulty urinating and dyspareunia  Musculoskeletal: Negative  Negative for arthralgias and back pain  Skin: Negative  Allergic/Immunologic: Negative for environmental allergies  Neurological: Negative  Negative for dizziness  Psychiatric/Behavioral: Negative  Negative for agitation  Current Outpatient Medications   Medication Sig Dispense Refill    anastrozole (ARIMIDEX) 1 mg tablet TAKE ONE TABLET BY MOUTH EVERY DAY 90 tablet 1    Calcium Carb-Cholecalciferol 600-1000 MG-UNIT CAPS Take by mouth      magnesium 30 MG tablet Take 30 mg by mouth 2 (two) times a day      ramipril (ALTACE) 5 mg capsule TAKE ONE CAPSULE BY MOUTH EVERY DAY 90 capsule 1    simvastatin (ZOCOR) 40 mg tablet Take 1 tablet (40 mg total) by mouth daily at bedtime 90 tablet 1    Zoster Vac Recomb Adjuvanted (SHINGRIX) 50 MCG/0 5ML SUSR 1 vaccine series 1 each 0     No current facility-administered medications for this visit  Objective:    /70   Pulse 76   Temp 98 °F (36 7 °C)   Resp 16   Ht 5' 4" (1 626 m)   Wt 68 5 kg (151 lb)   BMI 25 92 kg/m²        Physical Exam   Constitutional: She appears well-developed and well-nourished  No distress  HENT:   Head: Normocephalic and atraumatic     Right Ear: External ear normal    Left Ear: External ear normal    Nose: Nose normal    Mouth/Throat: Oropharynx is clear and moist  No oropharyngeal exudate  Eyes: Pupils are equal, round, and reactive to light  EOM are normal  Right eye exhibits no discharge  Left eye exhibits no discharge  No scleral icterus  Neck: No thyromegaly present  Cardiovascular: Normal rate  Murmur heard  Pulmonary/Chest: Effort normal and breath sounds normal  No respiratory distress  She has no wheezes  Abdominal: Soft  Bowel sounds are normal  She exhibits no distension and no mass  There is no tenderness  There is no rebound and no guarding  Musculoskeletal: Normal range of motion  Neurological: She is alert  She displays normal reflexes  Coordination normal    Skin: Skin is warm and dry  No rash noted  She is not diaphoretic  No erythema  Psychiatric: She has a normal mood and affect  Her behavior is normal    Nursing note and vitals reviewed             Recent Results (from the past 672 hour(s))   CBC    Collection Time: 08/13/19  8:43 AM   Result Value Ref Range    White Blood Cell Count 3 3 (L) 3 4 - 10 8 x10E3/uL    Red Blood Cell Count 4 04 3 77 - 5 28 x10E6/uL    Hemoglobin 12 5 11 1 - 15 9 g/dL    HCT 37 7 34 0 - 46 6 %    MCV 93 79 - 97 fL    MCH 30 9 26 6 - 33 0 pg    MCHC 33 2 31 5 - 35 7 g/dL    RDW 13 8 12 3 - 15 4 %    Platelet Count 168 328 - 450 x10E3/uL   Comprehensive metabolic panel    Collection Time: 08/13/19  8:43 AM   Result Value Ref Range    Glucose, Random 103 (H) 65 - 99 mg/dL    BUN 15 8 - 27 mg/dL    Creatinine 0 66 0 57 - 1 00 mg/dL    eGFR Non  89 >59 mL/min/1 73    eGFR  103 >59 mL/min/1 73    SL AMB BUN/CREATININE RATIO 23 12 - 28    Sodium 143 134 - 144 mmol/L    Potassium 4 4 3 5 - 5 2 mmol/L    Chloride 104 96 - 106 mmol/L    CO2 25 20 - 29 mmol/L    CALCIUM 9 8 8 7 - 10 3 mg/dL    Protein, Total 6 3 6 0 - 8 5 g/dL    Albumin 4 3 3 5 - 4 8 g/dL    Globulin, Total 2 0 1 5 - 4 5 g/dL Albumin/Globulin Ratio 2 2 1 2 - 2 2    TOTAL BILIRUBIN 0 5 0 0 - 1 2 mg/dL    Alk Phos Isoenzymes 90 39 - 117 IU/L    AST 30 0 - 40 IU/L    ALT 26 0 - 32 IU/L   Lipid Panel with Direct LDL reflex    Collection Time: 08/13/19  8:43 AM   Result Value Ref Range    Cholesterol, Total 191 100 - 199 mg/dL    Triglycerides 47 0 - 149 mg/dL    HDL 85 >39 mg/dL    VLDL Cholesterol Calculated 9 5 - 40 mg/dL    LDL Direct 97 0 - 99 mg/dL    LDl/HDL Ratio 1 1 0 0 - 3 2 ratio   TSH, 3rd generation    Collection Time: 08/13/19  8:43 AM   Result Value Ref Range    TSH 1 680 0 450 - 4 500 uIU/mL   Cardiovascular Report    Collection Time: 08/13/19  8:43 AM   Result Value Ref Range    Interpretation Note          Ce Wong DO

## 2019-08-16 ENCOUNTER — HOSPITAL ENCOUNTER (OUTPATIENT)
Dept: NON INVASIVE DIAGNOSTICS | Facility: CLINIC | Age: 69
Discharge: HOME | End: 2019-08-16
Payer: MEDICARE

## 2019-08-16 DIAGNOSIS — R01.1 MURMUR: ICD-10-CM

## 2019-08-16 PROCEDURE — 93306 TTE W/DOPPLER COMPLETE: CPT

## 2019-08-16 PROCEDURE — 93306 TTE W/DOPPLER COMPLETE: CPT | Performed by: INTERNAL MEDICINE

## 2019-11-06 ENCOUNTER — OFFICE VISIT (OUTPATIENT)
Dept: SURGICAL ONCOLOGY | Facility: CLINIC | Age: 69
End: 2019-11-06
Payer: MEDICARE

## 2019-11-06 VITALS
RESPIRATION RATE: 16 BRPM | SYSTOLIC BLOOD PRESSURE: 160 MMHG | DIASTOLIC BLOOD PRESSURE: 80 MMHG | TEMPERATURE: 99 F | HEART RATE: 66 BPM | WEIGHT: 151 LBS | HEIGHT: 64 IN | BODY MASS INDEX: 25.78 KG/M2

## 2019-11-06 DIAGNOSIS — C50.212 MALIGNANT NEOPLASM OF UPPER-INNER QUADRANT OF LEFT BREAST IN FEMALE, ESTROGEN RECEPTOR POSITIVE (HCC): Primary | ICD-10-CM

## 2019-11-06 DIAGNOSIS — Z17.0 MALIGNANT NEOPLASM OF UPPER-INNER QUADRANT OF LEFT BREAST IN FEMALE, ESTROGEN RECEPTOR POSITIVE (HCC): Primary | ICD-10-CM

## 2019-11-06 DIAGNOSIS — Z79.811 USE OF ANASTROZOLE (ARIMIDEX): ICD-10-CM

## 2019-11-06 PROCEDURE — 99214 OFFICE O/P EST MOD 30 MIN: CPT | Performed by: NURSE PRACTITIONER

## 2019-11-06 RX ORDER — LISINOPRIL 10 MG/1
10 TABLET ORAL DAILY
COMMUNITY
End: 2019-11-21 | Stop reason: ALTCHOICE

## 2019-11-06 NOTE — PROGRESS NOTES
Surgical Oncology Follow Up       [address] [place] [address],[address]  [facility] SURGICAL ONCOLOGY [place]  [address] [facility] [address]    Shamika Kendall  1950  [id number]  [address] [place] [address],[address] [place] [address]  [facility] SURGICAL ONCOLOGY [place] [address]  [id number]    Chief Complaint   Patient presents with    Breast Cancer     Pt is here for a six month follow up       Assessment/Plan:  1  Malignant neoplasm of 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, estrogen receptor positive ([facility] [address] )  - Mammo diagnostic bilateral w 3d & cad; Future  - 6 mo f/u visit    2  Use of anastrozole (Arimidex)  - Continue use per medical oncology    Discussion/Summary: Patient is a 75-year-old female who presents today for a six-month follow-up visit for left breast cancer diagnosed in January 2018  Her pathology revealed invasive ductal carcinoma, ER/MI 90%, her 2-  She underwent a left lumpectomy and sentinel node biopsy by Dr Gilberto Nails  She also completed intraoperative radiation therapy  Her surgical pathology was grade 3 and therefore her tumor was sent for Mammaprint which came back as high risk  She therefore completed adjuvant whole breast radiation therapy as well as adjuvant chemotherapy  She is currently taking anastrozole  She had a bilateral mammogram and right breast ultrasound performed on December 5, 2018 which were BI-RADS 2, category 3 density  She has no new complaints today and there are no concerns on today's exam   I will order a bilateral 3D diagnostic mammogram for next month and we will plan to see the patient back in 6 months or sooner if the need arises  She was instructed to call with any new concerns or symptoms prior to that time  All of her questions were answered today      History of Present Illness:        Malignant neoplasm of 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, estrogen receptor positive ([facility] [address] )    1/12/2018 Surgery     Left needle localization lumpectomy with sentinel lymph node biopsy  Grade 3  T1c NO Grade 3   Repeat HER 2 indicated and was Negative  ER 90  NE 90  Her 2 negative      1/12/2018 -  Radiation     IORT left breast to a dose of 2000cGy      2/2/2018 Genomic Testing     Mammaprint high risk        2/22/2018 - 4/26/2018 Chemotherapy     Cyclophosphamide and docetaxel (TC) x four doses  Dr Mabel Franklin      5/29/2018 - 7/2/2018 Radiation     Treatments:  Course: C1    Plan ID Energy Fractions Dose per Fraction (cGy) Dose Correction (cGy) Total Dose Delivered (cGy) Elapsed Days   BH L Breast 6X 25 / 25 200 0 5,000 34      Treatment Dates:  5/29/2018 - 7/2/2018 5/2018 -  Hormone Therapy     anastrozole          -Interval History:  Patient presents today for a six-month follow-up visit for left breast cancer diagnosed in 2018  She notices no changes on self-exam   She denies persistent headaches, back pain, bone pain, cough, shortness of breath, abdominal pain  She reports a cough which she attributes to postnasal drip when she 1st awakens in the morning and generalized arthralgias but no persistent symptoms  She also reports thinning of her hair since taking the anastrozole  Review of Systems:  Review of Systems   Constitutional: Negative for activity change, appetite change, chills, fatigue, fever and unexpected weight change  HENT: Positive for postnasal drip  Negative for trouble swallowing  Eyes: Negative for pain, redness and visual disturbance  Respiratory: Positive for cough (morning cough which she attributes to post nasal drip)  Negative for shortness of breath and wheezing  Cardiovascular: Negative for chest pain, palpitations and leg swelling  Gastrointestinal: Negative for abdominal pain, constipation, diarrhea, nausea and vomiting  Endocrine: Negative for cold intolerance and heat intolerance  Musculoskeletal: Positive for arthralgias  Negative for back pain, gait problem and myalgias     Skin: Negative for color change and rash  Allergic/Immunologic: Positive for environmental allergies  Neurological: Negative for dizziness, syncope, light-headedness, numbness and headaches  Hematological: Negative for adenopathy  Psychiatric/Behavioral: Negative for agitation and confusion  All other systems reviewed and are negative  Patient Active Problem List   Diagnosis    Anxiety    Benign essential HTN    Mixed hyperlipidemia    Insomnia    Leiomyoma of uterus    Malignant neoplasm of 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, estrogen receptor positive (HCC)    Osteopenia    Vitamin D deficiency    Abnormal glucose    Abnormal mammogram    Cervical polyp    Hemorrhoids    Menopause    Muscle cramps at night    Overweight (BMI 25 0-29  9)    Prediabetes    Shingles    Left-sided back pain    Family hx of colon cancer    Use of anastrozole (Arimidex)    Fall     Past Medical History:   Diagnosis Date    Abnormal blood chemistry     Abnormal glucose     Breast cancer (HCC) 2018    Cancer (HCC)     left breast    Cervical polyp     Fracture of ankle     Hemorrhoid     Herpes zoster     History of chemotherapy     History of radiation therapy     Hyperglycemia     Hyperlipidemia     Hypertension     Insomnia     Leiomyoma of uterus     Osteopenia     Seborrheic keratosis     Shingles     Spider bite wound      Past Surgical History:   Procedure Laterality Date    ANKLE SURGERY Right     BREAST BIOPSY Left     BREAST LUMPECTOMY Left     CYST REMOVAL      right eyelid    DILATION AND CURETTAGE OF UTERUS      FRACTURE SURGERY      ankle plates and screws    INTRAOPERATIVE RADIATION THERAPY (IORT) Left 1/12/2018    Procedure: BREAST IORT BY DR Geo Hitchcock;  Surgeon: Malgorzata Escalona MD;  [facility];  Service: Surgical Oncology    MAMMO NEEDLE LOCALIZATION LEFT (ALL INC) Left 1/12/2018    WY BIOPSY/EXCISION, LYMPH NODE(S) Left 1/12/2018    Procedure: LYMPHOSCINTIGRAPHY; INTRAOPERATIVE LYMPHATIC MAPPING; SENTINEL LYMPH NODE BIOPSY (INJECT AT 1215); Surgeon: Ankita Campos MD;  [facility];  Service: Surgical Oncology    [place] COLONOSCOPY FLX DX W/COLLJ [facility] WHEN PFRMD N/A 9/14/2018    Procedure: COLONOSCOPY;  Surgeon: Nereida Cogan, MD;  Location: [facility] GI LAB; Service: Gastroenterology    [place] PERQ DEVICE PLACEMT BREAST LOC 1ST LES W GUIDNCE Left 1/12/2018    Procedure: BREAST LUMPECTOMY; BREAST NEEDLE LOCALIZATION (NEEDLE LOC AT 1130); FROZEN SECTION;  Surgeon: Ankita Campos MD;  [facility];  Service: Surgical Oncology    SENTINEL LYMPH NODE BIOPSY Left     US GUIDED BREAST BIOPSY LEFT COMPLETE Left 11/30/2017     Family History   Problem Relation Age of Onset    Colon cancer Paternal Grandfather     Breast cancer Maternal Aunt 27    Ovarian cancer Maternal Aunt     Prostate cancer Maternal Uncle     Hypertension Mother     Heart disease Mother     Thyroid disease Mother     Leukemia Father     Other Father         dyschromia    Emphysema Maternal Grandfather     Heart disease Paternal Grandmother     Colon cancer Paternal Aunt     Breast cancer Maternal Aunt 36    Colon cancer Paternal Uncle     Mental illness Neg Hx      Social History     Socioeconomic History    Marital status: /Civil Union     Spouse name: Not on file    Number of children: Not on file    Years of education: Not on file    Highest education level: Not on file   Occupational History    Occupation:     Social Needs    Financial resource strain: Not on file    Food insecurity:     Worry: Not on file     Inability: Not on file   [employer] needs:     Medical: Not on file     Non-medical: Not on file   Tobacco Use    Smoking status: Former Smoker    Smokeless tobacco: Never Used    Tobacco comment: 2008 quit  Substance and Sexual Activity    Alcohol use:  Yes     Alcohol/week: 7 0 standard drinks     Types: 4 Glasses of wine, 3 Cans of beer per week Comment: social    Drug use: No    Sexual activity: Not on file   Lifestyle    Physical activity:     Days per week: Not on file     Minutes per session: Not on file    Stress: Not on file   Relationships    Social connections:     Talks on phone: Not on file     Gets together: Not on file     Attends Orthodoxy service: Not on file     Active member of club or organization: Not on file     Attends meetings of clubs or organizations: Not on file     Relationship status: Not on file    Intimate partner violence:     Fear of current or ex partner: Not on file     Emotionally abused: Not on file     Physically abused: Not on file     Forced sexual activity: Not on file   Other Topics Concern    Not on file   Social History Narrative    Not on file       Current Outpatient Medications:     anastrozole (ARIMIDEX) 1 mg tablet, TAKE ONE TABLET BY MOUTH EVERY DAY, Disp: 90 tablet, Rfl: 1    Calcium Carb-Cholecalciferol 600-1000 MG-UNIT CAPS, Take by mouth, Disp: , Rfl:     lisinopril (ZESTRIL) 10 mg tablet, Take 10 mg by mouth daily, Disp: , Rfl:     ramipril (ALTACE) 5 mg capsule, TAKE ONE CAPSULE BY MOUTH EVERY DAY, Disp: 90 capsule, Rfl: 1    simvastatin (ZOCOR) 40 mg tablet, Take 1 tablet (40 mg total) by mouth daily at bedtime, Disp: 90 tablet, Rfl: 1    Zoster Vac Recomb Adjuvanted (SHINGRIX) 50 MCG/0 5ML SUSR, 1 vaccine series, Disp: 1 each, Rfl: 0    magnesium 30 MG tablet, Take 30 mg by mouth 2 (two) times a day, Disp: , Rfl:   Allergies   Allergen Reactions    Adhesive [Medical Tape] Rash     Vitals:    11/06/19 0836   BP: 160/80   Pulse: 66   Resp: 16   Temp: 99 °F (37 2 °C)       Physical Exam   Constitutional: She is oriented to person, place, and time  Vital signs are normal  She appears well-developed and well-nourished  No distress  HENT:   Head: Normocephalic and atraumatic  Neck: Normal range of motion  Cardiovascular: Normal rate, regular rhythm and normal heart sounds  Pulmonary/Chest: Effort normal and breath sounds normal    Bilateral breasts were examined in the sitting and supine position  Left breast and left axillary surgical scars  There is significant scar tissue noted at the lumpectomy site which is stable s/p IORT/WBRT  There are no dominant masses, skin nodules, nipple changes or nipple discharge  There is no bilateral supraclavicular or axillary lymphadenopathy noted  Abdominal: Soft  Normal appearance  She exhibits no mass  There is no hepatosplenomegaly  There is no tenderness  Musculoskeletal: Normal range of motion  Lymphadenopathy:     She has no axillary adenopathy  Right: No supraclavicular adenopathy present  Left: No supraclavicular adenopathy present  Neurological: She is alert and oriented to person, place, and time  Skin: Skin is warm, dry and intact  No rash noted  She is not diaphoretic  Psychiatric: She has a normal mood and affect  Her speech is normal    Vitals reviewed  Advance Care Planning/Advance Directives:  Discussed disease status, cancer treatment plans and/or cancer treatment goals with the patient

## 2019-11-19 LAB
ALBUMIN SERPL-MCNC: 4.5 G/DL (ref 3.5–4.8)
ALBUMIN/GLOB SERPL: 3 {RATIO} (ref 1.2–2.2)
ALP SERPL-CCNC: 95 IU/L (ref 39–117)
ALT SERPL-CCNC: 14 IU/L (ref 0–32)
AST SERPL-CCNC: 19 IU/L (ref 0–40)
BASOPHILS # BLD AUTO: 0 X10E3/UL (ref 0–0.2)
BASOPHILS NFR BLD AUTO: 0 %
BILIRUB SERPL-MCNC: 0.3 MG/DL (ref 0–1.2)
BUN SERPL-MCNC: 17 MG/DL (ref 8–27)
BUN/CREAT SERPL: 26 (ref 12–28)
CALCIUM SERPL-MCNC: 9.5 MG/DL (ref 8.7–10.3)
CHLORIDE SERPL-SCNC: 104 MMOL/L (ref 96–106)
CHOLEST SERPL-MCNC: 182 MG/DL (ref 100–199)
CO2 SERPL-SCNC: 23 MMOL/L (ref 20–29)
CREAT SERPL-MCNC: 0.66 MG/DL (ref 0.57–1)
EOSINOPHIL # BLD AUTO: 0.1 X10E3/UL (ref 0–0.4)
EOSINOPHIL NFR BLD AUTO: 1 %
ERYTHROCYTE [DISTWIDTH] IN BLOOD BY AUTOMATED COUNT: 13.6 % (ref 12.3–15.4)
GLOBULIN SER-MCNC: 1.5 G/DL (ref 1.5–4.5)
GLUCOSE SERPL-MCNC: 120 MG/DL (ref 65–99)
HBA1C MFR BLD: 5.8 % (ref 4.8–5.6)
HCT VFR BLD AUTO: 39.9 % (ref 34–46.6)
HDLC SERPL-MCNC: 71 MG/DL
HGB BLD-MCNC: 13.1 G/DL (ref 11.1–15.9)
IMM GRANULOCYTES # BLD: 0 X10E3/UL (ref 0–0.1)
IMM GRANULOCYTES NFR BLD: 0 %
LDLC SERPL CALC-MCNC: 97 MG/DL (ref 0–99)
LYMPHOCYTES # BLD AUTO: 1.1 X10E3/UL (ref 0.7–3.1)
LYMPHOCYTES NFR BLD AUTO: 30 %
MCH RBC QN AUTO: 31.2 PG (ref 26.6–33)
MCHC RBC AUTO-ENTMCNC: 32.8 G/DL (ref 31.5–35.7)
MCV RBC AUTO: 95 FL (ref 79–97)
MICRODELETION SYND BLD/T FISH: NORMAL
MONOCYTES # BLD AUTO: 0.4 X10E3/UL (ref 0.1–0.9)
MONOCYTES NFR BLD AUTO: 9 %
NEUTROPHILS # BLD AUTO: 2.2 X10E3/UL (ref 1.4–7)
NEUTROPHILS NFR BLD AUTO: 60 %
PLATELET # BLD AUTO: 210 X10E3/UL (ref 150–450)
POTASSIUM SERPL-SCNC: 4.1 MMOL/L (ref 3.5–5.2)
PROT SERPL-MCNC: 6 G/DL (ref 6–8.5)
RBC # BLD AUTO: 4.2 X10E6/UL (ref 3.77–5.28)
SL AMB EGFR AFRICAN AMERICAN: 103 ML/MIN/1.73
SL AMB EGFR NON AFRICAN AMERICAN: 89 ML/MIN/1.73
SODIUM SERPL-SCNC: 142 MMOL/L (ref 134–144)
TRIGL SERPL-MCNC: 71 MG/DL (ref 0–149)
WBC # BLD AUTO: 3.7 X10E3/UL (ref 3.4–10.8)

## 2019-11-21 ENCOUNTER — OFFICE VISIT (OUTPATIENT)
Dept: FAMILY MEDICINE CLINIC | Facility: CLINIC | Age: 69
End: 2019-11-21
Payer: MEDICARE

## 2019-11-21 VITALS
HEIGHT: 64 IN | SYSTOLIC BLOOD PRESSURE: 128 MMHG | RESPIRATION RATE: 16 BRPM | TEMPERATURE: 97.4 F | DIASTOLIC BLOOD PRESSURE: 80 MMHG | HEART RATE: 76 BPM | BODY MASS INDEX: 25.44 KG/M2 | WEIGHT: 149 LBS

## 2019-11-21 DIAGNOSIS — E78.5 HYPERLIPIDEMIA LDL GOAL <130: ICD-10-CM

## 2019-11-21 DIAGNOSIS — D72.819 LEUKOPENIA, UNSPECIFIED TYPE: ICD-10-CM

## 2019-11-21 DIAGNOSIS — R30.0 DYSURIA: ICD-10-CM

## 2019-11-21 DIAGNOSIS — M85.80 OSTEOPENIA, UNSPECIFIED LOCATION: ICD-10-CM

## 2019-11-21 DIAGNOSIS — Z23 NEED FOR VACCINATION: ICD-10-CM

## 2019-11-21 DIAGNOSIS — R73.01 IFG (IMPAIRED FASTING GLUCOSE): ICD-10-CM

## 2019-11-21 DIAGNOSIS — E78.2 MIXED HYPERLIPIDEMIA: ICD-10-CM

## 2019-11-21 DIAGNOSIS — I10 BENIGN ESSENTIAL HTN: Primary | ICD-10-CM

## 2019-11-21 PROCEDURE — 90662 IIV NO PRSV INCREASED AG IM: CPT

## 2019-11-21 PROCEDURE — G0008 ADMIN INFLUENZA VIRUS VAC: HCPCS

## 2019-11-21 PROCEDURE — 99214 OFFICE O/P EST MOD 30 MIN: CPT | Performed by: FAMILY MEDICINE

## 2019-11-21 RX ORDER — RAMIPRIL 5 MG/1
5 CAPSULE ORAL DAILY
Qty: 90 CAPSULE | Refills: 1 | Status: SHIPPED | OUTPATIENT
Start: 2019-11-21 | End: 2020-05-18

## 2019-11-21 RX ORDER — SIMVASTATIN 40 MG
40 TABLET ORAL
Qty: 90 TABLET | Refills: 1 | Status: SHIPPED | OUTPATIENT
Start: 2019-11-21 | End: 2020-11-09

## 2019-11-21 NOTE — PROGRESS NOTES
Assessment/Plan:    No problem-specific Assessment & Plan notes found for this encounter  Will check vit D, she will take 1000u/d in meantime  Cbc ordered but not done, slightly low wbc, slip reprinted  htn stable  Chol stable  Dysuria, check ua today  ifg advice given     Diagnoses and all orders for this visit:    Benign essential HTN  -     Comprehensive metabolic panel; Future  -     ramipril (ALTACE) 5 mg capsule; Take 1 capsule (5 mg total) by mouth daily    Need for vaccination  -     Cancel: influenza vaccine, 9741-1737, high-dose, PF 0 5 mL, for patients 65 yr+ (FLUZONE HIGH-DOSE)  -     influenza vaccine, 1662-9251, high-dose, PF 0 5 mL (FLUZONE HIGH-DOSE)    Mixed hyperlipidemia    Osteopenia, unspecified location  -     Vitamin D 25 hydroxy; Future    Hyperlipidemia LDL goal <130  -     simvastatin (ZOCOR) 40 mg tablet; Take 1 tablet (40 mg total) by mouth daily at bedtime    IFG (impaired fasting glucose)  -     Hemoglobin A1C; Future    Leukopenia, unspecified type  -     CBC and differential; Future    Dysuria  -     UA/M w/rflx Culture, Routine; Future        1w dysuria, no hematuria, no fever    Return in about 6 months (around 5/21/2020) for with Dr Rickey Reyes, [address]  Subjective:      Patient ID: Dima Colorado is a 68 y o  female  Chief Complaint   Patient presents with    Follow-up     ac/cma     Results    Blood Pressure Check       HPI  F/u visit  Takes Ca and D, not sure of dose  takes anastrazole  Labs reviewed  Wondering about cbc, slightly low, not performed? No cough or myalgias    The following portions of the patient's history were reviewed and updated as appropriate: allergies, current medications, past family history, past medical history, past social history, past surgical history and problem list     Review of Systems   Respiratory: Negative for shortness of breath  Cardiovascular: Negative for leg swelling           Current Outpatient Medications   Medication Sig Dispense Refill    anastrozole (ARIMIDEX) 1 mg tablet TAKE ONE TABLET BY MOUTH EVERY DAY 90 tablet 1    Calcium Carb-Cholecalciferol 600-1000 MG-UNIT CAPS Take by mouth      ramipril (ALTACE) 5 mg capsule Take 1 capsule (5 mg total) by mouth daily 90 capsule 1    simvastatin (ZOCOR) 40 mg tablet Take 1 tablet (40 mg total) by mouth daily at bedtime 90 tablet 1    Zoster Vac Recomb Adjuvanted (SHINGRIX) 50 MCG/0 5ML SUSR 1 vaccine series 1 each 0     No current facility-administered medications for this visit  Objective:    /80   Pulse 76   Temp (!) 97 4 °F (36 3 °C)   Resp 16   Ht 5' 4" (1 626 m)   Wt 67 6 kg (149 lb)   BMI 25 58 kg/m²        Physical Exam   Constitutional: She appears well-developed  No distress  HENT:   Head: Normocephalic  Eyes: Conjunctivae are normal  No scleral icterus  Neck: Neck supple  Cardiovascular: Normal rate, regular rhythm and intact distal pulses  Pulmonary/Chest: Effort normal and breath sounds normal  No respiratory distress  Abdominal: Soft  Bowel sounds are normal    Musculoskeletal: She exhibits no edema or deformity  Neurological: She is alert  Skin: Skin is warm and dry  No pallor  Psychiatric: Her behavior is normal  Thought content normal    Nursing note and vitals reviewed               Tatiana Goodpasture, DO

## 2019-11-22 LAB
APPEARANCE UR: CLEAR
BACTERIA URNS QL MICRO: NORMAL
BILIRUB UR QL STRIP: NEGATIVE
COLOR UR: YELLOW
EPI CELLS #/AREA URNS HPF: NORMAL /HPF (ref 0–10)
GLUCOSE UR QL: NEGATIVE
HGB UR QL STRIP: NEGATIVE
KETONES UR QL STRIP: NEGATIVE
LEUKOCYTE ESTERASE UR QL STRIP: NEGATIVE
MICRO URNS: NORMAL
MICRO URNS: NORMAL
NITRITE UR QL STRIP: NEGATIVE
PH UR STRIP: 7.5 [PH] (ref 5–7.5)
PROT UR QL STRIP: NEGATIVE
RBC #/AREA URNS HPF: NORMAL /HPF (ref 0–2)
SL AMB URINALYSIS REFLEX: NORMAL
SP GR UR: 1 (ref 1–1.03)
UROBILINOGEN UR STRIP-ACNC: 0.2 MG/DL (ref 0.2–1)
WBC #/AREA URNS HPF: NORMAL /HPF (ref 0–5)

## 2019-12-06 ENCOUNTER — HOSPITAL ENCOUNTER (OUTPATIENT)
Dept: RADIOLOGY | Facility: HOSPITAL | Age: 69
Discharge: HOME | End: 2019-12-06
Payer: MEDICARE

## 2019-12-06 VITALS — WEIGHT: 149 LBS | BODY MASS INDEX: 25.44 KG/M2 | HEIGHT: 64 IN

## 2019-12-06 DIAGNOSIS — C50.212 MALIGNANT NEOPLASM OF UPPER-INNER QUADRANT OF LEFT BREAST IN FEMALE, ESTROGEN RECEPTOR POSITIVE (HCC): ICD-10-CM

## 2019-12-06 DIAGNOSIS — Z17.0 MALIGNANT NEOPLASM OF UPPER-INNER QUADRANT OF LEFT BREAST IN FEMALE, ESTROGEN RECEPTOR POSITIVE (HCC): ICD-10-CM

## 2019-12-06 PROCEDURE — 77066 DX MAMMO INCL CAD BI: CPT

## 2019-12-06 PROCEDURE — G0279 TOMOSYNTHESIS, MAMMO: HCPCS

## 2020-01-31 DIAGNOSIS — C50.212 MALIGNANT NEOPLASM OF UPPER-INNER QUADRANT OF LEFT BREAST IN FEMALE, ESTROGEN RECEPTOR POSITIVE (HCC): ICD-10-CM

## 2020-01-31 DIAGNOSIS — Z17.0 MALIGNANT NEOPLASM OF UPPER-INNER QUADRANT OF LEFT BREAST IN FEMALE, ESTROGEN RECEPTOR POSITIVE (HCC): ICD-10-CM

## 2020-01-31 RX ORDER — ANASTROZOLE 1 MG/1
TABLET ORAL
Qty: 90 TABLET | Refills: 1 | Status: SHIPPED | OUTPATIENT
Start: 2020-01-31 | End: 2020-07-27

## 2020-05-04 ENCOUNTER — TELEPHONE (OUTPATIENT)
Dept: SURGICAL ONCOLOGY | Facility: CLINIC | Age: 70
End: 2020-05-04

## 2020-05-06 ENCOUNTER — OFFICE VISIT (OUTPATIENT)
Dept: SURGICAL ONCOLOGY | Facility: CLINIC | Age: 70
End: 2020-05-06
Payer: MEDICARE

## 2020-05-06 VITALS
TEMPERATURE: 96.9 F | RESPIRATION RATE: 16 BRPM | WEIGHT: 154 LBS | SYSTOLIC BLOOD PRESSURE: 160 MMHG | HEART RATE: 70 BPM | BODY MASS INDEX: 26.29 KG/M2 | HEIGHT: 64 IN | DIASTOLIC BLOOD PRESSURE: 80 MMHG

## 2020-05-06 DIAGNOSIS — Z79.811 USE OF ANASTROZOLE (ARIMIDEX): ICD-10-CM

## 2020-05-06 DIAGNOSIS — Z17.0 MALIGNANT NEOPLASM OF UPPER-INNER QUADRANT OF LEFT BREAST IN FEMALE, ESTROGEN RECEPTOR POSITIVE (HCC): Primary | ICD-10-CM

## 2020-05-06 DIAGNOSIS — C50.212 MALIGNANT NEOPLASM OF UPPER-INNER QUADRANT OF LEFT BREAST IN FEMALE, ESTROGEN RECEPTOR POSITIVE (HCC): Primary | ICD-10-CM

## 2020-05-06 PROCEDURE — 99214 OFFICE O/P EST MOD 30 MIN: CPT | Performed by: NURSE PRACTITIONER

## 2020-05-06 PROCEDURE — 1036F TOBACCO NON-USER: CPT | Performed by: NURSE PRACTITIONER

## 2020-05-06 PROCEDURE — 3079F DIAST BP 80-89 MM HG: CPT | Performed by: NURSE PRACTITIONER

## 2020-05-06 PROCEDURE — 3077F SYST BP >= 140 MM HG: CPT | Performed by: NURSE PRACTITIONER

## 2020-05-06 PROCEDURE — 1160F RVW MEDS BY RX/DR IN RCRD: CPT | Performed by: NURSE PRACTITIONER

## 2020-05-06 PROCEDURE — 4040F PNEUMOC VAC/ADMIN/RCVD: CPT | Performed by: NURSE PRACTITIONER

## 2020-05-06 PROCEDURE — 3008F BODY MASS INDEX DOCD: CPT | Performed by: NURSE PRACTITIONER

## 2020-05-06 RX ORDER — NICOTINE POLACRILEX 2 MG
GUM BUCCAL
COMMUNITY
End: 2020-10-21 | Stop reason: ALTCHOICE

## 2020-05-17 DIAGNOSIS — I10 BENIGN ESSENTIAL HTN: ICD-10-CM

## 2020-05-18 RX ORDER — RAMIPRIL 5 MG/1
CAPSULE ORAL
Qty: 90 CAPSULE | Refills: 1 | Status: SHIPPED | OUTPATIENT
Start: 2020-05-18 | End: 2020-05-21

## 2020-05-20 LAB
25(OH)D3+25(OH)D2 SERPL-MCNC: 39.2 NG/ML (ref 30–100)
ALBUMIN SERPL-MCNC: 4.2 G/DL (ref 3.7–4.7)
ALBUMIN/GLOB SERPL: 2.6 {RATIO} (ref 1.2–2.2)
ALP SERPL-CCNC: 97 IU/L (ref 39–117)
ALT SERPL-CCNC: 21 IU/L (ref 0–32)
AST SERPL-CCNC: 25 IU/L (ref 0–40)
BASOPHILS # BLD AUTO: 0 X10E3/UL (ref 0–0.2)
BASOPHILS NFR BLD AUTO: 1 %
BILIRUB SERPL-MCNC: 0.4 MG/DL (ref 0–1.2)
BUN SERPL-MCNC: 13 MG/DL (ref 8–27)
BUN/CREAT SERPL: 22 (ref 12–28)
CALCIUM SERPL-MCNC: 9.6 MG/DL (ref 8.7–10.3)
CHLORIDE SERPL-SCNC: 102 MMOL/L (ref 96–106)
CO2 SERPL-SCNC: 25 MMOL/L (ref 20–29)
CREAT SERPL-MCNC: 0.6 MG/DL (ref 0.57–1)
EOSINOPHIL # BLD AUTO: 0.1 X10E3/UL (ref 0–0.4)
EOSINOPHIL NFR BLD AUTO: 2 %
ERYTHROCYTE [DISTWIDTH] IN BLOOD BY AUTOMATED COUNT: 12.7 % (ref 11.7–15.4)
GLOBULIN SER-MCNC: 1.6 G/DL (ref 1.5–4.5)
GLUCOSE SERPL-MCNC: 103 MG/DL (ref 65–99)
HBA1C MFR BLD: 5.9 % (ref 4.8–5.6)
HCT VFR BLD AUTO: 39.1 % (ref 34–46.6)
HGB BLD-MCNC: 12.8 G/DL (ref 11.1–15.9)
IMM GRANULOCYTES # BLD: 0 X10E3/UL (ref 0–0.1)
IMM GRANULOCYTES NFR BLD: 0 %
LYMPHOCYTES # BLD AUTO: 1.2 X10E3/UL (ref 0.7–3.1)
LYMPHOCYTES NFR BLD AUTO: 34 %
MCH RBC QN AUTO: 31.2 PG (ref 26.6–33)
MCHC RBC AUTO-ENTMCNC: 32.7 G/DL (ref 31.5–35.7)
MCV RBC AUTO: 95 FL (ref 79–97)
MONOCYTES # BLD AUTO: 0.4 X10E3/UL (ref 0.1–0.9)
MONOCYTES NFR BLD AUTO: 10 %
NEUTROPHILS # BLD AUTO: 1.9 X10E3/UL (ref 1.4–7)
NEUTROPHILS NFR BLD AUTO: 53 %
PLATELET # BLD AUTO: 225 X10E3/UL (ref 150–450)
POTASSIUM SERPL-SCNC: 4.7 MMOL/L (ref 3.5–5.2)
PROT SERPL-MCNC: 5.8 G/DL (ref 6–8.5)
RBC # BLD AUTO: 4.1 X10E6/UL (ref 3.77–5.28)
SL AMB EGFR AFRICAN AMERICAN: 105 ML/MIN/1.73
SL AMB EGFR NON AFRICAN AMERICAN: 91 ML/MIN/1.73
SODIUM SERPL-SCNC: 139 MMOL/L (ref 134–144)
WBC # BLD AUTO: 3.5 X10E3/UL (ref 3.4–10.8)

## 2020-05-21 ENCOUNTER — TELEPHONE (OUTPATIENT)
Dept: FAMILY MEDICINE CLINIC | Facility: CLINIC | Age: 70
End: 2020-05-21

## 2020-05-21 ENCOUNTER — OFFICE VISIT (OUTPATIENT)
Dept: FAMILY MEDICINE CLINIC | Facility: CLINIC | Age: 70
End: 2020-05-21
Payer: MEDICARE

## 2020-05-21 VITALS
DIASTOLIC BLOOD PRESSURE: 76 MMHG | HEART RATE: 84 BPM | BODY MASS INDEX: 26.12 KG/M2 | HEIGHT: 64 IN | TEMPERATURE: 97.7 F | WEIGHT: 153 LBS | SYSTOLIC BLOOD PRESSURE: 144 MMHG | RESPIRATION RATE: 16 BRPM

## 2020-05-21 DIAGNOSIS — I10 BENIGN ESSENTIAL HTN: Primary | ICD-10-CM

## 2020-05-21 DIAGNOSIS — E55.9 VITAMIN D DEFICIENCY: ICD-10-CM

## 2020-05-21 DIAGNOSIS — F41.9 ANXIETY: ICD-10-CM

## 2020-05-21 DIAGNOSIS — F51.04 PSYCHOPHYSIOLOGICAL INSOMNIA: ICD-10-CM

## 2020-05-21 DIAGNOSIS — Z00.00 MEDICARE ANNUAL WELLNESS VISIT, SUBSEQUENT: ICD-10-CM

## 2020-05-21 DIAGNOSIS — Z17.0 MALIGNANT NEOPLASM OF UPPER-INNER QUADRANT OF LEFT BREAST IN FEMALE, ESTROGEN RECEPTOR POSITIVE (HCC): ICD-10-CM

## 2020-05-21 DIAGNOSIS — M85.80 OSTEOPENIA, UNSPECIFIED LOCATION: ICD-10-CM

## 2020-05-21 DIAGNOSIS — E78.2 MIXED HYPERLIPIDEMIA: ICD-10-CM

## 2020-05-21 DIAGNOSIS — R73.09 ABNORMAL GLUCOSE: ICD-10-CM

## 2020-05-21 DIAGNOSIS — C50.212 MALIGNANT NEOPLASM OF UPPER-INNER QUADRANT OF LEFT BREAST IN FEMALE, ESTROGEN RECEPTOR POSITIVE (HCC): ICD-10-CM

## 2020-05-21 PROBLEM — E78.5 HYPERLIPIDEMIA LDL GOAL <130: Status: RESOLVED | Noted: 2019-11-21 | Resolved: 2020-05-21
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
## 2020-05-21 PROBLEM — M54.9 LEFT-SIDED BACK PAIN: Status: RESOLVED | Noted: 2018-04-26 | Resolved: 2020-05-21

## 2020-05-21 PROBLEM — B02.9 SHINGLES: Status: RESOLVED | Noted: 2017-03-30 | Resolved: 2020-05-21

## 2020-05-21 PROCEDURE — 99214 OFFICE O/P EST MOD 30 MIN: CPT | Performed by: FAMILY MEDICINE

## 2020-05-21 PROCEDURE — 3008F BODY MASS INDEX DOCD: CPT | Performed by: FAMILY MEDICINE

## 2020-05-21 PROCEDURE — 3077F SYST BP >= 140 MM HG: CPT | Performed by: FAMILY MEDICINE

## 2020-05-21 PROCEDURE — 1170F FXNL STATUS ASSESSED: CPT | Performed by: FAMILY MEDICINE

## 2020-05-21 PROCEDURE — 4040F PNEUMOC VAC/ADMIN/RCVD: CPT | Performed by: FAMILY MEDICINE

## 2020-05-21 PROCEDURE — G0402 INITIAL PREVENTIVE EXAM: HCPCS | Performed by: FAMILY MEDICINE

## 2020-05-21 PROCEDURE — 1160F RVW MEDS BY RX/DR IN RCRD: CPT | Performed by: FAMILY MEDICINE

## 2020-05-21 PROCEDURE — 3078F DIAST BP <80 MM HG: CPT | Performed by: FAMILY MEDICINE

## 2020-05-21 PROCEDURE — 1036F TOBACCO NON-USER: CPT | Performed by: FAMILY MEDICINE

## 2020-05-21 PROCEDURE — 1125F AMNT PAIN NOTED PAIN PRSNT: CPT | Performed by: FAMILY MEDICINE

## 2020-05-21 RX ORDER — AMLODIPINE AND OLMESARTAN MEDOXOMIL 5; 20 MG/1; MG/1
1 TABLET ORAL DAILY
Qty: 90 TABLET | Refills: 1 | Status: SHIPPED | OUTPATIENT
Start: 2020-05-21 | End: 2020-07-02

## 2020-05-21 RX ORDER — ESCITALOPRAM OXALATE 10 MG/1
10 TABLET ORAL DAILY
Qty: 30 TABLET | Refills: 1 | Status: SHIPPED | OUTPATIENT
Start: 2020-05-21 | End: 2020-05-21 | Stop reason: SDUPTHER

## 2020-05-21 RX ORDER — ESCITALOPRAM OXALATE 10 MG/1
10 TABLET ORAL DAILY
Qty: 30 TABLET | Refills: 1 | Status: SHIPPED | OUTPATIENT
Start: 2020-05-21 | End: 2020-07-02

## 2020-05-26 ENCOUNTER — TELEPHONE (OUTPATIENT)
Dept: FAMILY MEDICINE CLINIC | Facility: CLINIC | Age: 70
End: 2020-05-26

## 2020-05-27 ENCOUNTER — TELEPHONE (OUTPATIENT)
Dept: FAMILY MEDICINE CLINIC | Facility: CLINIC | Age: 70
End: 2020-05-27

## 2020-05-27 DIAGNOSIS — I10 BENIGN ESSENTIAL HTN: Primary | ICD-10-CM

## 2020-05-27 RX ORDER — AMLODIPINE AND VALSARTAN 5; 160 MG/1; MG/1
1 TABLET ORAL DAILY
Qty: 90 TABLET | Refills: 1 | Status: SHIPPED | OUTPATIENT
Start: 2020-05-27 | End: 2020-05-27 | Stop reason: SDUPTHER

## 2020-05-27 RX ORDER — AMLODIPINE AND VALSARTAN 5; 160 MG/1; MG/1
1 TABLET ORAL DAILY
Qty: 90 TABLET | Refills: 1 | Status: SHIPPED | OUTPATIENT
Start: 2020-05-27 | End: 2020-11-23

## 2020-06-23 ENCOUNTER — TELEPHONE (OUTPATIENT)
Dept: FAMILY MEDICINE CLINIC | Facility: CLINIC | Age: 70
End: 2020-06-23

## 2020-07-02 ENCOUNTER — OFFICE VISIT (OUTPATIENT)
Dept: FAMILY MEDICINE CLINIC | Facility: CLINIC | Age: 70
End: 2020-07-02
Payer: MEDICARE

## 2020-07-02 VITALS
BODY MASS INDEX: 26.46 KG/M2 | OXYGEN SATURATION: 97 % | SYSTOLIC BLOOD PRESSURE: 142 MMHG | TEMPERATURE: 97.2 F | RESPIRATION RATE: 16 BRPM | HEART RATE: 78 BPM | WEIGHT: 155 LBS | HEIGHT: 64 IN | DIASTOLIC BLOOD PRESSURE: 70 MMHG

## 2020-07-02 DIAGNOSIS — I10 BENIGN ESSENTIAL HTN: Primary | ICD-10-CM

## 2020-07-02 DIAGNOSIS — R60.0 BILATERAL LEG EDEMA: ICD-10-CM

## 2020-07-02 PROCEDURE — 3077F SYST BP >= 140 MM HG: CPT | Performed by: FAMILY MEDICINE

## 2020-07-02 PROCEDURE — 4040F PNEUMOC VAC/ADMIN/RCVD: CPT | Performed by: FAMILY MEDICINE

## 2020-07-02 PROCEDURE — 3008F BODY MASS INDEX DOCD: CPT | Performed by: FAMILY MEDICINE

## 2020-07-02 PROCEDURE — 1036F TOBACCO NON-USER: CPT | Performed by: FAMILY MEDICINE

## 2020-07-02 PROCEDURE — 3078F DIAST BP <80 MM HG: CPT | Performed by: FAMILY MEDICINE

## 2020-07-02 PROCEDURE — 99213 OFFICE O/P EST LOW 20 MIN: CPT | Performed by: FAMILY MEDICINE

## 2020-07-02 PROCEDURE — 1160F RVW MEDS BY RX/DR IN RCRD: CPT | Performed by: FAMILY MEDICINE

## 2020-07-02 RX ORDER — HYDROCHLOROTHIAZIDE 12.5 MG/1
12.5 TABLET ORAL DAILY
Qty: 90 TABLET | Refills: 1 | Status: SHIPPED | OUTPATIENT
Start: 2020-07-02 | End: 2020-12-18 | Stop reason: SDUPTHER

## 2020-07-02 NOTE — PROGRESS NOTES
Assessment/Plan:    1  Benign essential HTN  -     hydrochlorothiazide (HYDRODIURIL) 12 5 mg tablet; Take 1 tablet (12 5 mg total) by mouth daily    2  Bilateral leg edema  -     VAS reflux lower limb venous duplex study with reflux assessment, complete bilateral; Future; Expected date: 07/02/2020            There are no Patient Instructions on file for this visit  Return in about 4 weeks (around 7/30/2020) for Recheck  Subjective:      Patient ID: Myriam Edouard is a 65 y o  female  Chief Complaint   Patient presents with    Follow-up     follow up on HTN [id number]        Pt is here for a 6 week bp follow up  Pt states she has swollen ankles , that started on memorial day, this was before her new meds (amlodipine) were started  No CP, no SOB      The following portions of the patient's history were reviewed and updated as appropriate: allergies, current medications, past family history, past medical history, past social history, past surgical history and problem list     Review of Systems   Constitutional: Negative  Negative for activity change, appetite change, chills, diaphoresis and fatigue  HENT: Negative  Negative for dental problem, ear pain, sinus pressure and sore throat  Eyes: Negative  Negative for photophobia, pain, discharge, redness, itching and visual disturbance  Respiratory: Negative for apnea and chest tightness  Cardiovascular: Positive for leg swelling  Negative for chest pain and palpitations  Gastrointestinal: Negative  Negative for abdominal distention, abdominal pain, constipation and diarrhea  Endocrine: Negative  Negative for cold intolerance and heat intolerance  Genitourinary: Negative  Negative for difficulty urinating and dyspareunia  Musculoskeletal: Negative  Negative for arthralgias and back pain  Skin: Negative  Allergic/Immunologic: Negative for environmental allergies  Neurological: Negative  Negative for dizziness  Psychiatric/Behavioral: Negative  Negative for agitation  Current Outpatient Medications   Medication Sig Dispense Refill    amLODIPine-valsartan (EXFORGE) 5-160 MG per tablet Take 1 tablet by mouth daily 90 tablet 1    anastrozole (ARIMIDEX) 1 mg tablet TAKE 1 TABLET BY MOUTH EVERY DAY 90 tablet 1    Calcium Carb-Cholecalciferol 600-1000 MG-UNIT CAPS Take by mouth      simvastatin (ZOCOR) 40 mg tablet Take 1 tablet (40 mg total) by mouth daily at bedtime 90 tablet 1    Biotin 1 MG CAPS Take by mouth      hydrochlorothiazide (HYDRODIURIL) 12 5 mg tablet Take 1 tablet (12 5 mg total) by mouth daily 90 tablet 1    Zoster Vac Recomb Adjuvanted (SHINGRIX) 50 MCG/0 5ML SUSR 1 vaccine series (Patient not taking: Reported on 5/21/2020) 1 each 0     No current facility-administered medications for this visit  Objective:    /70   Pulse 78   Temp (!) 97 2 °F (36 2 °C)   Resp 16   Ht 5' 4" (1 626 m)   Wt 70 3 kg (155 lb)   SpO2 97%   BMI 26 61 kg/m²        Physical Exam   Constitutional: She appears well-developed and well-nourished  No distress  HENT:   Head: Normocephalic and atraumatic  Right Ear: External ear normal    Left Ear: External ear normal    Nose: Nose normal    Mouth/Throat: Oropharynx is clear and moist  No oropharyngeal exudate  Eyes: Pupils are equal, round, and reactive to light  EOM are normal  Right eye exhibits no discharge  Left eye exhibits no discharge  No scleral icterus  Neck: No thyromegaly present  Cardiovascular: Normal rate and normal heart sounds  No murmur heard  Pulmonary/Chest: Effort normal and breath sounds normal  No respiratory distress  She has no wheezes  Abdominal: Soft  Bowel sounds are normal  She exhibits no distension and no mass  There is no tenderness  There is no rebound and no guarding  Musculoskeletal: Normal range of motion  She exhibits edema  Neurological: She is alert  She displays normal reflexes   Coordination normal    Skin: Skin is warm and dry  No rash noted  She is not diaphoretic  No erythema  Psychiatric: She has a normal mood and affect  Her behavior is normal    Nursing note and vitals reviewed               Carol Walker DO

## 2020-07-27 DIAGNOSIS — Z17.0 MALIGNANT NEOPLASM OF UPPER-INNER QUADRANT OF LEFT BREAST IN FEMALE, ESTROGEN RECEPTOR POSITIVE (HCC): ICD-10-CM

## 2020-07-27 DIAGNOSIS — C50.212 MALIGNANT NEOPLASM OF UPPER-INNER QUADRANT OF LEFT BREAST IN FEMALE, ESTROGEN RECEPTOR POSITIVE (HCC): ICD-10-CM

## 2020-07-27 RX ORDER — ANASTROZOLE 1 MG/1
TABLET ORAL
Qty: 90 TABLET | Refills: 1 | Status: SHIPPED | OUTPATIENT
Start: 2020-07-27 | End: 2021-02-04 | Stop reason: SDUPTHER

## 2020-08-07 ENCOUNTER — TELEPHONE (OUTPATIENT)
Dept: FAMILY MEDICINE CLINIC | Facility: CLINIC | Age: 70
End: 2020-08-07

## 2020-08-10 ENCOUNTER — HOSPITAL ENCOUNTER (OUTPATIENT)
Dept: RADIOLOGY | Facility: HOSPITAL | Age: 70
Discharge: HOME | End: 2020-08-10
Attending: FAMILY MEDICINE
Payer: MEDICARE

## 2020-08-10 DIAGNOSIS — R60.0 BILATERAL LEG EDEMA: ICD-10-CM

## 2020-08-10 PROCEDURE — 93970 EXTREMITY STUDY: CPT

## 2020-08-10 PROCEDURE — 93970 EXTREMITY STUDY: CPT | Performed by: SURGERY

## 2020-08-14 ENCOUNTER — OFFICE VISIT (OUTPATIENT)
Dept: FAMILY MEDICINE CLINIC | Facility: CLINIC | Age: 70
End: 2020-08-14
Payer: MEDICARE

## 2020-08-14 VITALS
HEART RATE: 72 BPM | RESPIRATION RATE: 16 BRPM | BODY MASS INDEX: 26.29 KG/M2 | HEIGHT: 64 IN | WEIGHT: 154 LBS | SYSTOLIC BLOOD PRESSURE: 134 MMHG | DIASTOLIC BLOOD PRESSURE: 68 MMHG | TEMPERATURE: 97 F

## 2020-08-14 DIAGNOSIS — I10 BENIGN ESSENTIAL HTN: Primary | ICD-10-CM

## 2020-08-14 PROCEDURE — 4040F PNEUMOC VAC/ADMIN/RCVD: CPT | Performed by: FAMILY MEDICINE

## 2020-08-14 PROCEDURE — 1160F RVW MEDS BY RX/DR IN RCRD: CPT | Performed by: FAMILY MEDICINE

## 2020-08-14 PROCEDURE — 3075F SYST BP GE 130 - 139MM HG: CPT | Performed by: FAMILY MEDICINE

## 2020-08-14 PROCEDURE — 3078F DIAST BP <80 MM HG: CPT | Performed by: FAMILY MEDICINE

## 2020-08-14 PROCEDURE — 3008F BODY MASS INDEX DOCD: CPT | Performed by: FAMILY MEDICINE

## 2020-08-14 PROCEDURE — 99213 OFFICE O/P EST LOW 20 MIN: CPT | Performed by: FAMILY MEDICINE

## 2020-08-14 PROCEDURE — 1036F TOBACCO NON-USER: CPT | Performed by: FAMILY MEDICINE

## 2020-08-14 NOTE — PROGRESS NOTES
Assessment/Plan:    1  Benign essential HTN  -     Comprehensive metabolic panel; Future; Expected date: 10/28/2020            There are no Patient Instructions on file for this visit  Return in about 3 months (around 11/14/2020) for Recheck  Subjective:      Patient ID: Shelly Hobson is a 65 y o  female  Chief Complaint   Patient presents with    Follow-up     ac/cma     Results     vascular study       Pt is here for a BP follow up  Pt states she has a home BP machine and he bp has been good  Swelling in the legs has essentially cleared      The following portions of the patient's history were reviewed and updated as appropriate: allergies, current medications, past family history, past medical history, past social history, past surgical history and problem list     Review of Systems   Constitutional: Negative  Negative for activity change, appetite change, chills, diaphoresis and fatigue  HENT: Negative  Negative for dental problem, ear pain, sinus pressure and sore throat  Eyes: Negative  Negative for photophobia, pain, discharge, redness, itching and visual disturbance  Respiratory: Negative for apnea and chest tightness  Cardiovascular: Negative  Negative for chest pain, palpitations and leg swelling  Gastrointestinal: Negative  Negative for abdominal distention, abdominal pain, constipation and diarrhea  Endocrine: Negative  Negative for cold intolerance and heat intolerance  Genitourinary: Negative  Negative for difficulty urinating and dyspareunia  Musculoskeletal: Negative  Negative for arthralgias and back pain  Skin: Negative  Allergic/Immunologic: Negative for environmental allergies  Neurological: Negative  Negative for dizziness  Psychiatric/Behavioral: Negative  Negative for agitation           Current Outpatient Medications   Medication Sig Dispense Refill    amLODIPine-valsartan (EXFORGE) 5-160 MG per tablet Take 1 tablet by mouth daily 90 tablet 1  anastrozole (ARIMIDEX) 1 mg tablet TAKE 1 TABLET BY MOUTH EVERY DAY 90 tablet 1    Calcium Carb-Cholecalciferol 600-1000 MG-UNIT CAPS Take by mouth      hydrochlorothiazide (HYDRODIURIL) 12 5 mg tablet Take 1 tablet (12 5 mg total) by mouth daily 90 tablet 1    simvastatin (ZOCOR) 40 mg tablet Take 1 tablet (40 mg total) by mouth daily at bedtime 90 tablet 1    Biotin 1 MG CAPS Take by mouth      Zoster Vac Recomb Adjuvanted (SHINGRIX) 50 MCG/0 5ML SUSR 1 vaccine series (Patient not taking: Reported on 5/21/2020) 1 each 0     No current facility-administered medications for this visit  Objective:    /68   Pulse 72   Temp (!) 97 °F (36 1 °C)   Resp 16   Ht 5' 4" (1 626 m)   Wt 69 9 kg (154 lb)   BMI 26 43 kg/m²        Physical Exam  Vitals signs and nursing note reviewed  Constitutional:       General: She is not in acute distress  Appearance: She is well-developed  She is not diaphoretic  HENT:      Head: Normocephalic and atraumatic  Right Ear: External ear normal       Left Ear: External ear normal       Nose: Nose normal       Mouth/Throat:      Pharynx: No oropharyngeal exudate  Eyes:      General: No scleral icterus  Right eye: No discharge  Left eye: No discharge  Pupils: Pupils are equal, round, and reactive to light  Neck:      Thyroid: No thyromegaly  Cardiovascular:      Rate and Rhythm: Normal rate  Heart sounds: Normal heart sounds  No murmur  Pulmonary:      Effort: Pulmonary effort is normal  No respiratory distress  Breath sounds: Normal breath sounds  No wheezing  Abdominal:      General: Bowel sounds are normal  There is no distension  Palpations: Abdomen is soft  There is no mass  Tenderness: There is no abdominal tenderness  There is no guarding or rebound  Musculoskeletal: Normal range of motion  Skin:     General: Skin is warm and dry  Findings: No erythema or rash     Neurological:      Mental Status: She is alert  Coordination: Coordination normal       Deep Tendon Reflexes: Reflexes normal    Psychiatric:         Behavior: Behavior normal             VAS reflux lower limb venous duplex study with reflux assessment, complete bilateral   Status: Final result    PACS Images      Show images for VAS reflux lower limb venous duplex study with reflux assessment, complete bilateral    Study Result        [facility] REPORT  CLINICAL:  Indications: Localized edema [R60 0]  Patient presents with history of  Left lower extremity varicose veins  Patient  reports swelling this pass week  Patient has been wearing therapeutic  compressions stockings  Risk Factors  The patient has history of HTN, HLD and previous smoking (quit 1-5yrs ago)  FINDINGS:     Right         Impression       Diameter AP  Valve        CFV           Normal (Patent)               Competent    GSV Inguinal                           0 4  Competent    SSV Knee                                    Competent       Left          Impression       Diameter AP  Valve        CFV           Normal (Patent)               Competent    GSV Inguinal                           4 4  Competent    SSV Knee                                    Competent             CONCLUSION:     Impression:  RIGHT LIMB:  No evidence of deep venous incompetence  The great saphenous vein is competent  The great saphenous vein remains within the saphenous compartment in the thigh  The small saphenous vein is competent and does not communicate with the  popliteal vein  There is no evidence of incompetent perforators in the thigh or calf  There is no evidence of deep vein thrombosis in the CFV, the proximal PFV, the  femoral vein and the popliteal vein  LEFT LIMB:  No evidence of deep venous incompetence  The great saphenous vein is competent  The great saphenous vein remains within the saphenous compartment in the thigh    The small saphenous vein is competent and does not communicate with the  popliteal vein  There is no evidence of incompetent perforators in the thigh or calf  There is no evidence of deep vein thrombosis in the CFV, the proximal PFV, the  femoral vein and the popliteal vein  Study performed with patient standing  / in steep Reverse Trendelenburg       SIGNATURE:  Electronically Signed by: Shaila Rosenberg on 2020-08-10 05:28:11 PM   Linked Documents         Isaac Langley DO

## 2020-08-17 ENCOUNTER — TELEPHONE (OUTPATIENT)
Dept: HEMATOLOGY ONCOLOGY | Facility: MEDICAL CENTER | Age: 70
End: 2020-08-17

## 2020-08-17 NOTE — TELEPHONE ENCOUNTER
Patient called in to  Reschedule tomorrows appointment stated that she has to work I reschedule 10/21/2020 I reached out the office and advised

## 2020-10-19 ENCOUNTER — TELEPHONE (OUTPATIENT)
Dept: HEMATOLOGY ONCOLOGY | Facility: CLINIC | Age: 70
End: 2020-10-19

## 2020-10-21 ENCOUNTER — OFFICE VISIT (OUTPATIENT)
Dept: HEMATOLOGY ONCOLOGY | Facility: CLINIC | Age: 70
End: 2020-10-21
Payer: MEDICARE

## 2020-10-21 VITALS
TEMPERATURE: 97.7 F | OXYGEN SATURATION: 98 % | DIASTOLIC BLOOD PRESSURE: 74 MMHG | HEART RATE: 72 BPM | WEIGHT: 156 LBS | RESPIRATION RATE: 18 BRPM | SYSTOLIC BLOOD PRESSURE: 122 MMHG | BODY MASS INDEX: 26.63 KG/M2 | HEIGHT: 64 IN

## 2020-10-21 DIAGNOSIS — C50.212 MALIGNANT NEOPLASM OF UPPER-INNER QUADRANT OF LEFT BREAST IN FEMALE, ESTROGEN RECEPTOR POSITIVE (HCC): Primary | ICD-10-CM

## 2020-10-21 DIAGNOSIS — Z17.0 MALIGNANT NEOPLASM OF UPPER-INNER QUADRANT OF LEFT BREAST IN FEMALE, ESTROGEN RECEPTOR POSITIVE (HCC): Primary | ICD-10-CM

## 2020-10-21 PROCEDURE — 99214 OFFICE O/P EST MOD 30 MIN: CPT | Performed by: INTERNAL MEDICINE

## 2020-11-09 DIAGNOSIS — E78.5 HYPERLIPIDEMIA LDL GOAL <130: ICD-10-CM

## 2020-11-09 RX ORDER — SIMVASTATIN 40 MG
TABLET ORAL
Qty: 90 TABLET | Refills: 1 | Status: SHIPPED | OUTPATIENT
Start: 2020-11-09 | End: 2021-11-12

## 2020-11-11 ENCOUNTER — TELEPHONE (OUTPATIENT)
Dept: SURGICAL ONCOLOGY | Facility: CLINIC | Age: 70
End: 2020-11-11

## 2020-11-12 ENCOUNTER — OFFICE VISIT (OUTPATIENT)
Dept: SURGICAL ONCOLOGY | Facility: CLINIC | Age: 70
End: 2020-11-12
Payer: MEDICARE

## 2020-11-12 VITALS
BODY MASS INDEX: 26.98 KG/M2 | RESPIRATION RATE: 16 BRPM | TEMPERATURE: 97.3 F | DIASTOLIC BLOOD PRESSURE: 80 MMHG | SYSTOLIC BLOOD PRESSURE: 140 MMHG | WEIGHT: 158 LBS | HEIGHT: 64 IN | HEART RATE: 71 BPM

## 2020-11-12 DIAGNOSIS — Z17.0 MALIGNANT NEOPLASM OF UPPER-INNER QUADRANT OF LEFT BREAST IN FEMALE, ESTROGEN RECEPTOR POSITIVE (HCC): Primary | ICD-10-CM

## 2020-11-12 DIAGNOSIS — Z79.811 USE OF ANASTROZOLE (ARIMIDEX): ICD-10-CM

## 2020-11-12 DIAGNOSIS — C50.212 MALIGNANT NEOPLASM OF UPPER-INNER QUADRANT OF LEFT BREAST IN FEMALE, ESTROGEN RECEPTOR POSITIVE (HCC): Primary | ICD-10-CM

## 2020-11-12 LAB
ALBUMIN SERPL-MCNC: 4.2 G/DL (ref 3.7–4.7)
ALBUMIN/GLOB SERPL: 2.1 {RATIO} (ref 1.2–2.2)
ALP SERPL-CCNC: 119 IU/L (ref 39–117)
ALT SERPL-CCNC: 40 IU/L (ref 0–32)
AST SERPL-CCNC: 38 IU/L (ref 0–40)
BILIRUB SERPL-MCNC: 0.4 MG/DL (ref 0–1.2)
BUN SERPL-MCNC: 13 MG/DL (ref 8–27)
BUN/CREAT SERPL: 20 (ref 12–28)
CALCIUM SERPL-MCNC: 10 MG/DL (ref 8.7–10.3)
CHLORIDE SERPL-SCNC: 102 MMOL/L (ref 96–106)
CO2 SERPL-SCNC: 26 MMOL/L (ref 20–29)
CREAT SERPL-MCNC: 0.64 MG/DL (ref 0.57–1)
GLOBULIN SER-MCNC: 2 G/DL (ref 1.5–4.5)
GLUCOSE SERPL-MCNC: 109 MG/DL (ref 65–99)
POTASSIUM SERPL-SCNC: 4 MMOL/L (ref 3.5–5.2)
PROT SERPL-MCNC: 6.2 G/DL (ref 6–8.5)
SL AMB EGFR AFRICAN AMERICAN: 103 ML/MIN/1.73
SL AMB EGFR NON AFRICAN AMERICAN: 89 ML/MIN/1.73
SODIUM SERPL-SCNC: 140 MMOL/L (ref 134–144)

## 2020-11-12 PROCEDURE — 99214 OFFICE O/P EST MOD 30 MIN: CPT | Performed by: NURSE PRACTITIONER

## 2020-11-16 ENCOUNTER — OFFICE VISIT (OUTPATIENT)
Dept: FAMILY MEDICINE CLINIC | Facility: CLINIC | Age: 70
End: 2020-11-16
Payer: MEDICARE

## 2020-11-16 VITALS
DIASTOLIC BLOOD PRESSURE: 74 MMHG | HEART RATE: 68 BPM | SYSTOLIC BLOOD PRESSURE: 130 MMHG | TEMPERATURE: 98 F | RESPIRATION RATE: 16 BRPM | BODY MASS INDEX: 27.31 KG/M2 | WEIGHT: 160 LBS | HEIGHT: 64 IN

## 2020-11-16 DIAGNOSIS — R82.90 ABNORMAL URINE ODOR: ICD-10-CM

## 2020-11-16 DIAGNOSIS — I10 BENIGN ESSENTIAL HTN: Primary | ICD-10-CM

## 2020-11-16 DIAGNOSIS — Z23 NEED FOR VACCINATION: ICD-10-CM

## 2020-11-16 DIAGNOSIS — R73.09 ABNORMAL GLUCOSE: ICD-10-CM

## 2020-11-16 DIAGNOSIS — E78.2 MIXED HYPERLIPIDEMIA: ICD-10-CM

## 2020-11-16 DIAGNOSIS — R94.5 ABNORMAL LIVER FUNCTION: ICD-10-CM

## 2020-11-16 PROCEDURE — 90662 IIV NO PRSV INCREASED AG IM: CPT | Performed by: FAMILY MEDICINE

## 2020-11-16 PROCEDURE — G0008 ADMIN INFLUENZA VIRUS VAC: HCPCS | Performed by: FAMILY MEDICINE

## 2020-11-16 PROCEDURE — 99214 OFFICE O/P EST MOD 30 MIN: CPT | Performed by: FAMILY MEDICINE

## 2020-11-21 LAB
APPEARANCE UR: CLEAR
BACTERIA URNS QL MICRO: ABNORMAL
BILIRUB UR QL STRIP: NEGATIVE
COLOR UR: YELLOW
EPI CELLS #/AREA URNS HPF: ABNORMAL /HPF (ref 0–10)
GLUCOSE UR QL: NEGATIVE
HGB UR QL STRIP: NEGATIVE
KETONES UR QL STRIP: NEGATIVE
LEUKOCYTE ESTERASE UR QL STRIP: ABNORMAL
MICRO URNS: ABNORMAL
NITRITE UR QL STRIP: NEGATIVE
PH UR STRIP: 6.5 [PH] (ref 5–7.5)
PROT UR QL STRIP: NEGATIVE
RBC #/AREA URNS HPF: ABNORMAL /HPF (ref 0–2)
SP GR UR: 1.01 (ref 1–1.03)
UROBILINOGEN UR STRIP-ACNC: 0.2 MG/DL (ref 0.2–1)
WBC #/AREA URNS HPF: ABNORMAL /HPF (ref 0–5)

## 2020-11-23 DIAGNOSIS — I10 BENIGN ESSENTIAL HTN: ICD-10-CM

## 2020-11-23 RX ORDER — AMLODIPINE AND VALSARTAN 5; 160 MG/1; MG/1
TABLET ORAL
Qty: 90 TABLET | Refills: 1 | Status: SHIPPED | OUTPATIENT
Start: 2020-11-23 | End: 2021-05-22

## 2020-11-24 ENCOUNTER — TELEPHONE (OUTPATIENT)
Dept: FAMILY MEDICINE CLINIC | Facility: CLINIC | Age: 70
End: 2020-11-24

## 2020-11-24 DIAGNOSIS — N39.0 URINARY TRACT INFECTION WITHOUT HEMATURIA, SITE UNSPECIFIED: Primary | ICD-10-CM

## 2020-11-24 RX ORDER — SULFAMETHOXAZOLE AND TRIMETHOPRIM 800; 160 MG/1; MG/1
1 TABLET ORAL EVERY 12 HOURS SCHEDULED
Qty: 12 TABLET | Refills: 0 | Status: SHIPPED | OUTPATIENT
Start: 2020-11-24 | End: 2020-11-30

## 2020-12-16 ENCOUNTER — HOSPITAL ENCOUNTER (OUTPATIENT)
Dept: RADIOLOGY | Facility: HOSPITAL | Age: 70
Discharge: HOME | End: 2020-12-16
Payer: MEDICARE

## 2020-12-16 VITALS — BODY MASS INDEX: 25.99 KG/M2 | HEIGHT: 65 IN | WEIGHT: 156 LBS

## 2020-12-16 DIAGNOSIS — Z17.0 MALIGNANT NEOPLASM OF UPPER-INNER QUADRANT OF LEFT BREAST IN FEMALE, ESTROGEN RECEPTOR POSITIVE (HCC): ICD-10-CM

## 2020-12-16 DIAGNOSIS — C50.212 MALIGNANT NEOPLASM OF UPPER-INNER QUADRANT OF LEFT BREAST IN FEMALE, ESTROGEN RECEPTOR POSITIVE (HCC): ICD-10-CM

## 2020-12-16 PROCEDURE — G0279 TOMOSYNTHESIS, MAMMO: HCPCS

## 2020-12-16 PROCEDURE — 77066 DX MAMMO INCL CAD BI: CPT

## 2020-12-18 DIAGNOSIS — I10 BENIGN ESSENTIAL HTN: ICD-10-CM

## 2020-12-18 RX ORDER — HYDROCHLOROTHIAZIDE 12.5 MG/1
12.5 TABLET ORAL DAILY
Qty: 90 TABLET | Refills: 1 | Status: SHIPPED | OUTPATIENT
Start: 2020-12-18 | End: 2021-08-26 | Stop reason: SDUPTHER

## 2021-02-04 DIAGNOSIS — Z17.0 MALIGNANT NEOPLASM OF UPPER-INNER QUADRANT OF LEFT BREAST IN FEMALE, ESTROGEN RECEPTOR POSITIVE (HCC): ICD-10-CM

## 2021-02-04 DIAGNOSIS — C50.212 MALIGNANT NEOPLASM OF UPPER-INNER QUADRANT OF LEFT BREAST IN FEMALE, ESTROGEN RECEPTOR POSITIVE (HCC): ICD-10-CM

## 2021-02-04 RX ORDER — ANASTROZOLE 1 MG/1
1 TABLET ORAL DAILY
Qty: 90 TABLET | Refills: 1 | Status: SHIPPED | OUTPATIENT
Start: 2021-02-04 | End: 2021-07-28 | Stop reason: SDUPTHER

## 2021-02-23 LAB
ALBUMIN SERPL-MCNC: 4.2 G/DL (ref 3.7–4.7)
ALBUMIN/GLOB SERPL: 2.5 {RATIO} (ref 1.2–2.2)
ALP SERPL-CCNC: 124 IU/L (ref 39–117)
ALT SERPL-CCNC: 38 IU/L (ref 0–32)
AST SERPL-CCNC: 35 IU/L (ref 0–40)
BILIRUB SERPL-MCNC: 0.3 MG/DL (ref 0–1.2)
BUN SERPL-MCNC: 14 MG/DL (ref 8–27)
BUN/CREAT SERPL: 25 (ref 12–28)
CALCIUM SERPL-MCNC: 9.7 MG/DL (ref 8.7–10.3)
CHLORIDE SERPL-SCNC: 102 MMOL/L (ref 96–106)
CHOLEST SERPL-MCNC: 183 MG/DL (ref 100–199)
CO2 SERPL-SCNC: 25 MMOL/L (ref 20–29)
CREAT SERPL-MCNC: 0.57 MG/DL (ref 0.57–1)
GLOBULIN SER-MCNC: 1.7 G/DL (ref 1.5–4.5)
GLUCOSE SERPL-MCNC: 108 MG/DL (ref 65–99)
HBA1C MFR BLD: 6 % (ref 4.8–5.6)
HDLC SERPL-MCNC: 75 MG/DL
LDLC SERPL CALC-MCNC: 94 MG/DL (ref 0–99)
MICRODELETION SYND BLD/T FISH: NORMAL
POTASSIUM SERPL-SCNC: 4 MMOL/L (ref 3.5–5.2)
PROT SERPL-MCNC: 5.9 G/DL (ref 6–8.5)
SL AMB EGFR AFRICAN AMERICAN: 107 ML/MIN/1.73
SL AMB EGFR NON AFRICAN AMERICAN: 93 ML/MIN/1.73
SODIUM SERPL-SCNC: 139 MMOL/L (ref 134–144)
TRIGL SERPL-MCNC: 76 MG/DL (ref 0–149)

## 2021-02-26 ENCOUNTER — OFFICE VISIT (OUTPATIENT)
Dept: FAMILY MEDICINE CLINIC | Facility: CLINIC | Age: 71
End: 2021-02-26
Payer: MEDICARE

## 2021-02-26 VITALS
RESPIRATION RATE: 16 BRPM | DIASTOLIC BLOOD PRESSURE: 68 MMHG | WEIGHT: 160 LBS | SYSTOLIC BLOOD PRESSURE: 116 MMHG | BODY MASS INDEX: 27.31 KG/M2 | HEART RATE: 68 BPM | HEIGHT: 64 IN | TEMPERATURE: 95.8 F

## 2021-02-26 DIAGNOSIS — R94.5 ABNORMAL LIVER FUNCTION: ICD-10-CM

## 2021-02-26 DIAGNOSIS — E55.9 VITAMIN D DEFICIENCY: ICD-10-CM

## 2021-02-26 DIAGNOSIS — Z17.0 MALIGNANT NEOPLASM OF UPPER-INNER QUADRANT OF LEFT BREAST IN FEMALE, ESTROGEN RECEPTOR POSITIVE (HCC): ICD-10-CM

## 2021-02-26 DIAGNOSIS — R05.9 COUGH: ICD-10-CM

## 2021-02-26 DIAGNOSIS — Z87.891 FORMER SMOKER: ICD-10-CM

## 2021-02-26 DIAGNOSIS — E78.2 MIXED HYPERLIPIDEMIA: ICD-10-CM

## 2021-02-26 DIAGNOSIS — C50.212 MALIGNANT NEOPLASM OF UPPER-INNER QUADRANT OF LEFT BREAST IN FEMALE, ESTROGEN RECEPTOR POSITIVE (HCC): ICD-10-CM

## 2021-02-26 DIAGNOSIS — I10 BENIGN ESSENTIAL HTN: ICD-10-CM

## 2021-02-26 DIAGNOSIS — R73.09 ABNORMAL GLUCOSE: ICD-10-CM

## 2021-02-26 PROCEDURE — 99214 OFFICE O/P EST MOD 30 MIN: CPT | Performed by: FAMILY MEDICINE

## 2021-02-26 NOTE — PROGRESS NOTES
Assessment/Plan:    1  BMI 27 0-27 9,adult  -     CBC; Future; Expected date: 08/10/2021    2  Benign essential HTN  Assessment & Plan:  stable    Orders:  -     Comprehensive metabolic panel; Future; Expected date: 08/10/2021  -     CBC; Future; Expected date: 08/10/2021  -     CT chest wo contrast; Future; Expected date: 02/26/2021    3  Mixed hyperlipidemia  -     CBC; Future; Expected date: 08/10/2021  -     Lipid Panel with Direct LDL reflex; Future; Expected date: 08/10/2021    4  Abnormal glucose  -     CBC; Future; Expected date: 08/10/2021    5  Abnormal liver function  -     US liver; Future; Expected date: 02/26/2021  -     CBC; Future; Expected date: 08/10/2021  -     CT chest wo contrast; Future; Expected date: 02/26/2021    6  Malignant neoplasm of 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, estrogen receptor positive ([facility] [address] )  -     CBC; Future; Expected date: 08/10/2021  -     CT chest wo contrast; Future; Expected date: 02/26/2021    7  Vitamin D deficiency  -     CBC; Future; Expected date: 08/10/2021  -     Vitamin D 25 hydroxy; Future    8  Former smoker  -     CT chest wo contrast; Future; Expected date: 02/26/2021    9  Cough  -     CT chest wo contrast; Future; Expected date: 02/26/2021          Patient Instructions       Weight Management   AMBULATORY CARE:   Why it is important to manage your weight:  Being overweight increases your risk of health conditions such as heart disease, high blood pressure, type 2 diabetes, and certain types of cancer  It can also increase your risk for osteoarthritis, sleep apnea, and other respiratory problems  Aim for a slow, steady weight loss  Even a small amount of weight loss can lower your risk of health problems  How to lose weight safely:  A safe and healthy way to lose weight is to eat fewer calories and get regular exercise  · You can lose up about 1 pound a week by decreasing the number of calories you eat by 500 calories each day   You can decrease calories by eating smaller portion sizes or by cutting out high-calorie foods  Read labels to find out how many calories are in the foods you eat  · You can also burn calories with exercise such as walking, swimming, or biking  You will be more likely to keep weight off if you make these changes part of your lifestyle  Exercise at least 30 minutes per day on most days of the week  You can also fit in more physical activity by taking the stairs instead of the elevator or parking farther away from stores  Ask your healthcare provider about the best exercise plan for you  Healthy meal plan for weight management:  A healthy meal plan includes a variety of foods, contains fewer calories, and helps you stay healthy  A healthy meal plan includes the following:     · Eat whole-grain foods more often  A healthy meal plan should contain fiber  Fiber is the part of grains, fruits, and vegetables that is not broken down by your body  Whole-grain foods are healthy and provide extra fiber in your diet  Some examples of whole-grain foods are whole-wheat breads and pastas, oatmeal, brown rice, and bulgur  · Eat a variety of vegetables every day  Include dark, leafy greens such as spinach, kale, kathe greens, and mustard greens  Eat yellow and orange vegetables such as carrots, sweet potatoes, and winter squash  · Eat a variety of fruits every day  Choose fresh or canned fruit (canned in its own juice or light syrup) instead of juice  Fruit juice has very little or no fiber  · Eat low-fat dairy foods  Drink fat-free (skim) milk or 1% milk  Eat fat-free yogurt and low-fat cottage cheese  Try low-fat cheeses such as mozzarella and other reduced-fat cheeses  · Choose meat and other protein foods that are low in fat  Choose beans or other legumes such as split peas or lentils  Choose fish, skinless poultry (chicken or turkey), or lean cuts of red meat (beef or pork)   Before you cook meat or poultry, cut off any visible fat  · Use less fat and oil  Try baking foods instead of frying them  Add less fat, such as margarine, sour cream, regular salad dressing and mayonnaise to foods  Eat fewer high-fat foods  Some examples of high-fat foods include french fries, doughnuts, ice cream, and cakes  · Eat fewer sweets  Limit foods and drinks that are high in sugar  This includes candy, cookies, regular soda, and sweetened drinks  Ways to decrease calories:   · Eat smaller portions  ? Use a small plate with smaller servings  ? Do not eat second helpings  ? When you eat at a restaurant, ask for a box and place half of your meal in the box before you eat  ? Share an entrée with someone else  · Replace high-calorie snacks with healthy, low-calorie snacks  ? Choose fresh fruit, vegetables, fat-free rice cakes, or air-popped popcorn instead of potato chips, nuts, or chocolate  ? Choose water or calorie-free drinks instead of soda or sweetened drinks  · Do not shop for groceries when you are hungry  You may be more likely to make unhealthy food choices  Take a grocery list of healthy foods and shop after you have eaten  · Eat regular meals  Do not skip meals  Skipping meals can lead to overeating later in the day  This can make it harder for you to lose weight  Eat a healthy snack in place of a meal if you do not have time to eat a regular meal  Talk with a dietitian to help you create a meal plan and schedule that is right for you  Other things to consider as you try to lose weight:   · Be aware of situations that may give you the urge to overeat, such as eating while watching television  Find ways to avoid these situations  For example, read a book, go for a walk, or do crafts  · Meet with a weight loss support group or friends who are also trying to lose weight  This may help you stay motivated to continue working on your weight loss goals      © [address] Information is for End User's use only and may not be sold, redistributed or otherwise used for commercial purposes  All illustrations and images included in CareNotes® are the copyrighted property of [employer]  or [employer]  The above information is an  only  It is not intended as medical advice for individual conditions or treatments  Talk to your doctor, nurse or pharmacist before following any medical regimen to see if it is safe and effective for you  Return in about 6 months (around 8/26/2021) for Recheck  Subjective:      Patient ID: Carlos Rodriguez is a 65 y o  female  Chief Complaint   Patient presents with    Follow-up     new labs--marie       Pt is here for a 3 month follow up  Pt denies CP, no SOB  Pt feels well    Pt states she is a former smoker and is asking if she should have her lungs screened  PT states she does cough occasionally in the AM      The following portions of the patient's history were reviewed and updated as appropriate: allergies, current medications, past family history, past medical history, past social history, past surgical history and problem list     Review of Systems   Constitutional: Negative  Negative for activity change, appetite change, chills, diaphoresis and fatigue  HENT: Negative  Negative for dental problem, ear pain, sinus pressure and sore throat  Eyes: Negative  Negative for photophobia, pain, discharge, redness, itching and visual disturbance  Respiratory: Positive for cough  Negative for apnea and chest tightness  Cardiovascular: Negative  Negative for chest pain, palpitations and leg swelling  Gastrointestinal: Negative  Negative for abdominal distention, abdominal pain, constipation and diarrhea  Endocrine: Negative  Negative for cold intolerance and heat intolerance  Genitourinary: Negative  Negative for difficulty urinating and dyspareunia  Musculoskeletal: Negative    Negative for arthralgias and back pain  Skin: Negative  Allergic/Immunologic: Negative for environmental allergies  Neurological: Negative  Negative for dizziness  Psychiatric/Behavioral: Negative  Negative for agitation  Current Outpatient Medications   Medication Sig Dispense Refill    amLODIPine-valsartan (EXFORGE) 5-160 MG per tablet TAKE ONE TABLET BY MOUTH EVERY DAY 90 tablet 1    anastrozole (ARIMIDEX) 1 mg tablet Take 1 tablet (1 mg total) by mouth daily 90 tablet 1    Calcium Carb-Cholecalciferol 600-1000 MG-UNIT CAPS Take by mouth      hydrochlorothiazide (HYDRODIURIL) 12 5 mg tablet Take 1 tablet (12 5 mg total) by mouth daily 90 tablet 1    simvastatin (ZOCOR) 40 mg tablet TAKE 1 TABLET BY MOUTH AT BEDTIME 90 tablet 1    Zoster Vac Recomb Adjuvanted (SHINGRIX) 50 MCG/0 5ML SUSR 1 vaccine series (Patient not taking: Reported on 11/12/2020) 1 each 0     No current facility-administered medications for this visit  Objective:    /68   Pulse 68   Temp (!) 95 8 °F (35 4 °C)   Resp 16   Ht 5' 4" (1 626 m)   Wt 72 6 kg (160 lb)   BMI 27 46 kg/m²        Physical Exam  Vitals signs and nursing note reviewed  Constitutional:       General: She is not in acute distress  Appearance: She is well-developed  She is not diaphoretic  HENT:      Head: Normocephalic and atraumatic  Right Ear: External ear normal       Left Ear: External ear normal       Nose: Nose normal       Mouth/Throat:      Pharynx: No oropharyngeal exudate  Eyes:      General: No scleral icterus  Right eye: No discharge  Left eye: No discharge  Pupils: Pupils are equal, round, and reactive to light  Neck:      Thyroid: No thyromegaly  Cardiovascular:      Rate and Rhythm: Normal rate  Heart sounds: Normal heart sounds  No murmur  Pulmonary:      Effort: Pulmonary effort is normal  No respiratory distress  Breath sounds: Normal breath sounds  No wheezing     Abdominal:      General: Bowel sounds are normal  There is no distension  Palpations: Abdomen is soft  There is no mass  Tenderness: There is no abdominal tenderness  There is no guarding or rebound  Musculoskeletal: Normal range of motion  Skin:     General: Skin is warm and dry  Findings: No erythema or rash  Neurological:      Mental Status: She is alert  Coordination: Coordination normal       Deep Tendon Reflexes: Reflexes normal    Psychiatric:         Behavior: Behavior normal               Recent Results (from the past 672 hour(s))   Comprehensive metabolic panel    Collection Time: 02/22/21  8:25 AM   Result Value Ref Range    Glucose, Random 108 (H) 65 - 99 mg/dL    BUN 14 8 - 27 mg/dL    Creatinine 0 57 0 57 - 1 00 mg/dL    eGFR Non  93 >59 mL/min/1 73    eGFR  107 >59 mL/min/1 73    SL AMB BUN/CREATININE RATIO 25 12 - 28    Sodium 139 134 - 144 mmol/L    Potassium 4 0 3 5 - 5 2 mmol/L    Chloride 102 96 - 106 mmol/L    CO2 25 20 - 29 mmol/L    CALCIUM 9 7 8 7 - 10 3 mg/dL    Protein, Total 5 9 (L) 6 0 - 8 5 g/dL    Albumin 4 2 3 7 - 4 7 g/dL    Globulin, Total 1 7 1 5 - 4 5 g/dL    Albumin/Globulin Ratio 2 5 (H) 1 2 - 2 2    TOTAL BILIRUBIN 0 3 0 0 - 1 2 mg/dL    Alk Phos Isoenzymes 124 (H) 39 - 117 IU/L    AST 35 0 - 40 IU/L    ALT 38 (H) 0 - 32 IU/L   Lipid panel    Collection Time: 02/22/21  8:25 AM   Result Value Ref Range    Cholesterol, Total 183 100 - 199 mg/dL    Triglycerides 76 0 - 149 mg/dL    HDL 75 >39 mg/dL    LDL Calculated 94 0 - 99 mg/dL   Hemoglobin A1c (w/out EAG)    Collection Time: 02/22/21  8:25 AM   Result Value Ref Range    Hemoglobin A1C 6 0 (H) 4 8 - 5 6 %   Cardiovascular Report    Collection Time: 02/22/21  8:25 AM   Result Value Ref Range    Interpretation Note          Fani Sherman DO  BMI Counseling: Body mass index is 27 46 kg/m²  The BMI is above normal  Nutrition recommendations include reducing portion sizes

## 2021-02-26 NOTE — PATIENT INSTRUCTIONS
Weight Management   AMBULATORY CARE:   Why it is important to manage your weight:  Being overweight increases your risk of health conditions such as heart disease, high blood pressure, type 2 diabetes, and certain types of cancer  It can also increase your risk for osteoarthritis, sleep apnea, and other respiratory problems  Aim for a slow, steady weight loss  Even a small amount of weight loss can lower your risk of health problems  How to lose weight safely:  A safe and healthy way to lose weight is to eat fewer calories and get regular exercise  · You can lose up about 1 pound a week by decreasing the number of calories you eat by 500 calories each day  You can decrease calories by eating smaller portion sizes or by cutting out high-calorie foods  Read labels to find out how many calories are in the foods you eat  · You can also burn calories with exercise such as walking, swimming, or biking  You will be more likely to keep weight off if you make these changes part of your lifestyle  Exercise at least 30 minutes per day on most days of the week  You can also fit in more physical activity by taking the stairs instead of the elevator or parking farther away from stores  Ask your healthcare provider about the best exercise plan for you  Healthy meal plan for weight management:  A healthy meal plan includes a variety of foods, contains fewer calories, and helps you stay healthy  A healthy meal plan includes the following:     · Eat whole-grain foods more often  A healthy meal plan should contain fiber  Fiber is the part of grains, fruits, and vegetables that is not broken down by your body  Whole-grain foods are healthy and provide extra fiber in your diet  Some examples of whole-grain foods are whole-wheat breads and pastas, oatmeal, brown rice, and bulgur  · Eat a variety of vegetables every day  Include dark, leafy greens such as spinach, kale, kathe greens, and mustard greens   Eat yellow and orange vegetables such as carrots, sweet potatoes, and winter squash  · Eat a variety of fruits every day  Choose fresh or canned fruit (canned in its own juice or light syrup) instead of juice  Fruit juice has very little or no fiber  · Eat low-fat dairy foods  Drink fat-free (skim) milk or 1% milk  Eat fat-free yogurt and low-fat cottage cheese  Try low-fat cheeses such as mozzarella and other reduced-fat cheeses  · Choose meat and other protein foods that are low in fat  Choose beans or other legumes such as split peas or lentils  Choose fish, skinless poultry (chicken or turkey), or lean cuts of red meat (beef or pork)  Before you cook meat or poultry, cut off any visible fat  · Use less fat and oil  Try baking foods instead of frying them  Add less fat, such as margarine, sour cream, regular salad dressing and mayonnaise to foods  Eat fewer high-fat foods  Some examples of high-fat foods include french fries, doughnuts, ice cream, and cakes  · Eat fewer sweets  Limit foods and drinks that are high in sugar  This includes candy, cookies, regular soda, and sweetened drinks  Ways to decrease calories:   · Eat smaller portions  ? Use a small plate with smaller servings  ? Do not eat second helpings  ? When you eat at a restaurant, ask for a box and place half of your meal in the box before you eat  ? Share an entrée with someone else  · Replace high-calorie snacks with healthy, low-calorie snacks  ? Choose fresh fruit, vegetables, fat-free rice cakes, or air-popped popcorn instead of potato chips, nuts, or chocolate  ? Choose water or calorie-free drinks instead of soda or sweetened drinks  · Do not shop for groceries when you are hungry  You may be more likely to make unhealthy food choices  Take a grocery list of healthy foods and shop after you have eaten  · Eat regular meals  Do not skip meals  Skipping meals can lead to overeating later in the day   This can make it harder for you to lose weight  Eat a healthy snack in place of a meal if you do not have time to eat a regular meal  Talk with a dietitian to help you create a meal plan and schedule that is right for you  Other things to consider as you try to lose weight:   · Be aware of situations that may give you the urge to overeat, such as eating while watching television  Find ways to avoid these situations  For example, read a book, go for a walk, or do crafts  · Meet with a weight loss support group or friends who are also trying to lose weight  This may help you stay motivated to continue working on your weight loss goals  © Copyright [address] Information is for End User's use only and may not be sold, redistributed or otherwise used for commercial purposes  All illustrations and images included in CareNotes® are the copyrighted property of [employer]  or [facility] [employer]   The above information is an  only  It is not intended as medical advice for individual conditions or treatments  Talk to your doctor, nurse or pharmacist before following any medical regimen to see if it is safe and effective for you

## 2021-03-04 LAB
APPEARANCE UR: CLEAR
BILIRUB UR QL STRIP: NEGATIVE
COLOR UR: YELLOW
GLUCOSE UR QL: NEGATIVE
HGB UR QL STRIP: NEGATIVE
KETONES UR QL STRIP: NEGATIVE
LEUKOCYTE ESTERASE UR QL STRIP: NEGATIVE
MICRO URNS: NORMAL
NITRITE UR QL STRIP: NEGATIVE
PH UR STRIP: 7 [PH] (ref 5–7.5)
PROT UR QL STRIP: NEGATIVE
SP GR UR: 1.01 (ref 1–1.03)
UROBILINOGEN UR STRIP-ACNC: 0.2 MG/DL (ref 0.2–1)

## 2021-03-05 ENCOUNTER — HOSPITAL ENCOUNTER (OUTPATIENT)
Dept: RADIOLOGY | Facility: HOSPITAL | Age: 71
Discharge: HOME | End: 2021-03-05
Attending: FAMILY MEDICINE
Payer: MEDICARE

## 2021-03-05 DIAGNOSIS — I10 BENIGN ESSENTIAL HTN: ICD-10-CM

## 2021-03-05 DIAGNOSIS — Z87.891 FORMER SMOKER: ICD-10-CM

## 2021-03-05 DIAGNOSIS — R94.5 ABNORMAL LIVER FUNCTION: ICD-10-CM

## 2021-03-05 DIAGNOSIS — C50.212 MALIGNANT NEOPLASM OF UPPER-INNER QUADRANT OF LEFT BREAST IN FEMALE, ESTROGEN RECEPTOR POSITIVE (HCC): ICD-10-CM

## 2021-03-05 DIAGNOSIS — R05.9 COUGH: ICD-10-CM

## 2021-03-05 DIAGNOSIS — Z17.0 MALIGNANT NEOPLASM OF UPPER-INNER QUADRANT OF LEFT BREAST IN FEMALE, ESTROGEN RECEPTOR POSITIVE (HCC): ICD-10-CM

## 2021-03-05 PROCEDURE — G1004 CDSM NDSC: HCPCS

## 2021-03-05 PROCEDURE — 76705 ECHO EXAM OF ABDOMEN: CPT

## 2021-03-05 PROCEDURE — 71250 CT THORAX DX C-: CPT

## 2021-03-30 DIAGNOSIS — Z23 ENCOUNTER FOR IMMUNIZATION: ICD-10-CM

## 2021-05-22 DIAGNOSIS — I10 BENIGN ESSENTIAL HTN: ICD-10-CM

## 2021-05-22 RX ORDER — AMLODIPINE AND VALSARTAN 5; 160 MG/1; MG/1
TABLET ORAL
Qty: 90 TABLET | Refills: 1 | Status: SHIPPED | OUTPATIENT
Start: 2021-05-22 | End: 2021-05-24

## 2021-05-23 DIAGNOSIS — I10 BENIGN ESSENTIAL HTN: ICD-10-CM

## 2021-05-24 RX ORDER — AMLODIPINE AND VALSARTAN 5; 160 MG/1; MG/1
TABLET ORAL
Qty: 90 TABLET | Refills: 1 | Status: SHIPPED | OUTPATIENT
Start: 2021-05-24 | End: 2021-08-26 | Stop reason: SDUPTHER

## 2021-07-28 ENCOUNTER — TELEPHONE (OUTPATIENT)
Dept: HEMATOLOGY ONCOLOGY | Facility: CLINIC | Age: 71
End: 2021-07-28

## 2021-07-28 DIAGNOSIS — Z17.0 MALIGNANT NEOPLASM OF UPPER-INNER QUADRANT OF LEFT BREAST IN FEMALE, ESTROGEN RECEPTOR POSITIVE (HCC): ICD-10-CM

## 2021-07-28 DIAGNOSIS — C50.212 MALIGNANT NEOPLASM OF UPPER-INNER QUADRANT OF LEFT BREAST IN FEMALE, ESTROGEN RECEPTOR POSITIVE (HCC): ICD-10-CM

## 2021-07-28 RX ORDER — ANASTROZOLE 1 MG/1
1 TABLET ORAL DAILY
Qty: 90 TABLET | Refills: 1 | Status: SHIPPED | OUTPATIENT
Start: 2021-07-28 | End: 2022-01-12

## 2021-07-28 NOTE — TELEPHONE ENCOUNTER
Medication Refill     Who is Calling  Patient   Medication  anastrozole (ARIMIDEX) 1 mg tablet       How many pills left  5   Preferred Pharmacy / Address  [facility] [address] [place] [address]    Call back number [phone]   Relevant Information

## 2021-08-21 LAB
25(OH)D3+25(OH)D2 SERPL-MCNC: 37.5 NG/ML (ref 30–100)
ALBUMIN SERPL-MCNC: 4.4 G/DL (ref 3.7–4.7)
ALBUMIN/GLOB SERPL: 2.4 {RATIO} (ref 1.2–2.2)
ALP SERPL-CCNC: 106 IU/L (ref 48–121)
ALT SERPL-CCNC: 14 IU/L (ref 0–32)
AST SERPL-CCNC: 19 IU/L (ref 0–40)
BASOPHILS # BLD AUTO: 0 X10E3/UL (ref 0–0.2)
BASOPHILS NFR BLD AUTO: 1 %
BILIRUB SERPL-MCNC: 0.5 MG/DL (ref 0–1.2)
BUN SERPL-MCNC: 14 MG/DL (ref 8–27)
BUN/CREAT SERPL: 21 (ref 12–28)
CALCIUM SERPL-MCNC: 9.7 MG/DL (ref 8.7–10.3)
CHLORIDE SERPL-SCNC: 104 MMOL/L (ref 96–106)
CHOLEST SERPL-MCNC: 181 MG/DL (ref 100–199)
CO2 SERPL-SCNC: 25 MMOL/L (ref 20–29)
CREAT SERPL-MCNC: 0.66 MG/DL (ref 0.57–1)
EOSINOPHIL # BLD AUTO: 0.1 X10E3/UL (ref 0–0.4)
EOSINOPHIL NFR BLD AUTO: 2 %
ERYTHROCYTE [DISTWIDTH] IN BLOOD BY AUTOMATED COUNT: 13 % (ref 11.7–15.4)
GLOBULIN SER-MCNC: 1.8 G/DL (ref 1.5–4.5)
GLUCOSE SERPL-MCNC: 105 MG/DL (ref 65–99)
HCT VFR BLD AUTO: 38.2 % (ref 34–46.6)
HDLC SERPL-MCNC: 72 MG/DL
HGB BLD-MCNC: 13.1 G/DL (ref 11.1–15.9)
IMM GRANULOCYTES # BLD: 0 X10E3/UL (ref 0–0.1)
IMM GRANULOCYTES NFR BLD: 0 %
LDLC SERPL CALC-MCNC: 97 MG/DL (ref 0–99)
LYMPHOCYTES # BLD AUTO: 1.4 X10E3/UL (ref 0.7–3.1)
LYMPHOCYTES NFR BLD AUTO: 36 %
MCH RBC QN AUTO: 31.3 PG (ref 26.6–33)
MCHC RBC AUTO-ENTMCNC: 34.3 G/DL (ref 31.5–35.7)
MCV RBC AUTO: 91 FL (ref 79–97)
MICRODELETION SYND BLD/T FISH: NORMAL
MONOCYTES # BLD AUTO: 0.4 X10E3/UL (ref 0.1–0.9)
MONOCYTES NFR BLD AUTO: 10 %
NEUTROPHILS # BLD AUTO: 2 X10E3/UL (ref 1.4–7)
NEUTROPHILS NFR BLD AUTO: 51 %
PLATELET # BLD AUTO: 237 X10E3/UL (ref 150–450)
POTASSIUM SERPL-SCNC: 4.1 MMOL/L (ref 3.5–5.2)
PROT SERPL-MCNC: 6.2 G/DL (ref 6–8.5)
RBC # BLD AUTO: 4.19 X10E6/UL (ref 3.77–5.28)
SL AMB EGFR AFRICAN AMERICAN: 101 ML/MIN/1.73
SL AMB EGFR NON AFRICAN AMERICAN: 88 ML/MIN/1.73
SODIUM SERPL-SCNC: 142 MMOL/L (ref 134–144)
TRIGL SERPL-MCNC: 61 MG/DL (ref 0–149)
WBC # BLD AUTO: 4 X10E3/UL (ref 3.4–10.8)

## 2021-08-26 ENCOUNTER — OFFICE VISIT (OUTPATIENT)
Dept: FAMILY MEDICINE CLINIC | Facility: CLINIC | Age: 71
End: 2021-08-26
Payer: MEDICARE

## 2021-08-26 VITALS
RESPIRATION RATE: 16 BRPM | SYSTOLIC BLOOD PRESSURE: 138 MMHG | OXYGEN SATURATION: 99 % | TEMPERATURE: 97 F | HEIGHT: 63 IN | WEIGHT: 153 LBS | DIASTOLIC BLOOD PRESSURE: 80 MMHG | BODY MASS INDEX: 27.11 KG/M2 | HEART RATE: 71 BPM

## 2021-08-26 DIAGNOSIS — E78.2 MIXED HYPERLIPIDEMIA: ICD-10-CM

## 2021-08-26 DIAGNOSIS — R73.09 ABNORMAL GLUCOSE: ICD-10-CM

## 2021-08-26 DIAGNOSIS — E55.9 VITAMIN D DEFICIENCY: ICD-10-CM

## 2021-08-26 DIAGNOSIS — Z00.00 MEDICARE ANNUAL WELLNESS VISIT, SUBSEQUENT: Primary | ICD-10-CM

## 2021-08-26 DIAGNOSIS — I10 BENIGN ESSENTIAL HTN: ICD-10-CM

## 2021-08-26 DIAGNOSIS — R94.5 ABNORMAL LIVER FUNCTION: ICD-10-CM

## 2021-08-26 DIAGNOSIS — M85.80 OSTEOPENIA, UNSPECIFIED LOCATION: ICD-10-CM

## 2021-08-26 DIAGNOSIS — Z78.0 POST-MENOPAUSAL: ICD-10-CM

## 2021-08-26 PROCEDURE — G0439 PPPS, SUBSEQ VISIT: HCPCS | Performed by: FAMILY MEDICINE

## 2021-08-26 PROCEDURE — 1123F ACP DISCUSS/DSCN MKR DOCD: CPT | Performed by: FAMILY MEDICINE

## 2021-08-26 RX ORDER — AMLODIPINE AND VALSARTAN 5; 160 MG/1; MG/1
1 TABLET ORAL DAILY
Qty: 90 TABLET | Refills: 1 | Status: SHIPPED | OUTPATIENT
Start: 2021-08-26 | End: 2021-11-22

## 2021-08-26 RX ORDER — HYDROCHLOROTHIAZIDE 12.5 MG/1
12.5 TABLET ORAL DAILY
Qty: 90 TABLET | Refills: 1 | Status: SHIPPED | OUTPATIENT
Start: 2021-08-26 | End: 2022-02-28

## 2021-08-26 NOTE — PATIENT INSTRUCTIONS
Medicare Preventive Visit Patient Instructions  Thank you for completing your Welcome to Medicare Visit or Medicare Annual Wellness Visit today  Your next wellness visit will be due in one year (8/27/2022)  The screening/preventive services that you may require over the next 5-10 years are detailed below  Some tests may not apply to you based off risk factors and/or age  Screening tests ordered at today's visit but not completed yet may show as past due  Also, please note that scanned in results may not display below  Preventive Screenings:  Service Recommendations Previous Testing/Comments   Colorectal Cancer Screening  * Colonoscopy    * Fecal Occult Blood Test (FOBT)/Fecal Immunochemical Test (FIT)  * Fecal DNA/Cologuard Test  * Flexible Sigmoidoscopy Age: 54-65 years old   Colonoscopy: every 10 years (may be performed more frequently if at higher risk)  OR  FOBT/FIT: every 1 year  OR  Cologuard: every 3 years  OR  Sigmoidoscopy: every 5 years  Screening may be recommended earlier than age 48 if at higher risk for colorectal cancer  Also, an individualized decision between you and your healthcare provider will decide whether screening between the ages of 74-80 would be appropriate  Colonoscopy: 09/14/2018  FOBT/FIT: Not on file  Cologuard: Not on file  Sigmoidoscopy: Not on file    Screening Current     Breast Cancer Screening Age: 36 years old  Frequency: every 1-2 years  Not required if history of left and right mastectomy Mammogram: 12/16/2020    History Breast Cancer   Cervical Cancer Screening Between the ages of 21-29, pap smear recommended once every 3 years  Between the ages of 33-67, can perform pap smear with HPV co-testing every 5 years     Recommendations may differ for women with a history of total hysterectomy, cervical cancer, or abnormal pap smears in past  Pap Smear: Not on file    Screening Not Indicated   Hepatitis C Screening Once for adults born between 1945 and 1965  More frequently in patients at high risk for Hepatitis C Hep C Antibody: 02/05/2019    Screening Current   Diabetes Screening 1-2 times per year if you're at risk for diabetes or have pre-diabetes Fasting glucose: 99 mg/dL   A1C: 6 0 %    Screening Current   Cholesterol Screening Once every 5 years if you don't have a lipid disorder  May order more often based on risk factors  Lipid panel: 08/20/2021    Screening Not Indicated  History Lipid Disorder     Other Preventive Screenings Covered by Medicare:  1  Abdominal Aortic Aneurysm (AAA) Screening: covered once if your at risk  You're considered to be at risk if you have a family history of AAA  2  Lung Cancer Screening: covers low dose CT scan once per year if you meet all of the following conditions: (1) Age 50-69; (2) No signs or symptoms of lung cancer; (3) Current smoker or have quit smoking within the last 15 years; (4) You have a tobacco smoking history of at least 30 pack years (packs per day multiplied by number of years you smoked); (5) You get a written order from a healthcare provider  3  Glaucoma Screening: covered annually if you're considered high risk: (1) You have diabetes OR (2) Family history of glaucoma OR (3)  aged 48 and older OR (3)  American aged 72 and older  3  Osteoporosis Screening: covered every 2 years if you meet one of the following conditions: (1) You're estrogen deficient and at risk for osteoporosis based off medical history and other findings; (2) Have a vertebral abnormality; (3) On glucocorticoid therapy for more than 3 months; (4) Have primary hyperparathyroidism; (5) On osteoporosis medications and need to assess response to drug therapy  · Last bone density test (DXA Scan): 07/25/2018  5  HIV Screening: covered annually if you're between the age of 12-76  Also covered annually if you are younger than 13 and older than 72 with risk factors for HIV infection   For pregnant patients, it is covered up to 3 times per pregnancy  Immunizations:  Immunization Recommendations   Influenza Vaccine Annual influenza vaccination during flu season is recommended for all persons aged >= 6 months who do not have contraindications   Pneumococcal Vaccine (Prevnar and Pneumovax)  * Prevnar = PCV13  * Pneumovax = PPSV23   Adults 25-60 years old: 1-3 doses may be recommended based on certain risk factors  Adults 72 years old: Prevnar (PCV13) vaccine recommended followed by Pneumovax (PPSV23) vaccine  If already received PPSV23 since turning 65, then PCV13 recommended at least one year after PPSV23 dose  Hepatitis B Vaccine 3 dose series if at intermediate or high risk (ex: diabetes, end stage renal disease, liver disease)   Tetanus (Td) Vaccine - COST NOT COVERED BY MEDICARE PART B Following completion of primary series, a booster dose should be given every 10 years to maintain immunity against tetanus  Td may also be given as tetanus wound prophylaxis  Tdap Vaccine - COST NOT COVERED BY MEDICARE PART B Recommended at least once for all adults  For pregnant patients, recommended with each pregnancy  Shingles Vaccine (Shingrix) - COST NOT COVERED BY MEDICARE PART B  2 shot series recommended in those aged 48 and above     Health Maintenance Due:      Topic Date Due    Cervical Cancer Screening  Never done    Breast Cancer Screening: Mammogram  12/16/2021    Colorectal Cancer Screening  09/14/2028    Hepatitis C Screening  Completed     Immunizations Due:      Topic Date Due    COVID-19 Vaccine (1) Never done    DTaP,Tdap,and Td Vaccines (1 - Tdap) Never done    Influenza Vaccine (1) 09/01/2021     Advance Directives   What are advance directives? Advance directives are legal documents that state your wishes and plans for medical care  These plans are made ahead of time in case you lose your ability to make decisions for yourself   Advance directives can apply to any medical decision, such as the treatments you want, and if you want to donate organs  What are the types of advance directives? There are many types of advance directives, and each state has rules about how to use them  You may choose a combination of any of the following:  · Living will: This is a written record of the treatment you want  You can also choose which treatments you do not want, which to limit, and which to stop at a certain time  This includes surgery, medicine, IV fluid, and tube feedings  · Durable power of  for healthcare [facility]): This is a written record that states who you want to make healthcare choices for you when you are unable to make them for yourself  This person, called a proxy, is usually a family member or a friend  You may choose more than 1 proxy  · Do not resuscitate (DNR) order:  A DNR order is used in case your heart stops beating or you stop breathing  It is a request not to have certain forms of treatment, such as CPR  A DNR order may be included in other types of advance directives  · Medical directive: This covers the care that you want if you are in a coma, near death, or unable to make decisions for yourself  You can list the treatments you want for each condition  Treatment may include pain medicine, surgery, blood transfusions, dialysis, IV or tube feedings, and a ventilator (breathing machine)  · Values history: This document has questions about your views, beliefs, and how you feel and think about life  This information can help others choose the care that you would choose  Why are advance directives important? An advance directive helps you control your care  Although spoken wishes may be used, it is better to have your wishes written down  Spoken wishes can be misunderstood, or not followed  Treatments may be given even if you do not want them  An advance directive may make it easier for your family to make difficult choices about your care     Urinary Incontinence   Urinary incontinence (UI)  is when you lose control of your bladder  UI develops because your bladder cannot store or empty urine properly  The 3 most common types of UI are stress incontinence, urge incontinence, or both  Medicines:   · May be given to help strengthen your bladder control  Report any side effects of medication to your healthcare provider  Do pelvic muscle exercises often:  Your pelvic muscles help you stop urinating  Squeeze these muscles tight for 5 seconds, then relax for 5 seconds  Gradually work up to squeezing for 10 seconds  Do 3 sets of 15 repetitions a day, or as directed  This will help strengthen your pelvic muscles and improve bladder control  Train your bladder:  Go to the bathroom at set times, such as every 2 hours, even if you do not feel the urge to go  You can also try to hold your urine when you feel the urge to go  For example, hold your urine for 5 minutes when you feel the urge to go  As that becomes easier, hold your urine for 10 minutes  Self-care:   · Keep a UI record  Write down how often you leak urine and how much you leak  Make a note of what you were doing when you leaked urine  · Drink liquids as directed  You may need to limit the amount of liquid you drink to help control your urine leakage  Do not drink any liquid right before you go to bed  Limit or do not have drinks that contain caffeine or alcohol  · Prevent constipation  Eat a variety of high-fiber foods  Good examples are high-fiber cereals, beans, vegetables, and whole-grain breads  Walking is the best way to trigger your intestines to have a bowel movement  · Exercise regularly and maintain a healthy weight  Weight loss and exercise will decrease pressure on your bladder and help you control your leakage  · Use a catheter as directed  to help empty your bladder  A catheter is a tiny, plastic tube that is put into your bladder to drain your urine  · Go to behavior therapy as directed    Behavior therapy may be used to help you learn to control your urge to urinate  Weight Management   Why it is important to manage your weight:  Being overweight increases your risk of health conditions such as heart disease, high blood pressure, type 2 diabetes, and certain types of cancer  It can also increase your risk for osteoarthritis, sleep apnea, and other respiratory problems  Aim for a slow, steady weight loss  Even a small amount of weight loss can lower your risk of health problems  How to lose weight safely:  A safe and healthy way to lose weight is to eat fewer calories and get regular exercise  You can lose up about 1 pound a week by decreasing the number of calories you eat by 500 calories each day  Healthy meal plan for weight management:  A healthy meal plan includes a variety of foods, contains fewer calories, and helps you stay healthy  A healthy meal plan includes the following:  · Eat whole-grain foods more often  A healthy meal plan should contain fiber  Fiber is the part of grains, fruits, and vegetables that is not broken down by your body  Whole-grain foods are healthy and provide extra fiber in your diet  Some examples of whole-grain foods are whole-wheat breads and pastas, oatmeal, brown rice, and bulgur  · Eat a variety of vegetables every day  Include dark, leafy greens such as spinach, kale, kathe greens, and mustard greens  Eat yellow and orange vegetables such as carrots, sweet potatoes, and winter squash  · Eat a variety of fruits every day  Choose fresh or canned fruit (canned in its own juice or light syrup) instead of juice  Fruit juice has very little or no fiber  · Eat low-fat dairy foods  Drink fat-free (skim) milk or 1% milk  Eat fat-free yogurt and low-fat cottage cheese  Try low-fat cheeses such as mozzarella and other reduced-fat cheeses  · Choose meat and other protein foods that are low in fat  Choose beans or other legumes such as split peas or lentils   Choose fish, skinless poultry (chicken or turkey), or lean cuts of red meat (beef or pork)  Before you cook meat or poultry, cut off any visible fat  · Use less fat and oil  Try baking foods instead of frying them  Add less fat, such as margarine, sour cream, regular salad dressing and mayonnaise to foods  Eat fewer high-fat foods  Some examples of high-fat foods include french fries, doughnuts, ice cream, and cakes  · Eat fewer sweets  Limit foods and drinks that are high in sugar  This includes candy, cookies, regular soda, and sweetened drinks  Exercise:  Exercise at least 30 minutes per day on most days of the week  Some examples of exercise include walking, biking, dancing, and swimming  You can also fit in more physical activity by taking the stairs instead of the elevator or parking farther away from stores  Ask your healthcare provider about the best exercise plan for you  © Copyright [employer] 2018 Information is for End User's use only and may not be sold, redistributed or otherwise used for commercial purposes  All illustrations and images included in CareNotes® are the copyrighted property of [employer] M [employer]  or [address] [place], [place] [address]cora Canseco     Weight Management   AMBULATORY CARE:   Why it is important to manage your weight:  Being overweight increases your risk of health conditions such as heart disease, high blood pressure, type 2 diabetes, and certain types of cancer  It can also increase your risk for osteoarthritis, sleep apnea, and other respiratory problems  Aim for a slow, steady weight loss  Even a small amount of weight loss can lower your risk of health problems  How to lose weight safely:  A safe and healthy way to lose weight is to eat fewer calories and get regular exercise  · You can lose up about 1 pound a week by decreasing the number of calories you eat by 500 calories each day  You can decrease calories by eating smaller portion sizes or by cutting out high-calorie foods   Read labels to find out how many calories are in the foods you eat  · You can also burn calories with exercise such as walking, swimming, or biking  You will be more likely to keep weight off if you make these changes part of your lifestyle  Exercise at least 30 minutes per day on most days of the week  You can also fit in more physical activity by taking the stairs instead of the elevator or parking farther away from stores  Ask your healthcare provider about the best exercise plan for you  Healthy meal plan for weight management:  A healthy meal plan includes a variety of foods, contains fewer calories, and helps you stay healthy  A healthy meal plan includes the following:     · Eat whole-grain foods more often  A healthy meal plan should contain fiber  Fiber is the part of grains, fruits, and vegetables that is not broken down by your body  Whole-grain foods are healthy and provide extra fiber in your diet  Some examples of whole-grain foods are whole-wheat breads and pastas, oatmeal, brown rice, and bulgur  · Eat a variety of vegetables every day  Include dark, leafy greens such as spinach, kale, kathe greens, and mustard greens  Eat yellow and orange vegetables such as carrots, sweet potatoes, and winter squash  · Eat a variety of fruits every day  Choose fresh or canned fruit (canned in its own juice or light syrup) instead of juice  Fruit juice has very little or no fiber  · Eat low-fat dairy foods  Drink fat-free (skim) milk or 1% milk  Eat fat-free yogurt and low-fat cottage cheese  Try low-fat cheeses such as mozzarella and other reduced-fat cheeses  · Choose meat and other protein foods that are low in fat  Choose beans or other legumes such as split peas or lentils  Choose fish, skinless poultry (chicken or turkey), or lean cuts of red meat (beef or pork)  Before you cook meat or poultry, cut off any visible fat  · Use less fat and oil  Try baking foods instead of frying them   Add less fat, such as margarine, sour cream, regular salad dressing and mayonnaise to foods  Eat fewer high-fat foods  Some examples of high-fat foods include french fries, doughnuts, ice cream, and cakes  · Eat fewer sweets  Limit foods and drinks that are high in sugar  This includes candy, cookies, regular soda, and sweetened drinks  Ways to decrease calories:   · Eat smaller portions  ? Use a small plate with smaller servings  ? Do not eat second helpings  ? When you eat at a restaurant, ask for a box and place half of your meal in the box before you eat  ? Share an entrée with someone else  · Replace high-calorie snacks with healthy, low-calorie snacks  ? Choose fresh fruit, vegetables, fat-free rice cakes, or air-popped popcorn instead of potato chips, nuts, or chocolate  ? Choose water or calorie-free drinks instead of soda or sweetened drinks  · Do not shop for groceries when you are hungry  You may be more likely to make unhealthy food choices  Take a grocery list of healthy foods and shop after you have eaten  · Eat regular meals  Do not skip meals  Skipping meals can lead to overeating later in the day  This can make it harder for you to lose weight  Eat a healthy snack in place of a meal if you do not have time to eat a regular meal  Talk with a dietitian to help you create a meal plan and schedule that is right for you  Other things to consider as you try to lose weight:   · Be aware of situations that may give you the urge to overeat, such as eating while watching television  Find ways to avoid these situations  For example, read a book, go for a walk, or do crafts  · Meet with a weight loss support group or friends who are also trying to lose weight  This may help you stay motivated to continue working on your weight loss goals  © Copyright [employer] 2021 Information is for End User's use only and may not be sold, redistributed or otherwise used for commercial purposes   All illustrations and images included in CareNotes® are the copyrighted property of A [employer] M [employer]  or Richard Rhodes  The above information is an  only  It is not intended as medical advice for individual conditions or treatments  Talk to your doctor, nurse or pharmacist before following any medical regimen to see if it is safe and effective for you

## 2021-08-26 NOTE — PROGRESS NOTES
Assessment and Plan:     Problem List Items Addressed This Visit        Cardiovascular and Mediastinum    Benign essential HTN    Relevant Medications    hydrochlorothiazide (HYDRODIURIL) 12 5 mg tablet    amLODIPine-valsartan (EXFORGE) 5-160 MG per tablet    Other Relevant Orders    Comprehensive metabolic panel    CBC       Musculoskeletal and Integument    Osteopenia    Relevant Orders    DXA bone density spine hip and pelvis    CBC       Other    Mixed hyperlipidemia    Relevant Orders    Lipid Panel with Direct LDL reflex    CBC    Vitamin D deficiency    Relevant Orders    CBC    Abnormal glucose    Relevant Orders    CBC    Abnormal liver function    Relevant Orders    CBC      Other Visit Diagnoses     Medicare annual wellness visit, subsequent    -  Primary    Relevant Orders    CBC    BMI 27 0-27 9,adult        Relevant Orders    CBC    Post-menopausal        Relevant Orders    DXA bone density spine hip and pelvis    CBC        BMI Counseling: Body mass index is 27 54 kg/m²  The BMI is above normal  Nutrition recommendations include decreasing portion sizes  Exercise recommendations include moderate physical activity 150 minutes/week  No pharmacotherapy was ordered  Preventive health issues were discussed with patient, and age appropriate screening tests were ordered as noted in patient's After Visit Summary  Personalized health advice and appropriate referrals for health education or preventive services given if needed, as noted in patient's After Visit Summary       History of Present Illness:     Patient presents for Medicare Annual Wellness visit    Patient Care Team:  Kesha Mireles DO as PCP - General (Family Medicine)  Vannesa Ball MD as Consulting Physician (Surgical Oncology)  Karuna London MD as Consulting Physician (Radiation Oncology)  Landon Ba MD as Consulting Physician (Hematology and Oncology)  Nati Elliott MD as Consulting Physician (Gastroenterology)  Alexandrea Greene MD as Consulting Physician (Ophthalmology)     Problem List:     Patient Active Problem List   Diagnosis    Anxiety    Benign essential HTN    Mixed hyperlipidemia    Psychophysiological insomnia    Leiomyoma of uterus    Malignant neoplasm of 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, estrogen receptor positive (HCC)    Osteopenia    Vitamin D deficiency    Abnormal glucose    Abnormal mammogram    Cervical polyp    Hemorrhoids    Menopause    Muscle cramps at night    Overweight (BMI 25 0-29  9)    Family hx of colon cancer    Use of anastrozole (Arimidex)    Bilateral leg edema    Abnormal liver function    Former smoker      Past Medical and Surgical History:     Past Medical History:   Diagnosis Date    Abnormal blood chemistry     Abnormal glucose     Breast cancer ([address] ) 2018    left    Cancer (HCC)     left breast    Cervical polyp     Fracture of ankle     Hemorrhoid     Herpes zoster     History of chemotherapy     History of radiation therapy     Hyperglycemia     Hyperlipidemia     Hypertension     Insomnia     Leiomyoma of uterus     Osteopenia     Seborrheic keratosis     Shingles     Spider bite wound      Past Surgical History:   Procedure Laterality Date    ANKLE SURGERY Right     BREAST BIOPSY Left     BREAST CYST ASPIRATION Right 1992    BREAST LUMPECTOMY Left 01/01/2018    CYST REMOVAL      right eyelid    DILATION AND CURETTAGE OF UTERUS      FRACTURE SURGERY      ankle plates and screws    INTRAOPERATIVE RADIATION THERAPY (IORT) Left 1/12/2018    Procedure: BREAST IORT BY DR Emiliano Montaño;  Surgeon: Loreta Bang MD;  [facility];  Service: Surgical Oncology    MAMMO NEEDLE LOCALIZATION LEFT (ALL INC) Left 1/12/2018    KS BIOPSY/EXCISION, LYMPH NODE(S) Left 1/12/2018    Procedure: LYMPHOSCINTIGRAPHY; INTRAOPERATIVE LYMPHATIC MAPPING; SENTINEL LYMPH NODE BIOPSY (INJECT AT 1215);   Surgeon: Loreta Bang MD;  [facility];  Service: Surgical Oncology    IL COLONOSCOPY FLX DX W/COLLJ SPEC WHEN PFRMD N/A 9/14/2018    Procedure: COLONOSCOPY;  Surgeon: Sima Larsen MD;  Location: [place] [address] [facility]; Service: Gastroenterology    IL PERQ DEVICE PLACEMT BREAST LOC 1ST LES W GUIDNCE Left 1/12/2018    Procedure: BREAST LUMPECTOMY; BREAST NEEDLE LOCALIZATION (NEEDLE LOC AT 1130); FROZEN SECTION;  Surgeon: Tay Roberson MD;  [facility];  Service: Surgical Oncology    SENTINEL LYMPH NODE BIOPSY Left     US GUIDED BREAST BIOPSY LEFT COMPLETE Left 11/30/2017      Family History:     Family History   Problem Relation Age of Onset    Colon cancer Paternal Grandfather     Breast cancer Maternal Aunt 27    Ovarian cancer Maternal Aunt     Prostate cancer Maternal Uncle     Hypertension Mother     Heart disease Mother     Thyroid disease Mother     Leukemia Father     Other Father         dyschromia    Emphysema Maternal Grandfather     Heart disease Paternal Grandmother     Colon cancer Paternal Aunt     Breast cancer Maternal Aunt 36    Colon cancer Paternal Uncle     Mental illness Neg Hx       Social History:     Social History     Socioeconomic History    Marital status: /Civil Union     Spouse name: None    Number of children: None    Years of education: None    Highest education level: None   Occupational History    Occupation:     Tobacco Use    Smoking status: Former Smoker     Types: Cigarettes    Smokeless tobacco: Never Used    Tobacco comment: 2008 quit  Vaping Use    Vaping Use: Never assessed   Substance and Sexual Activity    Alcohol use:  Yes     Alcohol/week: 7 0 standard drinks     Types: 4 Glasses of wine, 3 Cans of beer per week     Comment: social    Drug use: Never    Sexual activity: None   Other Topics Concern    None   Social History Narrative    None     Social Determinants of Health     Financial Resource Strain:     Difficulty of Paying Living Expenses:    Food Insecurity:     Worried About Running Out of Food in the Last Year:     Kacy of Food in the Last Year:    Transportation Needs:     Lack of Transportation (Medical):  Lack of Transportation (Non-Medical):    Physical Activity:     Days of Exercise per Week:     Minutes of Exercise per Session:    Stress:     Feeling of Stress :    Social Connections:     Frequency of Communication with Friends and Family:     Frequency of Social Gatherings with Friends and Family:     Attends Cheondoism Services:     Active Member of Clubs or Organizations:     Attends Club or Organization Meetings:     Marital Status:    Intimate Partner Violence:     Fear of Current or Ex-Partner:     Emotionally Abused:     Physically Abused:     Sexually Abused:       Medications and Allergies:     Current Outpatient Medications   Medication Sig Dispense Refill    amLODIPine-valsartan (EXFORGE) 5-160 MG per tablet Take 1 tablet by mouth daily 90 tablet 1    anastrozole (ARIMIDEX) 1 mg tablet Take 1 tablet (1 mg total) by mouth daily 90 tablet 1    Calcium Carb-Cholecalciferol 600-1000 MG-UNIT CAPS Take by mouth      simvastatin (ZOCOR) 40 mg tablet TAKE 1 TABLET BY MOUTH AT BEDTIME 90 tablet 1    hydrochlorothiazide (HYDRODIURIL) 12 5 mg tablet Take 1 tablet (12 5 mg total) by mouth daily 90 tablet 1    Zoster Vac Recomb Adjuvanted (SHINGRIX) 50 MCG/0 5ML SUSR 1 vaccine series (Patient not taking: Reported on 11/12/2020) 1 each 0     No current facility-administered medications for this visit       Allergies   Allergen Reactions    Adhesive [Medical Tape] Rash      Immunizations:     Immunization History   Administered Date(s) Administered    Influenza Split High Dose Preservative Free IM 10/30/2013, 12/14/2016    Influenza, high dose seasonal 0 7 mL 02/14/2019, 11/21/2019, 11/16/2020    Influenza, seasonal, injectable 12/13/2016    Influenza, seasonal, injectable, preservative free 10/25/2015    Pneumococcal Conjugate 13-Valent 11/20/2015    Pneumococcal Polysaccharide PPV23 11/19/2014, 12/13/2015      Health Maintenance:         Topic Date Due    Cervical Cancer Screening  Never done    Breast Cancer Screening: Mammogram  12/16/2021    Colorectal Cancer Screening  09/14/2028    Hepatitis C Screening  Completed         Topic Date Due    COVID-19 Vaccine (1) Never done    DTaP,Tdap,and Td Vaccines (1 - Tdap) Never done    Influenza Vaccine (1) 09/01/2021      Medicare Health Risk Assessment:     /80   Pulse 71   Temp (!) 97 °F (36 1 °C)   Resp 16   Ht 5' 2 5" (1 588 m)   Wt 69 4 kg (153 lb)   SpO2 99%   BMI 27 54 kg/m²      Syed Sr is here for her Subsequent Wellness visit  Last Medicare Wellness visit information reviewed, patient interviewed, no change since last AWV  Health Risk Assessment:   Patient rates overall health as very good  Patient feels that their physical health rating is much better  Patient is satisfied with their life  Eyesight was rated as slightly worse  Hearing was rated as same  Patient feels that their emotional and mental health rating is same  Patients states they are sometimes angry  Patient states they are sometimes unusually tired/fatigued  Pain experienced in the last 7 days has been none  Patient states that she has experienced no weight loss or gain in last 6 months  Depression Screening:   PHQ-2 Score: 1      Fall Risk Screening: In the past year, patient has experienced: no history of falling in past year      Urinary Incontinence Screening:   Patient has leaked urine accidently in the last six months  Home Safety:  Patient does not have trouble with stairs inside or outside of their home  Patient has working smoke alarms and has working carbon monoxide detector  Home safety hazards include: household clutter  Nutrition:   Current diet is Regular  Medications:   Patient is currently taking over-the-counter supplements   OTC medications include: see medication list  Patient is able to manage medications  Activities of Daily Living (ADLs)/Instrumental Activities of Daily Living (IADLs):   Walk and transfer into and out of bed and chair?: Yes  Dress and groom yourself?: Yes    Bathe or shower yourself?: Yes    Feed yourself? Yes  Do your laundry/housekeeping?: Yes  Manage your money, pay your bills and track your expenses?: Yes  Make your own meals?: Yes    Do your own shopping?: Yes    Previous Hospitalizations:   Any hospitalizations or ED visits within the last 12 months?: No      Advance Care Planning:   Living will: No    Durable POA for healthcare: No    Advanced directive: No      Cognitive Screening:   Provider or family/friend/caregiver concerned regarding cognition?: No    PREVENTIVE SCREENINGS      Cardiovascular Screening:    General: Screening Not Indicated, History Lipid Disorder and Risks and Benefits Discussed    Due for: Lipid Panel      Diabetes Screening:     General: Screening Current and Risks and Benefits Discussed    Due for: Blood Glucose      Colorectal Cancer Screening:     General: Screening Current      Breast Cancer Screening:     General: History Breast Cancer and Risks and Benefits Discussed      Cervical Cancer Screening:    General: Screening Not Indicated      Osteoporosis Screening:    General: Risks and Benefits Discussed      Abdominal Aortic Aneurysm (AAA) Screening:        General: Screening Not Indicated      Lung Cancer Screening:     General: Screening Not Indicated      Hepatitis C Screening:    General: Screening Current    Screening, Brief Intervention, and Referral to Treatment (SBIRT)    Screening  Typical number of drinks in a day: 0  Typical number of drinks in a week: 0  Interpretation: Low risk drinking behavior      AUDIT-C Screenin) How often did you have a drink containing alcohol in the past year? monthly or less    Single Item Drug Screening:  How often have you used an illegal drug (including marijuana) or a prescription medication for non-medical reasons in the past year? never    Single Item Drug Screen Score: 0  Interpretation: Negative screen for possible drug use disorder    Brief Intervention  Alcohol & drug use screenings were reviewed  No concerns regarding substance use disorder identified       Recent Results (from the past 672 hour(s))   Lu Guzmán Default    Collection Time: 08/20/21  8:20 AM   Result Value Ref Range    White Blood Cell Count 4 0 3 4 - 10 8 x10E3/uL    Red Blood Cell Count 4 19 3 77 - 5 28 x10E6/uL    Hemoglobin 13 1 11 1 - 15 9 g/dL    HCT 38 2 34 0 - 46 6 %    MCV 91 79 - 97 fL    MCH 31 3 26 6 - 33 0 pg    MCHC 34 3 31 5 - 35 7 g/dL    RDW 13 0 11 7 - 15 4 %    Platelet Count 646 795 - 450 x10E3/uL    Neutrophils 51 Not Estab  %    Lymphocytes 36 Not Estab  %    Monocytes 10 Not Estab  %    Eosinophils 2 Not Estab  %    Basophils PCT 1 Not Estab  %    Neutrophils (Absolute) 2 0 1 4 - 7 0 x10E3/uL    Lymphocytes (Absolute) 1 4 0 7 - 3 1 x10E3/uL    Monocytes (Absolute) 0 4 0 1 - 0 9 x10E3/uL    Eosinophils (Absolute) 0 1 0 0 - 0 4 x10E3/uL    Basophils ABS 0 0 0 0 - 0 2 x10E3/uL    Immature Granulocytes 0 Not Estab  %    Immature Granulocytes (Absolute) 0 0 0 0 - 0 1 x10E3/uL   Comprehensive metabolic panel    Collection Time: 08/20/21  8:20 AM   Result Value Ref Range    Glucose, Random 105 (H) 65 - 99 mg/dL    BUN 14 8 - 27 mg/dL    Creatinine 0 66 0 57 - 1 00 mg/dL    eGFR Non  88 >59 mL/min/1 73    eGFR  101 >59 mL/min/1 73    SL AMB BUN/CREATININE RATIO 21 12 - 28    Sodium 142 134 - 144 mmol/L    Potassium 4 1 3 5 - 5 2 mmol/L    Chloride 104 96 - 106 mmol/L    CO2 25 20 - 29 mmol/L    CALCIUM 9 7 8 7 - 10 3 mg/dL    Protein, Total 6 2 6 0 - 8 5 g/dL    Albumin 4 4 3 7 - 4 7 g/dL    Globulin, Total 1 8 1 5 - 4 5 g/dL    Albumin/Globulin Ratio 2 4 (H) 1 2 - 2 2    TOTAL BILIRUBIN 0 5 0 0 - 1 2 mg/dL    Alk Phos Isoenzymes 106 48 - 121 IU/L    AST 19 0 - 40 IU/L    ALT 14 0 - 32 IU/L   Lipid panel    Collection Time: 08/20/21  8:20 AM   Result Value Ref Range    Cholesterol, Total 181 100 - 199 mg/dL    Triglycerides 61 0 - 149 mg/dL    HDL 72 >39 mg/dL    LDL Calculated 97 0 - 99 mg/dL   Vitamin D 25 hydroxy    Collection Time: 08/20/21  8:20 AM   Result Value Ref Range    25-HYDROXY VIT D 37 5 30 0 - 100 0 ng/mL   Cardiovascular Report    Collection Time: 08/20/21  8:20 AM   Result Value Ref Range    Interpretation Note          Yee Boyd DO  BMI Counseling: Body mass index is 27 54 kg/m²  The BMI is above normal  Nutrition recommendations include reducing portion sizes

## 2021-11-02 ENCOUNTER — OFFICE VISIT (OUTPATIENT)
Dept: HEMATOLOGY ONCOLOGY | Facility: CLINIC | Age: 71
End: 2021-11-02
Payer: MEDICARE

## 2021-11-02 VITALS
WEIGHT: 156 LBS | RESPIRATION RATE: 18 BRPM | OXYGEN SATURATION: 96 % | DIASTOLIC BLOOD PRESSURE: 68 MMHG | TEMPERATURE: 97.5 F | HEART RATE: 69 BPM | HEIGHT: 63 IN | BODY MASS INDEX: 27.64 KG/M2 | SYSTOLIC BLOOD PRESSURE: 126 MMHG

## 2021-11-02 DIAGNOSIS — Z17.0 MALIGNANT NEOPLASM OF UPPER-INNER QUADRANT OF LEFT BREAST IN FEMALE, ESTROGEN RECEPTOR POSITIVE (HCC): Primary | ICD-10-CM

## 2021-11-02 DIAGNOSIS — C50.212 MALIGNANT NEOPLASM OF UPPER-INNER QUADRANT OF LEFT BREAST IN FEMALE, ESTROGEN RECEPTOR POSITIVE (HCC): Primary | ICD-10-CM

## 2021-11-02 PROCEDURE — 99214 OFFICE O/P EST MOD 30 MIN: CPT | Performed by: INTERNAL MEDICINE

## 2021-11-12 ENCOUNTER — OFFICE VISIT (OUTPATIENT)
Dept: SURGICAL ONCOLOGY | Facility: CLINIC | Age: 71
End: 2021-11-12
Payer: MEDICARE

## 2021-11-12 VITALS
RESPIRATION RATE: 18 BRPM | OXYGEN SATURATION: 99 % | WEIGHT: 159.6 LBS | BODY MASS INDEX: 28.28 KG/M2 | DIASTOLIC BLOOD PRESSURE: 80 MMHG | SYSTOLIC BLOOD PRESSURE: 142 MMHG | HEIGHT: 63 IN | TEMPERATURE: 97.2 F | HEART RATE: 69 BPM

## 2021-11-12 DIAGNOSIS — Z79.811 USE OF ANASTROZOLE (ARIMIDEX): ICD-10-CM

## 2021-11-12 DIAGNOSIS — Z12.39 ENCOUNTER FOR BREAST CANCER SCREENING OTHER THAN MAMMOGRAM: ICD-10-CM

## 2021-11-12 DIAGNOSIS — E78.5 HYPERLIPIDEMIA LDL GOAL <130: ICD-10-CM

## 2021-11-12 DIAGNOSIS — R92.2 DENSE BREASTS: ICD-10-CM

## 2021-11-12 DIAGNOSIS — C50.212 MALIGNANT NEOPLASM OF UPPER-INNER QUADRANT OF LEFT BREAST IN FEMALE, ESTROGEN RECEPTOR POSITIVE (HCC): Primary | ICD-10-CM

## 2021-11-12 DIAGNOSIS — Z17.0 MALIGNANT NEOPLASM OF UPPER-INNER QUADRANT OF LEFT BREAST IN FEMALE, ESTROGEN RECEPTOR POSITIVE (HCC): Primary | ICD-10-CM

## 2021-11-12 PROCEDURE — 99214 OFFICE O/P EST MOD 30 MIN: CPT | Performed by: NURSE PRACTITIONER

## 2021-11-12 RX ORDER — SIMVASTATIN 40 MG
TABLET ORAL
Qty: 90 TABLET | Refills: 1 | Status: SHIPPED | OUTPATIENT
Start: 2021-11-12 | End: 2022-07-20

## 2021-11-21 DIAGNOSIS — I10 BENIGN ESSENTIAL HTN: ICD-10-CM

## 2021-11-22 RX ORDER — AMLODIPINE AND VALSARTAN 5; 160 MG/1; MG/1
TABLET ORAL
Qty: 90 TABLET | Refills: 0 | Status: SHIPPED | OUTPATIENT
Start: 2021-11-22 | End: 2022-03-29

## 2021-12-17 ENCOUNTER — HOSPITAL ENCOUNTER (OUTPATIENT)
Dept: RADIOLOGY | Facility: HOSPITAL | Age: 71
Discharge: HOME | End: 2021-12-17
Payer: MEDICARE

## 2021-12-17 VITALS — BODY MASS INDEX: 25.99 KG/M2 | HEIGHT: 65 IN | WEIGHT: 156 LBS

## 2021-12-17 DIAGNOSIS — Z17.0 MALIGNANT NEOPLASM OF UPPER-INNER QUADRANT OF LEFT BREAST IN FEMALE, ESTROGEN RECEPTOR POSITIVE (HCC): ICD-10-CM

## 2021-12-17 DIAGNOSIS — R92.2 DENSE BREASTS: ICD-10-CM

## 2021-12-17 DIAGNOSIS — Z12.39 ENCOUNTER FOR BREAST CANCER SCREENING OTHER THAN MAMMOGRAM: ICD-10-CM

## 2021-12-17 DIAGNOSIS — C50.212 MALIGNANT NEOPLASM OF UPPER-INNER QUADRANT OF LEFT BREAST IN FEMALE, ESTROGEN RECEPTOR POSITIVE (HCC): ICD-10-CM

## 2021-12-17 PROCEDURE — G0279 TOMOSYNTHESIS, MAMMO: HCPCS

## 2021-12-17 PROCEDURE — 77066 DX MAMMO INCL CAD BI: CPT

## 2021-12-17 PROCEDURE — 76641 ULTRASOUND BREAST COMPLETE: CPT

## 2022-01-12 DIAGNOSIS — Z17.0 MALIGNANT NEOPLASM OF UPPER-INNER QUADRANT OF LEFT BREAST IN FEMALE, ESTROGEN RECEPTOR POSITIVE (HCC): ICD-10-CM

## 2022-01-12 DIAGNOSIS — C50.212 MALIGNANT NEOPLASM OF UPPER-INNER QUADRANT OF LEFT BREAST IN FEMALE, ESTROGEN RECEPTOR POSITIVE (HCC): ICD-10-CM

## 2022-01-12 RX ORDER — ANASTROZOLE 1 MG/1
TABLET ORAL
Qty: 90 TABLET | Refills: 3 | Status: SHIPPED | OUTPATIENT
Start: 2022-01-12

## 2022-02-16 LAB
ALBUMIN SERPL-MCNC: 4.4 G/DL (ref 3.7–4.7)
ALBUMIN/GLOB SERPL: 2.6 {RATIO} (ref 1.2–2.2)
ALP SERPL-CCNC: 120 IU/L (ref 44–121)
ALT SERPL-CCNC: 18 IU/L (ref 0–32)
AST SERPL-CCNC: 24 IU/L (ref 0–40)
BASOPHILS # BLD AUTO: 0 X10E3/UL (ref 0–0.2)
BASOPHILS NFR BLD AUTO: 1 %
BILIRUB SERPL-MCNC: 0.3 MG/DL (ref 0–1.2)
BUN SERPL-MCNC: 13 MG/DL (ref 8–27)
BUN/CREAT SERPL: 22 (ref 12–28)
CALCIUM SERPL-MCNC: 9.4 MG/DL (ref 8.7–10.3)
CHLORIDE SERPL-SCNC: 102 MMOL/L (ref 96–106)
CHOLEST SERPL-MCNC: 194 MG/DL (ref 100–199)
CO2 SERPL-SCNC: 25 MMOL/L (ref 20–29)
CREAT SERPL-MCNC: 0.58 MG/DL (ref 0.57–1)
EOSINOPHIL # BLD AUTO: 0.1 X10E3/UL (ref 0–0.4)
EOSINOPHIL NFR BLD AUTO: 2 %
ERYTHROCYTE [DISTWIDTH] IN BLOOD BY AUTOMATED COUNT: 12.8 % (ref 11.7–15.4)
GLOBULIN SER-MCNC: 1.7 G/DL (ref 1.5–4.5)
GLUCOSE SERPL-MCNC: 111 MG/DL (ref 65–99)
HCT VFR BLD AUTO: 38.8 % (ref 34–46.6)
HDLC SERPL-MCNC: 77 MG/DL
HGB BLD-MCNC: 13.3 G/DL (ref 11.1–15.9)
IMM GRANULOCYTES # BLD: 0 X10E3/UL (ref 0–0.1)
IMM GRANULOCYTES NFR BLD: 0 %
LDLC SERPL CALC-MCNC: 104 MG/DL (ref 0–99)
LYMPHOCYTES # BLD AUTO: 1.4 X10E3/UL (ref 0.7–3.1)
LYMPHOCYTES NFR BLD AUTO: 34 %
MCH RBC QN AUTO: 31.3 PG (ref 26.6–33)
MCHC RBC AUTO-ENTMCNC: 34.3 G/DL (ref 31.5–35.7)
MCV RBC AUTO: 91 FL (ref 79–97)
MICRODELETION SYND BLD/T FISH: NORMAL
MONOCYTES # BLD AUTO: 0.3 X10E3/UL (ref 0.1–0.9)
MONOCYTES NFR BLD AUTO: 8 %
NEUTROPHILS # BLD AUTO: 2.3 X10E3/UL (ref 1.4–7)
NEUTROPHILS NFR BLD AUTO: 55 %
PLATELET # BLD AUTO: 234 X10E3/UL (ref 150–450)
POTASSIUM SERPL-SCNC: 3.9 MMOL/L (ref 3.5–5.2)
PROT SERPL-MCNC: 6.1 G/DL (ref 6–8.5)
RBC # BLD AUTO: 4.25 X10E6/UL (ref 3.77–5.28)
SL AMB EGFR AFRICAN AMERICAN: 106 ML/MIN/1.73
SL AMB EGFR NON AFRICAN AMERICAN: 92 ML/MIN/1.73
SODIUM SERPL-SCNC: 138 MMOL/L (ref 134–144)
TRIGL SERPL-MCNC: 70 MG/DL (ref 0–149)
WBC # BLD AUTO: 4.1 X10E3/UL (ref 3.4–10.8)

## 2022-02-28 ENCOUNTER — OFFICE VISIT (OUTPATIENT)
Dept: FAMILY MEDICINE CLINIC | Facility: CLINIC | Age: 72
End: 2022-02-28
Payer: MEDICARE

## 2022-02-28 VITALS
WEIGHT: 159 LBS | OXYGEN SATURATION: 96 % | RESPIRATION RATE: 18 BRPM | TEMPERATURE: 97 F | HEART RATE: 70 BPM | DIASTOLIC BLOOD PRESSURE: 80 MMHG | HEIGHT: 65 IN | SYSTOLIC BLOOD PRESSURE: 134 MMHG | BODY MASS INDEX: 26.49 KG/M2

## 2022-02-28 DIAGNOSIS — E78.2 MIXED HYPERLIPIDEMIA: ICD-10-CM

## 2022-02-28 DIAGNOSIS — F41.9 ANXIETY: ICD-10-CM

## 2022-02-28 DIAGNOSIS — R73.09 ABNORMAL GLUCOSE: ICD-10-CM

## 2022-02-28 DIAGNOSIS — I10 BENIGN ESSENTIAL HTN: Primary | ICD-10-CM

## 2022-02-28 DIAGNOSIS — E66.3 OVERWEIGHT (BMI 25.0-29.9): ICD-10-CM

## 2022-02-28 DIAGNOSIS — C50.212 MALIGNANT NEOPLASM OF UPPER-INNER QUADRANT OF LEFT BREAST IN FEMALE, ESTROGEN RECEPTOR POSITIVE (HCC): ICD-10-CM

## 2022-02-28 DIAGNOSIS — R94.5 ABNORMAL LIVER FUNCTION: ICD-10-CM

## 2022-02-28 DIAGNOSIS — Z17.0 MALIGNANT NEOPLASM OF UPPER-INNER QUADRANT OF LEFT BREAST IN FEMALE, ESTROGEN RECEPTOR POSITIVE (HCC): ICD-10-CM

## 2022-02-28 PROBLEM — R60.0 BILATERAL LEG EDEMA: Status: RESOLVED | Noted: 2020-07-02 | Resolved: 2022-02-28

## 2022-02-28 PROBLEM — R25.2 MUSCLE CRAMPS AT NIGHT: Status: RESOLVED | Noted: 2017-06-30 | Resolved: 2022-02-28

## 2022-02-28 PROCEDURE — 99214 OFFICE O/P EST MOD 30 MIN: CPT | Performed by: FAMILY MEDICINE

## 2022-02-28 NOTE — PROGRESS NOTES
Assessment/Plan:    1  Benign essential HTN  -     Comprehensive metabolic panel; Future; Expected date: 08/12/2022    2  Abnormal glucose  -     Hemoglobin A1C; Future; Expected date: 08/12/2022    3  Abnormal liver function    4  Anxiety    5  Malignant neoplasm of 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, estrogen receptor positive ([facility] [address] )    6  Mixed hyperlipidemia  Assessment & Plan:  Offered change to lipitor - pt would like to stay on zocor we will follow at next draw    Orders:  -     Lipid Panel with Direct LDL reflex; Future; Expected date: 08/12/2022    7  Overweight (BMI 25 0-29 9)    8  BMI 26 0-26 9,adult          Patient Instructions       Weight Management   AMBULATORY CARE:   Why it is important to manage your weight:  Being overweight increases your risk of health conditions such as heart disease, high blood pressure, type 2 diabetes, and certain types of cancer  It can also increase your risk for osteoarthritis, sleep apnea, and other respiratory problems  Aim for a slow, steady weight loss  Even a small amount of weight loss can lower your risk of health problems  Risks of being overweight:  Extra weight can cause many health problems, including the following:  · Diabetes (high blood sugar level)    · High blood pressure or high cholesterol    · Heart disease    · Stroke    · Gallbladder or liver disease    · Cancer of the colon, breast, prostate, liver, or kidney    · Sleep apnea    · Arthritis or gout    Screening  is done to check for health conditions before you have signs or symptoms  If you are 28to 79years old, your blood sugar level may be checked every 3 years for signs of prediabetes or diabetes  Your healthcare provider will check your blood pressure at each visit  High blood pressure can lead to a stroke or other problems  Your provider may check for signs of heart disease, cancer, or other health problems    How to lose weight safely:  A safe and healthy way to lose weight is to eat fewer calories and get regular exercise  · You can lose up about 1 pound a week by decreasing the number of calories you eat by 500 calories each day  You can decrease calories by eating smaller portion sizes or by cutting out high-calorie foods  Read labels to find out how many calories are in the foods you eat  · You can also burn calories with exercise such as walking, swimming, or biking  You will be more likely to keep weight off if you make these changes part of your lifestyle  Exercise at least 30 minutes per day on most days of the week  You can also fit in more physical activity by taking the stairs instead of the elevator or parking farther away from stores  Ask your healthcare provider about the best exercise plan for you  Healthy meal plan for weight management:  A healthy meal plan includes a variety of foods, contains fewer calories, and helps you stay healthy  A healthy meal plan includes the following:     · Eat whole-grain foods more often  A healthy meal plan should contain fiber  Fiber is the part of grains, fruits, and vegetables that is not broken down by your body  Whole-grain foods are healthy and provide extra fiber in your diet  Some examples of whole-grain foods are whole-wheat breads and pastas, oatmeal, brown rice, and bulgur  · Eat a variety of vegetables every day  Include dark, leafy greens such as spinach, kale, kathe greens, and mustard greens  Eat yellow and orange vegetables such as carrots, sweet potatoes, and winter squash  · Eat a variety of fruits every day  Choose fresh or canned fruit (canned in its own juice or light syrup) instead of juice  Fruit juice has very little or no fiber  · Eat low-fat dairy foods  Drink fat-free (skim) milk or 1% milk  Eat fat-free yogurt and low-fat cottage cheese  Try low-fat cheeses such as mozzarella and other reduced-fat cheeses  · Choose meat and other protein foods that are low in fat    Choose beans or other legumes such as split peas or lentils  Choose fish, skinless poultry (chicken or turkey), or lean cuts of red meat (beef or pork)  Before you cook meat or poultry, cut off any visible fat  · Use less fat and oil  Try baking foods instead of frying them  Add less fat, such as margarine, sour cream, regular salad dressing and mayonnaise to foods  Eat fewer high-fat foods  Some examples of high-fat foods include french fries, doughnuts, ice cream, and cakes  · Eat fewer sweets  Limit foods and drinks that are high in sugar  This includes candy, cookies, regular soda, and sweetened drinks  Ways to decrease calories:   · Eat smaller portions  ? Use a small plate with smaller servings  ? Do not eat second helpings  ? When you eat at a restaurant, ask for a box and place half of your meal in the box before you eat  ? Share an entrée with someone else  · Replace high-calorie snacks with healthy, low-calorie snacks  ? Choose fresh fruit, vegetables, fat-free rice cakes, or air-popped popcorn instead of potato chips, nuts, or chocolate  ? Choose water or calorie-free drinks instead of soda or sweetened drinks  · Do not shop for groceries when you are hungry  You may be more likely to make unhealthy food choices  Take a grocery list of healthy foods and shop after you have eaten  · Eat regular meals  Do not skip meals  Skipping meals can lead to overeating later in the day  This can make it harder for you to lose weight  Eat a healthy snack in place of a meal if you do not have time to eat a regular meal  Talk with a dietitian to help you create a meal plan and schedule that is right for you  Other things to consider as you try to lose weight:   · Be aware of situations that may give you the urge to overeat, such as eating while watching television  Find ways to avoid these situations  For example, read a book, go for a walk, or do crafts      · Meet with a weight loss support group or friends who are also trying to lose weight  This may help you stay motivated to continue working on your weight loss goals  © Copyright [employer] 2022 Information is for End User's use only and may not be sold, redistributed or otherwise used for commercial purposes  All illustrations and images included in CareNotes® are the copyrighted property of [employer] A M [employer] Inc  or Richard Cansec[employer]   The above information is an  only  It is not intended as medical advice for individual conditions or treatments  Talk to your doctor, nurse or pharmacist before following any medical regimen to see if it is safe and effective for you  Return in about 6 months (around 8/28/2022) for Recheck  Subjective:      Patient ID: Carolina Venegas is a 66 y o  female  Chief Complaint   Patient presents with    Follow-up     blood pressure check  [id number]       Pt is here for a follow up  Pt feels well   No CP, nO SB      The following portions of the patient's history were reviewed and updated as appropriate: allergies, current medications, past family history, past medical history, past social history, past surgical history and problem list     Review of Systems   Constitutional: Negative  Negative for activity change, appetite change, chills, diaphoresis and fatigue  HENT: Negative  Negative for dental problem, ear pain, sinus pressure and sore throat  Eyes: Negative  Negative for photophobia, pain, discharge, redness, itching and visual disturbance  Respiratory: Negative for apnea and chest tightness  Cardiovascular: Negative  Negative for chest pain, palpitations and leg swelling  Gastrointestinal: Negative  Negative for abdominal distention, abdominal pain, constipation and diarrhea  Endocrine: Negative  Negative for cold intolerance and heat intolerance  Genitourinary: Negative  Negative for difficulty urinating and dyspareunia  Musculoskeletal: Negative    Negative for arthralgias and back pain  Skin: Negative  Allergic/Immunologic: Negative for environmental allergies  Neurological: Negative  Negative for dizziness  Psychiatric/Behavioral: Negative  Negative for agitation  Current Outpatient Medications   Medication Sig Dispense Refill    amLODIPine-valsartan (EXFORGE) 5-160 MG per tablet TAKE ONE TABLET BY MOUTH EVERY DAY 90 tablet 0    anastrozole (ARIMIDEX) 1 mg tablet TAKE 1 TABLET(1 MG) BY MOUTH DAILY 90 tablet 3    Calcium Carb-Cholecalciferol 600-1000 MG-UNIT CAPS Take by mouth      simvastatin (ZOCOR) 40 mg tablet TAKE 1 TABLET BY MOUTH AT BEDTIME 90 tablet 1     No current facility-administered medications for this visit  Objective:    /80   Pulse 70   Temp (!) 97 °F (36 1 °C)   Resp 18   Ht 5' 4 5" (1 638 m)   Wt 72 1 kg (159 lb)   SpO2 96%   BMI 26 87 kg/m²        Physical Exam  Vitals and nursing note reviewed  Constitutional:       General: She is not in acute distress  Appearance: She is well-developed  She is not diaphoretic  HENT:      Head: Normocephalic and atraumatic  Right Ear: External ear normal       Left Ear: External ear normal       Nose: Nose normal       Mouth/Throat:      Pharynx: No oropharyngeal exudate  Eyes:      General: No scleral icterus  Right eye: No discharge  Left eye: No discharge  Pupils: Pupils are equal, round, and reactive to light  Neck:      Thyroid: No thyromegaly  Cardiovascular:      Rate and Rhythm: Normal rate  Heart sounds: Normal heart sounds  No murmur heard  Pulmonary:      Effort: Pulmonary effort is normal  No respiratory distress  Breath sounds: Normal breath sounds  No wheezing  Abdominal:      General: Bowel sounds are normal  There is no distension  Palpations: Abdomen is soft  There is no mass  Tenderness: There is no abdominal tenderness  There is no guarding or rebound     Musculoskeletal:         General: Normal range of motion  Skin:     General: Skin is warm and dry  Findings: No erythema or rash  Neurological:      Mental Status: She is alert        Coordination: Coordination normal       Deep Tendon Reflexes: Reflexes normal    Psychiatric:         Behavior: Behavior normal               Recent Results (from the past 672 hour(s))   Lu Guzmán Default    Collection Time: 02/15/22  8:28 AM   Result Value Ref Range    White Blood Cell Count 4 1 3 4 - 10 8 x10E3/uL    Red Blood Cell Count 4 25 3 77 - 5 28 x10E6/uL    Hemoglobin 13 3 11 1 - 15 9 g/dL    HCT 38 8 34 0 - 46 6 %    MCV 91 79 - 97 fL    MCH 31 3 26 6 - 33 0 pg    MCHC 34 3 31 5 - 35 7 g/dL    RDW 12 8 11 7 - 15 4 %    Platelet Count 345 679 - 450 x10E3/uL    Neutrophils 55 Not Estab  %    Lymphocytes 34 Not Estab  %    Monocytes 8 Not Estab  %    Eosinophils 2 Not Estab  %    Basophils PCT 1 Not Estab  %    Neutrophils (Absolute) 2 3 1 4 - 7 0 x10E3/uL    Lymphocytes (Absolute) 1 4 0 7 - 3 1 x10E3/uL    Monocytes (Absolute) 0 3 0 1 - 0 9 x10E3/uL    Eosinophils (Absolute) 0 1 0 0 - 0 4 x10E3/uL    Basophils ABS 0 0 0 0 - 0 2 x10E3/uL    Immature Granulocytes 0 Not Estab  %    Immature Granulocytes (Absolute) 0 0 0 0 - 0 1 x10E3/uL   Comprehensive metabolic panel    Collection Time: 02/15/22  8:28 AM   Result Value Ref Range    Glucose, Random 111 (H) 65 - 99 mg/dL    BUN 13 8 - 27 mg/dL    Creatinine 0 58 0 57 - 1 00 mg/dL    eGFR Non  92 >59 mL/min/1 73    eGFR  106 >59 mL/min/1 73    SL AMB BUN/CREATININE RATIO 22 12 - 28    Sodium 138 134 - 144 mmol/L    Potassium 3 9 3 5 - 5 2 mmol/L    Chloride 102 96 - 106 mmol/L    CO2 25 20 - 29 mmol/L    CALCIUM 9 4 8 7 - 10 3 mg/dL    Protein, Total 6 1 6 0 - 8 5 g/dL    Albumin 4 4 3 7 - 4 7 g/dL    Globulin, Total 1 7 1 5 - 4 5 g/dL    Albumin/Globulin Ratio 2 6 (H) 1 2 - 2 2    TOTAL BILIRUBIN 0 3 0 0 - 1 2 mg/dL    Alk Phos Isoenzymes 120 44 - 121 IU/L    AST 24 0 - 40 IU/L    ALT 18 0 - 32 IU/L   Lipid panel    Collection Time: 02/15/22  8:28 AM   Result Value Ref Range    Cholesterol, Total 194 100 - 199 mg/dL    Triglycerides 70 0 - 149 mg/dL    HDL 77 >39 mg/dL    LDL Calculated 104 (H) 0 - 99 mg/dL   Cardiovascular Report    Collection Time: 02/15/22  8:28 AM   Result Value Ref Range    Interpretation Note          Dheeraj Alejo DO  BMI Counseling: Body mass index is 26 87 kg/m²  The BMI is above normal  Exercise recommendations include moderate aerobic physical activity for 150 minutes/week

## 2022-02-28 NOTE — PATIENT INSTRUCTIONS
Weight Management   AMBULATORY CARE:   Why it is important to manage your weight:  Being overweight increases your risk of health conditions such as heart disease, high blood pressure, type 2 diabetes, and certain types of cancer  It can also increase your risk for osteoarthritis, sleep apnea, and other respiratory problems  Aim for a slow, steady weight loss  Even a small amount of weight loss can lower your risk of health problems  Risks of being overweight:  Extra weight can cause many health problems, including the following:  · Diabetes (high blood sugar level)    · High blood pressure or high cholesterol    · Heart disease    · Stroke    · Gallbladder or liver disease    · Cancer of the colon, breast, prostate, liver, or kidney    · Sleep apnea    · Arthritis or gout    Screening  is done to check for health conditions before you have signs or symptoms  If you are 28to 79years old, your blood sugar level may be checked every 3 years for signs of prediabetes or diabetes  Your healthcare provider will check your blood pressure at each visit  High blood pressure can lead to a stroke or other problems  Your provider may check for signs of heart disease, cancer, or other health problems  How to lose weight safely:  A safe and healthy way to lose weight is to eat fewer calories and get regular exercise  · You can lose up about 1 pound a week by decreasing the number of calories you eat by 500 calories each day  You can decrease calories by eating smaller portion sizes or by cutting out high-calorie foods  Read labels to find out how many calories are in the foods you eat  · You can also burn calories with exercise such as walking, swimming, or biking  You will be more likely to keep weight off if you make these changes part of your lifestyle  Exercise at least 30 minutes per day on most days of the week   You can also fit in more physical activity by taking the stairs instead of the elevator or parking farther away from stores  Ask your healthcare provider about the best exercise plan for you  Healthy meal plan for weight management:  A healthy meal plan includes a variety of foods, contains fewer calories, and helps you stay healthy  A healthy meal plan includes the following:     · Eat whole-grain foods more often  A healthy meal plan should contain fiber  Fiber is the part of grains, fruits, and vegetables that is not broken down by your body  Whole-grain foods are healthy and provide extra fiber in your diet  Some examples of whole-grain foods are whole-wheat breads and pastas, oatmeal, brown rice, and bulgur  · Eat a variety of vegetables every day  Include dark, leafy greens such as spinach, kale, kathe greens, and mustard greens  Eat yellow and orange vegetables such as carrots, sweet potatoes, and winter squash  · Eat a variety of fruits every day  Choose fresh or canned fruit (canned in its own juice or light syrup) instead of juice  Fruit juice has very little or no fiber  · Eat low-fat dairy foods  Drink fat-free (skim) milk or 1% milk  Eat fat-free yogurt and low-fat cottage cheese  Try low-fat cheeses such as mozzarella and other reduced-fat cheeses  · Choose meat and other protein foods that are low in fat  Choose beans or other legumes such as split peas or lentils  Choose fish, skinless poultry (chicken or turkey), or lean cuts of red meat (beef or pork)  Before you cook meat or poultry, cut off any visible fat  · Use less fat and oil  Try baking foods instead of frying them  Add less fat, such as margarine, sour cream, regular salad dressing and mayonnaise to foods  Eat fewer high-fat foods  Some examples of high-fat foods include french fries, doughnuts, ice cream, and cakes  · Eat fewer sweets  Limit foods and drinks that are high in sugar  This includes candy, cookies, regular soda, and sweetened drinks  Ways to decrease calories:   · Eat smaller portions  ? Use a small plate with smaller servings  ? Do not eat second helpings  ? When you eat at a restaurant, ask for a box and place half of your meal in the box before you eat  ? Share an entrée with someone else  · Replace high-calorie snacks with healthy, low-calorie snacks  ? Choose fresh fruit, vegetables, fat-free rice cakes, or air-popped popcorn instead of potato chips, nuts, or chocolate  ? Choose water or calorie-free drinks instead of soda or sweetened drinks  · Do not shop for groceries when you are hungry  You may be more likely to make unhealthy food choices  Take a grocery list of healthy foods and shop after you have eaten  · Eat regular meals  Do not skip meals  Skipping meals can lead to overeating later in the day  This can make it harder for you to lose weight  Eat a healthy snack in place of a meal if you do not have time to eat a regular meal  Talk with a dietitian to help you create a meal plan and schedule that is right for you  Other things to consider as you try to lose weight:   · Be aware of situations that may give you the urge to overeat, such as eating while watching television  Find ways to avoid these situations  For example, read a book, go for a walk, or do crafts  · Meet with a weight loss support group or friends who are also trying to lose weight  This may help you stay motivated to continue working on your weight loss goals  © Copyright [employer] 2022 Information is for End User's use only and may not be sold, redistributed or otherwise used for commercial purposes  All illustrations and images included in CareNotes® are the copyrighted property of [employer] M , Inc  or [facility] [employer]   The above information is an  only  It is not intended as medical advice for individual conditions or treatments  Talk to your doctor, nurse or pharmacist before following any medical regimen to see if it is safe and effective for you

## 2022-03-08 ENCOUNTER — TELEPHONE (OUTPATIENT)
Dept: GYNECOLOGIC ONCOLOGY | Facility: CLINIC | Age: 72
End: 2022-03-08

## 2022-03-08 NOTE — TELEPHONE ENCOUNTER
Left Voicemail for patient to call back and r/s their appointment with Daina Shafer at [facility]  If patient is a BREAST PATIENT:   -they can see Edmundo Neal at [facility] or Daina Shafer at [facility]     If patient is a NONBREAST PATIENT:   -they can see Mimi at [facility] or Edmundo Neal at [facility] on Fridays

## 2022-03-27 DIAGNOSIS — I10 BENIGN ESSENTIAL HTN: ICD-10-CM

## 2022-03-29 RX ORDER — AMLODIPINE AND VALSARTAN 5; 160 MG/1; MG/1
TABLET ORAL
Qty: 90 TABLET | Refills: 0 | Status: SHIPPED | OUTPATIENT
Start: 2022-03-29 | End: 2022-07-06

## 2022-07-05 DIAGNOSIS — I10 BENIGN ESSENTIAL HTN: ICD-10-CM

## 2022-07-06 RX ORDER — AMLODIPINE AND VALSARTAN 5; 160 MG/1; MG/1
TABLET ORAL
Qty: 90 TABLET | Refills: 0 | Status: SHIPPED | OUTPATIENT
Start: 2022-07-06 | End: 2022-10-04

## 2022-07-20 DIAGNOSIS — E78.5 HYPERLIPIDEMIA LDL GOAL <130: ICD-10-CM

## 2022-07-20 RX ORDER — SIMVASTATIN 40 MG
TABLET ORAL
Qty: 90 TABLET | Refills: 1 | Status: SHIPPED | OUTPATIENT
Start: 2022-07-20

## 2022-08-20 ENCOUNTER — HOSPITAL ENCOUNTER (EMERGENCY)
Facility: HOSPITAL | Age: 72
Discharge: HOME | End: 2022-08-20
Attending: EMERGENCY MEDICINE
Payer: MEDICARE

## 2022-08-20 VITALS
SYSTOLIC BLOOD PRESSURE: 122 MMHG | HEART RATE: 93 BPM | TEMPERATURE: 97.6 F | RESPIRATION RATE: 19 BRPM | DIASTOLIC BLOOD PRESSURE: 59 MMHG | OXYGEN SATURATION: 98 %

## 2022-08-20 DIAGNOSIS — D69.2 PURPURA (HCC): Primary | ICD-10-CM

## 2022-08-20 LAB
ALBUMIN SERPL BCP-MCNC: 4 G/DL (ref 3.5–5)
ALP SERPL-CCNC: 104 U/L (ref 46–116)
ALT SERPL W P-5'-P-CCNC: 31 U/L (ref 12–78)
ANION GAP SERPL CALCULATED.3IONS-SCNC: 10 MMOL/L (ref 4–13)
AST SERPL W P-5'-P-CCNC: 22 U/L (ref 5–45)
BASOPHILS # BLD AUTO: 0.02 THOUSANDS/ΜL (ref 0–0.1)
BASOPHILS NFR BLD AUTO: 0 % (ref 0–1)
BILIRUB SERPL-MCNC: 0.28 MG/DL (ref 0.2–1)
BUN SERPL-MCNC: 18 MG/DL (ref 5–25)
CALCIUM SERPL-MCNC: 9.2 MG/DL (ref 8.3–10.1)
CHLORIDE SERPL-SCNC: 105 MMOL/L (ref 96–108)
CO2 SERPL-SCNC: 27 MMOL/L (ref 21–32)
CREAT SERPL-MCNC: 0.62 MG/DL (ref 0.6–1.3)
EOSINOPHIL # BLD AUTO: 0.12 THOUSAND/ΜL (ref 0–0.61)
EOSINOPHIL NFR BLD AUTO: 3 % (ref 0–6)
ERYTHROCYTE [DISTWIDTH] IN BLOOD BY AUTOMATED COUNT: 12.9 % (ref 11.6–15.1)
GFR SERPL CREATININE-BSD FRML MDRD: 89 ML/MIN/1.73SQ M
GLUCOSE SERPL-MCNC: 133 MG/DL (ref 65–140)
HCT VFR BLD AUTO: 38.8 % (ref 34.8–46.1)
HGB BLD-MCNC: 12.5 G/DL (ref 11.5–15.4)
IMM GRANULOCYTES # BLD AUTO: 0.01 THOUSAND/UL (ref 0–0.2)
IMM GRANULOCYTES NFR BLD AUTO: 0 % (ref 0–2)
INR PPP: 0.9 (ref 0.84–1.19)
LYMPHOCYTES # BLD AUTO: 1.87 THOUSANDS/ΜL (ref 0.6–4.47)
LYMPHOCYTES NFR BLD AUTO: 38 % (ref 14–44)
MCH RBC QN AUTO: 30.8 PG (ref 26.8–34.3)
MCHC RBC AUTO-ENTMCNC: 32.2 G/DL (ref 31.4–37.4)
MCV RBC AUTO: 96 FL (ref 82–98)
MONOCYTES # BLD AUTO: 0.39 THOUSAND/ΜL (ref 0.17–1.22)
MONOCYTES NFR BLD AUTO: 8 % (ref 4–12)
NEUTROPHILS # BLD AUTO: 2.48 THOUSANDS/ΜL (ref 1.85–7.62)
NEUTS SEG NFR BLD AUTO: 51 % (ref 43–75)
NRBC BLD AUTO-RTO: 0 /100 WBCS
PLATELET # BLD AUTO: 199 THOUSANDS/UL (ref 149–390)
PMV BLD AUTO: 9.1 FL (ref 8.9–12.7)
POTASSIUM SERPL-SCNC: 3.8 MMOL/L (ref 3.5–5.3)
PROT SERPL-MCNC: 6.7 G/DL (ref 6.4–8.4)
PROTHROMBIN TIME: 12.3 SECONDS (ref 11.6–14.5)
RBC # BLD AUTO: 4.06 MILLION/UL (ref 3.81–5.12)
SODIUM SERPL-SCNC: 142 MMOL/L (ref 135–147)
WBC # BLD AUTO: 4.89 THOUSAND/UL (ref 4.31–10.16)

## 2022-08-20 PROCEDURE — 80053 COMPREHEN METABOLIC PANEL: CPT | Performed by: EMERGENCY MEDICINE

## 2022-08-20 PROCEDURE — 36415 COLL VENOUS BLD VENIPUNCTURE: CPT | Performed by: EMERGENCY MEDICINE

## 2022-08-20 PROCEDURE — 99283 EMERGENCY DEPT VISIT LOW MDM: CPT

## 2022-08-20 PROCEDURE — 85025 COMPLETE CBC W/AUTO DIFF WBC: CPT | Performed by: EMERGENCY MEDICINE

## 2022-08-20 PROCEDURE — 99284 EMERGENCY DEPT VISIT MOD MDM: CPT | Performed by: EMERGENCY MEDICINE

## 2022-08-20 PROCEDURE — 85610 PROTHROMBIN TIME: CPT | Performed by: EMERGENCY MEDICINE

## 2022-08-22 ENCOUNTER — TELEPHONE (OUTPATIENT)
Dept: FAMILY MEDICINE CLINIC | Facility: CLINIC | Age: 72
End: 2022-08-22

## 2022-08-22 DIAGNOSIS — Z13.89 SCREENING FOR BLOOD OR PROTEIN IN URINE: Primary | ICD-10-CM

## 2022-08-22 NOTE — TELEPHONE ENCOUNTER
Patient advised  She wanted to  lab slip to bring to [facility]   Printed and left in envelope with     DARCIE Holder

## 2022-08-22 NOTE — TELEPHONE ENCOUNTER
[facility]    Patient is asking for a urine test for her lab work  She said she has lab work to do because she was in the er yesterday  They asked her to have you add on a urine

## 2022-08-23 LAB
ALBUMIN SERPL-MCNC: 4.5 G/DL (ref 3.7–4.7)
ALBUMIN/GLOB SERPL: 3.2 {RATIO} (ref 1.2–2.2)
ALP SERPL-CCNC: 111 IU/L (ref 44–121)
ALT SERPL-CCNC: 37 IU/L (ref 0–32)
AST SERPL-CCNC: 34 IU/L (ref 0–40)
BILIRUB SERPL-MCNC: 0.3 MG/DL (ref 0–1.2)
BUN SERPL-MCNC: 15 MG/DL (ref 8–27)
BUN/CREAT SERPL: 28 (ref 12–28)
CALCIUM SERPL-MCNC: 9.4 MG/DL (ref 8.7–10.3)
CHLORIDE SERPL-SCNC: 107 MMOL/L (ref 96–106)
CHOLEST SERPL-MCNC: 185 MG/DL (ref 100–199)
CO2 SERPL-SCNC: 25 MMOL/L (ref 20–29)
CREAT SERPL-MCNC: 0.54 MG/DL (ref 0.57–1)
EGFR: 97 ML/MIN/1.73
GLOBULIN SER-MCNC: 1.4 G/DL (ref 1.5–4.5)
GLUCOSE SERPL-MCNC: 107 MG/DL (ref 65–99)
HBA1C MFR BLD: 6.1 % (ref 4.8–5.6)
HDLC SERPL-MCNC: 73 MG/DL
LDLC SERPL CALC-MCNC: 102 MG/DL (ref 0–99)
MICRODELETION SYND BLD/T FISH: NORMAL
POTASSIUM SERPL-SCNC: 4.7 MMOL/L (ref 3.5–5.2)
PROT SERPL-MCNC: 5.9 G/DL (ref 6–8.5)
SODIUM SERPL-SCNC: 144 MMOL/L (ref 134–144)
TRIGL SERPL-MCNC: 51 MG/DL (ref 0–149)

## 2022-08-24 LAB
APPEARANCE UR: CLEAR
BACTERIA URNS QL MICRO: NORMAL
BILIRUB UR QL STRIP: NEGATIVE
CASTS URNS QL MICRO: NORMAL /LPF
COLOR UR: YELLOW
EPI CELLS #/AREA URNS HPF: NORMAL /HPF (ref 0–10)
GLUCOSE UR QL: NEGATIVE
HGB UR QL STRIP: NEGATIVE
KETONES UR QL STRIP: NEGATIVE
LEUKOCYTE ESTERASE UR QL STRIP: NEGATIVE
MICRO URNS: NORMAL
MICRO URNS: NORMAL
NITRITE UR QL STRIP: NEGATIVE
PH UR STRIP: 6.5 [PH] (ref 5–7.5)
PROT UR QL STRIP: NEGATIVE
RBC #/AREA URNS HPF: NORMAL /HPF (ref 0–2)
SP GR UR: 1.01 (ref 1–1.03)
UROBILINOGEN UR STRIP-ACNC: 0.2 MG/DL (ref 0.2–1)
WBC #/AREA URNS HPF: NORMAL /HPF (ref 0–5)

## 2022-08-29 ENCOUNTER — OFFICE VISIT (OUTPATIENT)
Dept: FAMILY MEDICINE CLINIC | Facility: CLINIC | Age: 72
End: 2022-08-29
Payer: MEDICARE

## 2022-08-29 VITALS
BODY MASS INDEX: 25.16 KG/M2 | DIASTOLIC BLOOD PRESSURE: 60 MMHG | OXYGEN SATURATION: 99 % | WEIGHT: 151 LBS | HEIGHT: 65 IN | TEMPERATURE: 97.2 F | RESPIRATION RATE: 18 BRPM | SYSTOLIC BLOOD PRESSURE: 120 MMHG | HEART RATE: 73 BPM

## 2022-08-29 DIAGNOSIS — I10 BENIGN ESSENTIAL HTN: Primary | ICD-10-CM

## 2022-08-29 DIAGNOSIS — R94.5 ABNORMAL LIVER FUNCTION: ICD-10-CM

## 2022-08-29 DIAGNOSIS — M85.80 OSTEOPENIA, UNSPECIFIED LOCATION: ICD-10-CM

## 2022-08-29 DIAGNOSIS — Z00.00 MEDICARE ANNUAL WELLNESS VISIT, SUBSEQUENT: ICD-10-CM

## 2022-08-29 DIAGNOSIS — Z78.0 POST-MENOPAUSAL: ICD-10-CM

## 2022-08-29 DIAGNOSIS — E78.2 MIXED HYPERLIPIDEMIA: ICD-10-CM

## 2022-08-29 DIAGNOSIS — R73.09 ABNORMAL GLUCOSE: ICD-10-CM

## 2022-08-29 DIAGNOSIS — Z23 NEED FOR VACCINATION: ICD-10-CM

## 2022-08-29 PROCEDURE — 99214 OFFICE O/P EST MOD 30 MIN: CPT | Performed by: FAMILY MEDICINE

## 2022-08-29 PROCEDURE — G0438 PPPS, INITIAL VISIT: HCPCS | Performed by: FAMILY MEDICINE

## 2022-08-29 PROCEDURE — G0009 ADMIN PNEUMOCOCCAL VACCINE: HCPCS

## 2022-08-29 PROCEDURE — 90677 PCV20 VACCINE IM: CPT

## 2022-08-29 NOTE — PROGRESS NOTES
Assessment/Plan:    1  Benign essential HTN  Assessment & Plan:  stable    Orders:  -     Lipid Panel with Direct LDL reflex; Future; Expected date: 02/10/2023    2  Mixed hyperlipidemia  -     Comprehensive metabolic panel; Future; Expected date: 02/10/2023    3  Abnormal glucose    4  Abnormal liver function    5  Osteopenia, unspecified location  -     DXA bone density spine hip and pelvis; Future; Expected date: 08/29/2022    6  Post-menopausal  -     DXA bone density spine hip and pelvis; Future; Expected date: 08/29/2022    7  BMI 25 0-25 9,adult    8  Medicare annual wellness visit, subsequent    9  Need for vaccination  -     Pneumococcal Conjugate Vaccine 20-valent (Pcv20)            Patient Instructions     Weight Management   AMBULATORY CARE:   Why it is important to manage your weight:  Being overweight increases your risk of health conditions such as heart disease, high blood pressure, type 2 diabetes, and certain types of cancer  It can also increase your risk for osteoarthritis, sleep apnea, and other respiratory problems  Aim for a slow, steady weight loss  Even a small amount of weight loss can lower your risk of health problems  Risks of being overweight:  Extra weight can cause many health problems, including the following:  Diabetes (high blood sugar level)    High blood pressure or high cholesterol    Heart disease    Stroke    Gallbladder or liver disease    Cancer of the colon, breast, prostate, liver, or kidney    Sleep apnea    Arthritis or gout    Screening  is done to check for health conditions before you have signs or symptoms  If you are 28to 79years old, your blood sugar level may be checked every 3 years for signs of prediabetes or diabetes  Your healthcare provider will check your blood pressure at each visit  High blood pressure can lead to a stroke or other problems  Your provider may check for signs of heart disease, cancer, or other health problems    How to lose weight safely: A safe and healthy way to lose weight is to eat fewer calories and get regular exercise  You can lose up about 1 pound a week by decreasing the number of calories you eat by 500 calories each day  You can decrease calories by eating smaller portion sizes or by cutting out high-calorie foods  Read labels to find out how many calories are in the foods you eat  You can also burn calories with exercise such as walking, swimming, or biking  You will be more likely to keep weight off if you make these changes part of your lifestyle  Exercise at least 30 minutes per day on most days of the week  You can also fit in more physical activity by taking the stairs instead of the elevator or parking farther away from stores  Ask your healthcare provider about the best exercise plan for you  Healthy meal plan for weight management:  A healthy meal plan includes a variety of foods, contains fewer calories, and helps you stay healthy  A healthy meal plan includes the following:     Eat whole-grain foods more often  A healthy meal plan should contain fiber  Fiber is the part of grains, fruits, and vegetables that is not broken down by your body  Whole-grain foods are healthy and provide extra fiber in your diet  Some examples of whole-grain foods are whole-wheat breads and pastas, oatmeal, brown rice, and bulgur  Eat a variety of vegetables every day  Include dark, leafy greens such as spinach, kale, kathe greens, and mustard greens  Eat yellow and orange vegetables such as carrots, sweet potatoes, and winter squash  Eat a variety of fruits every day  Choose fresh or canned fruit (canned in its own juice or light syrup) instead of juice  Fruit juice has very little or no fiber  Eat low-fat dairy foods  Drink fat-free (skim) milk or 1% milk  Eat fat-free yogurt and low-fat cottage cheese  Try low-fat cheeses such as mozzarella and other reduced-fat cheeses      Choose meat and other protein foods that are low in fat  Choose beans or other legumes such as split peas or lentils  Choose fish, skinless poultry (chicken or turkey), or lean cuts of red meat (beef or pork)  Before you cook meat or poultry, cut off any visible fat  Use less fat and oil  Try baking foods instead of frying them  Add less fat, such as margarine, sour cream, regular salad dressing and mayonnaise to foods  Eat fewer high-fat foods  Some examples of high-fat foods include french fries, doughnuts, ice cream, and cakes  Eat fewer sweets  Limit foods and drinks that are high in sugar  This includes candy, cookies, regular soda, and sweetened drinks  Ways to decrease calories:   Eat smaller portions  Use a small plate with smaller servings  Do not eat second helpings  When you eat at a restaurant, ask for a box and place half of your meal in the box before you eat  Share an entrée with someone else  Replace high-calorie snacks with healthy, low-calorie snacks  Choose fresh fruit, vegetables, fat-free rice cakes, or air-popped popcorn instead of potato chips, nuts, or chocolate  Choose water or calorie-free drinks instead of soda or sweetened drinks  Do not shop for groceries when you are hungry  You may be more likely to make unhealthy food choices  Take a grocery list of healthy foods and shop after you have eaten  Eat regular meals  Do not skip meals  Skipping meals can lead to overeating later in the day  This can make it harder for you to lose weight  Eat a healthy snack in place of a meal if you do not have time to eat a regular meal  Talk with a dietitian to help you create a meal plan and schedule that is right for you  Other things to consider as you try to lose weight:   Be aware of situations that may give you the urge to overeat, such as eating while watching television  Find ways to avoid these situations  For example, read a book, go for a walk, or do crafts      Meet with a weight loss support group or friends who are also trying to lose weight  This may help you stay motivated to continue working on your weight loss goals  © Copyright [employer] 2022 Information is for End User's use only and may not be sold, redistributed or otherwise used for commercial purposes  All illustrations and images included in CareNotes® are the copyrighted property of [employer]  or Richard Canseco   The above information is an  only  It is not intended as medical advice for individual conditions or treatments  Talk to your doctor, nurse or pharmacist before following any medical regimen to see if it is safe and effective for you  Medicare Preventive Visit Patient Instructions  Thank you for completing your Welcome to Medicare Visit or Medicare Annual Wellness Visit today  Your next wellness visit will be due in one year (8/30/2023)  The screening/preventive services that you may require over the next 5-10 years are detailed below  Some tests may not apply to you based off risk factors and/or age  Screening tests ordered at today's visit but not completed yet may show as past due  Also, please note that scanned in results may not display below  Preventive Screenings:  Service Recommendations Previous Testing/Comments   Colorectal Cancer Screening  * Colonoscopy    * Fecal Occult Blood Test (FOBT)/Fecal Immunochemical Test (FIT)  * Fecal DNA/Cologuard Test  * Flexible Sigmoidoscopy Age: 39-70 years old   Colonoscopy: every 10 years (may be performed more frequently if at higher risk)  OR  FOBT/FIT: every 1 year  OR  Cologuard: every 3 years  OR  Sigmoidoscopy: every 5 years  Screening may be recommended earlier than age 39 if at higher risk for colorectal cancer  Also, an individualized decision between you and your healthcare provider will decide whether screening between the ages of 74-80 would be appropriate   Colonoscopy: 09/14/2018  FOBT/FIT: Not on file  Cologuard: Not on file  Sigmoidoscopy: Not on file    Screening Current     Breast Cancer Screening Age: 36 years old  Frequency: every 1-2 years  Not required if history of left and right mastectomy Mammogram: 12/17/2021    History Breast Cancer   Cervical Cancer Screening Between the ages of 21-29, pap smear recommended once every 3 years  Between the ages of 33-67, can perform pap smear with HPV co-testing every 5 years  Recommendations may differ for women with a history of total hysterectomy, cervical cancer, or abnormal pap smears in past  Pap Smear: Not on file    Screening Not Indicated   Hepatitis C Screening Once for adults born between 1945 and 1965  More frequently in patients at high risk for Hepatitis C Hep C Antibody: 02/05/2019    Screening Current   Diabetes Screening 1-2 times per year if you're at risk for diabetes or have pre-diabetes Fasting glucose: 99 mg/dL (4/24/2018)  A1C: 6 1 % (8/22/2022)  Screening Current   Cholesterol Screening Once every 5 years if you don't have a lipid disorder  May order more often based on risk factors  Lipid panel: 08/22/2022    Screening Not Indicated  History Lipid Disorder     Other Preventive Screenings Covered by Medicare:  Abdominal Aortic Aneurysm (AAA) Screening: covered once if your at risk  You're considered to be at risk if you have a family history of AAA  Lung Cancer Screening: covers low dose CT scan once per year if you meet all of the following conditions: (1) Age 50-69; (2) No signs or symptoms of lung cancer; (3) Current smoker or have quit smoking within the last 15 years; (4) You have a tobacco smoking history of at least 20 pack years (packs per day multiplied by number of years you smoked); (5) You get a written order from a healthcare provider    Glaucoma Screening: covered annually if you're considered high risk: (1) You have diabetes OR (2) Family history of glaucoma OR (3)  aged 48 and older OR (3)  American aged 72 and older  Osteoporosis Screening: covered every 2 years if you meet one of the following conditions: (1) You're estrogen deficient and at risk for osteoporosis based off medical history and other findings; (2) Have a vertebral abnormality; (3) On glucocorticoid therapy for more than 3 months; (4) Have primary hyperparathyroidism; (5) On osteoporosis medications and need to assess response to drug therapy  Last bone density test (DXA Scan): 07/25/2018  HIV Screening: covered annually if you're between the age of 12-76  Also covered annually if you are younger than 13 and older than 72 with risk factors for HIV infection  For pregnant patients, it is covered up to 3 times per pregnancy  Immunizations:  Immunization Recommendations   Influenza Vaccine Annual influenza vaccination during flu season is recommended for all persons aged >= 6 months who do not have contraindications   Pneumococcal Vaccine   * Pneumococcal conjugate vaccine = PCV13 (Prevnar 13), PCV15 (Vaxneuvance), PCV20 (Prevnar 20)  * Pneumococcal polysaccharide vaccine = PPSV23 (Pneumovax) Adults 25-60 years old: 1-3 doses may be recommended based on certain risk factors  Adults 72 years old: 1-2 doses may be recommended based off what pneumonia vaccine you previously received   Hepatitis B Vaccine 3 dose series if at intermediate or high risk (ex: diabetes, end stage renal disease, liver disease)   Tetanus (Td) Vaccine - COST NOT COVERED BY MEDICARE PART B Following completion of primary series, a booster dose should be given every 10 years to maintain immunity against tetanus  Td may also be given as tetanus wound prophylaxis  Tdap Vaccine - COST NOT COVERED BY MEDICARE PART B Recommended at least once for all adults  For pregnant patients, recommended with each pregnancy     Shingles Vaccine (Shingrix) - COST NOT COVERED BY MEDICARE PART B  2 shot series recommended in those aged 48 and above     Health Maintenance Due:      Topic Date Due    Cervical Cancer Screening  Never done    Breast Cancer Screening: Mammogram  12/17/2022    Colorectal Cancer Screening  09/14/2028    Hepatitis C Screening  Completed     Immunizations Due:      Topic Date Due    COVID-19 Vaccine (1) Never done    Influenza Vaccine (1) 09/01/2022     Advance Directives   What are advance directives? Advance directives are legal documents that state your wishes and plans for medical care  These plans are made ahead of time in case you lose your ability to make decisions for yourself  Advance directives can apply to any medical decision, such as the treatments you want, and if you want to donate organs  What are the types of advance directives? There are many types of advance directives, and each state has rules about how to use them  You may choose a combination of any of the following:  Living will: This is a written record of the treatment you want  You can also choose which treatments you do not want, which to limit, and which to stop at a certain time  This includes surgery, medicine, IV fluid, and tube feedings  Durable power of  for healthcare [facility]): This is a written record that states who you want to make healthcare choices for you when you are unable to make them for yourself  This person, called a proxy, is usually a family member or a friend  You may choose more than 1 proxy  Do not resuscitate (DNR) order:  A DNR order is used in case your heart stops beating or you stop breathing  It is a request not to have certain forms of treatment, such as CPR  A DNR order may be included in other types of advance directives  Medical directive: This covers the care that you want if you are in a coma, near death, or unable to make decisions for yourself  You can list the treatments you want for each condition  Treatment may include pain medicine, surgery, blood transfusions, dialysis, IV or tube feedings, and a ventilator (breathing machine)    Values history: This document has questions about your views, beliefs, and how you feel and think about life  This information can help others choose the care that you would choose  Why are advance directives important? An advance directive helps you control your care  Although spoken wishes may be used, it is better to have your wishes written down  Spoken wishes can be misunderstood, or not followed  Treatments may be given even if you do not want them  An advance directive may make it easier for your family to make difficult choices about your care  Weight Management   Why it is important to manage your weight:  Being overweight increases your risk of health conditions such as heart disease, high blood pressure, type 2 diabetes, and certain types of cancer  It can also increase your risk for osteoarthritis, sleep apnea, and other respiratory problems  Aim for a slow, steady weight loss  Even a small amount of weight loss can lower your risk of health problems  How to lose weight safely:  A safe and healthy way to lose weight is to eat fewer calories and get regular exercise  You can lose up about 1 pound a week by decreasing the number of calories you eat by 500 calories each day  Healthy meal plan for weight management:  A healthy meal plan includes a variety of foods, contains fewer calories, and helps you stay healthy  A healthy meal plan includes the following:  Eat whole-grain foods more often  A healthy meal plan should contain fiber  Fiber is the part of grains, fruits, and vegetables that is not broken down by your body  Whole-grain foods are healthy and provide extra fiber in your diet  Some examples of whole-grain foods are whole-wheat breads and pastas, oatmeal, brown rice, and bulgur  Eat a variety of vegetables every day  Include dark, leafy greens such as spinach, kale, kathe greens, and mustard greens  Eat yellow and orange vegetables such as carrots, sweet potatoes, and winter squash     Eat a variety of fruits every day  Choose fresh or canned fruit (canned in its own juice or light syrup) instead of juice  Fruit juice has very little or no fiber  Eat low-fat dairy foods  Drink fat-free (skim) milk or 1% milk  Eat fat-free yogurt and low-fat cottage cheese  Try low-fat cheeses such as mozzarella and other reduced-fat cheeses  Choose meat and other protein foods that are low in fat  Choose beans or other legumes such as split peas or lentils  Choose fish, skinless poultry (chicken or turkey), or lean cuts of red meat (beef or pork)  Before you cook meat or poultry, cut off any visible fat  Use less fat and oil  Try baking foods instead of frying them  Add less fat, such as margarine, sour cream, regular salad dressing and mayonnaise to foods  Eat fewer high-fat foods  Some examples of high-fat foods include french fries, doughnuts, ice cream, and cakes  Eat fewer sweets  Limit foods and drinks that are high in sugar  This includes candy, cookies, regular soda, and sweetened drinks  Exercise:  Exercise at least 30 minutes per day on most days of the week  Some examples of exercise include walking, biking, dancing, and swimming  You can also fit in more physical activity by taking the stairs instead of the elevator or parking farther away from stores  Ask your healthcare provider about the best exercise plan for you  © Copyright [employer] 2018 Information is for End User's use only and may not be sold, redistributed or otherwise used for commercial purposes  All illustrations and images included in CareNotes® are the copyrighted property of [employer]  or Richard Canseco       Return in about 6 months (around 2/28/2023) for Recheck  Subjective:      Patient ID: Priyanka Montilla is a 74 y o  female      Chief Complaint   Patient presents with    Follow-up     6 months  [id number]       Pt is here for a 6 month follow up  Pt had labspt would like pneumonia vaccine  Pt was recently in the hospital for a rash on her ankle - pt was given an ointment to rub on it and it went away        The following portions of the patient's history were reviewed and updated as appropriate: allergies, current medications, past family history, past medical history, past social history, past surgical history and problem list     Review of Systems   Constitutional: Negative  Negative for activity change, appetite change, chills, diaphoresis and fatigue  HENT: Negative  Negative for dental problem, ear pain, sinus pressure and sore throat  Eyes: Negative  Negative for photophobia, pain, discharge, redness, itching and visual disturbance  Respiratory: Negative for apnea and chest tightness  Cardiovascular: Negative  Negative for chest pain, palpitations and leg swelling  Gastrointestinal: Negative  Negative for abdominal distention, abdominal pain, constipation and diarrhea  Endocrine: Negative  Negative for cold intolerance and heat intolerance  Genitourinary: Negative  Negative for difficulty urinating and dyspareunia  Musculoskeletal: Negative  Negative for arthralgias and back pain  Skin: Negative  Allergic/Immunologic: Negative for environmental allergies  Neurological: Negative  Negative for dizziness  Psychiatric/Behavioral: Negative  Negative for agitation  Current Outpatient Medications   Medication Sig Dispense Refill    amLODIPine-valsartan (EXFORGE) 5-160 MG per tablet TAKE ONE TABLET BY MOUTH EVERY DAY 90 tablet 0    anastrozole (ARIMIDEX) 1 mg tablet TAKE 1 TABLET(1 MG) BY MOUTH DAILY 90 tablet 3    Calcium Carb-Cholecalciferol 600-1000 MG-UNIT CAPS Take by mouth      mupirocin (BACTROBAN) 2 % ointment Apply topically 3 (three) times a day 15 g 0    simvastatin (ZOCOR) 40 mg tablet TAKE 1 TABLET BY MOUTH AT BEDTIME 90 tablet 1     No current facility-administered medications for this visit         Objective:    /60   Pulse 73   Temp (!) 97 2 °F (36 2 °C) Resp 18   Ht 5' 4 5" (1 638 m)   Wt 68 5 kg (151 lb)   SpO2 99%   BMI 25 52 kg/m²        Physical Exam  Vitals and nursing note reviewed  Constitutional:       General: She is not in acute distress  Appearance: She is well-developed  She is not diaphoretic  HENT:      Head: Normocephalic and atraumatic  Right Ear: External ear normal       Left Ear: External ear normal       Nose: Nose normal       Mouth/Throat:      Pharynx: No oropharyngeal exudate  Eyes:      General: No scleral icterus  Right eye: No discharge  Left eye: No discharge  Pupils: Pupils are equal, round, and reactive to light  Neck:      Thyroid: No thyromegaly  Cardiovascular:      Rate and Rhythm: Normal rate  Heart sounds: Normal heart sounds  No murmur heard  Pulmonary:      Effort: Pulmonary effort is normal  No respiratory distress  Breath sounds: Normal breath sounds  No wheezing  Abdominal:      General: Bowel sounds are normal  There is no distension  Palpations: Abdomen is soft  There is no mass  Tenderness: There is no abdominal tenderness  There is no guarding or rebound  Musculoskeletal:         General: Normal range of motion  Skin:     General: Skin is warm and dry  Findings: No erythema or rash  Neurological:      Mental Status: She is alert        Coordination: Coordination normal       Deep Tendon Reflexes: Reflexes normal    Psychiatric:         Behavior: Behavior normal               Recent Results (from the past 672 hour(s))   CBC and differential    Collection Time: 08/20/22 12:29 AM   Result Value Ref Range    WBC 4 89 4 31 - 10 16 Thousand/uL    RBC 4 06 3 81 - 5 12 Million/uL    Hemoglobin 12 5 11 5 - 15 4 g/dL    Hematocrit 38 8 34 8 - 46 1 %    MCV 96 82 - 98 fL    MCH 30 8 26 8 - 34 3 pg    MCHC 32 2 31 4 - 37 4 g/dL    RDW 12 9 11 6 - 15 1 %    MPV 9 1 8 9 - 12 7 fL    Platelets 158 368 - 306 Thousands/uL    nRBC 0 /100 WBCs    Neutrophils Relative 51 43 - 75 %    Immat GRANS % 0 0 - 2 %    Lymphocytes Relative 38 14 - 44 %    Monocytes Relative 8 4 - 12 %    Eosinophils Relative 3 0 - 6 %    Basophils Relative 0 0 - 1 %    Neutrophils Absolute 2 48 1 85 - 7 62 Thousands/µL    Immature Grans Absolute 0 01 0 00 - 0 20 Thousand/uL    Lymphocytes Absolute 1 87 0 60 - 4 47 Thousands/µL    Monocytes Absolute 0 39 0 17 - 1 22 Thousand/µL    Eosinophils Absolute 0 12 0 00 - 0 61 Thousand/µL    Basophils Absolute 0 02 0 00 - 0 10 Thousands/µL   Comprehensive metabolic panel    Collection Time: 08/20/22 12:29 AM   Result Value Ref Range    Sodium 142 135 - 147 mmol/L    Potassium 3 8 3 5 - 5 3 mmol/L    Chloride 105 96 - 108 mmol/L    CO2 27 21 - 32 mmol/L    ANION GAP 10 4 - 13 mmol/L    BUN 18 5 - 25 mg/dL    Creatinine 0 62 0 60 - 1 30 mg/dL    Glucose 133 65 - 140 mg/dL    Calcium 9 2 8 3 - 10 1 mg/dL    AST 22 5 - 45 U/L    ALT 31 12 - 78 U/L    Alkaline Phosphatase 104 46 - 116 U/L    Total Protein 6 7 6 4 - 8 4 g/dL    Albumin 4 0 3 5 - 5 0 g/dL    Total Bilirubin 0 28 0 20 - 1 00 mg/dL    eGFR 89 ml/min/1 73sq m   Protime-INR    Collection Time: 08/20/22 12:29 AM   Result Value Ref Range    Protime 12 3 11 6 - 14 5 seconds    INR 0 90 0 84 - 1 19   Comprehensive metabolic panel    Collection Time: 08/22/22 10:49 AM   Result Value Ref Range    Glucose, Random 107 (H) 65 - 99 mg/dL    BUN 15 8 - 27 mg/dL    Creatinine 0 54 (L) 0 57 - 1 00 mg/dL    eGFR 97 >59 mL/min/1 73    SL AMB BUN/CREATININE RATIO 28 12 - 28    Sodium 144 134 - 144 mmol/L    Potassium 4 7 3 5 - 5 2 mmol/L    Chloride 107 (H) 96 - 106 mmol/L    CO2 25 20 - 29 mmol/L    CALCIUM 9 4 8 7 - 10 3 mg/dL    Protein, Total 5 9 (L) 6 0 - 8 5 g/dL    Albumin 4 5 3 7 - 4 7 g/dL    Globulin, Total 1 4 (L) 1 5 - 4 5 g/dL    Albumin/Globulin Ratio 3 2 (H) 1 2 - 2 2    TOTAL BILIRUBIN 0 3 0 0 - 1 2 mg/dL    Alk Phos Isoenzymes 111 44 - 121 IU/L    AST 34 0 - 40 IU/L    ALT 37 (H) 0 - 32 IU/L   Lipid panel    Collection Time: 08/22/22 10:49 AM   Result Value Ref Range    Cholesterol, Total 185 100 - 199 mg/dL    Triglycerides 51 0 - 149 mg/dL    HDL 73 >39 mg/dL    LDL Calculated 102 (H) 0 - 99 mg/dL   Hemoglobin A1c (w/out EAG)    Collection Time: 08/22/22 10:49 AM   Result Value Ref Range    Hemoglobin A1C 6 1 (H) 4 8 - 5 6 %   Cardiovascular Report    Collection Time: 08/22/22 10:49 AM   Result Value Ref Range    Interpretation Note    Urinalysis with microscopic    Collection Time: 08/23/22 10:43 AM   Result Value Ref Range    Specific Gravity 1 011 1 005 - 1 030    Ph 6 5 5 0 - 7 5    Color UA Yellow Yellow    Urine Appearance Clear Clear    Leukocyte Esterase Negative Negative    Protein Negative Negative/Trace    Glucose, 24 HR Urine Negative Negative    Ketone, Urine Negative Negative    Blood, Urine Negative Negative    Bilirubin, Urine Negative Negative    Urobilinogen Urine 0 2 0 2 - 1 0 mg/dL    SL AMB NITRITES URINE, QUAL  Negative Negative    Microscopic Examination Comment     Microscopic Examination See below:    Microscopic Examination    Collection Time: 08/23/22 10:43 AM   Result Value Ref Range    SL AMB WBC, URINE None seen 0 - 5 /hpf    RBC, Urine 0-2 0 - 2 /hpf    Epithelial Cells (non renal) None seen 0 - 10 /hpf    Casts None seen None seen /lpf    Bacteria, Urine None seen None seen/Few         Redell Lyudmila, DO  BMI Counseling: Body mass index is 25 52 kg/m²  The BMI is above normal  Nutrition recommendations include reducing portion sizes

## 2022-08-29 NOTE — PATIENT INSTRUCTIONS
Weight Management   AMBULATORY CARE:   Why it is important to manage your weight:  Being overweight increases your risk of health conditions such as heart disease, high blood pressure, type 2 diabetes, and certain types of cancer  It can also increase your risk for osteoarthritis, sleep apnea, and other respiratory problems  Aim for a slow, steady weight loss  Even a small amount of weight loss can lower your risk of health problems  Risks of being overweight:  Extra weight can cause many health problems, including the following:  · Diabetes (high blood sugar level)    · High blood pressure or high cholesterol    · Heart disease    · Stroke    · Gallbladder or liver disease    · Cancer of the colon, breast, prostate, liver, or kidney    · Sleep apnea    · Arthritis or gout    Screening  is done to check for health conditions before you have signs or symptoms  If you are 28to 79years old, your blood sugar level may be checked every 3 years for signs of prediabetes or diabetes  Your healthcare provider will check your blood pressure at each visit  High blood pressure can lead to a stroke or other problems  Your provider may check for signs of heart disease, cancer, or other health problems  How to lose weight safely:  A safe and healthy way to lose weight is to eat fewer calories and get regular exercise  · You can lose up about 1 pound a week by decreasing the number of calories you eat by 500 calories each day  You can decrease calories by eating smaller portion sizes or by cutting out high-calorie foods  Read labels to find out how many calories are in the foods you eat  · You can also burn calories with exercise such as walking, swimming, or biking  You will be more likely to keep weight off if you make these changes part of your lifestyle  Exercise at least 30 minutes per day on most days of the week   You can also fit in more physical activity by taking the stairs instead of the elevator or parking farther away from stores  Ask your healthcare provider about the best exercise plan for you  Healthy meal plan for weight management:  A healthy meal plan includes a variety of foods, contains fewer calories, and helps you stay healthy  A healthy meal plan includes the following:     · Eat whole-grain foods more often  A healthy meal plan should contain fiber  Fiber is the part of grains, fruits, and vegetables that is not broken down by your body  Whole-grain foods are healthy and provide extra fiber in your diet  Some examples of whole-grain foods are whole-wheat breads and pastas, oatmeal, brown rice, and bulgur  · Eat a variety of vegetables every day  Include dark, leafy greens such as spinach, kale, kathe greens, and mustard greens  Eat yellow and orange vegetables such as carrots, sweet potatoes, and winter squash  · Eat a variety of fruits every day  Choose fresh or canned fruit (canned in its own juice or light syrup) instead of juice  Fruit juice has very little or no fiber  · Eat low-fat dairy foods  Drink fat-free (skim) milk or 1% milk  Eat fat-free yogurt and low-fat cottage cheese  Try low-fat cheeses such as mozzarella and other reduced-fat cheeses  · Choose meat and other protein foods that are low in fat  Choose beans or other legumes such as split peas or lentils  Choose fish, skinless poultry (chicken or turkey), or lean cuts of red meat (beef or pork)  Before you cook meat or poultry, cut off any visible fat  · Use less fat and oil  Try baking foods instead of frying them  Add less fat, such as margarine, sour cream, regular salad dressing and mayonnaise to foods  Eat fewer high-fat foods  Some examples of high-fat foods include french fries, doughnuts, ice cream, and cakes  · Eat fewer sweets  Limit foods and drinks that are high in sugar  This includes candy, cookies, regular soda, and sweetened drinks  Ways to decrease calories:   · Eat smaller portions  ? Use a small plate with smaller servings  ? Do not eat second helpings  ? When you eat at a restaurant, ask for a box and place half of your meal in the box before you eat  ? Share an entrée with someone else  · Replace high-calorie snacks with healthy, low-calorie snacks  ? Choose fresh fruit, vegetables, fat-free rice cakes, or air-popped popcorn instead of potato chips, nuts, or chocolate  ? Choose water or calorie-free drinks instead of soda or sweetened drinks  · Do not shop for groceries when you are hungry  You may be more likely to make unhealthy food choices  Take a grocery list of healthy foods and shop after you have eaten  · Eat regular meals  Do not skip meals  Skipping meals can lead to overeating later in the day  This can make it harder for you to lose weight  Eat a healthy snack in place of a meal if you do not have time to eat a regular meal  Talk with a dietitian to help you create a meal plan and schedule that is right for you  Other things to consider as you try to lose weight:   · Be aware of situations that may give you the urge to overeat, such as eating while watching television  Find ways to avoid these situations  For example, read a book, go for a walk, or do crafts  · Meet with a weight loss support group or friends who are also trying to lose weight  This may help you stay motivated to continue working on your weight loss goals  © Copyright [employer] 2022 Information is for End User's use only and may not be sold, redistributed or otherwise used for commercial purposes  All illustrations and images included in CareNotes® are the copyrighted property of [employer] D A M [employer]  or [facility]   The above information is an  only  It is not intended as medical advice for individual conditions or treatments   Talk to your doctor, nurse or pharmacist before following any medical regimen to see if it is safe and effective for you     Medicare Preventive Visit Patient Instructions  Thank you for completing your Welcome to Medicare Visit or Medicare Annual Wellness Visit today  Your next wellness visit will be due in one year (8/30/2023)  The screening/preventive services that you may require over the next 5-10 years are detailed below  Some tests may not apply to you based off risk factors and/or age  Screening tests ordered at today's visit but not completed yet may show as past due  Also, please note that scanned in results may not display below  Preventive Screenings:  Service Recommendations Previous Testing/Comments   Colorectal Cancer Screening  * Colonoscopy    * Fecal Occult Blood Test (FOBT)/Fecal Immunochemical Test (FIT)  * Fecal DNA/Cologuard Test  * Flexible Sigmoidoscopy Age: 39-70 years old   Colonoscopy: every 10 years (may be performed more frequently if at higher risk)  OR  FOBT/FIT: every 1 year  OR  Cologuard: every 3 years  OR  Sigmoidoscopy: every 5 years  Screening may be recommended earlier than age 39 if at higher risk for colorectal cancer  Also, an individualized decision between you and your healthcare provider will decide whether screening between the ages of 74-80 would be appropriate  Colonoscopy: 09/14/2018  FOBT/FIT: Not on file  Cologuard: Not on file  Sigmoidoscopy: Not on file    Screening Current     Breast Cancer Screening Age: 36 years old  Frequency: every 1-2 years  Not required if history of left and right mastectomy Mammogram: 12/17/2021    History Breast Cancer   Cervical Cancer Screening Between the ages of 21-29, pap smear recommended once every 3 years  Between the ages of 33-67, can perform pap smear with HPV co-testing every 5 years     Recommendations may differ for women with a history of total hysterectomy, cervical cancer, or abnormal pap smears in past  Pap Smear: Not on file    Screening Not Indicated   Hepatitis C Screening Once for adults born between [facility] frequently in patients at high risk for Hepatitis C Hep C Antibody: 02/05/2019    Screening Current   Diabetes Screening 1-2 times per year if you're at risk for diabetes or have pre-diabetes Fasting glucose: 99 mg/dL (4/24/2018)  A1C: 6 1 % (8/22/2022)  Screening Current   Cholesterol Screening Once every 5 years if you don't have a lipid disorder  May order more often based on risk factors  Lipid panel: 08/22/2022    Screening Not Indicated  History Lipid Disorder     Other Preventive Screenings Covered by Medicare:  1  Abdominal Aortic Aneurysm (AAA) Screening: covered once if your at risk  You're considered to be at risk if you have a family history of AAA  2  Lung Cancer Screening: covers low dose CT scan once per year if you meet all of the following conditions: (1) Age 50-69; (2) No signs or symptoms of lung cancer; (3) Current smoker or have quit smoking within the last 15 years; (4) You have a tobacco smoking history of at least 20 pack years (packs per day multiplied by number of years you smoked); (5) You get a written order from a healthcare provider  3  Glaucoma Screening: covered annually if you're considered high risk: (1) You have diabetes OR (2) Family history of glaucoma OR (3)  aged 48 and older OR (3)  American aged 72 and older  3  Osteoporosis Screening: covered every 2 years if you meet one of the following conditions: (1) You're estrogen deficient and at risk for osteoporosis based off medical history and other findings; (2) Have a vertebral abnormality; (3) On glucocorticoid therapy for more than 3 months; (4) Have primary hyperparathyroidism; (5) On osteoporosis medications and need to assess response to drug therapy  · Last bone density test (DXA Scan): 07/25/2018  5  HIV Screening: covered annually if you're between the age of 12-76  Also covered annually if you are younger than 13 and older than 72 with risk factors for HIV infection   For pregnant patients, it is covered up to 3 times per pregnancy  Immunizations:  Immunization Recommendations   Influenza Vaccine Annual influenza vaccination during flu season is recommended for all persons aged >= 6 months who do not have contraindications   Pneumococcal Vaccine   * Pneumococcal conjugate vaccine = PCV13 (Prevnar 13), PCV15 (Vaxneuvance), PCV20 (Prevnar 20)  * Pneumococcal polysaccharide vaccine = PPSV23 (Pneumovax) Adults 25-60 years old: 1-3 doses may be recommended based on certain risk factors  Adults 72 years old: 1-2 doses may be recommended based off what pneumonia vaccine you previously received   Hepatitis B Vaccine 3 dose series if at intermediate or high risk (ex: diabetes, end stage renal disease, liver disease)   Tetanus (Td) Vaccine - COST NOT COVERED BY MEDICARE PART B Following completion of primary series, a booster dose should be given every 10 years to maintain immunity against tetanus  Td may also be given as tetanus wound prophylaxis  Tdap Vaccine - COST NOT COVERED BY MEDICARE PART B Recommended at least once for all adults  For pregnant patients, recommended with each pregnancy  Shingles Vaccine (Shingrix) - COST NOT COVERED BY MEDICARE PART B  2 shot series recommended in those aged 48 and above     Health Maintenance Due:      Topic Date Due    Cervical Cancer Screening  Never done    Breast Cancer Screening: Mammogram  12/17/2022    Colorectal Cancer Screening  09/14/2028    Hepatitis C Screening  Completed     Immunizations Due:      Topic Date Due    COVID-19 Vaccine (1) Never done    Influenza Vaccine (1) 09/01/2022     Advance Directives   What are advance directives? Advance directives are legal documents that state your wishes and plans for medical care  These plans are made ahead of time in case you lose your ability to make decisions for yourself  Advance directives can apply to any medical decision, such as the treatments you want, and if you want to donate organs  What are the types of advance directives? There are many types of advance directives, and each state has rules about how to use them  You may choose a combination of any of the following:  · Living will: This is a written record of the treatment you want  You can also choose which treatments you do not want, which to limit, and which to stop at a certain time  This includes surgery, medicine, IV fluid, and tube feedings  · Durable power of  for healthcare [facility]): This is a written record that states who you want to make healthcare choices for you when you are unable to make them for yourself  This person, called a proxy, is usually a family member or a friend  You may choose more than 1 proxy  · Do not resuscitate (DNR) order:  A DNR order is used in case your heart stops beating or you stop breathing  It is a request not to have certain forms of treatment, such as CPR  A DNR order may be included in other types of advance directives  · Medical directive: This covers the care that you want if you are in a coma, near death, or unable to make decisions for yourself  You can list the treatments you want for each condition  Treatment may include pain medicine, surgery, blood transfusions, dialysis, IV or tube feedings, and a ventilator (breathing machine)  · Values history: This document has questions about your views, beliefs, and how you feel and think about life  This information can help others choose the care that you would choose  Why are advance directives important? An advance directive helps you control your care  Although spoken wishes may be used, it is better to have your wishes written down  Spoken wishes can be misunderstood, or not followed  Treatments may be given even if you do not want them  An advance directive may make it easier for your family to make difficult choices about your care     Weight Management   Why it is important to manage your weight:  Being overweight increases your risk of health conditions such as heart disease, high blood pressure, type 2 diabetes, and certain types of cancer  It can also increase your risk for osteoarthritis, sleep apnea, and other respiratory problems  Aim for a slow, steady weight loss  Even a small amount of weight loss can lower your risk of health problems  How to lose weight safely:  A safe and healthy way to lose weight is to eat fewer calories and get regular exercise  You can lose up about 1 pound a week by decreasing the number of calories you eat by 500 calories each day  Healthy meal plan for weight management:  A healthy meal plan includes a variety of foods, contains fewer calories, and helps you stay healthy  A healthy meal plan includes the following:  · Eat whole-grain foods more often  A healthy meal plan should contain fiber  Fiber is the part of grains, fruits, and vegetables that is not broken down by your body  Whole-grain foods are healthy and provide extra fiber in your diet  Some examples of whole-grain foods are whole-wheat breads and pastas, oatmeal, brown rice, and bulgur  · Eat a variety of vegetables every day  Include dark, leafy greens such as spinach, kale, kathe greens, and mustard greens  Eat yellow and orange vegetables such as carrots, sweet potatoes, and winter squash  · Eat a variety of fruits every day  Choose fresh or canned fruit (canned in its own juice or light syrup) instead of juice  Fruit juice has very little or no fiber  · Eat low-fat dairy foods  Drink fat-free (skim) milk or 1% milk  Eat fat-free yogurt and low-fat cottage cheese  Try low-fat cheeses such as mozzarella and other reduced-fat cheeses  · Choose meat and other protein foods that are low in fat  Choose beans or other legumes such as split peas or lentils  Choose fish, skinless poultry (chicken or turkey), or lean cuts of red meat (beef or pork)  Before you cook meat or poultry, cut off any visible fat     · Use less fat and oil   Try baking foods instead of frying them  Add less fat, such as margarine, sour cream, regular salad dressing and mayonnaise to foods  Eat fewer high-fat foods  Some examples of high-fat foods include french fries, doughnuts, ice cream, and cakes  · Eat fewer sweets  Limit foods and drinks that are high in sugar  This includes candy, cookies, regular soda, and sweetened drinks  Exercise:  Exercise at least 30 minutes per day on most days of the week  Some examples of exercise include walking, biking, dancing, and swimming  You can also fit in more physical activity by taking the stairs instead of the elevator or parking farther away from stores  Ask your healthcare provider about the best exercise plan for you  © Copyright [employer] 2018 Information is for End User's use only and may not be sold, redistributed or otherwise used for commercial purposes   All illustrations and images included in CareNotes® are the copyrighted property of [employer] M , [employer]  or [address] [place] [address]

## 2022-08-29 NOTE — PROGRESS NOTES
Assessment and Plan:     Problem List Items Addressed This Visit        Cardiovascular and Mediastinum    Benign essential HTN - Primary     stable         Relevant Orders    Lipid Panel with Direct LDL reflex       Musculoskeletal and Integument    Osteopenia    Relevant Orders    DXA bone density spine hip and pelvis       Other    Mixed hyperlipidemia    Relevant Orders    Comprehensive metabolic panel    Abnormal glucose    Abnormal liver function      Other Visit Diagnoses     Post-menopausal        Relevant Orders    DXA bone density spine hip and pelvis    BMI 25 0-25 9,adult        Medicare annual wellness visit, subsequent        Need for vaccination        Relevant Orders    Pneumococcal Conjugate Vaccine 20-valent (Pcv20) (Completed)           Preventive health issues were discussed with patient, and age appropriate screening tests were ordered as noted in patient's After Visit Summary  Personalized health advice and appropriate referrals for health education or preventive services given if needed, as noted in patient's After Visit Summary       History of Present Illness:     Patient presents for a Medicare Wellness Visit    HPI   Patient Care Team:  Kati Sales DO as PCP - General (Family Medicine)  Sammi Foster MD as Consulting Physician (Surgical Oncology)  Tammy Otero MD as Consulting Physician (Radiation Oncology)  Ming Rodriguez MD as Consulting Physician (Hematology and Oncology)  Franklin Sanchez MD as Consulting Physician (Gastroenterology)  Raj Denton MD as Consulting Physician (Ophthalmology)     Review of Systems:     Review of Systems     Problem List:     Patient Active Problem List   Diagnosis    Anxiety    Benign essential HTN    Mixed hyperlipidemia    Psychophysiological insomnia    Leiomyoma of uterus    Malignant neoplasm of 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, estrogen receptor positive ([address] )    Osteopenia    Vitamin D deficiency    Abnormal glucose    Abnormal mammogram    Cervical polyp    Hemorrhoids    Menopause    Overweight (BMI 25 0-29  9)    Family hx of colon cancer    Use of anastrozole (Arimidex)    Abnormal liver function    Former smoker      Past Medical and Surgical History:     Past Medical History:   Diagnosis Date    Abnormal blood chemistry     Abnormal glucose     Breast cancer ([address] ) 2018    left    Cancer (HCC)     left breast    Cervical polyp     Fracture of ankle     Hemorrhoid     Herpes zoster     History of chemotherapy     History of radiation therapy     Hyperglycemia     Hyperlipidemia     Hypertension     Insomnia     Leiomyoma of uterus     Osteopenia     Seborrheic keratosis     Shingles     Spider bite wound      Past Surgical History:   Procedure Laterality Date    ANKLE SURGERY Right     BREAST BIOPSY Left     BREAST CYST ASPIRATION Right 1992    BREAST LUMPECTOMY Left 01/01/2018    CYST REMOVAL      right eyelid    DILATION AND CURETTAGE OF UTERUS      FRACTURE SURGERY      ankle plates and screws    INTRAOPERATIVE RADIATION THERAPY (IORT) Left 1/12/2018    Procedure: BREAST IORT BY DR Adan Javier;  Surgeon: Clement Jimenez MD;  [facility];  Service: Surgical Oncology    MAMMO NEEDLE LOCALIZATION LEFT (ALL INC) Left 1/12/2018    UT BIOPSY/EXCISION, LYMPH NODE(S) Left 1/12/2018    Procedure: LYMPHOSCINTIGRAPHY; INTRAOPERATIVE LYMPHATIC MAPPING; SENTINEL LYMPH NODE BIOPSY (INJECT AT 1215); Surgeon: Clement Jimenez MD;  [facility];  Service: Surgical Oncology    UT COLONOSCOPY FLX DX W/[facility] WHEN PFRMD N/A 9/14/2018    Procedure: COLONOSCOPY;  Surgeon: Ari Sotelo MD;  Location: [place] [address] GI LAB; Service: Gastroenterology    UT PERQ DEVICE PLACEMT BREAST LOC 1ST LES W GUIDNCE Left 1/12/2018    Procedure: BREAST LUMPECTOMY; BREAST NEEDLE LOCALIZATION (NEEDLE LOC AT 1130);  FROZEN SECTION;  Surgeon: Clement Jimenez MD;  [facility];  Service: Surgical Oncology    SENTINEL LYMPH NODE BIOPSY Left     US GUIDED BREAST BIOPSY LEFT COMPLETE Left 2017      Family History:     Family History   Problem Relation Age of Onset    Colon cancer Paternal Grandfather     Breast cancer Maternal Aunt 27    Ovarian cancer Maternal Aunt     Prostate cancer Maternal Uncle     Hypertension Mother     Heart disease Mother     Thyroid disease Mother     Leukemia Father     Other Father         dyschromia    Emphysema Maternal Grandfather     Heart disease Paternal Grandmother     Colon cancer Paternal Aunt     Breast cancer Maternal Aunt 36    Colon cancer Paternal Uncle     Mental illness Neg Hx       Social History:     Social History     Socioeconomic History    Marital status: /Civil Union     Spouse name: None    Number of children: None    Years of education: None    Highest education level: None   Occupational History    Occupation:     Tobacco Use    Smoking status: Former Smoker     Types: Cigarettes     Quit date: 1998     Years since quittin 5    Smokeless tobacco: Never Used    Tobacco comment:  quit  Vaping Use    Vaping Use: Never used   Substance and Sexual Activity    Alcohol use: Yes     Alcohol/week: 7 0 standard drinks     Types: 4 Glasses of wine, 3 Cans of beer per week     Comment: social    Drug use: Never    Sexual activity: None   Other Topics Concern    None   Social History Narrative    None     Social Determinants of Health     Financial Resource Strain: Low Risk     Difficulty of Paying Living Expenses: Not hard at all   Food Insecurity: Not on file   Transportation Needs: No Transportation Needs    Lack of Transportation (Medical): No    Lack of Transportation (Non-Medical):  No   Physical Activity: Not on file   Stress: Not on file   Social Connections: Not on file   Intimate Partner Violence: Not on file   Housing Stability: Not on file      Medications and Allergies:     Current Outpatient Medications Medication Sig Dispense Refill    amLODIPine-valsartan (EXFORGE) 5-160 MG per tablet TAKE ONE TABLET BY MOUTH EVERY DAY 90 tablet 0    anastrozole (ARIMIDEX) 1 mg tablet TAKE 1 TABLET(1 MG) BY MOUTH DAILY 90 tablet 3    Calcium Carb-Cholecalciferol 600-1000 MG-UNIT CAPS Take by mouth      mupirocin (BACTROBAN) 2 % ointment Apply topically 3 (three) times a day 15 g 0    simvastatin (ZOCOR) 40 mg tablet TAKE 1 TABLET BY MOUTH AT BEDTIME 90 tablet 1     No current facility-administered medications for this visit  Allergies   Allergen Reactions    Adhesive [Medical Tape] Rash      Immunizations:     Immunization History   Administered Date(s) Administered    Influenza Split High Dose Preservative Free IM 10/30/2013, 12/14/2016    Influenza, high dose seasonal 0 7 mL 02/14/2019, 11/21/2019, 11/16/2020    Influenza, seasonal, injectable 12/13/2016    Influenza, seasonal, injectable, preservative free 10/25/2015    Pneumococcal Conjugate 13-Valent 11/20/2015    Pneumococcal Conjugate Vaccine 20-valent (Pcv20), Polysace 08/29/2022    Pneumococcal Polysaccharide PPV23 11/19/2014, 12/13/2015      Health Maintenance:         Topic Date Due    Cervical Cancer Screening  Never done    Breast Cancer Screening: Mammogram  12/17/2022    Colorectal Cancer Screening  09/14/2028    Hepatitis C Screening  Completed         Topic Date Due    COVID-19 Vaccine (1) Never done    Influenza Vaccine (1) 09/01/2022      Medicare Screening Tests and Risk Assessments:     Jasen Castro is here for her Subsequent Wellness visit  Last Medicare Wellness visit information reviewed, patient interviewed, no change since last AWV  Health Risk Assessment:   Patient rates overall health as very good  Patient feels that their physical health rating is slightly better  Patient is satisfied with their life  Eyesight was rated as same  Hearing was rated as same   Patient feels that their emotional and mental health rating is slightly better  Patients states they are sometimes angry  Patient states they are sometimes unusually tired/fatigued  Pain experienced in the last 7 days has been none  Patient states that she has experienced no weight loss or gain in last 6 months  Depression Screening:   PHQ-2 Score: 0      Fall Risk Screening: In the past year, patient has experienced: no history of falling in past year      Urinary Incontinence Screening:   Patient has not leaked urine accidently in the last six months  Home Safety:  Patient does not have trouble with stairs inside or outside of their home  Patient has working smoke alarms and has working carbon monoxide detector  Home safety hazards include: none  Nutrition:   Current diet is Regular, Low Carb and Limited junk food  Medications:   Patient is currently taking over-the-counter supplements  OTC medications include: see medication list  Patient is able to manage medications  Activities of Daily Living (ADLs)/Instrumental Activities of Daily Living (IADLs):   Walk and transfer into and out of bed and chair?: Yes  Dress and groom yourself?: Yes    Bathe or shower yourself?: Yes    Feed yourself?  Yes  Do your laundry/housekeeping?: Yes  Manage your money, pay your bills and track your expenses?: Yes  Make your own meals?: Yes    Do your own shopping?: Yes    Previous Hospitalizations:   Any hospitalizations or ED visits within the last 12 months?: Yes    How many hospitalizations have you had in the last year?: 1-2    Advance Care Planning:   Living will: No      Cognitive Screening:   Provider or family/friend/caregiver concerned regarding cognition?: No    PREVENTIVE SCREENINGS      Cardiovascular Screening:    General: Screening Not Indicated, History Lipid Disorder and Risks and Benefits Discussed    Due for: Lipid Panel      Diabetes Screening:     General: Screening Current and Risks and Benefits Discussed    Due for: Blood Glucose      Colorectal Cancer Screening: General: Screening Current      Breast Cancer Screening:     General: History Breast Cancer and Risks and Benefits Discussed      Cervical Cancer Screening:    General: Screening Not Indicated      Osteoporosis Screening:    General: Risks and Benefits Discussed    Due for: Bone Density Ultrasound      Abdominal Aortic Aneurysm (AAA) Screening:        General: Screening Not Indicated      Lung Cancer Screening:     General: Screening Not Indicated      Hepatitis C Screening:    General: Screening Current    Screening, Brief Intervention, and Referral to Treatment (SBIRT)    Screening      AUDIT-C Screenin) How often did you have a drink containing alcohol in the past year? 2 to 4 times a month  2) How many drinks did you have on a typical day when you were drinking in the past year? 1 to 2  3) How often did you have 6 or more drinks on one occasion in the past year? never    AUDIT-C Score: 2  Interpretation: Score 0-2 (female): Negative screen for alcohol misuse    Single Item Drug Screening:  How often have you used an illegal drug (including marijuana) or a prescription medication for non-medical reasons in the past year? never    Single Item Drug Screen Score: 0  Interpretation: Negative screen for possible drug use disorder    Brief Intervention  Alcohol & drug use screenings were reviewed  No concerns regarding substance use disorder identified       No exam data present     Physical Exam:     /60   Pulse 73   Temp (!) 97 2 °F (36 2 °C)   Resp 18   Ht 5' 4 5" (1 638 m)   Wt 68 5 kg (151 lb)   SpO2 99%   BMI 25 52 kg/m²     Physical Exam     Feroz Guy,

## 2022-10-04 DIAGNOSIS — I10 BENIGN ESSENTIAL HTN: ICD-10-CM

## 2022-10-04 RX ORDER — AMLODIPINE AND VALSARTAN 5; 160 MG/1; MG/1
TABLET ORAL
Qty: 90 TABLET | Refills: 0 | Status: SHIPPED | OUTPATIENT
Start: 2022-10-04

## 2022-11-01 ENCOUNTER — OFFICE VISIT (OUTPATIENT)
Dept: SURGICAL ONCOLOGY | Facility: CLINIC | Age: 72
End: 2022-11-01

## 2022-11-01 VITALS
DIASTOLIC BLOOD PRESSURE: 78 MMHG | WEIGHT: 152 LBS | HEART RATE: 60 BPM | OXYGEN SATURATION: 95 % | HEIGHT: 65 IN | RESPIRATION RATE: 18 BRPM | TEMPERATURE: 97.5 F | SYSTOLIC BLOOD PRESSURE: 118 MMHG | BODY MASS INDEX: 25.33 KG/M2

## 2022-11-01 DIAGNOSIS — Z17.0 MALIGNANT NEOPLASM OF UPPER-INNER QUADRANT OF LEFT BREAST IN FEMALE, ESTROGEN RECEPTOR POSITIVE (HCC): Primary | ICD-10-CM

## 2022-11-01 DIAGNOSIS — Z12.39 ENCOUNTER FOR BREAST CANCER SCREENING OTHER THAN MAMMOGRAM: ICD-10-CM

## 2022-11-01 DIAGNOSIS — C50.212 MALIGNANT NEOPLASM OF UPPER-INNER QUADRANT OF LEFT BREAST IN FEMALE, ESTROGEN RECEPTOR POSITIVE (HCC): Primary | ICD-10-CM

## 2022-11-01 DIAGNOSIS — Z79.811 USE OF ANASTROZOLE (ARIMIDEX): ICD-10-CM

## 2022-11-01 DIAGNOSIS — Z76.89 ENCOUNTER TO ESTABLISH CARE: ICD-10-CM

## 2022-11-01 DIAGNOSIS — R92.2 DENSE BREAST: ICD-10-CM

## 2022-11-01 NOTE — PROGRESS NOTES
Surgical Oncology Follow Up       [address] [facility],[address] [facility]  [facility] [place]  [address] [address]    Sisto Beams  1950  [id number]  [address] [facility],[address] [place] [address]  [facility] [place]  [address] [address]    Chief Complaint   Patient presents with   • Breast Cancer       Assessment/Plan:  1  Malignant neoplasm of 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, estrogen receptor positive ([address] )  - 6 month follow up  - Mammo diagnostic bilateral w 3d & cad; Future    2  Use of anastrozole (Arimidex)  - continue use per medical oncology    3  Encounter for breast cancer screening other than mammogram  - US breast screening bilateral complete (ABUS); Future    4  Dense breast  - US breast screening bilateral complete (ABUS); Future    5  Encounter to establish care  - Ambulatory Referral to Dermatology; Future    Discussion/Summary:  Patient is a 64-year-old female presenting today for a six-month follow-up for left breast cancer diagnosed in January 2018  Pathology revealed invasive carcinoma ER/UT positive, HER2 negative  She underwent a left lumpectomy with sentinel node biopsy and completed intraoperative radiation therapy  She had adjuvant chemotherapy and whole breast radiation therapy as well  She is currently on anastrozole  Patient did have complaints of noticing her hair was thinning and I informed her that this is most likely a side effect of the anastrozole  There were no concerns on her clinical breast exam   I have ordered her diagnostic mammogram and automated breast ultrasound for next month  I have also placed a consult for a referral to Dermatology as per patient's request as she would like to establish care for full-body skin exams  I will see the patient back in 6 months or sooner should the need arise  She was instructed to call with any questions or concerns prior to this time   All questions were answered today  History of Present Illness:     Oncology History   Malignant neoplasm of 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, estrogen receptor positive ([facility] [address] )   1/12/2018 Surgery    Left needle localization lumpectomy with sentinel lymph node biopsy  Grade 3  T1c NO Grade 3   Repeat HER 2 indicated and was Negative  ER 90  AL 90  Her 2 negative     1/12/2018 -  Radiation    IORT left breast to a dose of 2000cGy     2/2/2018 Genomic Testing    Mammaprint high risk       2/22/2018 - 4/26/2018 Chemotherapy    Cyclophosphamide and docetaxel (TC) x four doses  Dr Yanna Caban     5/29/2018 - 7/2/2018 Radiation    Treatments:  Course: C1    Plan ID Energy Fractions Dose per Fraction (cGy) Dose Correction (cGy) Total Dose Delivered (cGy) Elapsed Days   BH L Breast 6X 25 / 25 200 0 5,000 34      Treatment Dates:  5/29/2018 - 7/2/2018 5/2018 -  Hormone Therapy    anastrozole          -Interval History: Patient is a 69-year-old female presenting today for a six-month follow-up for left breast cancer diagnosed in January 2018  Patient did have complaints of noticing her hair was thinning and I informed her that this is most likely a side effect of the anastrozole  Patient denies changes on her breast exam  She denies persistent headaches, bone pain, back pain, shortness of breath, or abdominal pain  Review of Systems:  Review of Systems   Constitutional: Negative for activity change, appetite change, fatigue and unexpected weight change  Respiratory: Negative for cough and shortness of breath  Cardiovascular: Negative for chest pain  Gastrointestinal: Negative for abdominal pain, diarrhea, nausea and vomiting  Endocrine: Negative for heat intolerance  Musculoskeletal: Negative for arthralgias, back pain and myalgias  Skin: Negative for rash  Neurological: Negative for weakness and headaches  Hematological: Negative for adenopathy         Patient Active Problem List Diagnosis   • Anxiety   • Benign essential HTN   • Mixed hyperlipidemia   • Psychophysiological insomnia   • Leiomyoma of uterus   • Malignant neoplasm of 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, estrogen receptor positive (HCC)   • Osteopenia   • Vitamin D deficiency   • Abnormal glucose   • Abnormal mammogram   • Cervical polyp   • Hemorrhoids   • Menopause   • Overweight (BMI 25 0-29  9)   • Family hx of colon cancer   • Use of anastrozole (Arimidex)   • Abnormal liver function   • Former smoker     Past Medical History:   Diagnosis Date   • Abnormal blood chemistry    • Abnormal glucose    • Breast cancer ([address] ) 2018    left   • Cancer (HCC)     left breast   • Cervical polyp    • Fracture of ankle    • Hemorrhoid    • Herpes zoster    • History of chemotherapy    • History of radiation therapy    • Hyperglycemia    • Hyperlipidemia    • Hypertension    • Insomnia    • Leiomyoma of uterus    • Osteopenia    • Seborrheic keratosis    • Shingles    • Spider bite wound      Past Surgical History:   Procedure Laterality Date   • ANKLE SURGERY Right    • BREAST BIOPSY Left    • BREAST CYST ASPIRATION Right 1992   • BREAST LUMPECTOMY Left 01/01/2018   • CYST REMOVAL      right eyelid   • DILATION AND CURETTAGE OF UTERUS     • FRACTURE SURGERY      ankle plates and screws   • INTRAOPERATIVE RADIATION THERAPY (IORT) Left 1/12/2018    Procedure: BREAST IORT BY DR Auther Hammans;  Surgeon: Jaxon Dickerson MD;  [facility];  Service: Surgical Oncology   • MAMMO NEEDLE LOCALIZATION LEFT (ALL INC) Left 1/12/2018   • NM BIOPSY/EXCISION, LYMPH NODE(S) Left 1/12/2018    Procedure: LYMPHOSCINTIGRAPHY; INTRAOPERATIVE LYMPHATIC MAPPING; SENTINEL LYMPH NODE BIOPSY (INJECT AT 1215); Surgeon: Jaxon Dickerson MD;  [facility];  Service: Surgical Oncology   • NM COLONOSCOPY FLX DX W/COLLJ SPEC WHEN PFRMD N/A 9/14/2018    Procedure: COLONOSCOPY;  Surgeon: Abbie Javier MD;  Location: [facility] GI LAB;   Service: Gastroenterology   • MD PERQ DEVICE PLACEMT BREAST LOC 1ST LES W [place] Left 2018    Procedure: BREAST LUMPECTOMY; BREAST NEEDLE LOCALIZATION (NEEDLE LOC AT 1130); FROZEN SECTION;  Surgeon: Ronnie Sainz MD;  [facility];  Service: Surgical Oncology   • SENTINEL LYMPH NODE BIOPSY Left    • US GUIDED BREAST BIOPSY LEFT COMPLETE Left 2017     Family History   Problem Relation Age of Onset   • Colon cancer Paternal Grandfather    • Breast cancer Maternal Aunt 34   • Ovarian cancer Maternal Aunt    • Prostate cancer Maternal Uncle    • Hypertension Mother    • Heart disease Mother    • Thyroid disease Mother    • Leukemia Father    • Other Father         dyschromia   • Emphysema Maternal Grandfather    • Heart disease Paternal Grandmother    • Colon cancer Paternal Aunt    • Breast cancer Maternal Aunt 44   • Colon cancer Paternal Uncle    • Mental illness Neg Hx      Social History     Socioeconomic History   • Marital status: /Civil Union     Spouse name: Not on file   • Number of children: Not on file   • Years of education: Not on file   • Highest education level: Not on file   Occupational History   • Occupation:     Tobacco Use   • Smoking status: Former Smoker     Types: Cigarettes     Quit date: 1998     Years since quittin 6   • Smokeless tobacco: Never Used   • Tobacco comment:  quit  Vaping Use   • Vaping Use: Never used   Substance and Sexual Activity   • Alcohol use: Yes     Alcohol/week: 7 0 standard drinks     Types: 4 Glasses of wine, 3 Cans of beer per week     Comment: social   • Drug use: Never   • Sexual activity: Not on file   Other Topics Concern   • Not on file   Social History Narrative   • Not on file     Social Determinants of Health     Financial Resource Strain: Low Risk    • Difficulty of Paying Living Expenses: Not hard at all   Food Insecurity: Not on file   Transportation Needs: No Transportation Needs   • Lack of Transportation (Medical):  No   • Lack of Transportation (Non-Medical): No   Physical Activity: Not on file   Stress: Not on file   Social Connections: Not on file   Intimate Partner Violence: Not on file   Housing Stability: Not on file       Current Outpatient Medications:   •  amLODIPine-valsartan (EXFORGE) 5-160 MG per tablet, TAKE ONE TABLET BY MOUTH EVERY DAY, Disp: 90 tablet, Rfl: 0  •  anastrozole (ARIMIDEX) 1 mg tablet, TAKE 1 TABLET(1 MG) BY MOUTH DAILY, Disp: 90 tablet, Rfl: 3  •  Calcium Carb-Cholecalciferol 600-1000 MG-UNIT CAPS, Take by mouth, Disp: , Rfl:   •  mupirocin (BACTROBAN) 2 % ointment, Apply topically 3 (three) times a day, Disp: 15 g, Rfl: 0  •  simvastatin (ZOCOR) 40 mg tablet, TAKE 1 TABLET BY MOUTH AT BEDTIME, Disp: 90 tablet, Rfl: 1  Allergies   Allergen Reactions   • Adhesive [Medical Tape] Rash     Vitals:    11/01/22 0823   BP: 118/78   Pulse: 60   Resp: 18   Temp: 97 5 °F (36 4 °C)   SpO2: 95%       Physical Exam  Constitutional:       General: She is not in acute distress  Appearance: Normal appearance  Cardiovascular:      Rate and Rhythm: Normal rate and regular rhythm  Pulses: Normal pulses  Heart sounds: Normal heart sounds  Pulmonary:      Effort: Pulmonary effort is normal       Breath sounds: Normal breath sounds  Chest:      Chest wall: No mass  Breasts:      Right: No swelling, bleeding, inverted nipple, mass, nipple discharge, skin change, tenderness, axillary adenopathy or supraclavicular adenopathy  Left: No swelling, bleeding, inverted nipple, mass, nipple discharge, skin change, tenderness, axillary adenopathy or supraclavicular adenopathy  Comments: Left breast lumpectomy scar with prominent scar tissue posteriorly  No masses, nodularity, skin changes, nipple changes or discharge, or adenopathy appreciated on physical exam      Abdominal:      General: Abdomen is flat  Palpations: Abdomen is soft     Lymphadenopathy:      Upper Body:      Right upper body: No supraclavicular, axillary or pectoral adenopathy  Left upper body: No supraclavicular, axillary or pectoral adenopathy  Skin:     General: Skin is warm  Neurological:      General: No focal deficit present  Mental Status: She is alert and oriented to person, place, and time  Psychiatric:         Mood and Affect: Mood normal          Behavior: Behavior normal            Results:    Imaging  No results found  I reviewed the above imaging data  Advance Care Planning/Advance Directives:  Discussed disease status, cancer treatment plans and/or cancer treatment goals with the patient

## 2022-11-09 ENCOUNTER — OFFICE VISIT (OUTPATIENT)
Dept: HEMATOLOGY ONCOLOGY | Facility: CLINIC | Age: 72
End: 2022-11-09

## 2022-11-09 VITALS
WEIGHT: 157 LBS | TEMPERATURE: 96.9 F | SYSTOLIC BLOOD PRESSURE: 124 MMHG | RESPIRATION RATE: 18 BRPM | HEART RATE: 64 BPM | OXYGEN SATURATION: 97 % | BODY MASS INDEX: 26.16 KG/M2 | HEIGHT: 65 IN | DIASTOLIC BLOOD PRESSURE: 78 MMHG

## 2022-11-09 DIAGNOSIS — Z17.0 MALIGNANT NEOPLASM OF UPPER-INNER QUADRANT OF LEFT BREAST IN FEMALE, ESTROGEN RECEPTOR POSITIVE (HCC): Primary | ICD-10-CM

## 2022-11-09 DIAGNOSIS — C50.212 MALIGNANT NEOPLASM OF UPPER-INNER QUADRANT OF LEFT BREAST IN FEMALE, ESTROGEN RECEPTOR POSITIVE (HCC): Primary | ICD-10-CM

## 2022-11-09 RX ORDER — BIOTIN 10000 MCG
CAPSULE ORAL
COMMUNITY

## 2022-11-09 NOTE — PROGRESS NOTES
Hematology / Oncology Outpatient Follow Up Note    Mau Rodriguez 74 y o  female [facility] [place]:[id number] [facility]:[id number]         Date:  11/9/2022    Assessment / Plan:  A 61-year-old postmenopausal woman with stage IA left breast cancer, grade 3, ER 90% positive, AL 90% positive, HER2 negative disease   She underwent lumpectomy with sentinel lymph node biopsy, resulting in TE   Her tumor was found to be high risk, based on a MammaPrint    She underwent adjuvant chemotherapy with Taxotere and cyclophosphamide x4, followed by adjuvant radiation therapy   She is currently on adjuvant hormonal therapy with anastrozole 1 mg once a day with minimal toxicity  Clinically, she has no evidence recurrent disease  I recommended her to continue anastrozole until May 2023 to complete 5 years of adjuvant hormonal therapy  She is in agreement with my recommendations  I will see her again in a year for routine follow-up        Subjective:      HPI:  A 61-year-old postmenopausal woman who was found to have abnormality in her left breast, based on a screening mammography   Therefore, she underwent ultrasound-guided biopsy in November 30, 2017   She was found to have invasive ductal carcinoma, grade 2   This was ER 90% positive, AL 90% positive, HER2 negative disease   Subsequently, she was seen by Dr Kurt Hurst who did lumpectomy in January 12, 2018   Lumpectomy specimen showed 11 x 10 x 8 mm of invasive ductal carcinoma, grade 3   There was no evidence of lymphovascular invasion   One sentinel lymph node was negative for metastatic disease   Because of the grade escalation, HER2 IHC was repeated which was 2+   However, HER2 fish was negative for gene amplification   Dr Kurt Hurst sent her tumor tissue for MammaPrint which came back high risk disease   She presents today to discuss adjuvant treatment options   She has no complaint of pain   Her weight is stable   She denied any respiratory symptoms   She has no significant past medical history   She has no major surgery in the past   Her performance status is normal            Interval History:   A 29-year-old postmenopausal woman with stage IA left breast cancer, grade 3, ER 90% positive, NE 90% positive, HER2 negative disease   She underwent lumpectomy with sentinel lymph node biopsy, resulting in TE   Her tumor was found to be high risk, based on a MammaPrint   Therefore, she underwent adjuvant chemotherapy with Taxotere and cyclophosphamide x4 cycle, which was completed in April 2018   She tolerated chemotherapy very well  She had adjuvant radiation therapy with mild to moderate skin toxicity which was completed in July 2018   She is currently on adjuvant hormonal therapy with anastrozole with excellent tolerance  She presents today for routine follow-up  She has no new complaint  She denied any pain  Her weight is stable  She has no respiratory symptoms    Her performance status is normal       Objective:      Primary Diagnosis:     Left breast cancer, stage IA, (pT1c, pN0, M0) grade 3, ER 90% positive, NE 90% positive, HER2 negative disease   MammaPrint high risk   Diagnosed in January 2018      Cancer Staging:  Malignant neoplasm of 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, estrogen receptor positive ([facility] [address] )    Staging form: Breast, AJCC 8th Edition    - Pathologic stage from 1/12/2018: Stage IA (pT1c, pN0(sn), cM0, G3, ER: Positive, NE: Positive, HER2: Negative) - Unsigned    Staging form: Breast, AJCC 7th Edition    - Clinical: No stage assigned - Unsigned     Malignant neoplasm of upper-inner quadrant of right breast in female, estrogen receptor positive ([facility] [address] )    Staging form: Breast, AJCC 8th Edition    - Clinical stage from 11/30/2017: Stage IA (cT1c, cN0, cM0, G3, ER: Positive, NE: Positive, HER2: Negative) - Unsigned    - Pathologic stage from 1/12/2018: Stage IA (pT1c, pN0(sn), cM0, G3, ER: Positive, NE: Positive, HER2: Negative) - Unsigned        Previous Hematologic/ Oncologic Treatment:       Adjuvant chemotherapy with Taxotere and cyclophosphamide x4 cycle, completed in April 2018      Current Hematologic/ Oncologic Treatment:       Adjuvant hormonal therapy with anastrozole since May 2018  To be completed in May 2023       Disease Status:      TE status post lumpectomy with sentinel lymph node biopsy      Test Results:     Pathology:     11 x 10 x 8 mm of invasive ductal carcinoma, grade 3   No evidence of lymphovascular invasion   One sentinel lymph node was negative for metastatic disease   ER 90% positive, ND 90% positive, her 2 2+ disease  Yvezarina Colon fish was negative for gene amplification   MammaPrint high risk  Stage IA (pT1c, pN0, M0)     Radiology:     DEXA scan in July 2018 showed T-score-2 0, consistent with osteopenia  Mammography in December 2021 was benign   BI-RADS 2      Laboratory:     CBC and CMP are within normal limits      Physical Exam:        General Appearance:    Alert, oriented          Eyes:    PERRL   Ears:    Normal external ear canals, both ears   Nose:   Nares normal, septum midline   Throat:   Mucosa moist  Pharynx without injection  Neck:   Supple         Lungs:     Clear to auscultation bilaterally   Chest Wall:    No tenderness or deformity    Heart:    Regular rate and rhythm         Abdomen:     Soft, non-tender, bowel sounds +, no organomegaly               Extremities:   Extremities no cyanosis or edema         Skin:   no rash or icterus  Lymph nodes:   Cervical, supraclavicular, and axillary nodes normal   Neurologic:   CNII-XII intact, normal strength, sensation and reflexes     Throughout             Breast exam: Lumpectomy scar at upper aspect her left breast with no palpable abnormalities   Right breast exam is negative  ROS: Review of Systems   All other systems reviewed and are negative  Imaging: No results found        Labs:   Lab Results   Component Value Date    WBC 4 89 08/20/2022    HGB 12 5 08/20/2022    HCT 38 8 08/20/2022    MCV 96 08/20/2022     08/20/2022     Lab Results   Component Value Date     12/29/2017    K 4 7 08/22/2022     (H) 08/22/2022    CO2 25 08/22/2022    BUN 15 08/22/2022    CREATININE 0 54 (L) 08/22/2022    GLUCOSE 108 (H) 12/29/2017    GLUF 99 04/24/2018    CALCIUM 9 2 08/20/2022    AST 34 08/22/2022    ALT 37 (H) 08/22/2022    ALKPHOS 104 08/20/2022    PROT 6 2 12/29/2017    BILITOT 0 5 12/29/2017    EGFR 97 08/22/2022       Current Medications: Reviewed  Allergies: Reviewed  PMH/FH/SH:  Reviewed      Vital Sign:    Body surface area is 1 77 meters squared      Wt Readings from Last 3 Encounters:   11/09/22 71 2 kg (157 lb)   11/01/22 68 9 kg (152 lb)   08/29/22 68 5 kg (151 lb)        Temp Readings from Last 3 Encounters:   11/09/22 (!) 96 9 °F (36 1 °C) (Tympanic)   11/01/22 97 5 °F (36 4 °C)   08/29/22 (!) 97 2 °F (36 2 °C)        BP Readings from Last 3 Encounters:   11/09/22 124/78   11/01/22 118/78   08/29/22 120/60         Pulse Readings from Last 3 Encounters:   11/09/22 64   11/01/22 60   08/29/22 73     @LASTSAO2(3)@

## 2022-12-31 ENCOUNTER — APPOINTMENT (OUTPATIENT)
Dept: RADIOLOGY | Facility: CLINIC | Age: 72
End: 2022-12-31

## 2022-12-31 ENCOUNTER — OFFICE VISIT (OUTPATIENT)
Dept: URGENT CARE | Facility: CLINIC | Age: 72
End: 2022-12-31

## 2022-12-31 VITALS
RESPIRATION RATE: 16 BRPM | OXYGEN SATURATION: 96 % | WEIGHT: 154 LBS | SYSTOLIC BLOOD PRESSURE: 168 MMHG | DIASTOLIC BLOOD PRESSURE: 72 MMHG | TEMPERATURE: 97.1 F | HEIGHT: 64 IN | HEART RATE: 70 BPM | BODY MASS INDEX: 26.29 KG/M2

## 2022-12-31 DIAGNOSIS — S69.91XA INJURY OF RIGHT HAND, INITIAL ENCOUNTER: ICD-10-CM

## 2022-12-31 DIAGNOSIS — S62.316A DISPLACED FRACTURE OF BASE OF FIFTH METACARPAL BONE, RIGHT HAND, INITIAL ENCOUNTER FOR CLOSED FRACTURE: Primary | ICD-10-CM

## 2022-12-31 NOTE — PROGRESS NOTES
[address] [facility]        NAME: Loulou Bernabe is a 74 y o  female  : 1950    MRN: [id number]  DATE: 2022  TIME: 12:21 PM    Assessment and Plan   Displaced fracture of base of fifth metacarpal bone, right hand, initial encounter for closed fracture [S62 316A]  1  Displaced fracture of base of fifth metacarpal bone, right hand, initial encounter for closed fracture  XR hand 3+ vw right    Ambulatory Referral to Orthopedic Surgery    Splint application        Continue rest, ice, elevation  Aleve prn  Pending official radiology report  Follow up with ortho  Discussed strict return to care precautions as well as red flag symptoms which should prompt immediate ED referral  Pt verbalized understanding and is in agreement with plan  Please follow up with your primary care provider within the next week  Please remember that your visit today was with an urgent care provider and should not replace follow up with your primary care provider for chronic medical issues or annual physicals  Patient Instructions       Follow up with PCP in 3-5 days  Proceed to  ER if symptoms worsen  Chief Complaint     Chief Complaint   Patient presents with   • Hand Injury     Right hand injury from a fall occurred last night  Pt denies any LOC and denies taking blood thinners  Pt does report of head injury  Pt denies any dizziness or headache  History of Present Illness       Patient is a 64-year-old right-hand-dominant female with past medical history of breast cancer, hypertension, hyperlipidemia, osteopenia presenting with right hand injury that occurred yesterday  States she was getting out of her car and tripped over bricks in her driveway, causing her to fall and land on her hand  She reports that she did smack her face and thought her tooth felt sore last night, but does not report any facial bony tenderness  Ate this morning without any problems  No numbness or tingling of hand   Took aleve last night before bed which helped with the pain enough for her to sleep  Was able to sit up on her own immediately to  her papers that fell  Review of Systems   Review of Systems   Respiratory: Negative for shortness of breath  Cardiovascular: Negative for chest pain  Musculoskeletal: Negative for back pain, neck pain and neck stiffness  Neurological: Negative for dizziness, syncope (denies; fall was witnessed by a neighbor), weakness and headaches           Current Medications       Current Outpatient Medications:   •  amLODIPine-valsartan (EXFORGE) 5-160 MG per tablet, TAKE ONE TABLET BY MOUTH EVERY DAY, Disp: 90 tablet, Rfl: 0  •  anastrozole (ARIMIDEX) 1 mg tablet, TAKE 1 TABLET(1 MG) BY MOUTH DAILY, Disp: 90 tablet, Rfl: 3  •  Biotin 10 MG CAPS, Take by mouth, Disp: , Rfl:   •  Calcium Carb-Cholecalciferol 600-1000 MG-UNIT CAPS, Take by mouth, Disp: , Rfl:   •  mupirocin (BACTROBAN) 2 % ointment, Apply topically 3 (three) times a day, Disp: 15 g, Rfl: 0  •  simvastatin (ZOCOR) 40 mg tablet, TAKE 1 TABLET BY MOUTH AT BEDTIME, Disp: 90 tablet, Rfl: 1    Current Allergies     Allergies as of 12/31/2022 - Reviewed 12/31/2022   Allergen Reaction Noted   • Adhesive [medical tape] Rash 09/12/2018            The following portions of the patient's history were reviewed and updated as appropriate: allergies, current medications, past family history, past medical history, past social history, past surgical history and problem list      Past Medical History:   Diagnosis Date   • Abnormal blood chemistry    • Abnormal glucose    • Breast cancer ([facility] [address] ) 2018    left   • Cancer (HCC)     left breast   • Cervical polyp    • Fracture of ankle    • Hemorrhoid    • Herpes zoster    • History of chemotherapy    • History of radiation therapy    • Hyperglycemia    • Hyperlipidemia    • Hypertension    • Insomnia    • Leiomyoma of uterus    • Osteopenia    • Seborrheic keratosis    • Shingles    • Spider bite wound Past Surgical History:   Procedure Laterality Date   • ANKLE SURGERY Right    • BREAST BIOPSY Left    • BREAST CYST ASPIRATION Right 1992   • BREAST LUMPECTOMY Left 01/01/2018   • CYST REMOVAL      right eyelid   • DILATION AND CURETTAGE OF UTERUS     • FRACTURE SURGERY      ankle plates and screws   • INTRAOPERATIVE RADIATION THERAPY (IORT) Left 1/12/2018    Procedure: BREAST IORT BY DR Estrada Rivero;  Surgeon: Court Ba MD;  Location: [facility];  Service: Surgical Oncology   • MAMMO NEEDLE LOCALIZATION LEFT (ALL INC) Left 1/12/2018   • ME BX/EXC LYMPH NODE OPEN SUPERFICIAL Left 1/12/2018    Procedure: LYMPHOSCINTIGRAPHY; INTRAOPERATIVE LYMPHATIC MAPPING; SENTINEL LYMPH NODE BIOPSY (INJECT AT 7222); Surgeon: Court Ba MD;  Location: [facility];  Service: Surgical Oncology   • ME COLONOSCOPY FLX DX W/COLLJ SPEC WHEN PFRMD N/A 9/14/2018    Procedure: COLONOSCOPY;  Surgeon: Esther Nevarez MD;  Location: [facility] GI LAB; Service: Gastroenterology   • ME PERQ DEVICE PLACEMENT BREAST LOC 1ST LES W/GDNCE Left 1/12/2018    Procedure: BREAST LUMPECTOMY; BREAST NEEDLE LOCALIZATION (NEEDLE LOC AT 1130); FROZEN SECTION;  Surgeon: Court Ba MD;  Location: [facility];  Service: Surgical Oncology   • SENTINEL LYMPH NODE BIOPSY Left    • US GUIDED BREAST BIOPSY LEFT COMPLETE Left 11/30/2017       Family History   Problem Relation Age of Onset   • Colon cancer Paternal Grandfather    • Breast cancer Maternal Aunt 34   • Ovarian cancer Maternal Aunt    • Prostate cancer Maternal Uncle    • Hypertension Mother    • Heart disease Mother    • Thyroid disease Mother    • Leukemia Father    • Other Father         dyschromia   • Emphysema Maternal Grandfather    • Heart disease Paternal Grandmother    • Colon cancer Paternal Aunt    • Breast cancer Maternal Aunt 44   • Colon cancer Paternal Uncle    • Mental illness Neg Hx          Medications have been verified          Objective   /72   Pulse 70   Temp (!) 97 1 °F (36 2 °C) Resp 16   Ht 5' 4" (1 626 m)   Wt 69 9 kg (154 lb)   SpO2 96%   BMI 26 43 kg/m²          Physical Exam     Physical Exam  Vitals and nursing note reviewed  Constitutional:       General: She is not in acute distress  Appearance: Normal appearance  She is not ill-appearing  HENT:      Head: Normocephalic and atraumatic  Cardiovascular:      Rate and Rhythm: Normal rate  Pulmonary:      Effort: Pulmonary effort is normal  No respiratory distress  Musculoskeletal:      Right hand: Swelling, tenderness and bony tenderness (5th metacarpal) present  No deformity  Normal range of motion  Normal strength  Normal sensation  Normal capillary refill  Normal pulse  Skin:     General: Skin is warm and dry  Capillary Refill: Capillary refill takes less than 2 seconds  Neurological:      Mental Status: She is alert and oriented to person, place, and time  Psychiatric:         Behavior: Behavior normal          Splint application    Date/Time: 12/31/2022 12:19 PM  Performed by: Frances Jorgensen RN  Authorized by: Citlaly Lopez PA-C   Universal Protocol:  Consent: Verbal consent obtained  Risks and benefits: risks, benefits and alternatives were discussed  Consent given by: patient  Patient understanding: patient states understanding of the procedure being performed  Radiology Images displayed and confirmed  If images not available, report reviewed: imaging studies available  Required items: required blood products, implants, devices, and special equipment available  Patient identity confirmed: verbally with patient      Pre-procedure details:     Sensation:  Normal  Procedure details:     Laterality:  Right    Location:  Hand    Hand:  R hand    Splint type:  Ulnar gutter    Supplies:  Ortho-Glass and elastic bandage  Post-procedure details:     Pain:  Improved    Sensation:  Normal    Patient tolerance of procedure:   Tolerated well, no immediate complications

## 2023-01-02 ENCOUNTER — TELEPHONE (OUTPATIENT)
Dept: OTHER | Facility: OTHER | Age: 73
End: 2023-01-02

## 2023-01-02 DIAGNOSIS — I10 BENIGN ESSENTIAL HTN: ICD-10-CM

## 2023-01-03 ENCOUNTER — TELEPHONE (OUTPATIENT)
Dept: OBGYN CLINIC | Facility: MEDICAL CENTER | Age: 73
End: 2023-01-03

## 2023-01-03 NOTE — TELEPHONE ENCOUNTER
Caller: Patient's  Letama Xavier    Doctor: hand specialist    Reason for call:     Tereza Park is calling to schedule appointment for injury of Comminuted minimally displaced fracture distal 5th metacarpal bone      Call back#: [phone]

## 2023-01-03 NOTE — TELEPHONE ENCOUNTER
Patient's  called to schedule a NP appt with Dr Cheryl Hua  Would like a callback from the office to set up services

## 2023-01-04 RX ORDER — AMLODIPINE AND VALSARTAN 5; 160 MG/1; MG/1
TABLET ORAL
Qty: 90 TABLET | Refills: 0 | Status: SHIPPED | OUTPATIENT
Start: 2023-01-04

## 2023-01-05 ENCOUNTER — OFFICE VISIT (OUTPATIENT)
Dept: OBGYN CLINIC | Facility: CLINIC | Age: 73
End: 2023-01-05

## 2023-01-05 ENCOUNTER — HOSPITAL ENCOUNTER (OUTPATIENT)
Dept: RADIOLOGY | Facility: HOSPITAL | Age: 73
Discharge: HOME | End: 2023-01-05
Attending: STUDENT IN AN ORGANIZED HEALTH CARE EDUCATION/TRAINING PROGRAM

## 2023-01-05 ENCOUNTER — HOSPITAL ENCOUNTER (OUTPATIENT)
Dept: RADIOLOGY | Facility: HOSPITAL | Age: 73
End: 2023-01-05
Attending: STUDENT IN AN ORGANIZED HEALTH CARE EDUCATION/TRAINING PROGRAM

## 2023-01-05 VITALS
BODY MASS INDEX: 26.46 KG/M2 | WEIGHT: 155 LBS | OXYGEN SATURATION: 99 % | DIASTOLIC BLOOD PRESSURE: 78 MMHG | HEIGHT: 64 IN | HEART RATE: 63 BPM | SYSTOLIC BLOOD PRESSURE: 144 MMHG

## 2023-01-05 DIAGNOSIS — S62.306A CLOSED NONDISPLACED FRACTURE OF FIFTH METACARPAL BONE OF RIGHT HAND, UNSPECIFIED PORTION OF METACARPAL, INITIAL ENCOUNTER: Primary | ICD-10-CM

## 2023-01-05 DIAGNOSIS — S62.316A DISPLACED FRACTURE OF BASE OF FIFTH METACARPAL BONE, RIGHT HAND, INITIAL ENCOUNTER FOR CLOSED FRACTURE: ICD-10-CM

## 2023-01-05 NOTE — PROGRESS NOTES
ORTHOPAEDIC HAND, WRIST, AND ELBOW OFFICE  VISIT       ASSESSMENT/PLAN:      Diagnoses and all orders for this visit:    Closed nondisplaced fracture of fifth metacarpal bone of right hand, unspecified portion of metacarpal, initial encounter  -     Ambulatory Referral to Orthopedic Surgery  -     XR hand 3+ vw right; Future  -     Ambulatory Referral to PT/OT Hand Therapy; Future  -     Splint application      [id number] female with right fifth metacarpal fracture, DOI 12/20/22  Discussed with the patient that this should heal well with non operative treatment  The patient is aware she may have an ext lag  The patient was agreeable to this  A ulnar gutter splint was applied in the office today  She will remain non weightbearing with her RUE  A order was provided for OT for a custom hand based ulnar gutter splint with the DIP's free  This will be fabricated after her next appointment and the patient was advised to call therapy to set that up  The patient verbalized understanding of exam findings and treatment plan  We engaged in the shared decision-making process and treatment options were discussed at length with the patient  Surgical and conservative management discussed today along with risks and benefits  Follow Up:  12 days      To Do Next Visit:  Re-evaluation of current issue, X-rays of the  right  hand and Cast/splint off prior to x-ray    General Discussions:  Fracture - Nonoperative Care: The physiology of a fractured bone was discussed with the patient today  With non-displaced or minimally displaced fractures, conservative treatment such as casting or splinting often results in a functional recovery  Typically, these fractures are immobilized in either a cast or splint depending on the pattern    Radiographs are typically taken at intervals throughout the fracture healing to ensure that reduction or alignment is not lost   If the fracture loses its alignment, surgical intervention may be required to stabilize it  Medical conditions such as diabetes, osteoporosis, vitamin D deficiency, and a history of or exposure to smoking may delay or prevent fracture healing  Options between cast/splint immobilization and surgical treatment were offered and the risks and benefits of both were discussed  Suze Craig MD  Attending, Orthopaedic Surgery  Hand, Wrist, and Elbow Surgery  [address] [place] [address]    ____________________________________________________________________________________________________________________________________________      CHIEF COMPLAINT:  Chief Complaint   Patient presents with   • Right Hand - Pain, Fracture       SUBJECTIVE:  Priyanka Montilla is a 74y o  year old RHD female who presents today for evaluation and treatment of right hand pain  The patient states she had a trip and fall on 12/20/22  She presented to urgent care the following day where x-rays were taken and she was placed in a splint  She has been tolerating the splint well  She notes mild pain  She states her hand was moving in the splint which increased her pain  The patient is a book keeper  Patient referred by NILS Bearden for evaluation             I have personally reviewed all the relevant PMH, PSH, SH, FH, Medications and allergies      PAST MEDICAL HISTORY:  Past Medical History:   Diagnosis Date   • Abnormal blood chemistry    • Abnormal glucose    • Breast cancer ([facility] [address] ) 2018    left   • Cancer (HCC)     left breast   • Cervical polyp    • Fracture of ankle    • Hemorrhoid    • Herpes zoster    • History of chemotherapy    • History of radiation therapy    • Hyperglycemia    • Hyperlipidemia    • Hypertension    • Insomnia    • Leiomyoma of uterus    • Osteopenia    • Seborrheic keratosis    • Shingles    • Spider bite wound        PAST SURGICAL HISTORY:  Past Surgical History:   Procedure Laterality Date   • ANKLE SURGERY Right    • BREAST BIOPSY Left    • BREAST CYST ASPIRATION Right    • BREAST LUMPECTOMY Left 2018   • CYST REMOVAL      right eyelid   • DILATION AND CURETTAGE OF UTERUS     • FRACTURE SURGERY      ankle plates and screws   • INTRAOPERATIVE RADIATION THERAPY (IORT) Left 2018    Procedure: BREAST IORT BY DR Stephanie Stephenson;  Surgeon: Abhishek Gaston MD;  [facility];  Service: Surgical Oncology   • MAMMO NEEDLE LOCALIZATION LEFT (ALL INC) Left 2018   • MS BX/EXC LYMPH NODE OPEN SUPERFICIAL Left 2018    Procedure: LYMPHOSCINTIGRAPHY; INTRAOPERATIVE LYMPHATIC MAPPING; SENTINEL LYMPH NODE BIOPSY (INJECT AT 7202); Surgeon: Abhishek Gaston MD;  [facility];  Service: Surgical Oncology   • MS COLONOSCOPY FLX DX W/COLLJ SPEC WHEN PFRMD N/A 2018    Procedure: COLONOSCOPY;  Surgeon: Joellen Stubbs MD;  Location: [facility] GI LAB; Service: Gastroenterology   • MS PERQ DEVICE PLACEMENT BREAST LOC 1ST LES W/GDNCE Left 2018    Procedure: BREAST LUMPECTOMY; BREAST NEEDLE LOCALIZATION (NEEDLE LOC AT 1130);  FROZEN SECTION;  Surgeon: Abhishek Gaston MD;  [facility];  Service: Surgical Oncology   • SENTINEL LYMPH NODE BIOPSY Left    • US GUIDED BREAST BIOPSY LEFT COMPLETE Left 2017       FAMILY HISTORY:  Family History   Problem Relation Age of Onset   • Colon cancer Paternal Grandfather    • Breast cancer Maternal Aunt 34   • Ovarian cancer Maternal Aunt    • Prostate cancer Maternal Uncle    • Hypertension Mother    • Heart disease Mother    • Thyroid disease Mother    • Leukemia Father    • Other Father         dyschromia   • Emphysema Maternal Grandfather    • Heart disease Paternal Grandmother    • Colon cancer Paternal Aunt    • Breast cancer Maternal Aunt 44   • Colon cancer Paternal Uncle    • Mental illness Neg Hx        SOCIAL HISTORY:  Social History     Tobacco Use   • Smoking status: Former     Types: Cigarettes     Quit date: 1998     Years since quittin 8   • Smokeless tobacco: Never   • Tobacco comments:      quit    Vaping Use   • Vaping Use: Never used   Substance Use Topics   • Alcohol use: Yes     Alcohol/week: 7 0 standard drinks     Types: 4 Glasses of wine, 3 Cans of beer per week     Comment: social   • Drug use: Never       MEDICATIONS:    Current Outpatient Medications:   •  amLODIPine-valsartan (EXFORGE) 5-160 MG per tablet, TAKE ONE TABLET BY MOUTH EVERY DAY, Disp: 90 tablet, Rfl: 0  •  anastrozole (ARIMIDEX) 1 mg tablet, TAKE 1 TABLET(1 MG) BY MOUTH DAILY, Disp: 90 tablet, Rfl: 3  •  Biotin 10 MG CAPS, Take by mouth, Disp: , Rfl:   •  Calcium Carb-Cholecalciferol 600-1000 MG-UNIT CAPS, Take by mouth, Disp: , Rfl:   •  mupirocin (BACTROBAN) 2 % ointment, Apply topically 3 (three) times a day, Disp: 15 g, Rfl: 0  •  simvastatin (ZOCOR) 40 mg tablet, TAKE 1 TABLET BY MOUTH AT BEDTIME, Disp: 90 tablet, Rfl: 1    ALLERGIES:  Allergies   Allergen Reactions   • Adhesive [Medical Tape] Rash           REVIEW OF SYSTEMS:  Musculoskeletal:        As noted in HPI  All other systems reviewed and are negative      VITALS:  Vitals:    01/05/23 1027   BP: 144/78   Pulse: 63   SpO2: 99%       LABS:  HgA1c:   Lab Results   Component Value Date    HGBA1C 6 1 (H) 08/22/2022     BMP:   Lab Results   Component Value Date    GLUCOSE 108 (H) 12/29/2017    CALCIUM 9 2 08/20/2022     12/29/2017    K 4 7 08/22/2022    CO2 25 08/22/2022     (H) 08/22/2022    BUN 15 08/22/2022    CREATININE 0 54 (L) 08/22/2022       _____________________________________________________  PHYSICAL EXAMINATION:  General: well developed and well nourished, alert, oriented times 3 and appears comfortable  Psychiatric: Normal  HEENT: Normocephalic, Atraumatic Trachea Midline, No torticollis  Pulmonary: No audible wheezing or respiratory distress   Abdomen/GI: Non tender, non distended   Cardiovascular: No pitting edema, 2+ radial pulse   Skin: No masses, erythema, lacerations, fluctation, ulcerations  Neurovascular: Sensation Intact to the Median, Ulnar, Radial Nerve, Motor Intact to the Median, Ulnar, Radial Nerve and Pulses Intact  Musculoskeletal: Normal, except as noted in detailed exam and in HPI  MUSCULOSKELETAL EXAMINATION:  Right hand   TTP fx site  Able to ext all digits  No wounds  Compartments soft  Brisk capillary refill   ___________________________________________________  STUDIES REVIEWED:  Images of the right hand were reviewed in PACS by Dr Jordyn Lo and demonstrate  fifth metacarpal fracture with appx 15 degree angulation  PROCEDURES PERFORMED:  Splint application    Date/Time: 1/5/2023 11:07 AM  Performed by: Paolo Luther MD  Authorized by: Paolo Luther MD   Universal Protocol:  Consent: Verbal consent obtained  Consent given by: patient  Patient identity confirmed: verbally with patient      Procedure details:     Laterality:  Right    Location:  Hand    Hand:  R hand    Splint type:  Ulnar gutter    Supplies:  Cotton padding, Ortho-Glass and elastic bandage  Post-procedure details:     Patient tolerance of procedure:   Tolerated well, no immediate complications           _____________________________________________________      Scribe Attestation    I,:  Vivian Lux MA am acting as a scribe while in the presence of the attending physician :       I,:  Paolo Luther MD personally performed the services described in this documentation    as scribed in my presence :

## 2023-01-12 ENCOUNTER — HOSPITAL ENCOUNTER (OUTPATIENT)
Dept: RADIOLOGY | Facility: HOSPITAL | Age: 73
Discharge: HOME | End: 2023-01-12

## 2023-01-12 VITALS — HEIGHT: 64 IN | BODY MASS INDEX: 26.12 KG/M2 | WEIGHT: 153 LBS

## 2023-01-12 DIAGNOSIS — C50.212 MALIGNANT NEOPLASM OF UPPER-INNER QUADRANT OF LEFT BREAST IN FEMALE, ESTROGEN RECEPTOR POSITIVE (HCC): ICD-10-CM

## 2023-01-12 DIAGNOSIS — Z17.0 MALIGNANT NEOPLASM OF UPPER-INNER QUADRANT OF LEFT BREAST IN FEMALE, ESTROGEN RECEPTOR POSITIVE (HCC): ICD-10-CM

## 2023-01-16 ENCOUNTER — HOSPITAL ENCOUNTER (OUTPATIENT)
Dept: RADIOLOGY | Facility: HOSPITAL | Age: 73
Discharge: HOME | End: 2023-01-16
Attending: STUDENT IN AN ORGANIZED HEALTH CARE EDUCATION/TRAINING PROGRAM

## 2023-01-16 ENCOUNTER — OFFICE VISIT (OUTPATIENT)
Dept: OBGYN CLINIC | Facility: CLINIC | Age: 73
End: 2023-01-16

## 2023-01-16 VITALS
HEART RATE: 65 BPM | HEIGHT: 64 IN | DIASTOLIC BLOOD PRESSURE: 65 MMHG | WEIGHT: 153.9 LBS | SYSTOLIC BLOOD PRESSURE: 119 MMHG | BODY MASS INDEX: 26.27 KG/M2

## 2023-01-16 DIAGNOSIS — S62.306A CLOSED NONDISPLACED FRACTURE OF FIFTH METACARPAL BONE OF RIGHT HAND, UNSPECIFIED PORTION OF METACARPAL, INITIAL ENCOUNTER: Primary | ICD-10-CM

## 2023-01-16 DIAGNOSIS — S62.306A CLOSED NONDISPLACED FRACTURE OF FIFTH METACARPAL BONE OF RIGHT HAND, UNSPECIFIED PORTION OF METACARPAL, INITIAL ENCOUNTER: ICD-10-CM

## 2023-01-16 NOTE — PROGRESS NOTES
ORTHOPAEDIC HAND, WRIST, AND ELBOW OFFICE  VISIT       ASSESSMENT/PLAN:      Diagnoses and all orders for this visit:    Closed nondisplaced fracture of fifth metacarpal bone of right hand, unspecified portion of metacarpal, initial encounter  -     XR hand 3+ vw right; Future  -     Ambulatory Referral to PT/OT Hand Therapy; Future    74year old female with R 5th metacarpal fracture sustained on 12/20/22  We discussed pt's xrays with her today and that these show a stable fx  At this time, pt placed back into a temporary ulnar gutter splint and will make an appt with hand therapy in the next couple of days to convert to custom made splint  Continue to refrain from weight lifting with this hand  The patient verbalized understanding of exam findings and treatment plan  We engaged in the shared decision-making process and treatment options were discussed at length with the patient  Surgical and conservative management discussed today along with risks and benefits  Follow Up:  4 weeks       To Do Next Visit:  X-rays of the  right  hand      Dione Martin MD  Attending, Orthopaedic Surgery  Hand, Wrist, and Elbow Surgery  [address] [place] [address]    ____________________________________________________________________________________________________________________________________________      CHIEF COMPLAINT:  Chief Complaint   Patient presents with   • Right Hand - Follow-up       SUBJECTIVE:  Pat Vila is a 74y o  year old RHD female who presents today for follow up of right 5th metacarpal fx sustained 12/20/22  Conservative management was initiated and pt placed into an ulnar gutter splint and advised to remain NWB              I have personally reviewed all the relevant PMH, PSH, SH, FH, Medications and allergies      PAST MEDICAL HISTORY:  Past Medical History:   Diagnosis Date   • Abnormal blood chemistry    • Abnormal glucose    • Breast cancer ([facility] [address] ) 2018    left   • Cancer ([facility] [address] ) left breast   • Cervical polyp    • Fracture of ankle    • Hemorrhoid    • Herpes zoster    • History of chemotherapy    • History of radiation therapy    • Hyperglycemia    • Hyperlipidemia    • Hypertension    • Insomnia    • Leiomyoma of uterus    • Osteopenia    • Seborrheic keratosis    • Shingles    • Spider bite wound        PAST SURGICAL HISTORY:  Past Surgical History:   Procedure Laterality Date   • ANKLE SURGERY Right    • BREAST BIOPSY Left    • BREAST CYST ASPIRATION Right 1992   • BREAST LUMPECTOMY Left 01/01/2018   • CYST REMOVAL      right eyelid   • DILATION AND CURETTAGE OF UTERUS     • FRACTURE SURGERY      ankle plates and screws   • INTRAOPERATIVE RADIATION THERAPY (IORT) Left 01/12/2018    Procedure: BREAST IORT BY DR Miquel Lenz;  Surgeon: Jyoti Bowie MD;  [facility];  Service: Surgical Oncology   • MAMMO NEEDLE LOCALIZATION LEFT (ALL INC) Left 01/12/2018   • MT BX/EXC LYMPH NODE OPEN SUPERFICIAL Left 01/12/2018    Procedure: LYMPHOSCINTIGRAPHY; INTRAOPERATIVE LYMPHATIC MAPPING; SENTINEL LYMPH NODE BIOPSY (INJECT AT 4636); Surgeon: Jyoti Bowie MD;  [facility];  Service: Surgical Oncology   • MT COLONOSCOPY FLX DX W/COLLJ SPEC WHEN PFRMD N/A 09/14/2018    Procedure: COLONOSCOPY;  Surgeon: Cheli Monahan MD;  Location: [facility] GI LAB; Service: Gastroenterology   • MT PERQ DEVICE PLACEMENT BREAST LOC 1ST LES W/GDNCE Left 01/12/2018    Procedure: BREAST LUMPECTOMY; BREAST NEEDLE LOCALIZATION (NEEDLE LOC AT 1130);  FROZEN SECTION;  Surgeon: Jyoti Bowie MD;  [facility];  Service: Surgical Oncology   • SENTINEL LYMPH NODE BIOPSY Left    • US GUIDED BREAST BIOPSY LEFT COMPLETE Left 11/30/2017       FAMILY HISTORY:  Family History   Problem Relation Age of Onset   • Colon cancer Paternal Grandfather    • Breast cancer Maternal Aunt 34   • Ovarian cancer Maternal Aunt    • Prostate cancer Maternal Uncle    • Hypertension Mother    • Heart disease Mother    • Thyroid disease Mother    • Leukemia Father    • Other Father         dyschromia   • Emphysema Maternal Grandfather    • Heart disease Paternal Grandmother    • Colon cancer Paternal Aunt    • Breast cancer Maternal Aunt 44   • Colon cancer Paternal Uncle    • Mental illness Neg Hx        SOCIAL HISTORY:  Social History     Tobacco Use   • Smoking status: Former     Types: Cigarettes     Quit date: 1998     Years since quittin 8   • Smokeless tobacco: Never   • Tobacco comments:      quit  Vaping Use   • Vaping Use: Never used   Substance Use Topics   • Alcohol use: Yes     Alcohol/week: 7 0 standard drinks     Types: 4 Glasses of wine, 3 Cans of beer per week     Comment: social   • Drug use: Never       MEDICATIONS:    Current Outpatient Medications:   •  amLODIPine-valsartan (EXFORGE) 5-160 MG per tablet, TAKE ONE TABLET BY MOUTH EVERY DAY, Disp: 90 tablet, Rfl: 0  •  anastrozole (ARIMIDEX) 1 mg tablet, TAKE 1 TABLET(1 MG) BY MOUTH DAILY, Disp: 90 tablet, Rfl: 3  •  Biotin 10 MG CAPS, Take by mouth, Disp: , Rfl:   •  Calcium Carb-Cholecalciferol 600-1000 MG-UNIT CAPS, Take by mouth, Disp: , Rfl:   •  mupirocin (BACTROBAN) 2 % ointment, Apply topically 3 (three) times a day, Disp: 15 g, Rfl: 0  •  simvastatin (ZOCOR) 40 mg tablet, TAKE 1 TABLET BY MOUTH AT BEDTIME, Disp: 90 tablet, Rfl: 1    ALLERGIES:  Allergies   Allergen Reactions   • Adhesive [Medical Tape] Rash           REVIEW OF SYSTEMS:  Musculoskeletal:        As noted in HPI  All other systems reviewed and are negative      VITALS:  Vitals:    23 1759   BP: 119/65   Pulse: 65       LABS:  HgA1c:   Lab Results   Component Value Date    HGBA1C 6 1 (H) 2022     BMP:   Lab Results   Component Value Date    GLUCOSE 108 (H) 2017    CALCIUM 9 2 2022     2017    K 4 7 2022    CO2 25 2022     (H) 2022    BUN 15 2022    CREATININE 0 54 (L) 2022 _____________________________________________________  PHYSICAL EXAMINATION:  General: well developed and well nourished, alert, oriented times 3 and appears comfortable  Psychiatric: Normal  HEENT: Normocephalic, Atraumatic Trachea Midline, No torticollis  Pulmonary: No audible wheezing or respiratory distress   Abdomen/GI: Non tender, non distended   Cardiovascular: No pitting edema, 2+ radial pulse   Skin: No masses, erythema, lacerations, fluctation, ulcerations  Neurovascular: Sensation Intact to the Median, Ulnar, Radial Nerve, Motor Intact to the Median, Ulnar, Radial Nerve and Pulses Intact  Musculoskeletal: Normal, except as noted in detailed exam and in HPI  MUSCULOSKELETAL EXAMINATION:  Right Hand:  No concerning edema, erythema  Pt with minimal discomfort to palpation  FDS/FDP/Extensors intact  Expected LROM      ___________________________________________________  STUDIES REVIEWED:  Images of the right hand were reviewed in PACS by Dr Inder Rosen and demonstrate maintained alignment of 5th metacarpal fracture compared to previous films           PROCEDURES PERFORMED:  Procedures  No Procedures performed today    _____________________________________________________      Scribe Attestation    I,:  Art Conroy PA-C am acting as a scribe while in the presence of the attending physician :       I,:  Elizabeth Meigs, MD personally performed the services described in this documentation    as scribed in my presence :

## 2023-01-17 ENCOUNTER — TELEPHONE (OUTPATIENT)
Dept: HEMATOLOGY ONCOLOGY | Facility: CLINIC | Age: 73
End: 2023-01-17

## 2023-01-17 DIAGNOSIS — C50.212 MALIGNANT NEOPLASM OF UPPER-INNER QUADRANT OF LEFT BREAST IN FEMALE, ESTROGEN RECEPTOR POSITIVE (HCC): ICD-10-CM

## 2023-01-17 DIAGNOSIS — Z17.0 MALIGNANT NEOPLASM OF UPPER-INNER QUADRANT OF LEFT BREAST IN FEMALE, ESTROGEN RECEPTOR POSITIVE (HCC): ICD-10-CM

## 2023-01-17 RX ORDER — ANASTROZOLE 1 MG/1
1 TABLET ORAL DAILY
Qty: 90 TABLET | Refills: 1 | Status: SHIPPED | OUTPATIENT
Start: 2023-01-17

## 2023-01-17 NOTE — TELEPHONE ENCOUNTER
MEDICATION REFILL ROUTE TO RN    Who is Calling  Patient   Medication anastrozole (ARIMIDEX     How many pills left 5   Preferred Pharmacy / Address  [facility] [address] [facility], [address] [place] [address]    Who is your Physician?  Dr Lennox Kearney   Call back number  [phone]   Relevant Information

## 2023-01-19 ENCOUNTER — EVALUATION (OUTPATIENT)
Dept: OCCUPATIONAL THERAPY | Facility: CLINIC | Age: 73
End: 2023-01-19

## 2023-01-19 DIAGNOSIS — S62.306A CLOSED FRACTURE OF FIFTH METACARPAL BONE OF RIGHT HAND, INITIAL ENCOUNTER: Primary | ICD-10-CM

## 2023-01-19 NOTE — PROGRESS NOTES
OT Evaluation     Today's date: 2023  Patient name: Kandis Holder  : 1950  MRN: [id number]  Referring provider: Mykel Merrill MD  Dx:   Encounter Diagnosis     ICD-10-CM    1  Closed fracture of fifth metacarpal bone of right hand, initial encounter  S62 [id number]           Start Time: 1100  Stop Time: 1145  Total time in clinic (min): 45 minutes    Assessment  Assessment details:   Patient is a 74 y o  RHD female who presents for OT IE and treatment for conservative management of a closed displaced fx of fifth metacarpal bone of right hand  Patient reports on 2022 she was getting out of her car and tripped over bricks causing her to fall and land on her hand  Patient referred by Dr Mahendra Logn to initiate treatment including hand therapy for wrist AROM and light AROM to DIPs only and custom orthotic ulnar gutter hand based splint with DIPs free  Impairments: abnormal or restricted ROM, activity intolerance, impaired physical strength, lacks appropriate home exercise program, pain with function and weight-bearing intolerance  Understanding of Dx/Px/POC: good   Prognosis: good    Goals  Goals:    Patient to demonstrate ability to independently don and doff splint in clinic following IE  Patient to demonstrate knowledge and understand of orthotic wear schedule and compliance with use of orthotic  Patient to increase ROM of RUE by at least 10 degrees to assist in completing ADLs  Plan  Plan details:   Plan: Focus on HEP and proper use of orthotic for positioning to ensure safe return to PLOF      Patient would benefit from: custom splinting, OT eval and skilled occupational therapy  Planned modality interventions: thermotherapy: hydrocollator packs  Planned therapy interventions: orthotic management and training, orthotic fitting/training, functional ROM exercises and home exercise program  Duration in weeks: 10  Plan of Care beginning date: 2023  Plan of Care expiration date: 3/30/2023  Treatment plan discussed with: patient and family        Subjective Evaluation    History of Present Illness  Date of onset: 2022  Mechanism of injury: trauma  Mechanism of injury: Patient is a 74 y o  RHD female who presents for OT IE and treatment for conservative management of a closed displaced fx of fifth metacarpal bone of right hand  Patient reports on 2022 she was getting out of her car and tripped over bricks causing her to fall and land on her hand  Patient referred by Dr Dinora Jiménez to initiate treatment including hand therapy for wrist AROM and light AROM to DIPs only and custom orthotic ulnar gutter hand based splint with DIPs free  Quality of life: good    Pain  Current pain ratin  At best pain ratin  At worst pain ratin  Quality: throbbing    Social Support  Lives with: spouse    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)  Hand dominance: right    Patient Goals  Patient goals for therapy: increased motion, independence with ADLs/IADLs, return to sport/leisure activities and decreased pain          Objective     Active Range of Motion     Left Wrist   Wrist flexion: 65 degrees   Wrist extension: 70 degrees   Radial deviation: 28 degrees   Ulnar deviation: 25 degrees     Right Wrist   Wrist flexion: 69 degrees   Wrist extension: 64 degrees   Radial deviation: 29 degrees   Ulnar deviation: 25 degrees     Left Digits    Flexion   Little     MCP: 92    PIP: 90    DIP: 82  Extension   Little     MCP: 0    PIP: 0    DIP: 0    Right Digits   Flexion   Little     MCP: 30    PIP: 32    DIP: 30  Extension   Little     MCP: 0    PIP: 0    DIP: 0      Flowsheet Rows    Flowsheet Row Most Recent Value   PT/OT G-Codes    Current Score 30   Projected Score 58             Precautions: Wrist AROM and Gentle AROM for DIPs Only while wearing ulnar gutter splint          Manuals HEP 2023                       Ther Ex     Education on Dx, HEP, Orthotic wearing schedule x x5min   Wrist AROM x 10   Digits Gentle AROM DIPs only x x10        Modalities     MH x x5min          Physician:  Dr Dinora Jiménez   Diagnosis:  closed displaced fx of fifth metacarpal bone of right hand  Type of DME: Ulnar Gutter hand based Splint with DIPs free   Position: MCPs   Side: Right   Estimated length of time the patient will use orthotic:  until deemed necessary as per physician orders  Reason for Orthotic: protection and positioning     Objective:   AROM - AROM limited at this time due to edema and pain  Strength - not assessed secondary to acute injury  Assessment: The physician's order and diagnosis were confirmed prior evaluation/assessment and placement of orthotic   This orthotic was deemed medically necessary by the ordering physician  The orthotic size was determined after performing appropriate custom measurements  The orthotic was then applied to the patient, securing all straps with moderate tension     The orthotic was then re-evaluated to confirm fit and verify that no compromise of circulation was occurring   All bony prominences were evaluated and padding was utilized as needed to further protect bony prominences       Application instructions and care of the orthotic were reviewed in detail with the patient, including need for regular inspection of the orthotic   It was verified that the patient was able to perform independent application and removal of the orthotic with appropriate technique   Usage instructions were reviewed as per the physician's order   The patient verbalized an understanding of all instructions

## 2023-01-24 ENCOUNTER — APPOINTMENT (OUTPATIENT)
Dept: OCCUPATIONAL THERAPY | Facility: CLINIC | Age: 73
End: 2023-01-24

## 2023-01-26 ENCOUNTER — APPOINTMENT (OUTPATIENT)
Dept: OCCUPATIONAL THERAPY | Facility: CLINIC | Age: 73
End: 2023-01-26

## 2023-01-31 ENCOUNTER — APPOINTMENT (OUTPATIENT)
Dept: OCCUPATIONAL THERAPY | Facility: CLINIC | Age: 73
End: 2023-01-31

## 2023-02-02 ENCOUNTER — APPOINTMENT (OUTPATIENT)
Dept: OCCUPATIONAL THERAPY | Facility: CLINIC | Age: 73
End: 2023-02-02

## 2023-02-06 ENCOUNTER — OFFICE VISIT (OUTPATIENT)
Dept: OCCUPATIONAL THERAPY | Facility: CLINIC | Age: 73
End: 2023-02-06

## 2023-02-06 ENCOUNTER — HOSPITAL ENCOUNTER (OUTPATIENT)
Dept: RADIOLOGY | Facility: HOSPITAL | Age: 73
Discharge: HOME | End: 2023-02-06
Attending: STUDENT IN AN ORGANIZED HEALTH CARE EDUCATION/TRAINING PROGRAM

## 2023-02-06 ENCOUNTER — OFFICE VISIT (OUTPATIENT)
Dept: OBGYN CLINIC | Facility: CLINIC | Age: 73
End: 2023-02-06

## 2023-02-06 VITALS
BODY MASS INDEX: 25.85 KG/M2 | WEIGHT: 151.4 LBS | SYSTOLIC BLOOD PRESSURE: 142 MMHG | HEIGHT: 64 IN | HEART RATE: 71 BPM | DIASTOLIC BLOOD PRESSURE: 72 MMHG

## 2023-02-06 DIAGNOSIS — S62.306A CLOSED NONDISPLACED FRACTURE OF FIFTH METACARPAL BONE OF RIGHT HAND, UNSPECIFIED PORTION OF METACARPAL, INITIAL ENCOUNTER: ICD-10-CM

## 2023-02-06 DIAGNOSIS — S62.306A CLOSED FRACTURE OF FIFTH METACARPAL BONE OF RIGHT HAND, INITIAL ENCOUNTER: Primary | ICD-10-CM

## 2023-02-06 DIAGNOSIS — S62.306D CLOSED NONDISPLACED FRACTURE OF FIFTH METACARPAL BONE OF RIGHT HAND WITH ROUTINE HEALING, UNSPECIFIED PORTION OF METACARPAL, SUBSEQUENT ENCOUNTER: Primary | ICD-10-CM

## 2023-02-06 NOTE — PROGRESS NOTES
OT Re-Evaluation     Today's date: 2023  Patient name: Raphael Turpin  : 1950  MRN: [id number]  Referring provider: Rafael Powers MD  Dx:   Encounter Diagnosis     ICD-10-CM    1  Closed fracture of fifth metacarpal bone of right hand, initial encounter  S62 306A           Start Time: 1415  Stop Time: 1500  Total time in clinic (min): 45 minutes    Assessment  Assessment details: Patient is a 74 y o  RHD female who presents for OT RE and treatment for conservative treatment of a closed displaced fx of fifth metacarpal bone of right hand  Patient demonstrates improvement of ROM since evaluation  Strength was not assessed at this time  Patient saw MD on 2023 and to discontinue ulnar gutter full time, continue to use when rowing and out in public for 2 more weeks and to initiate WBAT to RUE of wrist and digits  Patient showing mod deficits overall in range of motion of RUE compared to RUE at this time  Patient would benefit from continued skilled OT to maximize ability in RUE to complete ADLs with ease  Patient is a 74 y o  RHD female who presents for OT IE and treatment for conservative management of a closed displaced fx of fifth metacarpal bone of right hand  Patient reports on 2022 she was getting out of her car and tripped over bricks causing her to fall and land on her hand  Patient referred by Dr Rachel Gallardo to initiate treatment including hand therapy for wrist AROM and light AROM to DIPs only and custom orthotic ulnar gutter hand based splint with DIPs free  Impairments: abnormal or restricted ROM, activity intolerance, impaired physical strength, lacks appropriate home exercise program, pain with function and weight-bearing intolerance  Understanding of Dx/Px/POC: good   Prognosis: good    Goals  Goals:    Patient to demonstrate ability to independently don and doff splint in clinic following IE   MET    Patient to demonstrate knowledge and understand of orthotic wear schedule and compliance with use of orthotic  MET    Patient to increase ROM of RUE wrist by at least 10 degrees to assist in completing ADLs  Partially Met      **New Goals Added 2/6/23:    Short Term Goals by 2 - 4 weeks:    Establish HEP to increase performance with daily activities  Patient will increase ROM of  little small finger by at least 10 degrees to complete ADLs  Patient will decrease pain rate of RUE by at least 2 points per the VAS scale  Long Term Goals by discharge:    Establish final home exercise program to enhance maximal functional level with ADLs  Achieve functional active range of motion of RUE for full return to household chores  Achieve functional strength of RUE for full return to high level ADLs  Plan  Plan details:   Plan: Focus on HEP, proper use of orthotic for positioning to ensure safe return to PLOF and AROM  Patient would benefit from: custom splinting, OT eval and skilled occupational therapy  Planned modality interventions: thermotherapy: hydrocollator packs  Planned therapy interventions: orthotic management and training, orthotic fitting/training, functional ROM exercises, home exercise program, therapeutic activities, strengthening, stretching, therapeutic exercise, graded activity, graded exercise, fine motor coordination training, flexibility and patient education  Duration in weeks: 10  Plan of Care beginning date: 1/19/2023  Plan of Care expiration date: 3/30/2023  Treatment plan discussed with: patient and family        Subjective Evaluation    History of Present Illness  Date of onset: 12/30/2022  Mechanism of injury: trauma  Mechanism of injury: Patient is a 74 y o  RHD female who presents for OT IE and treatment for conservative management of a closed displaced fx of fifth metacarpal bone of right hand   Patient reports on 12/30/2022 she was getting out of her car and tripped over bricks causing her to fall and land on her hand  Patient referred by Dr Nikko Chavez to initiate treatment including hand therapy for wrist AROM and light AROM to DIPs only and custom orthotic ulnar gutter hand based splint with DIPs free  Quality of life: good    Pain  Current pain ratin  At best pain ratin  At worst pain ratin  Quality: throbbing    Social Support  Lives with: spouse    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)  Hand dominance: right    Patient Goals  Patient goals for therapy: increased motion, independence with ADLs/IADLs, return to sport/leisure activities, decreased pain and increased strength          Objective     Active Range of Motion     Left Wrist   Wrist flexion: 65 degrees   Wrist extension: 70 degrees   Radial deviation: 28 degrees   Ulnar deviation: 25 degrees     Right Wrist   Wrist flexion: 70 degrees   Wrist extension: 64 degrees   Radial deviation: 11 degrees   Ulnar deviation: 23 degrees     Left Digits    Flexion   Little     MCP: 92    PIP: 90    DIP: 82  Extension   Little     MCP: 0    PIP: 0    DIP: 0    Right Digits   Flexion   Little     MCP: 50    PIP: 74    DIP: 44  Extension   Little     MCP: 0    PIP: 0    DIP: 0                      Daily Note     Today's date: 2023  Patient name: Pat Vila  : 1950  MRN: [id number]  Referring provider: Diane Sol MD  Dx:   Encounter Diagnosis     ICD-10-CM    1  Closed fracture of fifth metacarpal bone of right hand, initial encounter  S62 306A           Start Time: 1415  Stop Time: 1500  Total time in clinic (min): 45 minutes    "Pat"    Subjective: Patient reports no functional changes this date  Objective: See treatment diary below     Right Wrist   Wrist flexion:  70  Wrist extension: 64  Radial deviation: 11  Ulnar deviation: 23      Right Digits   Flexion   Little     MCP: 50    PIP: 74    DIP: 44  Extension   Little     MCP: 0    PIP: 0    DIP: 0       Precautions: Wrist & Digit AROM   WBAT RUE Manuals HEP 2/6/2023                       Ther Ex     Education on DX and HEP x x5min   Wrist AROM x x10   Flexor Tendon Glides (hook & active full fist) x x10   MP extension off of table x x10   Digit Abd/Add x x10        Ther Activity     Juxaciser  x10   Dice, IHM  x25   Dice, opposition   x25   Boading Balls, small  x5CW/CCW   Towel scrunch  x5   Marker Crawls  x10             Modalities     MH  x5min            Assessment:   Patient tolerated session well  Session focused on HEP education, range of motion, and patient education to improve functional task performance with daily activities  Patient tolerated all TA & TE with no complaints of pain  Patient progressing well towards goals  Splint adjustment at today's session, demonstrating good fit and proper use of orthotic splint  Patient benefiting from skilled hand therapy OT to reduce deficits to improve independence with daily activities  Plan:   Focus on AROM to improve ability to complete daily activites with ease   POC: 1/19/23 - 3/30/23

## 2023-02-06 NOTE — PROGRESS NOTES
ORTHOPAEDIC HAND, WRIST, AND ELBOW OFFICE  VISIT       ASSESSMENT/PLAN:      Diagnoses and all orders for this visit:    Closed nondisplaced fracture of fifth metacarpal bone of right hand with routine healing, unspecified portion of metacarpal, subsequent encounter  -     XR hand 3+ vw right; Future  -     Ambulatory referral to PT/OT hand therapy; Future      74year old female with right 5th metacarpal fracture sustained 12/20/2022 treated nonoperatively with good progression  May discontinue use of ulnar gutter splint full time, continue use when rowing and out in public for two more weeks  Weight bearing as tolerated right upper extremity  New order placed for patient to initiate outpatient OT to review exercises and then may transition to home exercise program  Follow up in 6 weeks for re-evaluation with x-rays of right hand    The patient verbalized understanding of exam findings and treatment plan  We engaged in the shared decision-making process and treatment options were discussed at length with the patient  Surgical and conservative management discussed today along with risks and benefits  Follow Up:  6 weeks       To Do Next Visit:  Re-evaluation of current issue and X-rays of the  right  hand    Daisy Alcantar MD  Attending, Orthopaedic Surgery  Hand, Wrist, and Elbow Surgery  [facility]    ____________________________________________________________________________________________________________________________________________      CHIEF COMPLAINT:  Chief Complaint   Patient presents with   • Right Hand - Follow-up       SUBJECTIVE:  Kleber Banerjee is a 74y o  year old RHD female who presents today for follow up evaluation of fifth metacarpal fracture of right hand sustained 12/20/2022 treated nonoperatively in a custom ulnar gutter splint  Patient states she is doing well, no to minimal pain, has not needed pain medication    States she has been compliant in wearing custom splint full-time  I have personally reviewed all the relevant PMH, PSH, SH, FH, Medications and allergies      PAST MEDICAL HISTORY:  Past Medical History:   Diagnosis Date   • Abnormal blood chemistry    • Abnormal glucose    • Breast cancer ([address] ) 2018    left   • Cancer (HCC)     left breast   • Cervical polyp    • Fracture of ankle    • Hemorrhoid    • Herpes zoster    • History of chemotherapy    • History of radiation therapy    • Hyperglycemia    • Hyperlipidemia    • Hypertension    • Insomnia    • Leiomyoma of uterus    • Osteopenia    • Seborrheic keratosis    • Shingles    • Spider bite wound        PAST SURGICAL HISTORY:  Past Surgical History:   Procedure Laterality Date   • ANKLE SURGERY Right    • BREAST BIOPSY Left    • BREAST CYST ASPIRATION Right 1992   • BREAST LUMPECTOMY Left 01/01/2018   • CYST REMOVAL      right eyelid   • DILATION AND CURETTAGE OF UTERUS     • FRACTURE SURGERY      ankle plates and screws   • INTRAOPERATIVE RADIATION THERAPY (IORT) Left 01/12/2018    Procedure: BREAST IORT BY DR Raul Hastings;  Surgeon: Roxi Jose MD;  Location: [facility];  Service: Surgical Oncology   • MAMMO NEEDLE LOCALIZATION LEFT (ALL INC) Left 01/12/2018   • MI BX/EXC LYMPH NODE OPEN SUPERFICIAL Left 01/12/2018    Procedure: LYMPHOSCINTIGRAPHY; INTRAOPERATIVE LYMPHATIC MAPPING; SENTINEL LYMPH NODE BIOPSY (INJECT AT 1215); Surgeon: Roxi Jose MD;  Location[facility];  Service: Surgical Oncology   • MI COLONOSCOPY FLX DX W/COLLJ SPEC WHEN PFRMD N/A 09/14/2018    Procedure: COLONOSCOPY;  Surgeon: Julio Vargas MD;  Location: [facility]; Service: Gastroenterology   • MI PERQ DEVICE PLACEMENT BREAST LOC 1ST LES W/GDNCE Left 01/12/2018    Procedure: BREAST LUMPECTOMY; BREAST NEEDLE LOCALIZATION (NEEDLE LOC AT 1130);  FROZEN SECTION;  Surgeon: Roxi Jose MD;  [facility];  Service: Surgical Oncology   • SENTINEL LYMPH NODE BIOPSY Left    • US GUIDED BREAST BIOPSY LEFT COMPLETE Left 2017       FAMILY HISTORY:  Family History   Problem Relation Age of Onset   • Colon cancer Paternal Grandfather    • Breast cancer Maternal Aunt 34   • Ovarian cancer Maternal Aunt    • Prostate cancer Maternal Uncle    • Hypertension Mother    • Heart disease Mother    • Thyroid disease Mother    • Leukemia Father    • Other Father         dyschromia   • Emphysema Maternal Grandfather    • Heart disease Paternal Grandmother    • Colon cancer Paternal Aunt    • Breast cancer Maternal Aunt 44   • Colon cancer Paternal Uncle    • Mental illness Neg Hx        SOCIAL HISTORY:  Social History     Tobacco Use   • Smoking status: Former     Types: Cigarettes     Quit date: 1998     Years since quittin 9   • Smokeless tobacco: Never   • Tobacco comments:      quit  Vaping Use   • Vaping Use: Never used   Substance Use Topics   • Alcohol use: Yes     Alcohol/week: 7 0 standard drinks     Types: 4 Glasses of wine, 3 Cans of beer per week     Comment: social   • Drug use: Never       MEDICATIONS:    Current Outpatient Medications:   •  amLODIPine-valsartan (EXFORGE) 5-160 MG per tablet, TAKE ONE TABLET BY MOUTH EVERY DAY, Disp: 90 tablet, Rfl: 0  •  anastrozole (ARIMIDEX) 1 mg tablet, Take 1 tablet (1 mg total) by mouth daily, Disp: 90 tablet, Rfl: 1  •  Biotin 10 MG CAPS, Take by mouth, Disp: , Rfl:   •  Calcium Carb-Cholecalciferol 600-1000 MG-UNIT CAPS, Take by mouth, Disp: , Rfl:   •  mupirocin (BACTROBAN) 2 % ointment, Apply topically 3 (three) times a day, Disp: 15 g, Rfl: 0  •  simvastatin (ZOCOR) 40 mg tablet, TAKE 1 TABLET BY MOUTH AT BEDTIME, Disp: 90 tablet, Rfl: 1    ALLERGIES:  Allergies   Allergen Reactions   • Adhesive [Medical Tape] Rash         Review of Systems  Pertinent items are noted in HPI  All other systems were reviewed and are negative      VITALS:  Vitals:    23 0948   BP: 142/72   Pulse: 71       LABS:  HgA1c:   Lab Results   Component Value Date    HGBA1C 6 1 (H) 08/22/2022     BMP:   Lab Results   Component Value Date    GLUCOSE 108 (H) 12/29/2017    CALCIUM 9 2 08/20/2022     12/29/2017    K 4 7 08/22/2022    CO2 25 08/22/2022     (H) 08/22/2022    BUN 15 08/22/2022    CREATININE 0 54 (L) 08/22/2022       _____________________________________________________  PHYSICAL EXAMINATION:  General: well developed and well nourished, alert, oriented times 3 and appears comfortable  Psychiatric: Normal  HEENT: Normocephalic, Atraumatic Trachea Midline, No torticollis  Pulmonary: No audible wheezing or respiratory distress   Abdomen/GI: Non tender, non distended   Cardiovascular: No pitting edema, 2+ radial pulse   Skin: No masses, erythema, lacerations, fluctation, ulcerations  Neurovascular: Sensation Intact to the Median, Ulnar, Radial Nerve, Motor Intact to the Median, Ulnar, Radial Nerve and Pulses Intact  Musculoskeletal: Normal, except as noted in detailed exam and in HPI        MUSCULOSKELETAL EXAMINATION:    Right hand:  No obvious deformity, edema, ecchymosis  No tenderness with palpation  Able to fully flex index and long fingers  Ring and small fingers pulp to palm distance 2 5 cm  Can fully extend all digits  FDS/FDP/extensors intact      ___________________________________________________  STUDIES REVIEWED:  Images of the right hand were reviewed in PACS by Dr Braden Nassar and demonstrate  interval healing of 5th metacarpal fracture with maintained alignment        PROCEDURES PERFORMED:  Procedures  No Procedures performed today    _____________________________________________________      Gifty Helm    I,:  Vel Marcus am acting as a scribe while in the presence of the attending physician :       I,:  Roseline Renee MD personally performed the services described in this documentation    as scribed in my presence :

## 2023-02-07 ENCOUNTER — APPOINTMENT (OUTPATIENT)
Dept: OCCUPATIONAL THERAPY | Facility: CLINIC | Age: 73
End: 2023-02-07

## 2023-02-08 ENCOUNTER — OFFICE VISIT (OUTPATIENT)
Dept: OCCUPATIONAL THERAPY | Facility: CLINIC | Age: 73
End: 2023-02-08

## 2023-02-08 DIAGNOSIS — S62.306A CLOSED FRACTURE OF FIFTH METACARPAL BONE OF RIGHT HAND, INITIAL ENCOUNTER: Primary | ICD-10-CM

## 2023-02-08 NOTE — PROGRESS NOTES
Daily Note     Today's date: 2023  Patient name: Francisco Javier Mcwilliams  : 1950  MRN: [id number]  Referring provider: Sabi Celis MD  Dx:   Encounter Diagnosis     ICD-10-CM    1  Closed fracture of fifth metacarpal bone of right hand, initial encounter  S62 306A           Start Time: 1800  Stop Time: 1845  Total time in clinic (min): 45 minutes    "Pat"    Subjective: Patient reports no functional changes this date  Objective: See treatment diary below        Precautions: Wrist & Digit AROM  WBAT RUE           Manuals HEP 2023                       Ther Ex     Education on DX and HEP x x5min   Wrist AROM x x10   Flexor Tendon Glides (hook & active full fist) x x10   MP extension off of table x x10   Digit Abd/Add x x10   STM/Retrograde massage  x5min   Ulnar gutter hand based splint x continue use         Ther Activity     Juxaciser  x10   Dice, IHM  x25   Dice, opposition   x25   Boading Balls, small  x5CW/CCW   Towel scrunch  x10   Marker Crawls  x10   Small pegs, IHM/Opposition  x20        Modalities     MH  x5min            Assessment:   Patient tolerated session well  Session focused on HEP education, range of motion, and patient education to improve functional task performance with daily activities  Patient educated on edema management to increase functional performance with good carryover for HEP  Patient tolerated all TA & TE with min complaints of pain at the proximal PIP joint at the end of today's session  Patient progressing well towards goals  Patient benefiting from skilled hand therapy OT to reduce deficits to improve independence with daily activities  Plan:   Focus on AROM to improve ability to complete daily activites with ease   POC: 23 - 3/30/23

## 2023-02-09 ENCOUNTER — APPOINTMENT (OUTPATIENT)
Dept: OCCUPATIONAL THERAPY | Facility: CLINIC | Age: 73
End: 2023-02-09

## 2023-02-13 ENCOUNTER — OFFICE VISIT (OUTPATIENT)
Dept: OCCUPATIONAL THERAPY | Facility: CLINIC | Age: 73
End: 2023-02-13

## 2023-02-13 DIAGNOSIS — S62.306A CLOSED FRACTURE OF FIFTH METACARPAL BONE OF RIGHT HAND, INITIAL ENCOUNTER: Primary | ICD-10-CM

## 2023-02-13 NOTE — PROGRESS NOTES
Daily Note     Today's date: 2023  Patient name: Raphael Turpin  : 1950  MRN: [id number]  Referring provider: Rafael Powers MD  Dx:   Encounter Diagnosis     ICD-10-CM    1  Closed fracture of fifth metacarpal bone of right hand, initial encounter  S62 306A                      "Pat"    Subjective: Patient reports no functional changes this date  Objective: See treatment diary below        Precautions: Wrist & Digit AROM  WBAT RUE           Manuals HEP 2023                       Ther Ex     Education on DX and HEP x x5min   Wrist AROM x x10   Flexor Tendon Glides (hook & active full fist) x x10   MP extension off of table x x10   Digit Abd/Add x x10   STM/Retrograde massage  x5min   Ulnar gutter hand based splint x continue use         Ther Activity     Marker push ups  x10   Dice, IHM  x25   Dice, opposition   x25   Boading Balls, small  x5CW/CCW   Towel scrunch  x10   Marker Crawls  x10   Small pegs, IHM/Opposition  x20        Modalities     MH  x5min            Assessment:   Patient tolerated session well  Session focused on HEP education, range of motion, and patient education to improve functional task performance with daily activities  Patient tolerated all TE/TA with no complaints  Patient progressing well towards goals  Patient benefiting from skilled hand therapy OT to reduce deficits to improve independence with daily activities  Plan:   Focus on AROM to improve ability to complete daily activites with ease   POC: 23 - 3/30/23

## 2023-02-15 ENCOUNTER — OFFICE VISIT (OUTPATIENT)
Dept: OCCUPATIONAL THERAPY | Facility: CLINIC | Age: 73
End: 2023-02-15

## 2023-02-15 DIAGNOSIS — S62.306A CLOSED FRACTURE OF FIFTH METACARPAL BONE OF RIGHT HAND, INITIAL ENCOUNTER: Primary | ICD-10-CM

## 2023-02-15 NOTE — PROGRESS NOTES
Daily Note     Today's date: 2/15/2023  Patient name: Angelita Brown  : 1950  MRN: [id number]  Referring provider: Juvenal Eugene MD  Dx:   Encounter Diagnosis     ICD-10-CM    1  Closed fracture of fifth metacarpal bone of right hand, initial encounter  S62 306A           Start Time: 1730  Stop Time: 1815  Total time in clinic (min): 45 minutes    "Pat"    Subjective: Patient reports no functional changes this date  Patient notes "I've been sweeping the floors and mopping with my hand with no issues"  Objective: See treatment diary below        Precautions: Wrist & Digit AROM  WBAT RUE           Manuals HEP 2/15/2023                       Ther Ex     Education on DX and HEP x x5min   Wrist AROM x x10   Flexor Tendon Glides (hook & active full fist) x x10, 10 sec hold   MP extension off of table x x10   Digit Abd/Add x x10   STM/Retrograde massage  x5min   Ulnar gutter hand based splint x continue use         Ther Activity     Marker crawls  x10   Dice, IHM  x25   Dice, opposition   x25   Boading Balls, small  x5CW/CCW   Towel scrunch  x10   Marker Crawls  x10   Small pegs, IHM/Opposition  x20        Modalities     MH  x5min            Assessment:   Patient tolerated session well  Session focused on HEP education, range of motion, and patient education to improve functional task performance with daily activities  Patient tolerated all TE & TA with no complaints  Patient progressing well towards goals  Patient benefiting from skilled hand therapy OT to reduce deficits to improve independence with daily activities  Plan:   Focus on AROM to improve ability to complete daily activites with ease   POC: 23 - 3/30/23

## 2023-03-29 LAB
ALBUMIN SERPL-MCNC: 4.6 G/DL (ref 3.7–4.7)
ALBUMIN/GLOB SERPL: 2.7 {RATIO} (ref 1.2–2.2)
ALP SERPL-CCNC: 101 IU/L (ref 44–121)
ALT SERPL-CCNC: 31 IU/L (ref 0–32)
AST SERPL-CCNC: 39 IU/L (ref 0–40)
BILIRUB SERPL-MCNC: 0.3 MG/DL (ref 0–1.2)
BUN SERPL-MCNC: 18 MG/DL (ref 8–27)
BUN/CREAT SERPL: 30 (ref 12–28)
CALCIUM SERPL-MCNC: 10.4 MG/DL (ref 8.7–10.3)
CHLORIDE SERPL-SCNC: 107 MMOL/L (ref 96–106)
CHOLEST SERPL-MCNC: 200 MG/DL (ref 100–199)
CO2 SERPL-SCNC: 23 MMOL/L (ref 20–29)
CREAT SERPL-MCNC: 0.6 MG/DL (ref 0.57–1)
EGFR: 94 ML/MIN/1.73
GLOBULIN SER-MCNC: 1.7 G/DL (ref 1.5–4.5)
GLUCOSE SERPL-MCNC: 115 MG/DL (ref 70–99)
HDLC SERPL-MCNC: 84 MG/DL
LDLC SERPL CALC-MCNC: 106 MG/DL (ref 0–99)
MICRODELETION SYND BLD/T FISH: NORMAL
POTASSIUM SERPL-SCNC: 4.6 MMOL/L (ref 3.5–5.2)
PROT SERPL-MCNC: 6.3 G/DL (ref 6–8.5)
SODIUM SERPL-SCNC: 147 MMOL/L (ref 134–144)
TRIGL SERPL-MCNC: 54 MG/DL (ref 0–149)

## 2023-03-31 ENCOUNTER — OFFICE VISIT (OUTPATIENT)
Dept: OBGYN CLINIC | Facility: CLINIC | Age: 73
End: 2023-03-31

## 2023-03-31 ENCOUNTER — HOSPITAL ENCOUNTER (OUTPATIENT)
Dept: RADIOLOGY | Facility: HOSPITAL | Age: 73
End: 2023-03-31
Attending: STUDENT IN AN ORGANIZED HEALTH CARE EDUCATION/TRAINING PROGRAM

## 2023-03-31 VITALS
HEIGHT: 64 IN | DIASTOLIC BLOOD PRESSURE: 67 MMHG | HEART RATE: 65 BPM | WEIGHT: 155.4 LBS | SYSTOLIC BLOOD PRESSURE: 134 MMHG | BODY MASS INDEX: 26.53 KG/M2

## 2023-03-31 DIAGNOSIS — S62.306A CLOSED FRACTURE OF FIFTH METACARPAL BONE OF RIGHT HAND, INITIAL ENCOUNTER: Primary | ICD-10-CM

## 2023-03-31 DIAGNOSIS — S62.306A CLOSED FRACTURE OF FIFTH METACARPAL BONE OF RIGHT HAND, INITIAL ENCOUNTER: ICD-10-CM

## 2023-03-31 NOTE — PROGRESS NOTES
ORTHOPAEDIC HAND, WRIST, AND ELBOW OFFICE  VISIT       ASSESSMENT/PLAN:      Diagnoses and all orders for this visit:    Closed fracture of fifth metacarpal bone of right hand, initial encounter  -     XR wrist 3+ vw right; Future    74year old female with right 5th metacarpal fracture sustained 12/20/2022 treated nonoperatively with good progression and great range of motion  - no restrictions at this time   - will see back as needed     The patient verbalized understanding of exam findings and treatment plan  We engaged in the shared decision-making process and treatment options were discussed at length with the patient  Surgical and conservative management discussed today along with risks and benefits  Follow Up:  SOPHIA Colindres MD  Attending, Orthopaedic Surgery  Hand, Wrist, and Elbow Surgery  [address] [place] [address]    ____________________________________________________________________________________________________________________________________________      CHIEF COMPLAINT:  Chief Complaint   Patient presents with   • Right Wrist - Follow-up       SUBJECTIVE:  Ignacia Cantu is a 74 y o  female who presents today for follow up of her right 5th metacarpal fracture sustained 12/20/2022 treated nonoperatively with good progression  Patient has been fully weightbearing on her right upper extremity  She has also begun outpatient OT, which she is seeing improvements with  She has not had any issues, she has been doing all activities she has wanted       I have personally reviewed all the relevant PMH, PSH, SH, FH, Medications and allergies      PAST MEDICAL HISTORY:  Past Medical History:   Diagnosis Date   • Abnormal blood chemistry    • Abnormal glucose    • Breast cancer ([address] ) 2018    left   • Cancer (HCC)     left breast   • Cervical polyp    • Fracture of ankle    • Hemorrhoid    • Herpes zoster    • History of chemotherapy    • History of radiation therapy    • Hyperglycemia • Hyperlipidemia    • Hypertension    • Insomnia    • Leiomyoma of uterus    • Osteopenia    • Seborrheic keratosis    • Shingles    • Spider bite wound        PAST SURGICAL HISTORY:  Past Surgical History:   Procedure Laterality Date   • ANKLE SURGERY Right    • BREAST BIOPSY Left    • BREAST CYST ASPIRATION Right 1992   • BREAST LUMPECTOMY Left 01/01/2018   • CYST REMOVAL      right eyelid   • DILATION AND CURETTAGE OF UTERUS     • FRACTURE SURGERY      ankle plates and screws   • INTRAOPERATIVE RADIATION THERAPY (IORT) Left 01/12/2018    Procedure: BREAST IORT BY DR Jamie Poole;  Surgeon: Agustín Coronado MD;  Location[facility];  Service: Surgical Oncology   • MAMMO NEEDLE LOCALIZATION LEFT (ALL INC) Left 01/12/2018   • TX BX/EXC LYMPH NODE OPEN SUPERFICIAL Left 01/12/2018    Procedure: LYMPHOSCINTIGRAPHY; INTRAOPERATIVE LYMPHATIC MAPPING; SENTINEL LYMPH NODE BIOPSY (INJECT AT 2632); Surgeon: Agustín Coronado MD;  Location: [facility];  Service: Surgical Oncology   • TX COLONOSCOPY FLX DX W/COLLJ SPEC WHEN PFRMD N/A 09/14/2018    Procedure: COLONOSCOPY;  Surgeon: Mehul Buck MD;  Location: [place] [address] [facility]; Service: Gastroenterology   • TX PERQ DEVICE PLACEMENT BREAST LOC 1ST LES W/GDNCE Left 01/12/2018    Procedure: BREAST LUMPECTOMY; BREAST NEEDLE LOCALIZATION (NEEDLE LOC AT 1130);  FROZEN SECTION;  Surgeon: Agustín Coronado MD;  [facility];  Service: Surgical Oncology   • SENTINEL LYMPH NODE BIOPSY Left    • US GUIDED BREAST BIOPSY LEFT COMPLETE Left 11/30/2017       FAMILY HISTORY:  Family History   Problem Relation Age of Onset   • Colon cancer Paternal Grandfather    • Breast cancer Maternal Aunt 34   • Ovarian cancer Maternal Aunt    • Prostate cancer Maternal Uncle    • Hypertension Mother    • Heart disease Mother    • Thyroid disease Mother    • Leukemia Father    • Other Father         dyschromia   • Emphysema Maternal Grandfather    • Heart disease Paternal Grandmother    • Colon cancer Paternal Aunt    • Breast cancer Maternal Aunt 40   • Colon cancer Paternal Uncle    • Mental illness Neg Hx        SOCIAL HISTORY:  Social History     Tobacco Use   • Smoking status: Former     Types: Cigarettes     Quit date: 1998     Years since quittin 1   • Smokeless tobacco: Never   • Tobacco comments:      quit  Vaping Use   • Vaping Use: Never used   Substance Use Topics   • Alcohol use: Yes     Alcohol/week: 7 0 standard drinks     Types: 4 Glasses of wine, 3 Cans of beer per week     Comment: social   • Drug use: Never       MEDICATIONS:    Current Outpatient Medications:   •  amLODIPine-valsartan (EXFORGE) 5-160 MG per tablet, TAKE ONE TABLET BY MOUTH EVERY DAY, Disp: 90 tablet, Rfl: 0  •  anastrozole (ARIMIDEX) 1 mg tablet, Take 1 tablet (1 mg total) by mouth daily, Disp: 90 tablet, Rfl: 1  •  Biotin 10 MG CAPS, Take by mouth, Disp: , Rfl:   •  Calcium Carb-Cholecalciferol 600-1000 MG-UNIT CAPS, Take by mouth, Disp: , Rfl:   •  mupirocin (BACTROBAN) 2 % ointment, Apply topically 3 (three) times a day, Disp: 15 g, Rfl: 0  •  simvastatin (ZOCOR) 40 mg tablet, TAKE 1 TABLET BY MOUTH AT BEDTIME, Disp: 90 tablet, Rfl: 1    ALLERGIES:  Allergies   Allergen Reactions   • Adhesive [Medical Tape] Rash           REVIEW OF SYSTEMS:  Musculoskeletal:        As noted in HPI  All other systems reviewed and are negative      VITALS:  Vitals:    23 0859   BP: 134/67   Pulse: 65       LABS:  HgA1c:   Lab Results   Component Value Date    HGBA1C 6 1 (H) 2022     BMP:   Lab Results   Component Value Date    GLUCOSE 108 (H) 2017    CALCIUM 9 2 2022     2017    K 4 6 2023    CO2 23 2023     (H) 2023    BUN 18 2023    CREATININE 0 60 2023       _____________________________________________________  PHYSICAL EXAMINATION:  General: well developed and well nourished, alert, oriented times 3 and appears comfortable  Psychiatric: Normal  HEENT: Normocephalic, Atraumatic Trachea Midline, No torticollis  Pulmonary: No audible wheezing or respiratory distress   Abdomen/GI: Non tender, non distended   Cardiovascular: No pitting edema, 2+ radial pulse   Skin: No masses, erythema, lacerations, fluctation, ulcerations  Neurovascular: Sensation Intact to the Median, Ulnar, Radial Nerve, Motor Intact to the Median, Ulnar, Radial Nerve and Pulses Intact  Musculoskeletal: Normal, except as noted in detailed exam and in HPI        MUSCULOSKELETAL EXAMINATION:  Right Hand Exam:   - skin intact   - no tenderness   - ROM full and painless in all planes, normal finger cascade, able to form full composite fist   - sensation intact   - pulse intact     ___________________________________________________  STUDIES REVIEWED:  Xrays of the right hand  were reviewed in PACS by Dr Slim Flores and demonstrate fifth metacarpal fracture has healed well and maintained appropriate alignment         PROCEDURES PERFORMED:  Procedures  No Procedures performed today    _____________________________________________________      Scribe Attestation    I,:  Barbara Mccarthy am acting as a scribe while in the presence of the attending physician :       I,:  Carmen Villagran MD personally performed the services described in this documentation    as scribed in my presence :

## 2023-04-02 DIAGNOSIS — I10 BENIGN ESSENTIAL HTN: ICD-10-CM

## 2023-04-03 ENCOUNTER — OFFICE VISIT (OUTPATIENT)
Dept: FAMILY MEDICINE CLINIC | Facility: CLINIC | Age: 73
End: 2023-04-03

## 2023-04-03 VITALS
HEIGHT: 64 IN | TEMPERATURE: 96 F | WEIGHT: 153 LBS | BODY MASS INDEX: 26.12 KG/M2 | SYSTOLIC BLOOD PRESSURE: 120 MMHG | DIASTOLIC BLOOD PRESSURE: 66 MMHG | HEART RATE: 74 BPM | RESPIRATION RATE: 16 BRPM | OXYGEN SATURATION: 98 %

## 2023-04-03 DIAGNOSIS — Z78.0 POST-MENOPAUSAL: ICD-10-CM

## 2023-04-03 DIAGNOSIS — I10 BENIGN ESSENTIAL HTN: Primary | ICD-10-CM

## 2023-04-03 DIAGNOSIS — E78.2 MIXED HYPERLIPIDEMIA: ICD-10-CM

## 2023-04-03 DIAGNOSIS — R73.09 ABNORMAL GLUCOSE: ICD-10-CM

## 2023-04-03 DIAGNOSIS — S62.101B OPEN FRACTURE OF RIGHT WRIST, INITIAL ENCOUNTER: ICD-10-CM

## 2023-04-03 DIAGNOSIS — M85.80 OSTEOPENIA, UNSPECIFIED LOCATION: ICD-10-CM

## 2023-04-03 DIAGNOSIS — F41.9 ANXIETY: ICD-10-CM

## 2023-04-03 DIAGNOSIS — R94.5 ABNORMAL LIVER FUNCTION: ICD-10-CM

## 2023-04-03 DIAGNOSIS — E55.9 VITAMIN D DEFICIENCY: ICD-10-CM

## 2023-04-03 RX ORDER — AMLODIPINE AND VALSARTAN 5; 160 MG/1; MG/1
TABLET ORAL
Qty: 90 TABLET | Refills: 0 | Status: SHIPPED | OUTPATIENT
Start: 2023-04-03

## 2023-04-03 RX ORDER — ATORVASTATIN CALCIUM 40 MG/1
40 TABLET, FILM COATED ORAL DAILY
Qty: 90 TABLET | Refills: 3 | Status: SHIPPED | OUTPATIENT
Start: 2023-04-03

## 2023-04-03 NOTE — PATIENT INSTRUCTIONS
Fall Prevention   AMBULATORY CARE:   Fall prevention  includes ways to make your home and other areas safer  It also includes ways you can move more carefully to prevent a fall  Health conditions that cause changes in your blood pressure, vision, or muscle strength and coordination may increase your risk for falls  Medicines may also increase your risk for falls if they make you dizzy, weak, or sleepy  Call your local emergency number (911 in the [address]) or have someone call if:   • You have fallen and are unconscious  • You have fallen and cannot move part of your body  Call your doctor if:   • You have fallen and have pain or a headache  • You have questions or concerns about your condition or care  Fall prevention tips:   • Stand or sit up slowly  This may help you keep your balance and prevent falls  • Use assistive devices as directed  Your healthcare provider may suggest that you use a cane or walker to help you keep your balance  You may need to have grab bars put in your bathroom near the toilet or in the shower  • Wear shoes that fit well and have soles that   Wear shoes both inside and outside  Use slippers with good   Do not wear shoes with high heels  • Wear a personal alarm  This is a device that allows you to call 911 if you fall and need help  Ask your healthcare provider for more information  • Stay active  Exercise can help strengthen your muscles and improve your balance  Your healthcare provider may recommend water aerobics or walking  He or she may also recommend physical therapy to improve your coordination  Never start an exercise program without talking to your healthcare provider first          • Manage your medical conditions  Keep all appointments with your healthcare providers  Visit your eye doctor as directed  Home safety tips:       • Add items to prevent falls in the bathroom    Put nonslip strips on your bath or shower floor to prevent you from slipping  Use a bath mat if you do not have carpet in the bathroom  This will prevent you from falling when you step out of the bath or shower  Use a shower seat so you do not need to stand while you shower  Sit on the toilet or a chair in your bathroom to dry yourself and put on clothing  This will prevent you from losing your balance from drying or dressing yourself while you are standing  • Keep paths clear  Remove books, shoes, and other objects from walkways and stairs  Place cords for telephones and lamps out of the way so that you do not need to walk over them  Tape them down if you cannot move them  Remove small rugs  If you cannot remove a rug, secure it with double-sided tape  This will prevent you from tripping  • Install bright lights in your home  Use night lights to help light paths to the bathroom or kitchen  Always turn on the light before you start walking  • Keep items you use often on shelves within reach  Do not use a step stool to help you reach an item  • Paint or place reflective tape on the edges of your stairs  This will help you see the stairs better  Follow up with your doctor as directed:  Write down your questions so you remember to ask them during your visits  © Copyright [employer] 2022 Information is for End User's use only and may not be sold, redistributed or otherwise used for commercial purposes  The above information is an  only  It is not intended as medical advice for individual conditions or treatments  Talk to your doctor, nurse or pharmacist before following any medical regimen to see if it is safe and effective for you  Weight Management   AMBULATORY CARE:   Why it is important to manage your weight:  Being overweight increases your risk of health conditions such as heart disease, high blood pressure, type 2 diabetes, and certain types of cancer   It can also increase your risk for osteoarthritis, sleep apnea, and other respiratory problems  Aim for a slow, steady weight loss  Even a small amount of weight loss can lower your risk of health problems  Risks of being overweight:  Extra weight can cause many health problems, including the following:  • Diabetes (high blood sugar level)    • High blood pressure or high cholesterol    • Heart disease    • Stroke    • Gallbladder or liver disease    • Cancer of the colon, breast, prostate, liver, or kidney    • Sleep apnea    • Arthritis or gout    Screening  is done to check for health conditions before you have signs or symptoms  If you are 28to 79years old, your blood sugar level may be checked every 3 years for signs of prediabetes or diabetes  Your healthcare provider will check your blood pressure at each visit  High blood pressure can lead to a stroke or other problems  Your provider may check for signs of heart disease, cancer, or other health problems  How to lose weight safely:  A safe and healthy way to lose weight is to eat fewer calories and get regular exercise  • You can lose up about 1 pound a week by decreasing the number of calories you eat by 500 calories each day  You can decrease calories by eating smaller portion sizes or by cutting out high-calorie foods  Read labels to find out how many calories are in the foods you eat  • You can also burn calories with exercise such as walking, swimming, or biking  You will be more likely to keep weight off if you make these changes part of your lifestyle  Exercise at least 30 minutes per day on most days of the week  You can also fit in more physical activity by taking the stairs instead of the elevator or parking farther away from stores  Ask your healthcare provider about the best exercise plan for you  Healthy meal plan for weight management:  A healthy meal plan includes a variety of foods, contains fewer calories, and helps you stay healthy   A healthy meal plan includes the following:     • Eat whole-grain foods more often  A healthy meal plan should contain fiber  Fiber is the part of grains, fruits, and vegetables that is not broken down by your body  Whole-grain foods are healthy and provide extra fiber in your diet  Some examples of whole-grain foods are whole-wheat breads and pastas, oatmeal, brown rice, and bulgur  • Eat a variety of vegetables every day  Include dark, leafy greens such as spinach, kale, kathe greens, and mustard greens  Eat yellow and orange vegetables such as carrots, sweet potatoes, and winter squash  • Eat a variety of fruits every day  Choose fresh or canned fruit (canned in its own juice or light syrup) instead of juice  Fruit juice has very little or no fiber  • Eat low-fat dairy foods  Drink fat-free (skim) milk or 1% milk  Eat fat-free yogurt and low-fat cottage cheese  Try low-fat cheeses such as mozzarella and other reduced-fat cheeses  • Choose meat and other protein foods that are low in fat  Choose beans or other legumes such as split peas or lentils  Choose fish, skinless poultry (chicken or turkey), or lean cuts of red meat (beef or pork)  Before you cook meat or poultry, cut off any visible fat  • Use less fat and oil  Try baking foods instead of frying them  Add less fat, such as margarine, sour cream, regular salad dressing and mayonnaise to foods  Eat fewer high-fat foods  Some examples of high-fat foods include french fries, doughnuts, ice cream, and cakes  • Eat fewer sweets  Limit foods and drinks that are high in sugar  This includes candy, cookies, regular soda, and sweetened drinks  Ways to decrease calories:   • Eat smaller portions  ? Use a small plate with smaller servings  ? Do not eat second helpings  ? When you eat at a restaurant, ask for a box and place half of your meal in the box before you eat  ? Share an entrée with someone else  • Replace high-calorie snacks with healthy, low-calorie snacks  ?  Choose fresh fruit, vegetables, fat-free rice cakes, or air-popped popcorn instead of potato chips, nuts, or chocolate  ? Choose water or calorie-free drinks instead of soda or sweetened drinks  • Do not shop for groceries when you are hungry  You may be more likely to make unhealthy food choices  Take a grocery list of healthy foods and shop after you have eaten  • Eat regular meals  Do not skip meals  Skipping meals can lead to overeating later in the day  This can make it harder for you to lose weight  Eat a healthy snack in place of a meal if you do not have time to eat a regular meal  Talk with a dietitian to help you create a meal plan and schedule that is right for you  Other things to consider as you try to lose weight:   • Be aware of situations that may give you the urge to overeat, such as eating while watching television  Find ways to avoid these situations  For example, read a book, go for a walk, or do crafts  • Meet with a weight loss support group or friends who are also trying to lose weight  This may help you stay motivated to continue working on your weight loss goals  © Copyright [employer] 2022 Information is for End User's use only and may not be sold, redistributed or otherwise used for commercial purposes  The above information is an  only  It is not intended as medical advice for individual conditions or treatments  Talk to your doctor, nurse or pharmacist before following any medical regimen to see if it is safe and effective for you

## 2023-04-03 NOTE — PROGRESS NOTES
Assessment/Plan:    1  Benign essential HTN  Assessment & Plan:  stable    Orders:  -     Comprehensive metabolic panel; Future; Expected date: 06/17/2023    2  Mixed hyperlipidemia  Assessment & Plan:  Pt has been not exercising due to injury wopuld like 6 months to get back in the laci and redraw    Orders:  -     atorvastatin (LIPITOR) 40 mg tablet; Take 1 tablet (40 mg total) by mouth daily  -     Lipid Panel with Direct LDL reflex; Future; Expected date: 06/17/2023    3  Abnormal liver function    4  Osteopenia, unspecified location  -     DXA bone density spine hip and pelvis; Future; Expected date: 04/03/2023    5  Vitamin D deficiency  -     DXA bone density spine hip and pelvis; Future; Expected date: 04/03/2023    6  Open fracture of right wrist, initial encounter  -     DXA bone density spine hip and pelvis; Future; Expected date: 04/03/2023    7  Post-menopausal  -     DXA bone density spine hip and pelvis; Future; Expected date: 04/03/2023    8  Abnormal glucose  -     Comprehensive metabolic panel; Future; Expected date: 06/17/2023  -     Hemoglobin A1C; Future; Expected date: 06/17/2023    9  Anxiety  Assessment & Plan:  Has been a little anxious her brother was in the hospital - Sleep still an issue      10  BMI 26 0-26 9,adult            Patient Instructions     Fall Prevention   AMBULATORY CARE:   Fall prevention  includes ways to make your home and other areas safer  It also includes ways you can move more carefully to prevent a fall  Health conditions that cause changes in your blood pressure, vision, or muscle strength and coordination may increase your risk for falls  Medicines may also increase your risk for falls if they make you dizzy, weak, or sleepy  Call your local emergency number [phone] in the [address] [facility]) or have someone call if:   You have fallen and are unconscious  You have fallen and cannot move part of your body      Call your doctor if:   You have fallen and have pain or a headache  You have questions or concerns about your condition or care  Fall prevention tips:   Stand or sit up slowly  This may help you keep your balance and prevent falls  Use assistive devices as directed  Your healthcare provider may suggest that you use a cane or walker to help you keep your balance  You may need to have grab bars put in your bathroom near the toilet or in the shower  Wear shoes that fit well and have soles that   Wear shoes both inside and outside  Use slippers with good   Do not wear shoes with high heels  Wear a personal alarm  This is a device that allows you to call 911 if you fall and need help  Ask your healthcare provider for more information  Stay active  Exercise can help strengthen your muscles and improve your balance  Your healthcare provider may recommend water aerobics or walking  He or she may also recommend physical therapy to improve your coordination  Never start an exercise program without talking to your healthcare provider first          Manage your medical conditions  Keep all appointments with your healthcare providers  Visit your eye doctor as directed  Home safety tips: Add items to prevent falls in the bathroom  Put nonslip strips on your bath or shower floor to prevent you from slipping  Use a bath mat if you do not have carpet in the bathroom  This will prevent you from falling when you step out of the bath or shower  Use a shower seat so you do not need to stand while you shower  Sit on the toilet or a chair in your bathroom to dry yourself and put on clothing  This will prevent you from losing your balance from drying or dressing yourself while you are standing  Keep paths clear  Remove books, shoes, and other objects from walkways and stairs  Place cords for telephones and lamps out of the way so that you do not need to walk over them  Tape them down if you cannot move them  Remove small rugs   If you cannot remove a rug, secure it with double-sided tape  This will prevent you from tripping  Install bright lights in your home  Use night lights to help light paths to the bathroom or kitchen  Always turn on the light before you start walking  Keep items you use often on shelves within reach  Do not use a step stool to help you reach an item  Paint or place reflective tape on the edges of your stairs  This will help you see the stairs better  Follow up with your doctor as directed:  Write down your questions so you remember to ask them during your visits  © Copyright Jg Lombardo 2022 Information is for End User's use only and may not be sold, redistributed or otherwise used for commercial purposes  The above information is an  only  It is not intended as medical advice for individual conditions or treatments  Talk to your doctor, nurse or pharmacist before following any medical regimen to see if it is safe and effective for you  Weight Management   AMBULATORY CARE:   Why it is important to manage your weight:  Being overweight increases your risk of health conditions such as heart disease, high blood pressure, type 2 diabetes, and certain types of cancer  It can also increase your risk for osteoarthritis, sleep apnea, and other respiratory problems  Aim for a slow, steady weight loss  Even a small amount of weight loss can lower your risk of health problems  Risks of being overweight:  Extra weight can cause many health problems, including the following:  Diabetes (high blood sugar level)    High blood pressure or high cholesterol    Heart disease    Stroke    Gallbladder or liver disease    Cancer of the colon, breast, prostate, liver, or kidney    Sleep apnea    Arthritis or gout    Screening  is done to check for health conditions before you have signs or symptoms  If you are 28to 79years old, your blood sugar level may be checked every 3 years for signs of prediabetes or diabetes   Your healthcare provider will check your blood pressure at each visit  High blood pressure can lead to a stroke or other problems  Your provider may check for signs of heart disease, cancer, or other health problems  How to lose weight safely:  A safe and healthy way to lose weight is to eat fewer calories and get regular exercise  You can lose up about 1 pound a week by decreasing the number of calories you eat by 500 calories each day  You can decrease calories by eating smaller portion sizes or by cutting out high-calorie foods  Read labels to find out how many calories are in the foods you eat  You can also burn calories with exercise such as walking, swimming, or biking  You will be more likely to keep weight off if you make these changes part of your lifestyle  Exercise at least 30 minutes per day on most days of the week  You can also fit in more physical activity by taking the stairs instead of the elevator or parking farther away from stores  Ask your healthcare provider about the best exercise plan for you  Healthy meal plan for weight management:  A healthy meal plan includes a variety of foods, contains fewer calories, and helps you stay healthy  A healthy meal plan includes the following:     Eat whole-grain foods more often  A healthy meal plan should contain fiber  Fiber is the part of grains, fruits, and vegetables that is not broken down by your body  Whole-grain foods are healthy and provide extra fiber in your diet  Some examples of whole-grain foods are whole-wheat breads and pastas, oatmeal, brown rice, and bulgur  Eat a variety of vegetables every day  Include dark, leafy greens such as spinach, kale, kathe greens, and mustard greens  Eat yellow and orange vegetables such as carrots, sweet potatoes, and winter squash  Eat a variety of fruits every day  Choose fresh or canned fruit (canned in its own juice or light syrup) instead of juice   Fruit juice has very little or no fiber  Eat low-fat dairy foods  Drink fat-free (skim) milk or 1% milk  Eat fat-free yogurt and low-fat cottage cheese  Try low-fat cheeses such as mozzarella and other reduced-fat cheeses  Choose meat and other protein foods that are low in fat  Choose beans or other legumes such as split peas or lentils  Choose fish, skinless poultry (chicken or turkey), or lean cuts of red meat (beef or pork)  Before you cook meat or poultry, cut off any visible fat  Use less fat and oil  Try baking foods instead of frying them  Add less fat, such as margarine, sour cream, regular salad dressing and mayonnaise to foods  Eat fewer high-fat foods  Some examples of high-fat foods include french fries, doughnuts, ice cream, and cakes  Eat fewer sweets  Limit foods and drinks that are high in sugar  This includes candy, cookies, regular soda, and sweetened drinks  Ways to decrease calories:   Eat smaller portions  Use a small plate with smaller servings  Do not eat second helpings  When you eat at a restaurant, ask for a box and place half of your meal in the box before you eat  Share an entrée with someone else  Replace high-calorie snacks with healthy, low-calorie snacks  Choose fresh fruit, vegetables, fat-free rice cakes, or air-popped popcorn instead of potato chips, nuts, or chocolate  Choose water or calorie-free drinks instead of soda or sweetened drinks  Do not shop for groceries when you are hungry  You may be more likely to make unhealthy food choices  Take a grocery list of healthy foods and shop after you have eaten  Eat regular meals  Do not skip meals  Skipping meals can lead to overeating later in the day  This can make it harder for you to lose weight  Eat a healthy snack in place of a meal if you do not have time to eat a regular meal  Talk with a dietitian to help you create a meal plan and schedule that is right for you      Other things to consider as you try to lose weight:   Be aware of situations that may give you the urge to overeat, such as eating while watching television  Find ways to avoid these situations  For example, read a book, go for a walk, or do crafts  Meet with a weight loss support group or friends who are also trying to lose weight  This may help you stay motivated to continue working on your weight loss goals  © Copyright Elizebeth Gata 2022 Information is for End User's use only and may not be sold, redistributed or otherwise used for commercial purposes  The above information is an  only  It is not intended as medical advice for individual conditions or treatments  Talk to your doctor, nurse or pharmacist before following any medical regimen to see if it is safe and effective for you  Return in about 3 months (around 7/3/2023) for Recheck  Subjective:      Patient ID: Man Pacheco is a 74 y o  female  Chief Complaint   Patient presents with   • Follow-up     Daniel Prado CMA        Pt is here for a 6 month follow upPt had a fracture in her rt wrist - had a trip while holding packages and landed on it  Has seen ortho, no restrictions at thios time      The following portions of the patient's history were reviewed and updated as appropriate: allergies, current medications, past family history, past medical history, past social history, past surgical history and problem list     Review of Systems   Constitutional: Negative  Negative for activity change, appetite change, chills, diaphoresis and fatigue  HENT: Negative  Negative for dental problem, ear pain, sinus pressure and sore throat  Eyes: Negative  Negative for photophobia, pain, discharge, redness, itching and visual disturbance  Respiratory: Negative for apnea and chest tightness  Cardiovascular: Negative  Negative for chest pain, palpitations and leg swelling  Gastrointestinal: Negative    Negative for abdominal distention, abdominal pain, constipation and "diarrhea  Endocrine: Negative  Negative for cold intolerance and heat intolerance  Genitourinary: Negative  Negative for difficulty urinating and dyspareunia  Musculoskeletal: Negative  Negative for arthralgias and back pain  Skin: Negative  Allergic/Immunologic: Negative for environmental allergies  Neurological: Negative  Negative for dizziness  Psychiatric/Behavioral: Negative  Negative for agitation  Current Outpatient Medications   Medication Sig Dispense Refill   • amLODIPine-valsartan (EXFORGE) 5-160 MG per tablet TAKE ONE TABLET BY MOUTH EVERY DAY 90 tablet 0   • anastrozole (ARIMIDEX) 1 mg tablet Take 1 tablet (1 mg total) by mouth daily 90 tablet 1   • atorvastatin (LIPITOR) 40 mg tablet Take 1 tablet (40 mg total) by mouth daily 90 tablet 3   • Biotin 10 MG CAPS Take by mouth     • Calcium Carb-Cholecalciferol 600-1000 MG-UNIT CAPS Take by mouth       No current facility-administered medications for this visit  Objective:    /66   Pulse 74   Temp (!) 96 °F (35 6 °C)   Resp 16   Ht 5' 4\" (1 626 m)   Wt 69 4 kg (153 lb)   SpO2 98%   BMI 26 26 kg/m²        Physical Exam  Vitals and nursing note reviewed  Constitutional:       General: She is not in acute distress  Appearance: She is well-developed  She is not diaphoretic  HENT:      Head: Normocephalic and atraumatic  Right Ear: External ear normal       Left Ear: External ear normal       Nose: Nose normal       Mouth/Throat:      Pharynx: No oropharyngeal exudate  Eyes:      General: No scleral icterus  Right eye: No discharge  Left eye: No discharge  Pupils: Pupils are equal, round, and reactive to light  Neck:      Thyroid: No thyromegaly  Cardiovascular:      Rate and Rhythm: Normal rate  Heart sounds: Normal heart sounds  No murmur heard  Pulmonary:      Effort: Pulmonary effort is normal  No respiratory distress  Breath sounds: Normal breath sounds   No " wheezing  Abdominal:      General: Bowel sounds are normal  There is no distension  Palpations: Abdomen is soft  There is no mass  Tenderness: There is no abdominal tenderness  There is no guarding or rebound  Musculoskeletal:         General: Normal range of motion  Skin:     General: Skin is warm and dry  Findings: No erythema or rash  Neurological:      Mental Status: She is alert  Coordination: Coordination normal       Deep Tendon Reflexes: Reflexes normal    Psychiatric:         Behavior: Behavior normal               Recent Results (from the past 672 hour(s))   Comprehensive metabolic panel    Collection Time: 03/28/23  9:16 AM   Result Value Ref Range    Glucose, Random 115 (H) 70 - 99 mg/dL    BUN 18 8 - 27 mg/dL    Creatinine 0 60 0 57 - 1 00 mg/dL    eGFR 94 >59 mL/min/1 73    SL AMB BUN/CREATININE RATIO 30 (H) 12 - 28    Sodium 147 (H) 134 - 144 mmol/L    Potassium 4 6 3 5 - 5 2 mmol/L    Chloride 107 (H) 96 - 106 mmol/L    CO2 23 20 - 29 mmol/L    CALCIUM 10 4 (H) 8 7 - 10 3 mg/dL    Protein, Total 6 3 6 0 - 8 5 g/dL    Albumin 4 6 3 7 - 4 7 g/dL    Globulin, Total 1 7 1 5 - 4 5 g/dL    Albumin/Globulin Ratio 2 7 (H) 1 2 - 2 2    TOTAL BILIRUBIN 0 3 0 0 - 1 2 mg/dL    Alk Phos Isoenzymes 101 44 - 121 IU/L    AST 39 0 - 40 IU/L    ALT 31 0 - 32 IU/L   Lipid panel    Collection Time: 03/28/23  9:16 AM   Result Value Ref Range    Cholesterol, Total 200 (H) 100 - 199 mg/dL    Triglycerides 54 0 - 149 mg/dL    HDL 84 >39 mg/dL    LDL Calculated 106 (H) 0 - 99 mg/dL   Cardiovascular Report    Collection Time: 03/28/23  9:16 AM   Result Value Ref Range    Interpretation Note        Debbora Better, DO  Falls Plan of Care: Balance, strength, and gait training instructions were provided  BMI Counseling: Body mass index is 26 26 kg/m²  The BMI is above normal  Exercise recommendations include moderate aerobic physical activity for 150 minutes/week

## 2023-04-24 ENCOUNTER — HOSPITAL ENCOUNTER (OUTPATIENT)
Dept: RADIOLOGY | Facility: HOSPITAL | Age: 73
Discharge: HOME | End: 2023-04-24

## 2023-05-01 ENCOUNTER — TELEPHONE (OUTPATIENT)
Dept: SURGICAL ONCOLOGY | Facility: CLINIC | Age: 73
End: 2023-05-01

## 2023-05-01 NOTE — TELEPHONE ENCOUNTER
Spoke to patient about rescheduling her  6 month f/u with TERESA Fisher and date/time in 6/23 not available for patient at the [facility] location she wanted  Spoke to Preeti and she suggested Mimi if patient would like sooner , I attempted 2nd phone call and had to leave a message for patient to call hopeline x 4 to set up next f/u appointment

## 2023-07-01 ENCOUNTER — TELEPHONE (OUTPATIENT)
Dept: FAMILY MEDICINE CLINIC | Facility: CLINIC | Age: 73
End: 2023-07-01

## 2023-07-01 DIAGNOSIS — E78.2 MIXED HYPERLIPIDEMIA: Primary | ICD-10-CM

## 2023-07-01 RX ORDER — SIMVASTATIN 40 MG
40 TABLET ORAL
Qty: 90 TABLET | Refills: 3 | Status: SHIPPED | OUTPATIENT
Start: 2023-07-01

## 2023-07-01 NOTE — TELEPHONE ENCOUNTER
The patient left a voicemail, stating that she would like to be switched back to her old Cholesterol medication. She stated her new one makes her feel bloated and her hair started to fall out. She is currently on the Atorvastatin, and she used to be on Simvastatin.      Cheryl Hassan LPN

## 2023-07-02 DIAGNOSIS — I10 BENIGN ESSENTIAL HTN: ICD-10-CM

## 2023-07-03 RX ORDER — AMLODIPINE AND VALSARTAN 5; 160 MG/1; MG/1
TABLET ORAL
Qty: 90 TABLET | Refills: 0 | Status: SHIPPED | OUTPATIENT
Start: 2023-07-03

## 2023-07-28 ENCOUNTER — OFFICE VISIT (OUTPATIENT)
Dept: URGENT CARE | Facility: CLINIC | Age: 73
End: 2023-07-28

## 2023-07-28 VITALS
HEART RATE: 74 BPM | HEIGHT: 64 IN | WEIGHT: 150 LBS | RESPIRATION RATE: 16 BRPM | OXYGEN SATURATION: 96 % | BODY MASS INDEX: 25.61 KG/M2 | TEMPERATURE: 99.7 F

## 2023-07-28 DIAGNOSIS — U07.1 COVID: Primary | ICD-10-CM

## 2023-07-28 RX ORDER — NIRMATRELVIR AND RITONAVIR 300-100 MG
3 KIT ORAL 2 TIMES DAILY
Qty: 30 TABLET | Refills: 0 | Status: SHIPPED | OUTPATIENT
Start: 2023-07-28 | End: 2023-08-01

## 2023-07-28 NOTE — PROGRESS NOTES
[facility]        NAME: Greta Arnold is a 74 y.o. female  : 1950    MRN: [id number]  DATE: 2023  TIME: 12:43 PM    Assessment and Plan   COVID [U07.1]  1. COVID  nirmatrelvir & ritonavir (Paxlovid, 300/100,) tablet therapy pack            Patient Instructions     Patient Instructions   Rapid COVID test positive, will treat with Paxlovid, discussed holding simvastatin until 2023. Encourage scheduling follow-up appointment with PCP in the next few days. With any progression or worsening of symptoms to return to be seen in ER. All patient's questions answered and is agreeable with this plan. Follow up with PCP in 3-5 days. Proceed to  ER if symptoms worsen. Chief Complaint     Chief Complaint   Patient presents with   • Nausea     Nausea, low grade fever, runny nose, sore throat, chills- started Tuesday. OTC Robitussin, advil           History of Present Illness       Patient is a 78-year-old female presenting today with flulike symptoms x4 days. Patient notes over the last few days she has been running a fever, notes a Tmax of 102 °F, currently afebrile at 99.7 °F with no antipyretics in her system. Also notes associated nausea, 1 episode of emesis, congestion and a dry cough, has been taking over-the-counter Robitussin which has provided some relief. Denies any known sick contacts but has been in and out of the hospital visiting her brother for the last few days. Denies chest tightness, SOB, diarrhea, abdominal pain, chest pain, palpitations. Review of Systems   Review of Systems   Constitutional: Positive for fever. Negative for chills and fatigue. HENT: Positive for congestion and sore throat. Negative for postnasal drip. Eyes: Negative for redness and itching. Respiratory: Positive for cough. Negative for chest tightness and shortness of breath. Cardiovascular: Negative for chest pain. Gastrointestinal: Positive for nausea and vomiting.  Negative for abdominal pain and diarrhea. Musculoskeletal: Positive for myalgias. Neurological: Negative for light-headedness and headaches.          Current Medications       Current Outpatient Medications:   •  amLODIPine-valsartan (EXFORGE) 5-160 MG per tablet, TAKE ONE TABLET BY MOUTH EVERY DAY, Disp: 90 tablet, Rfl: 0  •  anastrozole (ARIMIDEX) 1 mg tablet, Take 1 tablet (1 mg total) by mouth daily, Disp: 90 tablet, Rfl: 1  •  Biotin 10 MG CAPS, Take by mouth, Disp: , Rfl:   •  Calcium Carb-Cholecalciferol 600-1000 MG-UNIT CAPS, Take by mouth, Disp: , Rfl:   •  nirmatrelvir & ritonavir (Paxlovid, 300/100,) tablet therapy pack, Take 3 tablets by mouth 2 (two) times a day for 5 days Take 2 nirmatrelvir tablets + 1 ritonavir tablet together per dose, Disp: 30 tablet, Rfl: 0  •  simvastatin (ZOCOR) 40 mg tablet, Take 1 tablet (40 mg total) by mouth daily at bedtime, Disp: 90 tablet, Rfl: 3    Current Allergies     Allergies as of 07/28/2023 - Reviewed 07/28/2023   Allergen Reaction Noted   • Adhesive [medical tape] Rash 09/12/2018            The following portions of the patient's history were reviewed and updated as appropriate: allergies, current medications, past family history, past medical history, past social history, past surgical history and problem list.     Past Medical History:   Diagnosis Date   • Abnormal blood chemistry    • Abnormal glucose    • Breast cancer ([address]) 2018    left   • Cancer (HCC)     left breast   • Cervical polyp    • Fracture of ankle    • Hemorrhoid    • Herpes zoster    • History of chemotherapy    • History of radiation therapy    • Hyperglycemia    • Hyperlipidemia    • Hypertension    • Insomnia    • Leiomyoma of uterus    • Osteopenia    • Seborrheic keratosis    • Shingles    • Spider bite wound        Past Surgical History:   Procedure Laterality Date   • ANKLE SURGERY Right    • BREAST BIOPSY Left    • BREAST CYST ASPIRATION Right 1992   • BREAST LUMPECTOMY Left 01/01/2018   • CYST REMOVAL      right eyelid   • DILATION AND CURETTAGE OF UTERUS     • FRACTURE SURGERY      ankle plates and screws   • INTRAOPERATIVE RADIATION THERAPY (IORT) Left 01/12/2018    Procedure: BREAST IORT BY DR Camilla Meyer;  Surgeon: Jc Ritter MD;  [facility];  Service: Surgical Oncology   • MAMMO NEEDLE LOCALIZATION LEFT (ALL INC) Left 01/12/2018   • KS BX/EXC LYMPH NODE OPEN SUPERFICIAL Left 01/12/2018    Procedure: LYMPHOSCINTIGRAPHY; INTRAOPERATIVE LYMPHATIC MAPPING; SENTINEL LYMPH NODE BIOPSY (INJECT AT 5963); Surgeon: Jc Ritter MD;  [facility];  Service: Surgical Oncology   • KS COLONOSCOPY FLX DX W/COLLJ SPEC WHEN PFRMD N/A 09/14/2018    Procedure: COLONOSCOPY;  Surgeon: Lu Slaughter MD;  Location: [address] [place] [address] [facility]; Service: Gastroenterology   • KS PERQ DEVICE PLACEMENT BREAST LOC 1ST LES W/GDNCE Left 01/12/2018    Procedure: BREAST LUMPECTOMY; BREAST NEEDLE LOCALIZATION (NEEDLE LOC AT 1130); FROZEN SECTION;  Surgeon: Jc Ritter MD;  [facility];  Service: Surgical Oncology   • SENTINEL LYMPH NODE BIOPSY Left    • US GUIDED BREAST BIOPSY LEFT COMPLETE Left 11/30/2017       Family History   Problem Relation Age of Onset   • Colon cancer Paternal Grandfather    • Breast cancer Maternal Aunt 34   • Ovarian cancer Maternal Aunt    • Prostate cancer Maternal Uncle    • Hypertension Mother    • Heart disease Mother    • Thyroid disease Mother    • Leukemia Father    • Other Father         dyschromia   • Emphysema Maternal Grandfather    • Heart disease Paternal Grandmother    • Colon cancer Paternal Aunt    • Breast cancer Maternal Aunt 44   • Colon cancer Paternal Uncle    • Mental illness Neg Hx          Medications have been verified. Objective   Pulse 74   Temp 99.7 °F (37.6 °C)   Resp 16   Ht 5' 4" (1.626 m)   Wt 68 kg (150 lb)   SpO2 96%   BMI 25.75 kg/m²        Physical Exam     Physical Exam  Vitals and nursing note reviewed.    Constitutional:       General: She is not in acute distress. HENT:      Head: Normocephalic. Right Ear: Tympanic membrane, ear canal and external ear normal.      Left Ear: Tympanic membrane, ear canal and external ear normal.      Nose: Nose normal.      Mouth/Throat:      Mouth: Mucous membranes are moist.      Pharynx: Oropharynx is clear. Eyes:      Conjunctiva/sclera: Conjunctivae normal.   Cardiovascular:      Rate and Rhythm: Normal rate and regular rhythm. Pulses: Normal pulses. Heart sounds: Normal heart sounds. Pulmonary:      Effort: Pulmonary effort is normal.      Breath sounds: Normal breath sounds. Abdominal:      General: Abdomen is flat. Bowel sounds are normal.      Palpations: Abdomen is soft. Tenderness: There is no abdominal tenderness. Skin:     General: Skin is warm. Capillary Refill: Capillary refill takes less than 2 seconds. Neurological:      Mental Status: She is alert.

## 2023-07-28 NOTE — PATIENT INSTRUCTIONS
Rapid COVID test positive, will treat with Paxlovid, discussed holding simvastatin until 8/7/2023. Encourage scheduling follow-up appointment with PCP in the next few days. With any progression or worsening of symptoms to return to be seen in ER. All patient's questions answered and is agreeable with this plan.

## 2023-08-01 ENCOUNTER — OFFICE VISIT (OUTPATIENT)
Dept: FAMILY MEDICINE CLINIC | Facility: CLINIC | Age: 73
End: 2023-08-01
Payer: MEDICARE

## 2023-08-01 VITALS
OXYGEN SATURATION: 97 % | WEIGHT: 147 LBS | RESPIRATION RATE: 18 BRPM | DIASTOLIC BLOOD PRESSURE: 66 MMHG | BODY MASS INDEX: 25.1 KG/M2 | HEIGHT: 64 IN | TEMPERATURE: 97.4 F | HEART RATE: 64 BPM | SYSTOLIC BLOOD PRESSURE: 110 MMHG

## 2023-08-01 DIAGNOSIS — U07.1 COVID-19 VIRUS INFECTION: Primary | ICD-10-CM

## 2023-08-01 PROCEDURE — 99213 OFFICE O/P EST LOW 20 MIN: CPT | Performed by: FAMILY MEDICINE

## 2023-08-01 NOTE — PROGRESS NOTES
Assessment/Plan:    1. COVID-19 virus infection    Advised on self isolation  Vit c , d and zinc.  Pt is doing very well        There are no Patient Instructions on file for this visit. No follow-ups on file. Subjective:      Patient ID: Prince Arnold is a 74 y.o. female. Chief Complaint   Patient presents with   • Follow-up     Covid  [id number]       Pt was seen at the urgent care for COVID - dx on the 28th  First day of symptyoms was the 25th    Initial symptoms was chills, sweats, loss of appetite, nausea    Improving    Pt states she feels pretty good now. Pt states the paxlovid made her feel like she was eating chalk. So she stopped it    No SOB  No fever at hiome now        The following portions of the patient's history were reviewed and updated as appropriate: allergies, current medications, past family history, past medical history, past social history, past surgical history and problem list.    Review of Systems   Constitutional: Negative. Negative for activity change, appetite change, chills, diaphoresis and fatigue. HENT: Negative. Negative for dental problem, ear pain, sinus pressure and sore throat. Eyes: Negative. Negative for photophobia, pain, discharge, redness, itching and visual disturbance. Respiratory: Negative for apnea and chest tightness. Cardiovascular: Negative. Negative for chest pain, palpitations and leg swelling. Gastrointestinal: Negative. Negative for abdominal distention, abdominal pain, constipation and diarrhea. Endocrine: Negative. Negative for cold intolerance and heat intolerance. Genitourinary: Negative. Negative for difficulty urinating and dyspareunia. Musculoskeletal: Negative. Negative for arthralgias and back pain. Skin: Negative. Allergic/Immunologic: Negative for environmental allergies. Neurological: Negative. Negative for dizziness. Psychiatric/Behavioral: Negative. Negative for agitation.          Current Outpatient Medications   Medication Sig Dispense Refill   • amLODIPine-valsartan (EXFORGE) 5-160 MG per tablet TAKE ONE TABLET BY MOUTH EVERY DAY 90 tablet 0   • Calcium Carb-Cholecalciferol 600-1000 MG-UNIT CAPS Take by mouth     • simvastatin (ZOCOR) 40 mg tablet Take 1 tablet (40 mg total) by mouth daily at bedtime 90 tablet 3     No current facility-administered medications for this visit. Objective:    /66   Pulse 64   Temp (!) 97.4 °F (36.3 °C)   Resp 18   Ht 5' 4" (1.626 m)   Wt 66.7 kg (147 lb)   SpO2 97%   BMI 25.23 kg/m²        Physical Exam  Vitals and nursing note reviewed. Constitutional:       General: She is not in acute distress. Appearance: She is well-developed. She is not diaphoretic. HENT:      Head: Normocephalic and atraumatic. Right Ear: External ear normal.      Left Ear: External ear normal.      Nose: Nose normal.      Mouth/Throat:      Pharynx: No oropharyngeal exudate. Eyes:      General: No scleral icterus. Right eye: No discharge. Left eye: No discharge. Pupils: Pupils are equal, round, and reactive to light. Neck:      Thyroid: No thyromegaly. Cardiovascular:      Rate and Rhythm: Normal rate. Heart sounds: Normal heart sounds. No murmur heard. Pulmonary:      Effort: Pulmonary effort is normal. No respiratory distress. Breath sounds: Normal breath sounds. No wheezing. Abdominal:      General: Bowel sounds are normal. There is no distension. Palpations: Abdomen is soft. There is no mass. Tenderness: There is no abdominal tenderness. There is no guarding or rebound. Musculoskeletal:         General: Normal range of motion. Skin:     General: Skin is warm and dry. Findings: No erythema or rash. Neurological:      Mental Status: She is alert.       Coordination: Coordination normal.      Deep Tendon Reflexes: Reflexes normal.   Psychiatric:         Behavior: Behavior normal.                Kisha Figueroa, DO

## 2023-08-06 ENCOUNTER — HOSPITAL ENCOUNTER (EMERGENCY)
Facility: HOSPITAL | Age: 73
Discharge: HOME | End: 2023-08-06
Attending: EMERGENCY MEDICINE | Admitting: EMERGENCY MEDICINE
Payer: MEDICARE

## 2023-08-06 VITALS
RESPIRATION RATE: 20 BRPM | DIASTOLIC BLOOD PRESSURE: 74 MMHG | SYSTOLIC BLOOD PRESSURE: 167 MMHG | TEMPERATURE: 98.2 F | HEART RATE: 78 BPM | OXYGEN SATURATION: 98 %

## 2023-08-06 DIAGNOSIS — R42 LIGHTHEADEDNESS: Primary | ICD-10-CM

## 2023-08-06 LAB
ALBUMIN SERPL BCP-MCNC: 4.3 G/DL (ref 3.5–5)
ALP SERPL-CCNC: 76 U/L (ref 34–104)
ALT SERPL W P-5'-P-CCNC: 12 U/L (ref 7–52)
ANION GAP SERPL CALCULATED.3IONS-SCNC: 6 MMOL/L
AST SERPL W P-5'-P-CCNC: 15 U/L (ref 13–39)
BASOPHILS # BLD AUTO: 0.01 THOUSANDS/ÂΜL (ref 0–0.1)
BASOPHILS NFR BLD AUTO: 0 % (ref 0–1)
BILIRUB SERPL-MCNC: 0.6 MG/DL (ref 0.2–1)
BILIRUB UR QL STRIP: NEGATIVE
BUN SERPL-MCNC: 11 MG/DL (ref 5–25)
CALCIUM SERPL-MCNC: 9.5 MG/DL (ref 8.4–10.2)
CARDIAC TROPONIN I PNL SERPL HS: 3 NG/L
CHLORIDE SERPL-SCNC: 104 MMOL/L (ref 96–108)
CLARITY UR: CLEAR
CO2 SERPL-SCNC: 28 MMOL/L (ref 21–32)
COLOR UR: NORMAL
CREAT SERPL-MCNC: 0.6 MG/DL (ref 0.6–1.3)
EOSINOPHIL # BLD AUTO: 0.04 THOUSAND/ÂΜL (ref 0–0.61)
EOSINOPHIL NFR BLD AUTO: 1 % (ref 0–6)
ERYTHROCYTE [DISTWIDTH] IN BLOOD BY AUTOMATED COUNT: 12.7 % (ref 11.6–15.1)
GFR SERPL CREATININE-BSD FRML MDRD: 89 ML/MIN/1.73SQ M
GLUCOSE SERPL-MCNC: 111 MG/DL (ref 65–140)
GLUCOSE UR STRIP-MCNC: NEGATIVE MG/DL
HCT VFR BLD AUTO: 41.2 % (ref 34.8–46.1)
HGB BLD-MCNC: 13.6 G/DL (ref 11.5–15.4)
HGB UR QL STRIP.AUTO: NEGATIVE
IMM GRANULOCYTES # BLD AUTO: 0.01 THOUSAND/UL (ref 0–0.2)
IMM GRANULOCYTES NFR BLD AUTO: 0 % (ref 0–2)
KETONES UR STRIP-MCNC: NEGATIVE MG/DL
LEUKOCYTE ESTERASE UR QL STRIP: NEGATIVE
LYMPHOCYTES # BLD AUTO: 1.31 THOUSANDS/ÂΜL (ref 0.6–4.47)
LYMPHOCYTES NFR BLD AUTO: 37 % (ref 14–44)
MCH RBC QN AUTO: 31.1 PG (ref 26.8–34.3)
MCHC RBC AUTO-ENTMCNC: 33 G/DL (ref 31.4–37.4)
MCV RBC AUTO: 94 FL (ref 82–98)
MONOCYTES # BLD AUTO: 0.52 THOUSAND/ÂΜL (ref 0.17–1.22)
MONOCYTES NFR BLD AUTO: 15 % (ref 4–12)
NEUTROPHILS # BLD AUTO: 1.63 THOUSANDS/ÂΜL (ref 1.85–7.62)
NEUTS SEG NFR BLD AUTO: 47 % (ref 43–75)
NITRITE UR QL STRIP: NEGATIVE
NRBC BLD AUTO-RTO: 0 /100 WBCS
PH UR STRIP.AUTO: 7 [PH]
PLATELET # BLD AUTO: 286 THOUSANDS/UL (ref 149–390)
PMV BLD AUTO: 8.7 FL (ref 8.9–12.7)
POTASSIUM SERPL-SCNC: 3.8 MMOL/L (ref 3.5–5.3)
PROT SERPL-MCNC: 6.7 G/DL (ref 6.4–8.4)
PROT UR STRIP-MCNC: NEGATIVE MG/DL
RBC # BLD AUTO: 4.38 MILLION/UL (ref 3.81–5.12)
SODIUM SERPL-SCNC: 138 MMOL/L (ref 135–147)
SP GR UR STRIP.AUTO: <=1.005 (ref 1–1.03)
TSH SERPL DL<=0.05 MIU/L-ACNC: 1.16 UIU/ML (ref 0.45–4.5)
UROBILINOGEN UR QL STRIP.AUTO: 0.2 E.U./DL
WBC # BLD AUTO: 3.52 THOUSAND/UL (ref 4.31–10.16)

## 2023-08-06 PROCEDURE — 99284 EMERGENCY DEPT VISIT MOD MDM: CPT | Performed by: EMERGENCY MEDICINE

## 2023-08-06 PROCEDURE — 84484 ASSAY OF TROPONIN QUANT: CPT | Performed by: EMERGENCY MEDICINE

## 2023-08-06 PROCEDURE — 93005 ELECTROCARDIOGRAM TRACING: CPT

## 2023-08-06 PROCEDURE — 85025 COMPLETE CBC W/AUTO DIFF WBC: CPT | Performed by: EMERGENCY MEDICINE

## 2023-08-06 PROCEDURE — 36415 COLL VENOUS BLD VENIPUNCTURE: CPT | Performed by: EMERGENCY MEDICINE

## 2023-08-06 PROCEDURE — 99284 EMERGENCY DEPT VISIT MOD MDM: CPT

## 2023-08-06 PROCEDURE — 81003 URINALYSIS AUTO W/O SCOPE: CPT | Performed by: EMERGENCY MEDICINE

## 2023-08-06 PROCEDURE — 80053 COMPREHEN METABOLIC PANEL: CPT | Performed by: EMERGENCY MEDICINE

## 2023-08-06 PROCEDURE — 96360 HYDRATION IV INFUSION INIT: CPT

## 2023-08-06 PROCEDURE — 84443 ASSAY THYROID STIM HORMONE: CPT | Performed by: EMERGENCY MEDICINE

## 2023-08-06 RX ADMIN — SODIUM CHLORIDE 500 ML: 0.9 INJECTION, SOLUTION INTRAVENOUS at 09:55

## 2023-08-06 NOTE — ED PROVIDER NOTES
History  Chief Complaint   Patient presents with   • Dizziness     Pt sts waking up this am felt lightheaded and weak tested positive for covid June 29    74year-old female presents with feeling lightheaded and weak particularly when she woke up today and stood up from a lying down position. Denies any nausea vomiting chest pain shortness of breath syncope focal weakness numbness tingling expressive aphasia slurred speech or any other symptoms. She tested positive for COVID in June last week. History provided by:  Patient   used: No        Prior to Admission Medications   Prescriptions Last Dose Informant Patient Reported? Taking?    Calcium Carb-Cholecalciferol 600-1000 MG-UNIT CAPS  Self Yes No   Sig: Take by mouth   amLODIPine-valsartan (EXFORGE) 5-160 MG per tablet   No No   Sig: TAKE ONE TABLET BY MOUTH EVERY DAY   simvastatin (ZOCOR) 40 mg tablet   No No   Sig: Take 1 tablet (40 mg total) by mouth daily at bedtime      Facility-Administered Medications: None       Past Medical History:   Diagnosis Date   • Abnormal blood chemistry    • Abnormal glucose    • Breast cancer ([address]) 2018    left   • Cancer (HCC)     left breast   • Cervical polyp    • Fracture of ankle    • Hemorrhoid    • Herpes zoster    • History of chemotherapy    • History of radiation therapy    • Hyperglycemia    • Hyperlipidemia    • Hypertension    • Insomnia    • Leiomyoma of uterus    • Osteopenia    • Seborrheic keratosis    • Shingles    • Spider bite wound        Past Surgical History:   Procedure Laterality Date   • ANKLE SURGERY Right    • BREAST BIOPSY Left    • BREAST CYST ASPIRATION Right 1992   • BREAST LUMPECTOMY Left 01/01/2018   • CYST REMOVAL      right eyelid   • DILATION AND CURETTAGE OF UTERUS     • FRACTURE SURGERY      ankle plates and screws   • INTRAOPERATIVE RADIATION THERAPY (IORT) Left 01/12/2018    Procedure: BREAST IORT BY DR Nadean Sandhoff;  Surgeon: Nathan Shaw MD;  Location: [facility]; Service: Surgical Oncology   • MAMMO NEEDLE LOCALIZATION LEFT (ALL INC) Left 2018   • NY BX/EXC LYMPH NODE OPEN SUPERFICIAL Left 2018    Procedure: LYMPHOSCINTIGRAPHY; INTRAOPERATIVE LYMPHATIC MAPPING; SENTINEL LYMPH NODE BIOPSY (INJECT AT 4600); Surgeon: Driss King MD;  Location: [facility];  Service: Surgical Oncology   • NY COLONOSCOPY FLX DX W/COLLJ SPEC WHEN PFRMD N/A 2018    Procedure: COLONOSCOPY;  Surgeon: Patsy Dunbar MD;  Location: [address] [place] [address] [facility]; Service: Gastroenterology   • NY PERQ DEVICE PLACEMENT BREAST LOC 1ST LES W/GDNCE Left 2018    Procedure: BREAST LUMPECTOMY; BREAST NEEDLE LOCALIZATION (NEEDLE LOC AT 1130); FROZEN SECTION;  Surgeon: Driss King MD;  Location[facility];  Service: Surgical Oncology   • SENTINEL LYMPH NODE BIOPSY Left    • US GUIDED BREAST BIOPSY LEFT COMPLETE Left 2017       Family History   Problem Relation Age of Onset   • Colon cancer Paternal Grandfather    • Breast cancer Maternal Aunt 34   • Ovarian cancer Maternal Aunt    • Prostate cancer Maternal Uncle    • Hypertension Mother    • Heart disease Mother    • Thyroid disease Mother    • Leukemia Father    • Other Father         dyschromia   • Emphysema Maternal Grandfather    • Heart disease Paternal Grandmother    • Colon cancer Paternal Aunt    • Breast cancer Maternal Aunt 44   • Colon cancer Paternal Uncle    • Mental illness Neg Hx      I have reviewed and agree with the history as documented. E-Cigarette/Vaping   • E-Cigarette Use Never User      E-Cigarette/Vaping Substances   • Nicotine No    • THC No    • CBD No    • Flavoring No    • Other No    • Unknown No      Social History     Tobacco Use   • Smoking status: Former     Packs/day: 0.25     Types: Cigarettes     Start date:      Quit date: 1998     Years since quittin.4   • Smokeless tobacco: Never   • Tobacco comments:      quit.    Vaping Use   • Vaping Use: Never used   Substance Use Topics   • Alcohol use: Yes     Alcohol/week: 7.0 standard drinks of alcohol     Types: 4 Glasses of wine, 3 Cans of beer per week     Comment: social   • Drug use: Never       Review of Systems   Constitutional: Negative. HENT: Negative. Eyes: Negative. Respiratory: Negative. Cardiovascular: Negative. Gastrointestinal: Negative. Endocrine: Negative. Genitourinary: Negative. Musculoskeletal: Negative. Skin: Negative. Allergic/Immunologic: Negative. Neurological: Positive for light-headedness. Hematological: Negative. Psychiatric/Behavioral: Negative. All other systems reviewed and are negative. Physical Exam  Physical Exam  Vitals and nursing note reviewed. Constitutional:       Appearance: Normal appearance. HENT:      Head: Normocephalic and atraumatic. Nose: Nose normal.      Mouth/Throat:      Mouth: Mucous membranes are moist.   Eyes:      Extraocular Movements: Extraocular movements intact. Pupils: Pupils are equal, round, and reactive to light. Cardiovascular:      Rate and Rhythm: Normal rate and regular rhythm. Pulmonary:      Effort: Pulmonary effort is normal.      Breath sounds: Normal breath sounds. Abdominal:      General: Abdomen is flat. Bowel sounds are normal.      Palpations: Abdomen is soft. Musculoskeletal:         General: Normal range of motion. Cervical back: Normal range of motion and neck supple. Skin:     General: Skin is warm. Capillary Refill: Capillary refill takes less than 2 seconds. Neurological:      General: No focal deficit present. Mental Status: She is alert and oriented to person, place, and time. Mental status is at baseline. Cranial Nerves: No cranial nerve deficit. Sensory: No sensory deficit. Motor: No weakness.       Coordination: Coordination normal.      Gait: Gait normal.      Deep Tendon Reflexes: Reflexes normal.   Psychiatric:         Mood and Affect: Mood normal.         Thought Content: Thought content normal.         Vital Signs  ED Triage Vitals   Temperature Pulse Respirations Blood Pressure SpO2   08/06/23 0941 08/06/23 0941 08/06/23 0941 08/06/23 0941 08/06/23 0941   98.2 °F (36.8 °C) 78 20 164/74 97 %      Temp src Heart Rate Source Patient Position - Orthostatic VS BP Location FiO2 (%)   -- 08/06/23 0941 08/06/23 1113 08/06/23 1113 --    Monitor Lying - Orthostatic VS Left arm       Pain Score       --                  Vitals:    08/06/23 1100 08/06/23 1113 08/06/23 1114 08/06/23 1115   BP: 169/71 144/65 157/70 167/74   Pulse: 62 73 76 78   Patient Position - Orthostatic VS:  Lying - Orthostatic VS Sitting - Orthostatic VS Standing - Orthostatic VS         Visual Acuity      ED Medications  Medications   sodium chloride 0.9 % bolus 500 mL (0 mL Intravenous Stopped 8/6/23 1047)       Diagnostic Studies  Results Reviewed     Procedure Component Value Units Date/Time    UA (URINE) with reflex to Scope [[id number]] Collected: 08/06/23 1049    Lab Status: Final result Specimen: Urine, Clean Catch Updated: 08/06/23 1102     Color, UA Light Yellow     Clarity, UA Clear     Specific Gravity, UA <=1.005     pH, UA 7.0     Leukocytes, UA Negative     Nitrite, UA Negative     Protein, UA Negative mg/dl      Glucose, UA Negative mg/dl      Ketones, UA Negative mg/dl      Urobilinogen, UA 0.2 E.U./dl      Bilirubin, UA Negative     Occult Blood, UA Negative    TSH [[id number]]  (Normal) Collected: 08/06/23 0958    Lab Status: Final result Specimen: Blood from Arm, Right Updated: 08/06/23 1038     TSH 3RD GENERATON 1.159 uIU/mL     HS Troponin 0hr (reflex protocol) [[id number]]  (Normal) Collected: 08/06/23 0958    Lab Status: Final result Specimen: Blood from Arm, Right Updated: 08/06/23 1029     hs TnI 0hr 3 ng/L     Comprehensive metabolic panel [[id number]] Collected: 08/06/23 0958    Lab Status: Final result Specimen: Blood from Arm, Right Updated: 08/06/23 1021     Sodium 138 mmol/L      Potassium 3.8 mmol/L      Chloride 104 mmol/L      CO2 28 mmol/L      ANION GAP 6 mmol/L      BUN 11 mg/dL      Creatinine 0.60 mg/dL      Glucose 111 mg/dL      Calcium 9.5 mg/dL      AST 15 U/L      ALT 12 U/L      Alkaline Phosphatase 76 U/L      Total Protein 6.7 g/dL      Albumin 4.3 g/dL      Total Bilirubin 0.60 mg/dL      eGFR 89 ml/min/1.73sq m     Narrative:      National Kidney Disease Foundation guidelines for Chronic Kidney Disease (CKD):   •  Stage 1 with normal or high GFR (GFR > 90 mL/min/1.73 square meters)  •  Stage 2 Mild CKD (GFR = 60-89 mL/min/1.73 square meters)  •  Stage 3A Moderate CKD (GFR = 45-59 mL/min/1.73 square meters)  •  Stage 3B Moderate CKD (GFR = 30-44 mL/min/1.73 square meters)  •  Stage 4 Severe CKD (GFR = 15-29 mL/min/1.73 square meters)  •  Stage 5 End Stage CKD (GFR <15 mL/min/1.73 square meters)  Note: GFR calculation is accurate only with a steady state creatinine    CBC and differential [[id number]]  (Abnormal) Collected: 08/06/23 0958    Lab Status: Final result Specimen: Blood from Arm, Right Updated: 08/06/23 1004     WBC 3.52 Thousand/uL      RBC 4.38 Million/uL      Hemoglobin 13.6 g/dL      Hematocrit 41.2 %      MCV 94 fL      MCH 31.1 pg      MCHC 33.0 g/dL      RDW 12.7 %      MPV 8.7 fL      Platelets 838 Thousands/uL      nRBC 0 /100 WBCs      Neutrophils Relative 47 %      Immat GRANS % 0 %      Lymphocytes Relative 37 %      Monocytes Relative 15 %      Eosinophils Relative 1 %      Basophils Relative 0 %      Neutrophils Absolute 1.63 Thousands/µL      Immature Grans Absolute 0.01 Thousand/uL      Lymphocytes Absolute 1.31 Thousands/µL      Monocytes Absolute 0.52 Thousand/µL      Eosinophils Absolute 0.04 Thousand/µL      Basophils Absolute 0.01 Thousands/µL                  No orders to display              Procedures  ECG 12 Lead Documentation Only    Performed by: Carlita Carr DO  Authorized by: Carlita Carr DO    ECG reviewed by me, the ED Provider: yes    Patient location:  ED  Previous ECG:     Previous ECG:  Unavailable    Comparison to cardiac monitor: Yes    Interpretation:     Interpretation: non-specific    Rate:     ECG rate assessment: normal    Rhythm:     Rhythm: sinus rhythm    Ectopy:     Ectopy: none    QRS:     QRS axis:  Normal  Conduction:     Conduction: normal    ST segments:     ST segments:  Non-specific  T waves:     T waves: non-specific               ED Course                                             Medical Decision Making  Patient evaluated with labs EKG. I reviewed the results and discussed them with the patient. Patient discharged with appropriate instructions medications and follow-up. Patient verbalized understanding had no further questions at the time of discharge. Patient had stable vital signs and well-appearing at the time of discharge. Orthostatics negative    Lightheadedness: acute illness or injury  Amount and/or Complexity of Data Reviewed  External Data Reviewed: notes. Labs: ordered. Decision-making details documented in ED Course. ECG/medicine tests: ordered and independent interpretation performed. Decision-making details documented in ED Course. Disposition  Final diagnoses:   Lightheadedness     Time reflects when diagnosis was documented in both MDM as applicable and the Disposition within this note     Time User Action Codes Description Comment    8/6/2023 11:29 AM Quincy Madden Add [R42] Lightheadedness       ED Disposition     ED Disposition   Discharge    Condition   Stable    Date/Time   Sun Aug 6, 2023 11:29 AM    Comment   [address] discharge to home/self care.                Follow-up Information     Follow up With Specialties Details Why Contact Info Additional Information    Torsten Montano DO Family Medicine, Wound Care Schedule an appointment as soon as possible for a visit   [facility] [place] [id number] [address] Emergency Department Emergency Medicine  If symptoms worsen [place]  [address] [facility] Emergency Department, [address], [place], [address]          Discharge Medication List as of 8/6/2023 11:30 AM      CONTINUE these medications which have NOT CHANGED    Details   amLODIPine-valsartan (EXFORGE) 5-160 MG per tablet TAKE ONE TABLET BY MOUTH EVERY DAY, Normal      Calcium Carb-Cholecalciferol 600-1000 MG-UNIT CAPS Take by mouth, Historical Med      simvastatin (ZOCOR) 40 mg tablet Take 1 tablet (40 mg total) by mouth daily at bedtime, Starting Sat 7/1/2023, Normal             No discharge procedures on file.     PDMP Review     None          ED Provider  Electronically Signed by           Shyla Toney DO  08/06/23 4867

## 2023-08-07 LAB
ATRIAL RATE: 74 BPM
P AXIS: 70 DEGREES
PR INTERVAL: 218 MS
QRS AXIS: -5 DEGREES
QRSD INTERVAL: 84 MS
QT INTERVAL: 392 MS
QTC INTERVAL: 435 MS
T WAVE AXIS: 43 DEGREES
VENTRICULAR RATE: 74 BPM

## 2023-08-07 PROCEDURE — 93010 ELECTROCARDIOGRAM REPORT: CPT | Performed by: INTERNAL MEDICINE

## 2023-08-08 ENCOUNTER — OFFICE VISIT (OUTPATIENT)
Dept: FAMILY MEDICINE CLINIC | Facility: CLINIC | Age: 73
End: 2023-08-08
Payer: MEDICARE

## 2023-08-08 VITALS
TEMPERATURE: 97.6 F | HEIGHT: 64 IN | BODY MASS INDEX: 25 KG/M2 | DIASTOLIC BLOOD PRESSURE: 68 MMHG | HEART RATE: 76 BPM | WEIGHT: 146.4 LBS | RESPIRATION RATE: 16 BRPM | SYSTOLIC BLOOD PRESSURE: 132 MMHG

## 2023-08-08 DIAGNOSIS — I10 BENIGN ESSENTIAL HTN: ICD-10-CM

## 2023-08-08 DIAGNOSIS — U07.1 COVID-19 VIRUS INFECTION: Primary | ICD-10-CM

## 2023-08-08 PROCEDURE — 99213 OFFICE O/P EST LOW 20 MIN: CPT | Performed by: NURSE PRACTITIONER

## 2023-08-08 NOTE — LETTER
August 8, 2023     Patient: Eulalio Lara  YOB: 1950  Date of Visit: 8/8/2023      To Whom it May Concern:    Jong Mcallister is under my professional care. Michelle Stanley was seen in my office on 8/8/2023 for follow up on Covid 19 infection. She may return to work 8/14/23. If you have any questions or concerns, please don't hesitate to call.          Sincerely,          JENNIFER Nieves        CC: No Recipients

## 2023-08-08 NOTE — PROGRESS NOTES
Assessment/Plan:    Discussed possibility of rebound infection following Paxlovid, however she did not take a full course. Since symptoms overall improving, recommending continuing to monitor and treat symptoms. Note given to return to work. F/u here as needed    1. COVID-19 virus infection    2. Benign essential HTN  Assessment & Plan:  Stable               There are no Patient Instructions on file for this visit. No follow-ups on file. Subjective:      Patient ID: Eulalio Lara is a 74 y.o. female. Chief Complaint   Patient presents with   • Follow-up     Went to hospital Sunday 8/6 , for lightheaded and dizziness and told to follow up with PCP  LORIE MENDOZA      Covid  [address]        Initially tested positive on 7/28. Took Paxlovid for 3 days, then stopped d/t side effects. Woke up a few days ago with dizziness. She retested for Covid at home and it was positive. The dizziness lasted for 2 minutes and has not recurred. Has never had Covid prior to this. Has had 3 doses of the vaccine. Overall feels okay now, has some lingering congestion and a cough          The following portions of the patient's history were reviewed and updated as appropriate: allergies, current medications, past family history, past medical history, past social history, past surgical history and problem list.    Review of Systems   Constitutional: Negative for chills, fatigue and fever. HENT: Positive for rhinorrhea. Negative for congestion, ear pain, postnasal drip, sinus pressure and sore throat. Respiratory: Positive for cough. Negative for shortness of breath and wheezing. Cardiovascular: Negative for chest pain. Gastrointestinal: Negative for abdominal pain, diarrhea, nausea and vomiting. Musculoskeletal: Negative for arthralgias. Skin: Negative for rash. Neurological: Negative for dizziness and headaches.          Current Outpatient Medications   Medication Sig Dispense Refill   • amLODIPine-valsartan (EXFORGE) 5-160 MG per tablet TAKE ONE TABLET BY MOUTH EVERY DAY 90 tablet 0   • Calcium Carb-Cholecalciferol 600-1000 MG-UNIT CAPS Take by mouth     • simvastatin (ZOCOR) 40 mg tablet Take 1 tablet (40 mg total) by mouth daily at bedtime 90 tablet 3     No current facility-administered medications for this visit. Objective:    /68   Pulse 76   Temp 97.6 °F (36.4 °C)   Resp 16   Ht 5' 4" (1.626 m)   Wt 66.4 kg (146 lb 6.4 oz)   BMI 25.13 kg/m²        Physical Exam  Vitals and nursing note reviewed. Constitutional:       Appearance: Normal appearance. She is well-developed. Cardiovascular:      Rate and Rhythm: Normal rate and regular rhythm. Pulses: Normal pulses. Heart sounds: Normal heart sounds. No murmur heard. Pulmonary:      Effort: Pulmonary effort is normal.      Breath sounds: Normal breath sounds. Skin:     General: Skin is warm and dry. Neurological:      Mental Status: She is alert.    Psychiatric:         Mood and Affect: Mood normal.         Behavior: Behavior normal.                JENNIFER Davis

## 2023-10-02 DIAGNOSIS — I10 BENIGN ESSENTIAL HTN: ICD-10-CM

## 2023-10-02 RX ORDER — AMLODIPINE AND VALSARTAN 5; 160 MG/1; MG/1
TABLET ORAL
Qty: 90 TABLET | Refills: 0 | Status: SHIPPED | OUTPATIENT
Start: 2023-10-02

## 2023-10-05 ENCOUNTER — TELEPHONE (OUTPATIENT)
Dept: HEMATOLOGY ONCOLOGY | Facility: CLINIC | Age: 73
End: 2023-10-05

## 2023-10-05 NOTE — TELEPHONE ENCOUNTER
Appointment Change  Cancel, Reschedule, Change to Virtual      Who are you speaking with? Patient   If it is not the patient, is the caller listed on the communication consent form? Yes   Which provider is the appointment scheduled with? Dr. Yari Varghese   When was the original appointment scheduled? Please list date and time 11/15/23   At which location is the [facility] scheduled to take [facility]   Was the appointment rescheduled? Was the appointment changed from an in person visit to a virtual visit? If so, please list the details of the change. No   What is the reason for the appointment change?  Will call back if she needs an appt

## 2023-11-15 ENCOUNTER — HOSPITAL ENCOUNTER (OUTPATIENT)
Dept: RADIOLOGY | Facility: HOSPITAL | Age: 73
Discharge: HOME | End: 2023-11-15
Attending: FAMILY MEDICINE
Payer: MEDICARE

## 2023-11-15 ENCOUNTER — OFFICE VISIT (OUTPATIENT)
Dept: SURGICAL ONCOLOGY | Facility: CLINIC | Age: 73
End: 2023-11-15
Payer: MEDICARE

## 2023-11-15 VITALS
HEIGHT: 64 IN | TEMPERATURE: 98.6 F | DIASTOLIC BLOOD PRESSURE: 64 MMHG | OXYGEN SATURATION: 98 % | HEART RATE: 68 BPM | BODY MASS INDEX: 24.67 KG/M2 | SYSTOLIC BLOOD PRESSURE: 148 MMHG | WEIGHT: 144.5 LBS

## 2023-11-15 VITALS — WEIGHT: 146 LBS | HEIGHT: 64 IN | BODY MASS INDEX: 24.92 KG/M2

## 2023-11-15 DIAGNOSIS — Z85.3 HISTORY OF CANCER OF LEFT BREAST: ICD-10-CM

## 2023-11-15 DIAGNOSIS — Z78.0 POST-MENOPAUSAL: ICD-10-CM

## 2023-11-15 DIAGNOSIS — Z85.3 ENCOUNTER FOR FOLLOW-UP SURVEILLANCE OF BREAST CANCER: Primary | ICD-10-CM

## 2023-11-15 DIAGNOSIS — S62.101B OPEN FRACTURE OF RIGHT WRIST, INITIAL ENCOUNTER: ICD-10-CM

## 2023-11-15 DIAGNOSIS — Z08 ENCOUNTER FOR FOLLOW-UP SURVEILLANCE OF BREAST CANCER: Primary | ICD-10-CM

## 2023-11-15 DIAGNOSIS — Z12.31 VISIT FOR SCREENING MAMMOGRAM: ICD-10-CM

## 2023-11-15 DIAGNOSIS — M85.80 OSTEOPENIA, UNSPECIFIED LOCATION: ICD-10-CM

## 2023-11-15 DIAGNOSIS — E55.9 VITAMIN D DEFICIENCY: ICD-10-CM

## 2023-11-15 PROCEDURE — 77080 DXA BONE DENSITY AXIAL: CPT

## 2023-11-15 PROCEDURE — 99213 OFFICE O/P EST LOW 20 MIN: CPT

## 2024-01-05 DIAGNOSIS — I10 BENIGN ESSENTIAL HTN: ICD-10-CM

## 2024-01-05 RX ORDER — AMLODIPINE AND VALSARTAN 5; 160 MG/1; MG/1
TABLET ORAL
Qty: 90 TABLET | Refills: 1 | Status: SHIPPED | OUTPATIENT
Start: 2024-01-05

## 2024-01-24 ENCOUNTER — HOSPITAL ENCOUNTER (OUTPATIENT)
Dept: RADIOLOGY | Facility: HOSPITAL | Age: 74
Discharge: HOME | End: 2024-01-24
Payer: MEDICARE

## 2024-01-24 VITALS — BODY MASS INDEX: 24.16 KG/M2 | WEIGHT: 145 LBS | HEIGHT: 65 IN

## 2024-01-24 DIAGNOSIS — Z12.31 VISIT FOR SCREENING MAMMOGRAM: ICD-10-CM

## 2024-01-24 PROCEDURE — 77063 BREAST TOMOSYNTHESIS BI: CPT

## 2024-01-24 PROCEDURE — 77067 SCR MAMMO BI INCL CAD: CPT

## 2024-01-30 ENCOUNTER — RA CDI HCC (OUTPATIENT)
Dept: OTHER | Facility: HOSPITAL | Age: 74
End: 2024-01-30

## 2024-02-03 LAB
ALBUMIN SERPL-MCNC: 4.4 G/DL (ref 3.8–4.8)
ALBUMIN/GLOB SERPL: 2.8 {RATIO} (ref 1.2–2.2)
ALP SERPL-CCNC: 88 IU/L (ref 44–121)
ALT SERPL-CCNC: 18 IU/L (ref 0–32)
AST SERPL-CCNC: 22 IU/L (ref 0–40)
BILIRUB SERPL-MCNC: 0.4 MG/DL (ref 0–1.2)
BUN SERPL-MCNC: 17 MG/DL (ref 8–27)
BUN/CREAT SERPL: 33 (ref 12–28)
CALCIUM SERPL-MCNC: 9.6 MG/DL (ref 8.7–10.3)
CHLORIDE SERPL-SCNC: 106 MMOL/L (ref 96–106)
CHOLEST SERPL-MCNC: 178 MG/DL (ref 100–199)
CO2 SERPL-SCNC: 26 MMOL/L (ref 20–29)
CREAT SERPL-MCNC: 0.52 MG/DL (ref 0.57–1)
EGFR: 97 ML/MIN/1.73
GLOBULIN SER-MCNC: 1.6 G/DL (ref 1.5–4.5)
GLUCOSE SERPL-MCNC: 102 MG/DL (ref 70–99)
HBA1C MFR BLD: 5.9 % (ref 4.8–5.6)
HDLC SERPL-MCNC: 81 MG/DL
LDLC SERPL CALC-MCNC: 83 MG/DL (ref 0–99)
MICRODELETION SYND BLD/T FISH: NORMAL
POTASSIUM SERPL-SCNC: 4 MMOL/L (ref 3.5–5.2)
PROT SERPL-MCNC: 6 G/DL (ref 6–8.5)
SODIUM SERPL-SCNC: 143 MMOL/L (ref 134–144)
TRIGL SERPL-MCNC: 78 MG/DL (ref 0–149)

## 2024-02-06 ENCOUNTER — OFFICE VISIT (OUTPATIENT)
Dept: FAMILY MEDICINE CLINIC | Facility: CLINIC | Age: 74
End: 2024-02-06
Payer: MEDICARE

## 2024-02-06 VITALS
HEART RATE: 63 BPM | TEMPERATURE: 95.8 F | RESPIRATION RATE: 18 BRPM | WEIGHT: 149.4 LBS | SYSTOLIC BLOOD PRESSURE: 124 MMHG | BODY MASS INDEX: 26.47 KG/M2 | DIASTOLIC BLOOD PRESSURE: 68 MMHG | HEIGHT: 63 IN

## 2024-02-06 DIAGNOSIS — E55.9 VITAMIN D DEFICIENCY: ICD-10-CM

## 2024-02-06 DIAGNOSIS — Z23 NEED FOR VACCINATION: ICD-10-CM

## 2024-02-06 DIAGNOSIS — R73.09 ABNORMAL GLUCOSE: ICD-10-CM

## 2024-02-06 DIAGNOSIS — Z00.00 MEDICARE ANNUAL WELLNESS VISIT, SUBSEQUENT: Primary | ICD-10-CM

## 2024-02-06 DIAGNOSIS — M85.89 OSTEOPENIA OF MULTIPLE SITES: ICD-10-CM

## 2024-02-06 DIAGNOSIS — I10 BENIGN ESSENTIAL HTN: ICD-10-CM

## 2024-02-06 DIAGNOSIS — E78.2 MIXED HYPERLIPIDEMIA: ICD-10-CM

## 2024-02-06 PROBLEM — Z79.811 USE OF ANASTROZOLE (ARIMIDEX): Status: RESOLVED | Noted: 2018-10-22 | Resolved: 2024-02-06

## 2024-02-06 PROCEDURE — G0008 ADMIN INFLUENZA VIRUS VAC: HCPCS

## 2024-02-06 PROCEDURE — 90662 IIV NO PRSV INCREASED AG IM: CPT

## 2024-02-06 PROCEDURE — G0439 PPPS, SUBSEQ VISIT: HCPCS | Performed by: FAMILY MEDICINE

## 2024-02-06 RX ORDER — AMLODIPINE AND VALSARTAN 5; 160 MG/1; MG/1
1 TABLET ORAL DAILY
Qty: 90 TABLET | Refills: 3 | Status: SHIPPED | OUTPATIENT
Start: 2024-02-06

## 2024-02-06 RX ORDER — SIMVASTATIN 40 MG
40 TABLET ORAL
Qty: 90 TABLET | Refills: 3 | Status: SHIPPED | OUTPATIENT
Start: 2024-02-06

## 2024-02-06 NOTE — PROGRESS NOTES
Assessment and Plan:     Problem List Items Addressed This Visit        Cardiovascular and Mediastinum    Benign essential HTN    Relevant Medications    amLODIPine-valsartan (EXFORGE) 5-160 MG per tablet       Musculoskeletal and Integument    Osteopenia of multiple sites       Other    Mixed hyperlipidemia    Relevant Medications    simvastatin (ZOCOR) 40 mg tablet    Other Relevant Orders    Comprehensive metabolic panel    Lipid Panel with Direct LDL reflex    CBC    Vitamin D deficiency    Abnormal glucose    Relevant Orders    Comprehensive metabolic panel    Lipid Panel with Direct LDL reflex    CBC   Other Visit Diagnoses     Medicare annual wellness visit, subsequent    -  Primary    Need for vaccination        Relevant Orders    influenza vaccine, high-dose, PF 0.5 mL (Completed)           Preventive health issues were discussed with patient, and age appropriate screening tests were ordered as noted in patient's After Visit Summary.  Personalized health advice and appropriate referrals for health education or preventive services given if needed, as noted in patient's After Visit Summary.     History of Present Illness:     Patient presents for a Medicare Wellness Visit    Ptis here for an AWV       Patient Care Team:  Frank Lombardi, DO as PCP - General (Family Medicine)  Pardeep Madison MD as Consulting Physician (Surgical Oncology)  Stu Jeffery MD as Consulting Physician (Radiation Oncology)  Wilmer Rice MD as Consulting Physician (Gastroenterology)  Gustavo Rico MD as Consulting Physician (Ophthalmology)     Review of Systems:     Review of Systems     Problem List:     Patient Active Problem List   Diagnosis   • Anxiety   • Benign essential HTN   • Mixed hyperlipidemia   • Leiomyoma of uterus   • History of cancer of left breast   • Osteopenia of multiple sites   • Vitamin D deficiency   • Abnormal glucose   • Abnormal mammogram   • Cervical polyp   • Hemorrhoids   • Menopause   • Overweight (BMI  25.0-29.9)   • Family hx of colon cancer   • Abnormal liver function   • Former smoker   • Encounter for follow-up surveillance of breast cancer      Past Medical and Surgical History:     Past Medical History:   Diagnosis Date   • Abnormal blood chemistry    • Abnormal glucose    • Breast cancer (HCC) 2018    left   • Cancer (HCC)     left breast   • Cervical polyp    • Fracture of ankle    • Hemorrhoid    • Herpes zoster    • History of chemotherapy    • History of radiation therapy    • Hyperglycemia    • Hyperlipidemia    • Hypertension    • Insomnia    • Leiomyoma of uterus    • Osteopenia    • Seborrheic keratosis    • Shingles    • Spider bite wound      Past Surgical History:   Procedure Laterality Date   • ANKLE SURGERY Right    • BREAST BIOPSY Left    • BREAST CYST ASPIRATION Right 1992   • BREAST LUMPECTOMY Left 01/01/2018   • CYST REMOVAL      right eyelid   • DILATION AND CURETTAGE OF UTERUS     • FRACTURE SURGERY      ankle plates and screws   • INTRAOPERATIVE RADIATION THERAPY (IORT) Left 01/12/2018    Procedure: BREAST IORT BY DR PATEL;  Surgeon: Pardeep Madison MD;  Location[facility];  Service: Surgical Oncology   • MAMMO NEEDLE LOCALIZATION LEFT (ALL INC) Left 01/12/2018   • IL BX/EXC LYMPH NODE OPEN SUPERFICIAL Left 01/12/2018    Procedure: LYMPHOSCINTIGRAPHY; INTRAOPERATIVE LYMPHATIC MAPPING; SENTINEL LYMPH NODE BIOPSY (INJECT AT 1215);  Surgeon: Pardeep Madison MD;  Location[facility];  Service: Surgical Oncology   • IL COLONOSCOPY FLX DX W/COLLJ SPEC WHEN PFRMD N/A 09/14/2018    Procedure: COLONOSCOPY;  Surgeon: Wilmer Rice MD;  Location: [facility];  Service: Gastroenterology   • IL PERQ DEVICE PLACEMENT BREAST LOC 1ST LES W/GDNCE Left 01/12/2018    Procedure: BREAST LUMPECTOMY; BREAST NEEDLE LOCALIZATION (NEEDLE LOC AT 1130); FROZEN SECTION;  Surgeon: Pardeep Madison MD;  Location[facility];  Service: Surgical Oncology   • SENTINEL LYMPH NODE BIOPSY Left    • US GUIDED BREAST BIOPSY LEFT  COMPLETE Left 2017      Family History:     Family History   Problem Relation Age of Onset   • Colon cancer Paternal Grandfather    • Breast cancer Maternal Aunt 30   • Ovarian cancer Maternal Aunt    • Prostate cancer Maternal Uncle    • Hypertension Mother    • Heart disease Mother    • Thyroid disease Mother    • Leukemia Father    • Other Father         dyschromia   • Emphysema Maternal Grandfather    • Heart disease Paternal Grandmother    • Colon cancer Paternal Aunt    • Breast cancer Maternal Aunt 40   • Colon cancer Paternal Uncle    • Mental illness Neg Hx       Social History:     Social History     Socioeconomic History   • Marital status: /Civil Union     Spouse name: None   • Number of children: None   • Years of education: None   • Highest education level: None   Occupational History   • Occupation:     Tobacco Use   • Smoking status: Former     Current packs/day: 0.00     Average packs/day: 0.3 packs/day for 30.2 years (7.5 ttl pk-yrs)     Types: Cigarettes     Start date:      Quit date: 1998     Years since quittin.9     Passive exposure: Never   • Smokeless tobacco: Never   • Tobacco comments:      quit.   Vaping Use   • Vaping status: Never Used   Substance and Sexual Activity   • Alcohol use: Yes     Alcohol/week: 7.0 standard drinks of alcohol     Types: 4 Glasses of wine, 3 Cans of beer per week     Comment: social   • Drug use: Never   • Sexual activity: None   Other Topics Concern   • None   Social History Narrative   • None     Social Determinants of Health     Financial Resource Strain: Low Risk  (2024)    Overall Financial Resource Strain (CARDIA)    • Difficulty of Paying Living Expenses: Not hard at all   Food Insecurity: Not on file   Transportation Needs: No Transportation Needs (2024)    PRAPARE - Transportation    • Lack of Transportation (Medical): No    • Lack of Transportation (Non-Medical): No   Physical Activity: Not on file    Stress: Not on file   Social Connections: Not on file   Intimate Partner Violence: Not on file   Housing Stability: Not on file      Medications and Allergies:     Current Outpatient Medications   Medication Sig Dispense Refill   • amLODIPine-valsartan (EXFORGE) 5-160 MG per tablet Take 1 tablet by mouth daily 90 tablet 3   • simvastatin (ZOCOR) 40 mg tablet Take 1 tablet (40 mg total) by mouth daily at bedtime 90 tablet 3     No current facility-administered medications for this visit.     Allergies   Allergen Reactions   • Adhesive [Medical Tape] Rash      Immunizations:     Immunization History   Administered Date(s) Administered   • Influenza Split High Dose Preservative Free IM 10/30/2013, 12/14/2016   • Influenza, high dose seasonal 0.7 mL 02/14/2019, 11/21/2019, 11/16/2020, 02/06/2024   • Influenza, seasonal, injectable 12/13/2016   • Influenza, seasonal, injectable, preservative free 10/25/2015   • Pneumococcal Conjugate 13-Valent 11/20/2015   • Pneumococcal Conjugate Vaccine 20-valent (Pcv20), Polysace 08/29/2022   • Pneumococcal Polysaccharide PPV23 11/19/2014, 12/13/2015      Health Maintenance:         Topic Date Due   • Cervical Cancer Screening  Never done   • Breast Cancer Screening: Mammogram  01/24/2025   • Colorectal Cancer Screening  09/14/2028   • Hepatitis C Screening  Completed     There are no preventive care reminders to display for this patient.     Medicare Screening Tests and Risk Assessments:      is here for her Subsequent Wellness visit. Last Medicare Wellness visit information reviewed, patient interviewed, no change since last AWV.     Health Risk Assessment:   Patient rates overall health as very good. Patient feels that their physical health rating is much better. Patient is satisfied with their life. Eyesight was rated as same. Hearing was rated as same. Patient feels that their emotional and mental health rating is same. Patients states they are never, rarely angry.  Patient states they are always unusually tired/fatigued. Pain experienced in the last 7 days has been none. Patient states that she has experienced no weight loss or gain in last 6 months.     Fall Risk Screening:   In the past year, patient has experienced: no history of falling in past year      Urinary Incontinence Screening:   Patient has leaked urine accidently in the last six months.     Home Safety:  Patient does not have trouble with stairs inside or outside of their home. Patient has working smoke alarms and has working carbon monoxide detector. Home safety hazards include: none.     Nutrition:   Current diet is Regular.     Medications:   Patient is currently taking over-the-counter supplements. OTC medications include: see medication list. Patient is able to manage medications.     Activities of Daily Living (ADLs)/Instrumental Activities of Daily Living (IADLs):   Walk and transfer into and out of bed and chair?: Yes  Dress and groom yourself?: Yes    Bathe or shower yourself?: Yes    Feed yourself? Yes  Do your laundry/housekeeping?: Yes  Manage your money, pay your bills and track your expenses?: Yes  Make your own meals?: Yes    Do your own shopping?: Yes    Previous Hospitalizations:   Any hospitalizations or ED visits within the last 12 months?: No      Advance Care Planning:   Living will: No    Durable POA for healthcare: No    Advanced directive: No      Cognitive Screening:   Provider or family/friend/caregiver concerned regarding cognition?: No    PREVENTIVE SCREENINGS      Cardiovascular Screening:    General: Screening Not Indicated, History Lipid Disorder and Risks and Benefits Discussed    Due for: Lipid Panel      Diabetes Screening:     General: Screening Current and Risks and Benefits Discussed    Due for: Blood Glucose      Colorectal Cancer Screening:     General: Screening Current      Breast Cancer Screening:     General: History Breast Cancer, Risks and Benefits Discussed and  "Screening Current      Cervical Cancer Screening:    General: Screening Not Indicated      Osteoporosis Screening:    General: Risks and Benefits Discussed and Screening Current      Abdominal Aortic Aneurysm (AAA) Screening:        General: Screening Not Indicated      Lung Cancer Screening:     General: Screening Not Indicated      Hepatitis C Screening:    General: Screening Current    Screening, Brief Intervention, and Referral to Treatment (SBIRT)    Screening    Typical number of drinks in a week: 1    Single Item Drug Screening:  How often have you used an illegal drug (including marijuana) or a prescription medication for non-medical reasons in the past year? never    Single Item Drug Screen Score: 0  Interpretation: Negative screen for possible drug use disorder    Brief Intervention  Alcohol & drug use screenings were reviewed. No concerns regarding substance use disorder identified.     No results found.     Physical Exam:     /68   Pulse 63   Temp (!) 95.8 °F (35.4 °C)   Resp 18   Ht 5' 3\" (1.6 m)   Wt 67.8 kg (149 lb 6.4 oz)   BMI 26.47 kg/m²     Physical Exam     DXA bone density spine hip and pelvis  Status: Final result     PACS Images     Show images for DXA bone density spine hip and pelvis  Study Result    Narrative & Impression   CENTRAL  DXA SCAN     CLINICAL HISTORY: 73-year-old postmenopausal female.  OTHER RISK FACTORS: Prior fracture as a result of minor injury, parental history of hip fracture status post minor injury.     PHARMACOLOGIC THERAPY FOR OSTEOPOROSIS: None.     TECHNIQUE: Bone densitometry was performed using a [employer] bone densitometer.  Regions of interest appear properly placed.     COMPARISON: 7/25/2018.     RESULTS:     LUMBAR SPINE  Level: L1-L4:  BMD: 0.969 gm/cm2  T-score: -1.8        LEFT TOTAL HIP:  BMD: 0.743 gm/cm2  T-score: -2.1     LEFT FEMORAL NECK:  BMD: 0.731 gm/cm2  T score: -2.2     RIGHT TOTAL HIP:  BMD: 0.746 gm/cm2  T-score: -2.1     RIGHT " FEMORAL NECK:  BMD: 0.715 gm/cm2  T score: -2.3        IMPRESSION:     1. Low bone mass (osteopenia).     2.  Since a DXA study from 7/25/2018, there has been:  A  STATISTICALLY SIGNIFICANT DECREASE in bone mineral density of 0.088 g/cm2 (8.3%) in the lumbar spine.  A  STATISTICALLY SIGNIFICANT DECREASE in bone mineral density of 0.061 g/cm2 (7.6%) in the hips.        3.  The 10 year risk of hip fracture is 26.3% with the 10 year risk of major osteoporotic fracture being 40.8% as calculated by the [employer] fracture risk assessment tool (FRAX, which is based on data generated by the WHO Collaborating   [employer] for Metabolic Bone Diseases).     4.  The current NOF guidelines recommend treating patients with a T-score of -2.5 or less in the lumbar spine or hips, or in post-menopausal women and men over the age of 50 with low bone mass (osteopenia) and a FRAX 10 year risk score of >3% for hip   fracture and/or >20% for major osteoporotic fracture.     5.  The NOF recommends follow-up DXA in 1-2 years after initiating therapy for osteoporosis and every 2 years thereafter. More frequent evaluation is appropriate for patients with conditions associated with rapid bone loss, such as glucocorticoid   therapy. The interval between DXA screenings may be longer for individuals without major risk factors and initial T-score in the normal or upper low bone mass range.        The FRAX algorithm has certain limitations:  -FRAX has not been validated in patients currently or previously treated with pharmacotherapy for osteoporosis.  In such patients, clinical judgment must be exercised in interpreting FRAX scores.  -Prior hip, vertebral and humeral fragility fractures appear to confer greater risk of subsequent fracture than fractures at other sites (this is especially true for individuals with severe vertebral fractures), but quantification of this incremental   risk is not possible with FRAX.  -FRAX underestimates  fracture risk in patients with history of multiple fragility fractures.  -FRAX may underestimate fracture risk in patients with history of frequent falls.  -It is not appropriate to use FRAX to monitor treatment response.        WHO CLASSIFICATION:  Normal (a T-score of -1.0 or higher)  Low bone mineral density (a T-score of less than -1.0 but higher than -2.5)  Osteoporosis (a T-score of -2.5 or less)  Severe osteoporosis (a T-score of -2.5 or less with a fragility fracture)     LEAST SIGNIFICANT CHANGE AT 95% C.I:  Lumbar spine: 0.036 gm/cm2 (3.2%).  Total hip: 0.018 gm/cm2 (2.0%).  Forearm: 0.024 gm/cm2 (3.2%).              Workstation performed: [id number]         Frank Lombardi, DO

## 2024-08-27 LAB
ALBUMIN SERPL-MCNC: 4.3 G/DL (ref 3.8–4.8)
ALP SERPL-CCNC: 79 IU/L (ref 44–121)
ALT SERPL-CCNC: 15 IU/L (ref 0–32)
AST SERPL-CCNC: 21 IU/L (ref 0–40)
BASOPHILS # BLD AUTO: 0 X10E3/UL (ref 0–0.2)
BASOPHILS NFR BLD AUTO: 1 %
BILIRUB SERPL-MCNC: 0.4 MG/DL (ref 0–1.2)
BUN SERPL-MCNC: 13 MG/DL (ref 8–27)
BUN/CREAT SERPL: 22 (ref 12–28)
CALCIUM SERPL-MCNC: 9.4 MG/DL (ref 8.7–10.3)
CHLORIDE SERPL-SCNC: 104 MMOL/L (ref 96–106)
CHOLEST SERPL-MCNC: 179 MG/DL (ref 100–199)
CO2 SERPL-SCNC: 27 MMOL/L (ref 20–29)
CREAT SERPL-MCNC: 0.6 MG/DL (ref 0.57–1)
EGFR: 93 ML/MIN/1.73
EOSINOPHIL # BLD AUTO: 0.1 X10E3/UL (ref 0–0.4)
EOSINOPHIL NFR BLD AUTO: 1 %
ERYTHROCYTE [DISTWIDTH] IN BLOOD BY AUTOMATED COUNT: 12.4 % (ref 11.7–15.4)
GLOBULIN SER-MCNC: 1.4 G/DL (ref 1.5–4.5)
GLUCOSE SERPL-MCNC: 109 MG/DL (ref 70–99)
HCT VFR BLD AUTO: 39.2 % (ref 34–46.6)
HDLC SERPL-MCNC: 81 MG/DL
HGB BLD-MCNC: 12.6 G/DL (ref 11.1–15.9)
IMM GRANULOCYTES # BLD: 0 X10E3/UL (ref 0–0.1)
IMM GRANULOCYTES NFR BLD: 0 %
LDLC SERPL CALC-MCNC: 88 MG/DL (ref 0–99)
LDLC/HDLC SERPL: 1.1 RATIO (ref 0–3.2)
LYMPHOCYTES # BLD AUTO: 1.5 X10E3/UL (ref 0.7–3.1)
LYMPHOCYTES NFR BLD AUTO: 32 %
MCH RBC QN AUTO: 30.9 PG (ref 26.6–33)
MCHC RBC AUTO-ENTMCNC: 32.1 G/DL (ref 31.5–35.7)
MCV RBC AUTO: 96 FL (ref 79–97)
MICRODELETION SYND BLD/T FISH: NORMAL
MONOCYTES # BLD AUTO: 0.4 X10E3/UL (ref 0.1–0.9)
MONOCYTES NFR BLD AUTO: 8 %
NEUTROPHILS # BLD AUTO: 2.7 X10E3/UL (ref 1.4–7)
NEUTROPHILS NFR BLD AUTO: 58 %
PLATELET # BLD AUTO: 234 X10E3/UL (ref 150–450)
POTASSIUM SERPL-SCNC: 4 MMOL/L (ref 3.5–5.2)
PROT SERPL-MCNC: 5.7 G/DL (ref 6–8.5)
RBC # BLD AUTO: 4.08 X10E6/UL (ref 3.77–5.28)
SL AMB VLDL CHOLESTEROL CALC: 10 MG/DL (ref 5–40)
SODIUM SERPL-SCNC: 142 MMOL/L (ref 134–144)
TRIGL SERPL-MCNC: 47 MG/DL (ref 0–149)
WBC # BLD AUTO: 4.6 X10E3/UL (ref 3.4–10.8)

## 2024-09-05 ENCOUNTER — OFFICE VISIT (OUTPATIENT)
Dept: FAMILY MEDICINE CLINIC | Facility: CLINIC | Age: 74
End: 2024-09-05
Payer: MEDICARE

## 2024-09-05 VITALS
DIASTOLIC BLOOD PRESSURE: 58 MMHG | SYSTOLIC BLOOD PRESSURE: 122 MMHG | TEMPERATURE: 97.3 F | BODY MASS INDEX: 26.33 KG/M2 | WEIGHT: 148.6 LBS | RESPIRATION RATE: 18 BRPM | HEIGHT: 63 IN | HEART RATE: 66 BPM

## 2024-09-05 DIAGNOSIS — L98.9 SKIN LESION: ICD-10-CM

## 2024-09-05 DIAGNOSIS — R73.03 PREDIABETES: ICD-10-CM

## 2024-09-05 DIAGNOSIS — R94.5 ABNORMAL LIVER FUNCTION: ICD-10-CM

## 2024-09-05 DIAGNOSIS — I10 BENIGN ESSENTIAL HTN: Primary | ICD-10-CM

## 2024-09-05 DIAGNOSIS — E55.9 VITAMIN D DEFICIENCY: ICD-10-CM

## 2024-09-05 DIAGNOSIS — E78.2 MIXED HYPERLIPIDEMIA: ICD-10-CM

## 2024-09-05 PROCEDURE — G2211 COMPLEX E/M VISIT ADD ON: HCPCS | Performed by: FAMILY MEDICINE

## 2024-09-05 PROCEDURE — 99214 OFFICE O/P EST MOD 30 MIN: CPT | Performed by: FAMILY MEDICINE

## 2024-09-05 RX ORDER — SIMVASTATIN 40 MG
40 TABLET ORAL
Qty: 90 TABLET | Refills: 3 | Status: SHIPPED | OUTPATIENT
Start: 2024-09-05 | End: 2025-09-05

## 2024-09-05 RX ORDER — AMLODIPINE AND VALSARTAN 5; 160 MG/1; MG/1
1 TABLET ORAL DAILY
Qty: 90 TABLET | Refills: 3 | Status: SHIPPED | OUTPATIENT
Start: 2024-09-05 | End: 2025-09-05

## 2024-09-05 NOTE — PROGRESS NOTES
Assessment/Plan:    1. Benign essential HTN  Assessment & Plan:  stable  Orders:  -     CBC; Future  -     CBC  -     amLODIPine-valsartan (EXFORGE) 5-160 MG per tablet; Take 1 tablet by mouth daily  2. Prediabetes  -     CBC; Future  -     Hemoglobin A1C; Future; Expected date: 02/17/2025  -     CBC  -     Hemoglobin A1C  3. Abnormal liver function  -     CBC; Future  -     CBC  4. Vitamin D deficiency  -     CBC; Future  -     Vitamin D 25 hydroxy; Future  -     CBC  -     Vitamin D 25 hydroxy  5. Mixed hyperlipidemia  -     CBC; Future  -     Comprehensive metabolic panel; Future; Expected date: 02/17/2025  -     Lipid Panel with Direct LDL reflex; Future; Expected date: 02/17/2025  -     CBC  -     Comprehensive metabolic panel  -     Lipid Panel with Direct LDL reflex  -     simvastatin (ZOCOR) 40 mg tablet; Take 1 tablet (40 mg total) by mouth daily at bedtime  6. Skin lesion  -     Ambulatory referral to Dermatology; Future          There are no Patient Instructions on file for this visit.    Return in about 6 months (around 3/5/2025).    Subjective:      Patient ID: Patricia Cabrera is a 74 y.o. female.    Chief Complaint   Patient presents with   • Follow-up     Kathya Shaw CMA        Pt is here for a 6 mionth follow up    Pt last visit with OBGYN was when she was 66 or so.  She was ok.    Pt wants to see a derm - for a lesion on her face and a full body check        The following portions of the patient's history were reviewed and updated as appropriate: allergies, current medications, past family history, past medical history, past social history, past surgical history and problem list.    Review of Systems   Constitutional: Negative.  Negative for activity change, appetite change, chills, diaphoresis and fatigue.   HENT: Negative.  Negative for dental problem, ear pain, sinus pressure and sore throat.    Eyes: Negative.  Negative for photophobia, pain, discharge, redness, itching and visual disturbance.  "  Respiratory:  Negative for apnea and chest tightness.    Cardiovascular: Negative.  Negative for chest pain, palpitations and leg swelling.   Gastrointestinal: Negative.  Negative for abdominal distention, abdominal pain, constipation and diarrhea.   Endocrine: Negative.  Negative for cold intolerance and heat intolerance.   Genitourinary: Negative.  Negative for difficulty urinating and dyspareunia.   Musculoskeletal: Negative.  Negative for arthralgias and back pain.   Skin: Negative.    Allergic/Immunologic: Negative for environmental allergies.   Neurological: Negative.  Negative for dizziness.   Psychiatric/Behavioral: Negative.  Negative for agitation.          Current Outpatient Medications   Medication Sig Dispense Refill   • amLODIPine-valsartan (EXFORGE) 5-160 MG per tablet Take 1 tablet by mouth daily 90 tablet 3   • simvastatin (ZOCOR) 40 mg tablet Take 1 tablet (40 mg total) by mouth daily at bedtime 90 tablet 3     No current facility-administered medications for this visit.       Objective:    /58   Pulse 66   Temp (!) 97.3 °F (36.3 °C)   Resp 18   Ht 5' 3\" (1.6 m)   Wt 67.4 kg (148 lb 9.6 oz)   BMI 26.32 kg/m²        Physical Exam  Vitals and nursing note reviewed.   Constitutional:       General: She is not in acute distress.     Appearance: She is well-developed. She is not diaphoretic.   HENT:      Head: Normocephalic and atraumatic.      Right Ear: External ear normal.      Left Ear: External ear normal.      Nose: Nose normal.      Mouth/Throat:      Pharynx: No oropharyngeal exudate.   Eyes:      General: No scleral icterus.        Right eye: No discharge.         Left eye: No discharge.      Pupils: Pupils are equal, round, and reactive to light.   Neck:      Thyroid: No thyromegaly.   Cardiovascular:      Rate and Rhythm: Normal rate.      Heart sounds: Normal heart sounds. No murmur heard.  Pulmonary:      Effort: Pulmonary effort is normal. No respiratory distress.      Breath " sounds: Normal breath sounds. No wheezing.   Abdominal:      General: Bowel sounds are normal. There is no distension.      Palpations: Abdomen is soft. There is no mass.      Tenderness: There is no abdominal tenderness. There is no guarding or rebound.   Musculoskeletal:         General: Normal range of motion.   Skin:     General: Skin is warm and dry.      Findings: No erythema or rash.   Neurological:      Mental Status: She is alert.      Coordination: Coordination normal.      Deep Tendon Reflexes: Reflexes normal.   Psychiatric:         Behavior: Behavior normal.                Frank Lombardi, DO

## 2024-11-15 ENCOUNTER — OFFICE VISIT (OUTPATIENT)
Dept: SURGICAL ONCOLOGY | Facility: CLINIC | Age: 74
End: 2024-11-15
Payer: MEDICARE

## 2024-11-15 VITALS
DIASTOLIC BLOOD PRESSURE: 64 MMHG | BODY MASS INDEX: 26.93 KG/M2 | HEIGHT: 63 IN | WEIGHT: 152 LBS | OXYGEN SATURATION: 97 % | SYSTOLIC BLOOD PRESSURE: 120 MMHG | TEMPERATURE: 97.4 F | HEART RATE: 67 BPM

## 2024-11-15 DIAGNOSIS — R92.333 HETEROGENEOUSLY DENSE TISSUE OF BOTH BREASTS ON MAMMOGRAPHY: ICD-10-CM

## 2024-11-15 DIAGNOSIS — Z85.3 ENCOUNTER FOR FOLLOW-UP SURVEILLANCE OF BREAST CANCER: Primary | ICD-10-CM

## 2024-11-15 DIAGNOSIS — Z12.31 VISIT FOR SCREENING MAMMOGRAM: ICD-10-CM

## 2024-11-15 DIAGNOSIS — Z85.3 HISTORY OF CANCER OF LEFT BREAST: ICD-10-CM

## 2024-11-15 DIAGNOSIS — Z08 ENCOUNTER FOR FOLLOW-UP SURVEILLANCE OF BREAST CANCER: Primary | ICD-10-CM

## 2024-11-15 DIAGNOSIS — Z12.39 BREAST CANCER SCREENING OTHER THAN MAMMOGRAM: ICD-10-CM

## 2024-11-15 PROCEDURE — G2211 COMPLEX E/M VISIT ADD ON: HCPCS

## 2024-11-15 PROCEDURE — 99213 OFFICE O/P EST LOW 20 MIN: CPT

## 2024-11-15 NOTE — ASSESSMENT & PLAN NOTE
The patient is doing well, and there is no evidence of disease recurrence at this time.  At her request, we will perform yearly ABUS in addition to screening mammograms, and I will see her back in 1 year for another clinical exam.

## 2024-11-15 NOTE — PROGRESS NOTES
Surgical Oncology Follow Up       [facility] [place]  [address]  [place] [address]    Patricia Cabrera  1950  [id number]  [facility] [place]  [address]  [place] [address]    1. Encounter for follow-up surveillance of breast cancer  2. History of cancer of left breast  Assessment & Plan:  The patient is doing well, and there is no evidence of disease recurrence at this time.  At her request, we will perform yearly ABUS in addition to screening mammograms, and I will see her back in 1 year for another clinical exam.  3. Heterogeneously dense tissue of both breasts on mammography  -     US breast screening bilateral complete (ABUS); Future; Expected date: 01/25/2025  4. Breast cancer screening other than mammogram  -     US breast screening bilateral complete (ABUS); Future; Expected date: 01/25/2025  5. Visit for screening mammogram  -     Mammo screening bilateral w 3d and cad; Future; Expected date: 01/25/2025         Chief Complaint   Patient presents with   • Follow-up       Return in about 1 year (around 11/15/2025) for Office Visit, Imaging - See orders.      Oncology History   History of cancer of left breast   11/30/2017 Biopsy    Left breast 11:00 4 cmfn:  Invasive ductal carcinoma  Grade 2  ER 90  NV 90  Her2 Neg by IHC     1/12/2018 Surgery    Left needle localization lumpectomy with sentinel lymph node biopsy  Grade 3  T1c N0 Grade 3   Repeat HER 2 indicated and was Negative  ER 90  NV 90  Her 2 negative     1/12/2018 -  Radiation    IORT left breast to a dose of 2000cGy     2/2/2018 Genomic Testing    Mammaprint high risk       2/22/2018 - 4/26/2018 Chemotherapy    Cyclophosphamide and docetaxel (TC) x four doses.  Dr. Davis     5/29/2018 - 7/2/2018 Radiation    Treatments:  Course: C1    Plan ID Energy Fractions Dose per Fraction (cGy) Dose Correction  (cGy) Total Dose Delivered (cGy) Elapsed Days   BH L Breast 6X 25 / 25 200 0 5,000 34      Treatment Dates:  5/29/2018 - 7/2/2018.      5/2018 - 5/2023 Hormone Therapy    anastrozole           History of Present Illness: This is a 73 y/o female who returns to the office today in follow-up for her history of left breast cancer.  She is currently TE at just under 7 years and doing very well.  She denies any new breast lumps, skin changes or pain.  She is not experiencing any new bone pain, weight loss, abdominal pain, headaches or dizziness.  She is due for screening mammogram in January, and states she would like to consider supplemental imaging for her dense breast tissue.      Review of Systems   Constitutional:  Negative for activity change, appetite change, fatigue and unexpected weight change.   HENT: Negative.     Respiratory: Negative.  Negative for cough.    Cardiovascular: Negative.    Gastrointestinal: Negative.  Negative for abdominal pain.   Musculoskeletal: Negative.    Skin: Negative.  Negative for color change and wound.   Neurological: Negative.  Negative for dizziness and headaches.   Hematological: Negative.  Negative for adenopathy.   Psychiatric/Behavioral: Negative.               Patient Active Problem List   Diagnosis   • Anxiety   • Benign essential HTN   • Mixed hyperlipidemia   • Leiomyoma of uterus   • History of cancer of left breast   • Osteopenia of multiple sites   • Vitamin D deficiency   • Prediabetes   • Abnormal mammogram   • Cervical polyp   • Hemorrhoids   • Menopause   • Overweight (BMI 25.0-29.9)   • Family hx of colon cancer   • Abnormal liver function   • Former smoker   • Encounter for follow-up surveillance of breast cancer     Past Medical History:   Diagnosis Date   • Abnormal blood chemistry    • Abnormal glucose    • Breast cancer (HCC) 2018    left   • Cancer (HCC)     left breast   • Cervical polyp    • Fracture of ankle    • Hemorrhoid    • Herpes zoster    • History  of chemotherapy    • History of radiation therapy    • Hyperglycemia    • Hyperlipidemia    • Hypertension    • Insomnia    • Leiomyoma of uterus    • Osteopenia    • Seborrheic keratosis    • Shingles    • Spider bite wound      Past Surgical History:   Procedure Laterality Date   • ANKLE SURGERY Right    • BREAST BIOPSY Left    • BREAST CYST ASPIRATION Right 1992   • BREAST LUMPECTOMY Left 01/01/2018   • CYST REMOVAL      right eyelid   • DILATION AND CURETTAGE OF UTERUS     • FRACTURE SURGERY      ankle plates and screws   • INTRAOPERATIVE RADIATION THERAPY (IORT) Left 01/12/2018    Procedure: BREAST IORT BY DR PATEL;  Surgeon: Pardeep Madison MD;  Location: [facility];  Service: Surgical Oncology   • MAMMO NEEDLE LOCALIZATION LEFT (ALL INC) Left 01/12/2018   • CO BX/EXC LYMPH NODE OPEN SUPERFICIAL Left 01/12/2018    Procedure: LYMPHOSCINTIGRAPHY; INTRAOPERATIVE LYMPHATIC MAPPING; SENTINEL LYMPH NODE BIOPSY (INJECT AT 1215);  Surgeon: Pardeep Madison MD;  Location[facility];  Service: Surgical Oncology   • CO COLONOSCOPY FLX DX W/COLLJ SPEC WHEN PFRMD N/A 09/14/2018    Procedure: COLONOSCOPY;  Surgeon: Wilmer Rice MD;  Location: [facility];  Service: Gastroenterology   • CO PERQ DEVICE PLACEMENT BREAST LOC 1ST LES W/GDNCE Left 01/12/2018    Procedure: BREAST LUMPECTOMY; BREAST NEEDLE LOCALIZATION (NEEDLE LOC AT 1130); FROZEN SECTION;  Surgeon: Pardeep Madison MD;  [facility];  Service: Surgical Oncology   • SENTINEL LYMPH NODE BIOPSY Left    • US GUIDED BREAST BIOPSY LEFT COMPLETE Left 11/30/2017     Family History   Problem Relation Age of Onset   • Colon cancer Paternal Grandfather    • Breast cancer Maternal Aunt 30   • Ovarian cancer Maternal Aunt    • Prostate cancer Maternal Uncle    • Hypertension Mother    • Heart disease Mother    • Thyroid disease Mother    • Leukemia Father    • Other Father         dyschromia   • Emphysema Maternal Grandfather    • Heart disease Paternal Grandmother    • Colon  cancer Paternal Aunt    • Breast cancer Maternal Aunt 40   • Colon cancer Paternal Uncle    • Mental illness Neg Hx      Social History     Socioeconomic History   • Marital status: /Civil Union     Spouse name: Not on file   • Number of children: Not on file   • Years of education: Not on file   • Highest education level: Not on file   Occupational History   • Occupation:     Tobacco Use   • Smoking status: Former     Current packs/day: 0.00     Average packs/day: 0.3 packs/day for 30.2 years (7.5 ttl pk-yrs)     Types: Cigarettes     Start date:      Quit date: 1998     Years since quittin.7     Passive exposure: Never   • Smokeless tobacco: Never   • Tobacco comments:      quit.   Vaping Use   • Vaping status: Never Used   Substance and Sexual Activity   • Alcohol use: Yes     Alcohol/week: 7.0 standard drinks of alcohol     Types: 4 Glasses of wine, 3 Cans of beer per week     Comment: social   • Drug use: Never   • Sexual activity: Not on file   Other Topics Concern   • Not on file   Social History Narrative   • Not on file     Social Drivers of Health     Financial Resource Strain: Low Risk  (2024)    Overall Financial Resource Strain (CARDIA)    • Difficulty of Paying Living Expenses: Not hard at all   Food Insecurity: Not on file   Transportation Needs: No Transportation Needs (2024)    PRAPARE - Transportation    • Lack of Transportation (Medical): No    • Lack of Transportation (Non-Medical): No   Physical Activity: Not on file   Stress: Not on file   Social Connections: Not on file   Intimate Partner Violence: Not on file   Housing Stability: Not on file       Current Outpatient Medications:   •  amLODIPine-valsartan (EXFORGE) 5-160 MG per tablet, Take 1 tablet by mouth daily, Disp: 90 tablet, Rfl: 3  •  simvastatin (ZOCOR) 40 mg tablet, Take 1 tablet (40 mg total) by mouth daily at bedtime, Disp: 90 tablet, Rfl: 3  Allergies   Allergen Reactions   • Adhesive  [Medical Tape] Rash     Vitals:    11/15/24 0827   BP: 120/64   Pulse: 67   Temp: (!) 97.4 °F (36.3 °C)   SpO2: 97%       Physical Exam  Vitals reviewed.   Constitutional:       General: She is not in acute distress.     Appearance: Normal appearance. She is normal weight. She is not ill-appearing or toxic-appearing.   HENT:      Head: Normocephalic and atraumatic.      Nose: Nose normal.      Mouth/Throat:      Mouth: Mucous membranes are moist.   Eyes:      General: No scleral icterus.  Cardiovascular:      Rate and Rhythm: Normal rate.   Pulmonary:      Effort: Pulmonary effort is normal.   Musculoskeletal:         General: No swelling or tenderness. Normal range of motion.      Cervical back: Normal range of motion and neck supple.   Lymphadenopathy:      Cervical: No cervical adenopathy.   Skin:     General: Skin is warm and dry.      Coloration: Skin is not jaundiced.      Findings: No erythema.   Neurological:      General: No focal deficit present.      Mental Status: She is alert and oriented to person, place, and time.   Psychiatric:         Mood and Affect: Mood normal.         Behavior: Behavior normal.         Thought Content: Thought content normal.         Judgment: Judgment normal.

## 2024-11-15 NOTE — PROGRESS NOTES
Name: Patricia Cabrera      : 1950      MRN: [id number]  Encounter Provider: JENNIFER Keen  Encounter Date: 11/15/2024   Encounter department: [facility] [place]       Cancer Staging   History of cancer of left breast  Staging form: Breast, AJCC 8th Edition  - Pathologic stage from 2018: Stage IA (pT1c, pN0(sn), cM0, G3, ER: Positive, NV: Positive, HER2: Negative) - Signed by Pardeep Madison MD on 2018  :  Assessment & Plan  Encounter for follow-up surveillance of breast cancer         History of cancer of left breast  The patient is doing well, and there is no evidence of disease recurrence at this time.  At her request, we will perform yearly ABUS in addition to screening mammograms, and I will see her back in 1 year for another clinical exam.         Heterogeneously dense tissue of both breasts on mammography    Orders:    US breast screening bilateral complete (ABUS); Future    Breast cancer screening other than mammogram    Orders:    US breast screening bilateral complete (ABUS); Future    Visit for screening mammogram    Orders:    Mammo screening bilateral w 3d and cad; Future       Advance Care Planning/Advance Directives: Discussed disease status, cancer treatment plans and/or cancer treatment goals with the patient.    History of Present Illness   Chief Complaint   Patient presents with    Follow-up     Return in about 1 year (around 11/15/2025) for Office Visit, Imaging - See orders.  Pertinent Medical History   This is a 73 y/o female who returns to the office today in follow-up for her history of left breast cancer.  She is currently TE at just under 7 years and doing very well.  She denies any new breast lumps, skin changes or pain.  She is not experiencing any new bone pain, weight loss, abdominal pain, headaches or dizziness.  She is due for screening mammogram in January, and states she would like to consider supplemental imaging for her dense  "breast tissue.       Oncology History   Oncology History   History of cancer of left breast   11/30/2017 Biopsy    Left breast 11:00 4 cmfn:  Invasive ductal carcinoma  Grade 2  ER 90  ND 90  Her2 Neg by IHC     1/12/2018 Surgery    Left needle localization lumpectomy with sentinel lymph node biopsy  Grade 3  T1c N0 Grade 3   Repeat HER 2 indicated and was Negative  ER 90  ND 90  Her 2 negative     1/12/2018 -  Radiation    IORT left breast to a dose of 2000cGy     2/2/2018 Genomic Testing    Mammaprint high risk       2/22/2018 - 4/26/2018 Chemotherapy    Cyclophosphamide and docetaxel (TC) x four doses.  Dr. Davis     5/29/2018 - 7/2/2018 Radiation    Treatments:  Course: C1    Plan ID Energy Fractions Dose per Fraction (cGy) Dose Correction (cGy) Total Dose Delivered (cGy) Elapsed Days   BH L Breast 6X 25 / 25 200 0 5,000 34      Treatment Dates:  5/29/2018 - 7/2/2018.      5/2018 - 5/2023 Hormone Therapy    anastrozole        Review of Systems   Constitutional:  Negative for activity change, appetite change, fatigue and unexpected weight change.   HENT: Negative.     Respiratory: Negative.  Negative for cough.    Cardiovascular: Negative.    Gastrointestinal: Negative.  Negative for abdominal pain, nausea and vomiting.   Musculoskeletal: Negative.    Skin: Negative.  Negative for color change and wound.   Neurological: Negative.  Negative for dizziness and headaches.   Hematological: Negative.  Negative for adenopathy.   Psychiatric/Behavioral: Negative.             Objective   /64   Pulse 67   Temp (!) 97.4 °F (36.3 °C)   Ht 5' 3\" (1.6 m)   Wt 68.9 kg (152 lb)   SpO2 97%   BMI 26.93 kg/m²     ECOG    Physical Exam  Vitals reviewed.   Constitutional:       General: She is not in acute distress.     Appearance: Normal appearance. She is normal weight. She is not ill-appearing or toxic-appearing.   HENT:      Head: Normocephalic and atraumatic.      Nose: Nose normal.      Mouth/Throat:      Mouth: " Mucous membranes are moist.   Eyes:      General: No scleral icterus.  Cardiovascular:      Rate and Rhythm: Normal rate.   Pulmonary:      Effort: Pulmonary effort is normal.   Chest:   Breasts:     Right: Normal.      Comments: Right breast is smooth and even.  Moderate stable scarring present in left breast and axilla.  There are no dominant masses, nodules, skin changes or tenderness. I do not appreciate any adenopathy.  Musculoskeletal:         General: No swelling or tenderness. Normal range of motion.      Cervical back: Normal range of motion and neck supple.   Lymphadenopathy:      Cervical: No cervical adenopathy.      Upper Body:      Right upper body: No supraclavicular, axillary or pectoral adenopathy.      Left upper body: No supraclavicular, axillary or pectoral adenopathy.   Skin:     General: Skin is warm and dry.      Coloration: Skin is not jaundiced.      Findings: No erythema.   Neurological:      General: No focal deficit present.      Mental Status: She is alert and oriented to person, place, and time.   Psychiatric:         Mood and Affect: Mood normal.         Behavior: Behavior normal.         Thought Content: Thought content normal.         Judgment: Judgment normal.

## 2025-01-06 ENCOUNTER — DOCUMENTATION (OUTPATIENT)
Dept: ADMINISTRATIVE | Facility: OTHER | Age: 75
End: 2025-01-06

## 2025-01-06 ENCOUNTER — OFFICE VISIT (OUTPATIENT)
Dept: URGENT CARE | Facility: CLINIC | Age: 75
End: 2025-01-06
Payer: MEDICARE

## 2025-01-06 VITALS
SYSTOLIC BLOOD PRESSURE: 154 MMHG | HEIGHT: 64 IN | TEMPERATURE: 98.7 F | BODY MASS INDEX: 26.29 KG/M2 | DIASTOLIC BLOOD PRESSURE: 67 MMHG | HEART RATE: 80 BPM | WEIGHT: 154 LBS | RESPIRATION RATE: 14 BRPM | OXYGEN SATURATION: 98 %

## 2025-01-06 DIAGNOSIS — R05.1 ACUTE COUGH: Primary | ICD-10-CM

## 2025-01-06 LAB
SARS-COV-2 AG UPPER RESP QL IA: NEGATIVE
VALID CONTROL: NORMAL

## 2025-01-06 PROCEDURE — 99213 OFFICE O/P EST LOW 20 MIN: CPT | Performed by: PHYSICIAN ASSISTANT

## 2025-01-06 PROCEDURE — 87811 SARS-COV-2 COVID19 W/OPTIC: CPT | Performed by: PHYSICIAN ASSISTANT

## 2025-01-06 RX ORDER — BENZONATATE 100 MG/1
100 CAPSULE ORAL 3 TIMES DAILY PRN
Qty: 20 CAPSULE | Refills: 0 | Status: SHIPPED | OUTPATIENT
Start: 2025-01-06

## 2025-01-06 NOTE — PATIENT INSTRUCTIONS
"Patient Education     Cough in adults   The Basics   Written by the doctors and editors at [facility]   What is a cough? -- A cough is an important reflex that helps clear out the body's airways. The airways include the windpipe, or \"trachea,\" and the bronchi, which are the tubes that carry air within the lungs. Coughing helps keep people from breathing things into the airways and lungs, which could cause problems (figure 1).  It is normal for people to cough once in a while. But sometimes, a cough is a symptom of an illness or condition.  Some coughs are called \"dry\" coughs, because they don't bring up mucus (phlegm). Other coughs are called \"wet\" or \"productive\" coughs, because they do bring up mucus. Some coughs are mild and don't cause serious problems. Other coughs are severe and can cause trouble breathing.  What causes a cough? -- In adults, common causes of a cough include:   Viral infections - These include the common cold, the flu, and COVID-19. Usually, a cough caused by a viral infection will only last for a week or 2, but sometimes, it can last longer.   Smoking cigarettes or vaping   Postnasal drip - Postnasal drip is when mucus from the nose drips down or flows along the back of the throat. Postnasal drip can happen when people have:   A cold   Allergies   A sinus infection   Lung conditions - Lung problems like asthma and chronic obstructive pulmonary disease (\"COPD\") can make it hard to breathe. COPD is usually caused by smoking.   Acid reflux - Acid reflux is when the acid that is normally in your stomach backs up into your esophagus (the tube that carries food from your mouth to your stomach).   A side effect from blood pressure medicines called \"ACE inhibitors\"  Will I need tests? -- Maybe. If you see a doctor for your cough, they will talk with you and do an exam. Based on your symptoms and other factors, they might decide that you need tests. These might include:   A swab from your inside of your " "nose - This can be tested for the virus that causes COVID-19.   A chest X-ray   Breathing tests - Breathing tests involve breathing hard into a tube. These tests show how your lungs are working.   Allergy skin tests to find out what you're allergic to - For a skin test, the doctor puts a drop of the substance that you might be allergic to on your skin and makes a tiny prick in your skin. Then, they watch your skin to see if it gets red and bumpy.   A CT scan of your chest or sinuses - A CT scan is an imaging test that creates pictures of the inside of the body.   Lab tests on a sample of the mucus that you cough up   Using a \"scope\" to look inside of your nose, sinuses, airway, or lungs   Tests to check for acid reflux - These usually involve having a thin tube put in your mouth and down into your esophagus.  How can I care for myself at home?    If your cough is from a cold, you can use a cool mist humidifier in your bedroom.   Suck on cough drops or hard candy.   Drink warm liquids, like tea, to soothe your throat.   Avoid smoking and places where other people are smoking.   If you have allergies, avoid the things that you are allergic to. This might include pollen, dust, animals, or mold.   If you are coughing up mucus, try an over-the-counter cold and cough medicine. These medicines can thin mucus and sometimes reduce the urge to cough.   If you have acid reflux, your doctor or nurse will talk to you about how to reduce symptoms.  How is a cough treated? -- Treatment depends on the cause of your cough. For example:   Some infections are treated with antibiotic medicines. If an infection is caused by bacteria, doctors can treat it with antibiotics. If the infection is caused by a virus (such as the common cold), antibiotics will not help. For some viral infections, like the flu or COVID-19, there might be other medicines that can help.   Postnasal drip is treated with different kinds of medicines that can come as " "a pill or nose spray.   Asthma and COPD are usually treated with medicines that people breathe into their lungs. These are called \"inhaler medicines.\"   Acid reflux can be treated with medicine to reduce or block stomach acid.   If you have a cough as a side effect from an ACE inhibitor, your doctor can switch your medicine.  If the cause of your cough is not clear, your doctor might prescribe medicine to make your cough less severe. But these medicines have side effects, and doctors usually recommend them only if nothing else has worked.  When should I call the doctor? -- Call your doctor or nurse if:   You have trouble breathing or noisy breathing (wheezing).   You have a fever or chest pain.   You cough up blood, or yellow or green mucus.   You cough so hard that it makes you throw up.   Your cough gets worse or lasts longer than 14 days.   You have a cough and have lost weight without trying to.  All topics are updated as new evidence becomes available and our peer review process is complete.  This topic retrieved from [employer] on: Feb 26, 2024.  Topic 39690 Version 16.0  Release: 32.2.4 - C32.56  © 2024 UpToDate, Inc. and/or its affiliates. All rights reserved.  figure 1: Normal lungs     The lungs sit in the chest, inside the ribcage. They are covered with a thin membrane called the \"pleura.\" The windpipe, or trachea, branches into 2 smaller airways called the left and right \"bronchi.\" The space between the lungs is called the \"mediastinum.\" Lymph nodes are located within and around the lungs and mediastinum.  Graphic 46361 Version 14.0  Consumer Information Use and Disclaimer   Disclaimer: This generalized information is a limited summary of diagnosis, treatment, and/or medication information. It is not meant to be comprehensive and should be used as a tool to help the user understand and/or assess potential diagnostic and treatment options. It does NOT include all information about conditions, treatments, " medications, side effects, or risks that may apply to a specific patient. It is not intended to be medical advice or a substitute for the medical advice, diagnosis, or treatment of a health care provider based on the health care provider's examination and assessment of a patient's specific and unique circumstances. Patients must speak with a health care provider for complete information about their health, medical questions, and treatment options, including any risks or benefits regarding use of medications. This information does not endorse any treatments or medications as safe, effective, or approved for treating a specific patient. UpToDate, Inc. and its affiliates disclaim any warranty or liability relating to this information or the use thereof.The use of this information is governed by the Terms of Use, available at https://www.[employer] [facility] [employer] [facility] Services[facility].com/en/know/clinical-effectiveness-terms. 2024© UpToDate, Inc. and its affiliates and/or licensors. All rights reserved.  Copyright   © 2024 UpToDate, Inc. and/or its affiliates. All rights reserved.

## 2025-01-06 NOTE — PROGRESS NOTES
Zainab Lance PA-C  P Patient Reported Team         Blood pressure elevated  Appointment department: [facility]  Appointment provider: Zainab Lance PA-C  Blood pressure  01/06/25 1051 154/67  01/06/25 1019 154/67    01/08/25 3:26 PM    Patient was called after the Urgent Care visit . Home BP reading(s) was/were has cuff has not taken . Patient has no symptoms.     Thank you.  Simone Ayoub MA  [facility]

## 2025-01-06 NOTE — PROGRESS NOTES
"  [facility]        NAME: Patricia Cabrera is a 74 y.o. female  : 1950    MRN: [id number]  DATE: 2025  TIME: 12:28 PM    Assessment and Plan   Acute cough [R05.1]  1. Acute cough  Poct Covid 19 Rapid Antigen Test    benzonatate (TESSALON PERLES) 100 mg capsule            Patient Instructions     Patient Instructions   Patient Education     Cough in adults   The Basics   Written by the doctors and editors at [facility]   What is a cough? -- A cough is an important reflex that helps clear out the body's airways. The airways include the windpipe, or \"trachea,\" and the bronchi, which are the tubes that carry air within the lungs. Coughing helps keep people from breathing things into the airways and lungs, which could cause problems (figure 1).  It is normal for people to cough once in a while. But sometimes, a cough is a symptom of an illness or condition.  Some coughs are called \"dry\" coughs, because they don't bring up mucus (phlegm). Other coughs are called \"wet\" or \"productive\" coughs, because they do bring up mucus. Some coughs are mild and don't cause serious problems. Other coughs are severe and can cause trouble breathing.  What causes a cough? -- In adults, common causes of a cough include:   Viral infections - These include the common cold, the flu, and COVID-19. Usually, a cough caused by a viral infection will only last for a week or 2, but sometimes, it can last longer.   Smoking cigarettes or vaping   Postnasal drip - Postnasal drip is when mucus from the nose drips down or flows along the back of the throat. Postnasal drip can happen when people have:   A cold   Allergies   A sinus infection   Lung conditions - Lung problems like asthma and chronic obstructive pulmonary disease (\"COPD\") can make it hard to breathe. COPD is usually caused by smoking.   Acid reflux - Acid reflux is when the acid that is normally in your stomach backs up into your esophagus (the tube that carries food " "from your mouth to your stomach).   A side effect from blood pressure medicines called \"ACE inhibitors\"  Will I need tests? -- Maybe. If you see a doctor for your cough, they will talk with you and do an exam. Based on your symptoms and other factors, they might decide that you need tests. These might include:   A swab from your inside of your nose - This can be tested for the virus that causes COVID-19.   A chest X-ray   Breathing tests - Breathing tests involve breathing hard into a tube. These tests show how your lungs are working.   Allergy skin tests to find out what you're allergic to - For a skin test, the doctor puts a drop of the substance that you might be allergic to on your skin and makes a tiny prick in your skin. Then, they watch your skin to see if it gets red and bumpy.   A CT scan of your chest or sinuses - A CT scan is an imaging test that creates pictures of the inside of the body.   Lab tests on a sample of the mucus that you cough up   Using a \"scope\" to look inside of your nose, sinuses, airway, or lungs   Tests to check for acid reflux - These usually involve having a thin tube put in your mouth and down into your esophagus.  How can I care for myself at home?    If your cough is from a cold, you can use a cool mist humidifier in your bedroom.   Suck on cough drops or hard candy.   Drink warm liquids, like tea, to soothe your throat.   Avoid smoking and places where other people are smoking.   If you have allergies, avoid the things that you are allergic to. This might include pollen, dust, animals, or mold.   If you are coughing up mucus, try an over-the-counter cold and cough medicine. These medicines can thin mucus and sometimes reduce the urge to cough.   If you have acid reflux, your doctor or nurse will talk to you about how to reduce symptoms.  How is a cough treated? -- Treatment depends on the cause of your cough. For example:   Some infections are treated with antibiotic medicines. " "If an infection is caused by bacteria, doctors can treat it with antibiotics. If the infection is caused by a virus (such as the common cold), antibiotics will not help. For some viral infections, like the flu or COVID-19, there might be other medicines that can help.   Postnasal drip is treated with different kinds of medicines that can come as a pill or nose spray.   Asthma and COPD are usually treated with medicines that people breathe into their lungs. These are called \"inhaler medicines.\"   Acid reflux can be treated with medicine to reduce or block stomach acid.   If you have a cough as a side effect from an ACE inhibitor, your doctor can switch your medicine.  If the cause of your cough is not clear, your doctor might prescribe medicine to make your cough less severe. But these medicines have side effects, and doctors usually recommend them only if nothing else has worked.  When should I call the doctor? -- Call your doctor or nurse if:   You have trouble breathing or noisy breathing (wheezing).   You have a fever or chest pain.   You cough up blood, or yellow or green mucus.   You cough so hard that it makes you throw up.   Your cough gets worse or lasts longer than 14 days.   You have a cough and have lost weight without trying to.  All topics are updated as new evidence becomes available and our peer review process is complete.  This topic retrieved from [employer] on: Feb 26, 2024.  Topic 32963 Version 16.0  Release: 32.2.4 - C32.56  © 2024 [employer] Inc[employer] and/or its affiliates. All rights reserved.  figure 1: Normal lungs     The lungs sit in the chest, inside the ribcage. They are covered with a thin membrane called the \"pleura.\" The windpipe, or trachea, branches into 2 smaller airways called the left and right \"bronchi.\" The space between the lungs is called the \"mediastinum.\" Lymph nodes are located within and around the lungs and mediastinum.  Graphic 37623 Version 14.0  Consumer Information Use and " Disclaimer   Disclaimer: This generalized information is a limited summary of diagnosis, treatment, and/or medication information. It is not meant to be comprehensive and should be used as a tool to help the user understand and/or assess potential diagnostic and treatment options. It does NOT include all information about conditions, treatments, medications, side effects, or risks that may apply to a specific patient. It is not intended to be medical advice or a substitute for the medical advice, diagnosis, or treatment of a health care provider based on the health care provider's examination and assessment of a patient's specific and unique circumstances. Patients must speak with a health care provider for complete information about their health, medical questions, and treatment options, including any risks or benefits regarding use of medications. This information does not endorse any treatments or medications as safe, effective, or approved for treating a specific patient. UpToDate, Inc. and its affiliates disclaim any warranty or liability relating to this information or the use thereof.The use of this information is governed by the Terms of Use, available at https://www.[employer].com/en/know/clinical-effectiveness-terms. 2024© UpToDate, Inc. and its affiliates and/or licensors. All rights reserved.  Copyright   © 2024 Up[employer]Date, Inc. and/or its affiliates. All rights reserved.        Follow up with PCP in 3-5 days.  Proceed to  ER if symptoms worsen.    Chief Complaint     Chief Complaint   Patient presents with    Cold Like Symptoms     URi s/s with cough and chest congestion.          History of Present Illness       HPI    Review of Systems   Review of Systems   Constitutional:  Negative for activity change, appetite change, chills, fatigue and fever.   HENT:  Positive for sore throat. Negative for congestion, ear pain, rhinorrhea, sinus pressure and sinus pain.    Eyes:  Negative for pain and visual  disturbance.   Respiratory:  Positive for cough. Negative for chest tightness and shortness of breath.    Cardiovascular:  Negative for chest pain and palpitations.   Gastrointestinal:  Negative for abdominal pain, diarrhea, nausea and vomiting.   Genitourinary:  Negative for dysuria and hematuria.   Musculoskeletal:  Negative for arthralgias, back pain and myalgias.   Skin:  Negative for color change, pallor and rash.   Neurological:  Negative for seizures, syncope and headaches.   All other systems reviewed and are negative.        Current Medications       Current Outpatient Medications:     benzonatate (TESSALON PERLES) 100 mg capsule, Take 1 capsule (100 mg total) by mouth 3 (three) times a day as needed for cough, Disp: 20 capsule, Rfl: 0    amLODIPine-valsartan (EXFORGE) 5-160 MG per tablet, Take 1 tablet by mouth daily, Disp: 90 tablet, Rfl: 3    simvastatin (ZOCOR) 40 mg tablet, Take 1 tablet (40 mg total) by mouth daily at bedtime, Disp: 90 tablet, Rfl: 3    Current Allergies     Allergies as of 01/06/2025 - Reviewed 01/06/2025   Allergen Reaction Noted    Adhesive [medical tape] Rash 09/12/2018            The following portions of the patient's history were reviewed and updated as appropriate: allergies, current medications, past family history, past medical history, past social history, past surgical history and problem list.     Past Medical History:   Diagnosis Date    Abnormal blood chemistry     Abnormal glucose     Breast cancer (HCC) 2018    left    Cancer (HCC)     left breast    Cervical polyp     Fracture of ankle     Hemorrhoid     Herpes zoster     History of chemotherapy     History of radiation therapy     Hyperglycemia     Hyperlipidemia     Hypertension     Insomnia     Leiomyoma of uterus     Osteopenia     Seborrheic keratosis     Shingles     Spider bite wound        Past Surgical History:   Procedure Laterality Date    ANKLE SURGERY Right     BREAST BIOPSY Left     BREAST CYST  "ASPIRATION Right 1992    BREAST LUMPECTOMY Left 01/01/2018    CYST REMOVAL      right eyelid    DILATION AND CURETTAGE OF UTERUS      FRACTURE SURGERY      ankle plates and screws    INTRAOPERATIVE RADIATION THERAPY (IORT) Left 01/12/2018    Procedure: BREAST IORT BY DR PATEL;  Surgeon: Pardeep Madison MD;  Location: [facility];  Service: Surgical Oncology    MAMMO NEEDLE LOCALIZATION LEFT (ALL INC) Left 01/12/2018    NH BX/EXC LYMPH NODE OPEN SUPERFICIAL Left 01/12/2018    Procedure: LYMPHOSCINTIGRAPHY; INTRAOPERATIVE LYMPHATIC MAPPING; SENTINEL LYMPH NODE BIOPSY (INJECT AT 1215);  Surgeon: Pardeep Madison MD;  Location: [facility];  Service: Surgical Oncology    NH COLONOSCOPY FLX DX W/COLLJ SPEC WHEN PFRMD N/A 09/14/2018    Procedure: COLONOSCOPY;  Surgeon: Wilmer Rice MD;  Location: [facility];  Service: Gastroenterology    NH PERQ DEVICE PLACEMENT BREAST LOC 1ST LES W/GDNCE Left 01/12/2018    Procedure: BREAST LUMPECTOMY; BREAST NEEDLE LOCALIZATION (NEEDLE LOC AT 1130); FROZEN SECTION;  Surgeon: Pardeep Madison MD;  Location: [facility];  Service: Surgical Oncology    SENTINEL LYMPH NODE BIOPSY Left     US GUIDED BREAST BIOPSY LEFT COMPLETE Left 11/30/2017       Family History   Problem Relation Age of Onset    Colon cancer Paternal Grandfather     Breast cancer Maternal Aunt 30    Ovarian cancer Maternal Aunt     Prostate cancer Maternal Uncle     Hypertension Mother     Heart disease Mother     Thyroid disease Mother     Leukemia Father     Other Father         dyschromia    Emphysema Maternal Grandfather     Heart disease Paternal Grandmother     Colon cancer Paternal Aunt     Breast cancer Maternal Aunt 40    Colon cancer Paternal Uncle     Mental illness Neg Hx          Medications have been verified.        Objective   /67   Pulse 80   Temp 98.7 °F (37.1 °C)   Resp 14   Ht 5' 4\" (1.626 m)   Wt 69.9 kg (154 lb)   SpO2 98%   BMI 26.43 kg/m²        Physical Exam     Physical Exam  Vitals reviewed. "   Constitutional:       General: She is not in acute distress.     Appearance: Normal appearance. She is well-developed and normal weight. She is not ill-appearing, toxic-appearing or diaphoretic.   HENT:      Head: Normocephalic and atraumatic.      Right Ear: Tympanic membrane and ear canal normal. No drainage, swelling or tenderness. No middle ear effusion. Tympanic membrane is not erythematous.      Left Ear: Tympanic membrane and ear canal normal. No drainage, swelling or tenderness.  No middle ear effusion. Tympanic membrane is not erythematous.      Nose: No congestion or rhinorrhea.      Mouth/Throat:      Mouth: Mucous membranes are moist. No oral lesions.      Pharynx: Oropharynx is clear. Uvula midline. No pharyngeal swelling, oropharyngeal exudate, posterior oropharyngeal erythema or uvula swelling.      Tonsils: No tonsillar exudate or tonsillar abscesses. 0 on the right. 0 on the left.   Eyes:      Extraocular Movements:      Right eye: Normal extraocular motion.      Left eye: Normal extraocular motion.      Conjunctiva/sclera: Conjunctivae normal.      Pupils: Pupils are equal, round, and reactive to light.   Cardiovascular:      Rate and Rhythm: Normal rate and regular rhythm.      Heart sounds: Normal heart sounds. No murmur heard.     No friction rub. No gallop.   Pulmonary:      Effort: Pulmonary effort is normal. No respiratory distress.      Breath sounds: Normal breath sounds. No stridor. No wheezing, rhonchi or rales.   Chest:      Chest wall: No tenderness.   Skin:     General: Skin is warm and dry.      Capillary Refill: Capillary refill takes less than 2 seconds.   Neurological:      Mental Status: She is alert.

## 2025-02-21 ENCOUNTER — OFFICE VISIT (OUTPATIENT)
Dept: FAMILY MEDICINE CLINIC | Facility: CLINIC | Age: 75
End: 2025-02-21
Payer: MEDICARE

## 2025-02-21 VITALS
HEART RATE: 68 BPM | WEIGHT: 152.4 LBS | TEMPERATURE: 98.8 F | HEIGHT: 64 IN | DIASTOLIC BLOOD PRESSURE: 72 MMHG | BODY MASS INDEX: 26.02 KG/M2 | SYSTOLIC BLOOD PRESSURE: 144 MMHG

## 2025-02-21 DIAGNOSIS — I10 BENIGN ESSENTIAL HTN: ICD-10-CM

## 2025-02-21 DIAGNOSIS — J01.90 ACUTE BACTERIAL SINUSITIS: Primary | ICD-10-CM

## 2025-02-21 DIAGNOSIS — B96.89 ACUTE BACTERIAL SINUSITIS: Primary | ICD-10-CM

## 2025-02-21 PROCEDURE — 99213 OFFICE O/P EST LOW 20 MIN: CPT | Performed by: FAMILY MEDICINE

## 2025-02-21 PROCEDURE — G2211 COMPLEX E/M VISIT ADD ON: HCPCS | Performed by: FAMILY MEDICINE

## 2025-02-21 RX ORDER — METHYLPREDNISOLONE 4 MG/1
TABLET ORAL
Qty: 21 EACH | Refills: 0 | Status: SHIPPED | OUTPATIENT
Start: 2025-02-21

## 2025-02-21 NOTE — PROGRESS NOTES
"Name: Patricia Cabrera      : 1950      MRN: [id number]  Encounter Provider: Frank Lombardi, DO  Encounter Date: 2025   Encounter department: [facility]  :  Assessment & Plan  Acute bacterial sinusitis    Orders:  •  amoxicillin-clavulanate (AUGMENTIN) 875-125 mg per tablet; Take 1 tablet by mouth every 12 (twelve) hours for 7 days  •  methylPREDNISolone 4 MG tablet therapy pack; Use as directed on package    Benign essential HTN  Elevated but she is sick today              History of Present Illness   Pt is here for a same day appt for URI  Pt states she went to urgent care in  sinus infection runny nose cough and fatigue - was dx with viral ibnfection.  Pt took tessalon - caused her back pain she stopped them it went away  Pt states she is still congested and she still has fatigue        Review of Systems   HENT:  Positive for congestion, postnasal drip and sinus pressure.    Respiratory:  Positive for cough.        Objective   /72   Pulse 68   Temp 98.8 °F (37.1 °C)   Ht 5' 4\" (1.626 m)   Wt 69.1 kg (152 lb 6.4 oz)   BMI 26.16 kg/m²      Physical Exam  HENT:      Nose: Congestion and rhinorrhea present.         "

## 2025-02-26 ENCOUNTER — TELEPHONE (OUTPATIENT)
Age: 75
End: 2025-02-26

## 2025-02-26 DIAGNOSIS — B96.89 ACUTE BACTERIAL SINUSITIS: Primary | ICD-10-CM

## 2025-02-26 DIAGNOSIS — J01.90 ACUTE BACTERIAL SINUSITIS: Primary | ICD-10-CM

## 2025-02-26 RX ORDER — BENZONATATE 200 MG/1
200 CAPSULE ORAL 3 TIMES DAILY PRN
Qty: 30 CAPSULE | Refills: 0 | Status: SHIPPED | OUTPATIENT
Start: 2025-02-26 | End: 2025-03-06

## 2025-02-26 NOTE — TELEPHONE ENCOUNTER
Pt was seen on 2/21. She still has meds left but she said she is still coughing up phlegm. Pt wants to know what to do. Please, advise.

## 2025-03-04 ENCOUNTER — APPOINTMENT (OUTPATIENT)
Dept: LAB | Facility: HOSPITAL | Age: 75
End: 2025-03-04
Attending: FAMILY MEDICINE
Payer: MEDICARE

## 2025-03-04 ENCOUNTER — RESULTS FOLLOW-UP (OUTPATIENT)
Dept: FAMILY MEDICINE CLINIC | Facility: CLINIC | Age: 75
End: 2025-03-04

## 2025-03-06 ENCOUNTER — OFFICE VISIT (OUTPATIENT)
Dept: FAMILY MEDICINE CLINIC | Facility: CLINIC | Age: 75
End: 2025-03-06
Payer: MEDICARE

## 2025-03-06 VITALS
TEMPERATURE: 97.8 F | HEIGHT: 64 IN | RESPIRATION RATE: 16 BRPM | WEIGHT: 153.8 LBS | HEART RATE: 74 BPM | SYSTOLIC BLOOD PRESSURE: 134 MMHG | DIASTOLIC BLOOD PRESSURE: 60 MMHG | BODY MASS INDEX: 26.26 KG/M2

## 2025-03-06 DIAGNOSIS — Z00.00 MEDICARE ANNUAL WELLNESS VISIT, SUBSEQUENT: Primary | ICD-10-CM

## 2025-03-06 DIAGNOSIS — I10 BENIGN ESSENTIAL HTN: ICD-10-CM

## 2025-03-06 DIAGNOSIS — R73.03 PREDIABETES: ICD-10-CM

## 2025-03-06 DIAGNOSIS — E55.9 VITAMIN D DEFICIENCY: ICD-10-CM

## 2025-03-06 DIAGNOSIS — E78.2 MIXED HYPERLIPIDEMIA: ICD-10-CM

## 2025-03-06 PROCEDURE — G2211 COMPLEX E/M VISIT ADD ON: HCPCS | Performed by: FAMILY MEDICINE

## 2025-03-06 PROCEDURE — 99214 OFFICE O/P EST MOD 30 MIN: CPT | Performed by: FAMILY MEDICINE

## 2025-03-06 PROCEDURE — G0439 PPPS, SUBSEQ VISIT: HCPCS | Performed by: FAMILY MEDICINE

## 2025-03-06 NOTE — ASSESSMENT & PLAN NOTE
stable  Orders:  •  CT coronary calcium score; Future  •  CBC; Future  •  Comprehensive metabolic panel; Future  •  Lipid Panel with Direct LDL reflex; Future

## 2025-03-06 NOTE — ASSESSMENT & PLAN NOTE
Limit caerbs  Decrease weight   Increase activity  Orders:  •  CBC; Future  •  Comprehensive metabolic panel; Future  •  Lipid Panel with Direct LDL reflex; Future

## 2025-03-06 NOTE — PATIENT INSTRUCTIONS
Medicare Preventive Visit Patient Instructions  Thank you for completing your Welcome to Medicare Visit or Medicare Annual Wellness Visit today. Your next wellness visit will be due in one year (3/7/2026).  The screening/preventive services that you may require over the next 5-10 years are detailed below. Some tests may not apply to you based off risk factors and/or age. Screening tests ordered at today's visit but not completed yet may show as past due. Also, please note that scanned in results may not display below.  Preventive Screenings:  Service Recommendations Previous Testing/Comments   Colorectal Cancer Screening  * Colonoscopy    * Fecal Occult Blood Test (FOBT)/Fecal Immunochemical Test (FIT)  * Fecal DNA/Cologuard Test  * Flexible Sigmoidoscopy Age: 45-75 years old   Colonoscopy: every 10 years (may be performed more frequently if at higher risk)  OR  FOBT/FIT: every 1 year  OR  Cologuard: every 3 years  OR  Sigmoidoscopy: every 5 years  Screening may be recommended earlier than age 45 if at higher risk for colorectal cancer. Also, an individualized decision between you and your healthcare provider will decide whether screening between the ages of 76-85 would be appropriate. Colonoscopy: 09/14/2018  FOBT/FIT: Not on file  Cologuard: Not on file  Sigmoidoscopy: Not on file          Breast Cancer Screening Age: 40+ years old  Frequency: every 1-2 years  Not required if history of left and right mastectomy Mammogram: 01/24/2024    History Breast Cancer   Cervical Cancer Screening Between the ages of 21-29, pap smear recommended once every 3 years.   Between the ages of 30-65, can perform pap smear with HPV co-testing every 5 years.   Recommendations may differ for women with a history of total hysterectomy, cervical cancer, or abnormal pap smears in past. Pap Smear: Not on file    Screening Not Indicated   Hepatitis C Screening Once for adults born between 1945 and 1965  More frequently in patients at high  risk for Hepatitis C Hep C Antibody: 02/05/2019    Screening Current   Diabetes Screening 1-2 times per year if you're at risk for diabetes or have pre-diabetes Fasting glucose: 106 mg/dL (3/4/2025)  A1C: 6.0 % (3/4/2025)  Screening Current   Cholesterol Screening Once every 5 years if you don't have a lipid disorder. May order more often based on risk factors. Lipid panel: 03/04/2025    Screening Not Indicated  History Lipid Disorder     Other Preventive Screenings Covered by Medicare:  Abdominal Aortic Aneurysm (AAA) Screening: covered once if your at risk. You're considered to be at risk if you have a family history of AAA.  Lung Cancer Screening: covers low dose CT scan once per year if you meet all of the following conditions: (1) Age 55-77; (2) No signs or symptoms of lung cancer; (3) Current smoker or have quit smoking within the last 15 years; (4) You have a tobacco smoking history of at least 20 pack years (packs per day multiplied by number of years you smoked); (5) You get a written order from a healthcare provider.  Glaucoma Screening: covered annually if you're considered high risk: (1) You have diabetes OR (2) Family history of glaucoma OR (3)  aged 50 and older OR (4)  American aged 65 and older  Osteoporosis Screening: covered every 2 years if you meet one of the following conditions: (1) You're estrogen deficient and at risk for osteoporosis based off medical history and other findings; (2) Have a vertebral abnormality; (3) On glucocorticoid therapy for more than 3 months; (4) Have primary hyperparathyroidism; (5) On osteoporosis medications and need to assess response to drug therapy.   Last bone density test (DXA Scan): 11/15/2023.  HIV Screening: covered annually if you're between the age of 15-65. Also covered annually if you are younger than 15 and older than 65 with risk factors for HIV infection. For pregnant patients, it is covered up to 3 times per  pregnancy.    Immunizations:  Immunization Recommendations   Influenza Vaccine Annual influenza vaccination during flu season is recommended for all persons aged >= 6 months who do not have contraindications   Pneumococcal Vaccine   * Pneumococcal conjugate vaccine = PCV13 (Prevnar 13), PCV15 (Vaxneuvance), PCV20 (Prevnar 20)  * Pneumococcal polysaccharide vaccine = PPSV23 (Pneumovax) Adults 19-63 yo with certain risk factors or if 65+ yo  If never received any pneumonia vaccine: recommend Prevnar 20 (PCV20)  Give PCV20 if previously received 1 dose of PCV13 or PPSV23   Hepatitis B Vaccine 3 dose series if at intermediate or high risk (ex: diabetes, end stage renal disease, liver disease)   Respiratory syncytial virus (RSV) Vaccine - COVERED BY MEDICARE PART D  * RSVPreF3 (Arexvy) CDC recommends that adults 60 years of age and older may receive a single dose of RSV vaccine using shared clinical decision-making (SCDM)   Tetanus (Td) Vaccine - COST NOT COVERED BY MEDICARE PART B Following completion of primary series, a booster dose should be given every 10 years to maintain immunity against tetanus. Td may also be given as tetanus wound prophylaxis.   Tdap Vaccine - COST NOT COVERED BY MEDICARE PART B Recommended at least once for all adults. For pregnant patients, recommended with each pregnancy.   Shingles Vaccine (Shingrix) - COST NOT COVERED BY MEDICARE PART B  2 shot series recommended in those 19 years and older who have or will have weakened immune systems or those 50 years and older     Health Maintenance Due:      Topic Date Due   • Cervical Cancer Screening  Never done   • Breast Cancer Screening: Mammogram  01/24/2025   • Hepatitis C Screening  Completed   • Colorectal Cancer Screening  Discontinued     Immunizations Due:      Topic Date Due   • Influenza Vaccine (1) 09/01/2024   • COVID-19 Vaccine (1 - 2024-25 season) Never done     Advance Directives   What are advance directives?  Advance directives are  legal documents that state your wishes and plans for medical care. These plans are made ahead of time in case you lose your ability to make decisions for yourself. Advance directives can apply to any medical decision, such as the treatments you want, and if you want to donate organs.   What are the types of advance directives?  There are many types of advance directives, and each state has rules about how to use them. You may choose a combination of any of the following:  Living will:  This is a written record of the treatment you want. You can also choose which treatments you do not want, which to limit, and which to stop at a certain time. This includes surgery, medicine, IV fluid, and tube feedings.   Durable power of  for healthcare (DPAHC):  This is a written record that states who you want to make healthcare choices for you when you are unable to make them for yourself. This person, called a proxy, is usually a family member or a friend. You may choose more than 1 proxy.  Do not resuscitate (DNR) order:  A DNR order is used in case your heart stops beating or you stop breathing. It is a request not to have certain forms of treatment, such as CPR. A DNR order may be included in other types of advance directives.  Medical directive:  This covers the care that you want if you are in a coma, near death, or unable to make decisions for yourself. You can list the treatments you want for each condition. Treatment may include pain medicine, surgery, blood transfusions, dialysis, IV or tube feedings, and a ventilator (breathing machine).  Values history:  This document has questions about your views, beliefs, and how you feel and think about life. This information can help others choose the care that you would choose.  Why are advance directives important?  An advance directive helps you control your care. Although spoken wishes may be used, it is better to have your wishes written down. Spoken wishes can be  misunderstood, or not followed. Treatments may be given even if you do not want them. An advance directive may make it easier for your family to make difficult choices about your care.   Weight Management   Why it is important to manage your weight:  Being overweight increases your risk of health conditions such as heart disease, high blood pressure, type 2 diabetes, and certain types of cancer. It can also increase your risk for osteoarthritis, sleep apnea, and other respiratory problems. Aim for a slow, steady weight loss. Even a small amount of weight loss can lower your risk of health problems.  How to lose weight safely:  A safe and healthy way to lose weight is to eat fewer calories and get regular exercise. You can lose up about 1 pound a week by decreasing the number of calories you eat by 500 calories each day.   Healthy meal plan for weight management:  A healthy meal plan includes a variety of foods, contains fewer calories, and helps you stay healthy. A healthy meal plan includes the following:  Eat whole-grain foods more often.  A healthy meal plan should contain fiber. Fiber is the part of grains, fruits, and vegetables that is not broken down by your body. Whole-grain foods are healthy and provide extra fiber in your diet. Some examples of whole-grain foods are whole-wheat breads and pastas, oatmeal, brown rice, and bulgur.  Eat a variety of vegetables every day.  Include dark, leafy greens such as spinach, kale, kathe greens, and mustard greens. Eat yellow and orange vegetables such as carrots, sweet potatoes, and winter squash.   Eat a variety of fruits every day.  Choose fresh or canned fruit (canned in its own juice or light syrup) instead of juice. Fruit juice has very little or no fiber.  Eat low-fat dairy foods.  Drink fat-free (skim) milk or 1% milk. Eat fat-free yogurt and low-fat cottage cheese. Try low-fat cheeses such as mozzarella and other reduced-fat cheeses.  Choose meat and other  protein foods that are low in fat.  Choose beans or other legumes such as split peas or lentils. Choose fish, skinless poultry (chicken or turkey), or lean cuts of red meat (beef or pork). Before you cook meat or poultry, cut off any visible fat.   Use less fat and oil.  Try baking foods instead of frying them. Add less fat, such as margarine, sour cream, regular salad dressing and mayonnaise to foods. Eat fewer high-fat foods. Some examples of high-fat foods include french fries, doughnuts, ice cream, and cakes.  Eat fewer sweets.  Limit foods and drinks that are high in sugar. This includes candy, cookies, regular soda, and sweetened drinks.  Exercise:  Exercise at least 30 minutes per day on most days of the week. Some examples of exercise include walking, biking, dancing, and swimming. You can also fit in more physical activity by taking the stairs instead of the elevator or parking farther away from stores. Ask your healthcare provider about the best exercise plan for you.      © Copyright [employer] 2018 Information is for End User's use only and may not be sold, redistributed or otherwise used for commercial purposes. All illustrations and images included in CareNotes® are the copyrighted property of [employer] Inc[employer] or [employer]

## 2025-03-06 NOTE — PROGRESS NOTES
Name: Patricia Cabrera      : 1950      MRN: [id number]  Encounter Provider: Frank Lombardi, DO  Encounter Date: 3/6/2025   Encounter department: [facility]    Assessment & Plan  Medicare annual wellness visit, subsequent         Benign essential HTN  stable  Orders:  •  CT coronary calcium score; Future  •  CBC; Future  •  Comprehensive metabolic panel; Future  •  Lipid Panel with Direct LDL reflex; Future    Mixed hyperlipidemia    Orders:  •  CBC; Future  •  Comprehensive metabolic panel; Future  •  Lipid Panel with Direct LDL reflex; Future    Prediabetes  Limit caerbs  Decrease weight   Increase activity  Orders:  •  CBC; Future  •  Comprehensive metabolic panel; Future  •  Lipid Panel with Direct LDL reflex; Future    Vitamin D deficiency            Preventive health issues were discussed with patient, and age appropriate screening tests were ordered as noted in patient's After Visit Summary. Personalized health advice and appropriate referrals for health education or preventive services given if needed, as noted in patient's After Visit Summary.    History of Present Illness     Pt is here fopr a follow up   Alos due for an AWV    Pt states her mom had heart disease, her Aunt had strokes - one had an MI.  Also stents       Patient Care Team:  Frank Lombardi, DO as PCP - General (Family Medicine)  Pardeep Madison MD (Inactive) as Consulting Physician (Surgical Oncology)  Stu Jeffery MD as Consulting Physician (Radiation Oncology)  Wilmer Rice MD as Consulting Physician (Gastroenterology)  Gustavo Rico MD as Consulting Physician (Ophthalmology)  JENNIFER Keen as Nurse Practitioner (Surgical Oncology)    Review of Systems   Constitutional: Negative.  Negative for activity change, appetite change, chills, diaphoresis and fatigue.   HENT: Negative.  Negative for dental problem, ear pain, sinus pressure and sore throat.    Eyes: Negative.  Negative for photophobia, pain, discharge,  redness, itching and visual disturbance.   Respiratory:  Negative for apnea and chest tightness.    Cardiovascular: Negative.  Negative for chest pain, palpitations and leg swelling.   Gastrointestinal: Negative.  Negative for abdominal distention, abdominal pain, constipation and diarrhea.   Endocrine: Negative.  Negative for cold intolerance and heat intolerance.   Genitourinary: Negative.  Negative for difficulty urinating and dyspareunia.   Musculoskeletal: Negative.  Negative for arthralgias and back pain.   Skin: Negative.    Allergic/Immunologic: Negative for environmental allergies.   Neurological: Negative.  Negative for dizziness.   Psychiatric/Behavioral: Negative.  Negative for agitation.      Medical History Reviewed by provider this encounter:       Annual Wellness Visit Questionnaire    is here for her Subsequent Wellness visit. Last Medicare Wellness visit information reviewed, patient interviewed, no change since last AWV.     Health Risk Assessment:   Patient rates overall health as very good. Patient feels that their physical health rating is slightly better. Patient is very satisfied with their life. Eyesight was rated as same. Hearing was rated as same. Patient feels that their emotional and mental health rating is same. Patients states they are never, rarely angry. Patient states they are often unusually tired/fatigued. Pain experienced in the last 7 days has been none. Patient states that she has experienced no weight loss or gain in last 6 months.     Depression Screening:   PHQ-2 Score: 0      Fall Risk Screening:   In the past year, patient has experienced: no history of falling in past year      Urinary Incontinence Screening:   Patient has not leaked urine accidently in the last six months.     Home Safety:  Patient does not have trouble with stairs inside or outside of their home. Patient has working smoke alarms and has working carbon monoxide detector. Home safety hazards  include: none.     Nutrition:   Current diet is Regular.     Medications:   Patient is currently taking over-the-counter supplements. OTC medications include: see medication list. Patient is able to manage medications.     Activities of Daily Living (ADLs)/Instrumental Activities of Daily Living (IADLs):   Walk and transfer into and out of bed and chair?: Yes  Dress and groom yourself?: Yes    Bathe or shower yourself?: Yes    Feed yourself? Yes  Do your laundry/housekeeping?: Yes  Manage your money, pay your bills and track your expenses?: Yes  Make your own meals?: Yes    Do your own shopping?: Yes    Previous Hospitalizations:   Any hospitalizations or ED visits within the last 12 months?: No    How many hospitalizations have you had in the last year?: 1-2    Advance Care Planning:   Living will: No      Cognitive Screening:   Provider or family/friend/caregiver concerned regarding cognition?: No    PREVENTIVE SCREENINGS      Cardiovascular Screening:    General: Screening Not Indicated, History Lipid Disorder and Risks and Benefits Discussed    Due for: Lipid Panel      Diabetes Screening:     General: Screening Current and Risks and Benefits Discussed      Colorectal Cancer Screening:     General: Risks and Benefits Discussed and Screening Current      Breast Cancer Screening:     General: History Breast Cancer and Risks and Benefits Discussed    Due for: Mammogram        Cervical Cancer Screening:    General: Screening Not Indicated      Osteoporosis Screening:    General: Risks and Benefits Discussed and Screening Current      Abdominal Aortic Aneurysm (AAA) Screening:        General: Screening Not Indicated      Lung Cancer Screening:     General: Screening Not Indicated      Hepatitis C Screening:    General: Screening Current    Screening, Brief Intervention, and Referral to Treatment (SBIRT)     Screening      AUDIT-C Screenin) How often did you have a drink containing alcohol in the past year? 2 to  "4 times a month  2) How many drinks did you have on a typical day when you were drinking in the past year? 1 to 2  3) How often did you have 6 or more drinks on one occasion in the past year? never    AUDIT-C Score: 2  Interpretation: Score 0-2 (female): Negative screen for alcohol misuse    Single Item Drug Screening:  How often have you used an illegal drug (including marijuana) or a prescription medication for non-medical reasons in the past year? never    Single Item Drug Screen Score: 0  Interpretation: Negative screen for possible drug use disorder    Brief Intervention  Alcohol & drug use screenings were reviewed. No concerns regarding substance use disorder identified.     Social Drivers of Health     Financial Resource Strain: Low Risk  (2/6/2024)    Overall Financial Resource Strain (CARDIA)    • Difficulty of Paying Living Expenses: Not hard at all   Food Insecurity: No Food Insecurity (3/6/2025)    Hunger Vital Sign    • Worried About Running Out of Food in the Last Year: Never true    • Ran Out of Food in the Last Year: Never true   Transportation Needs: No Transportation Needs (3/6/2025)    PRAPARE - Transportation    • Lack of Transportation (Medical): No    • Lack of Transportation (Non-Medical): No   Housing Stability: Low Risk  (3/6/2025)    Housing Stability Vital Sign    • Unable to Pay for Housing in the Last Year: No    • Number of Times Moved in the Last Year: 0    • Homeless in the Last Year: No   Utilities: Not At Risk (3/6/2025)    [facility] Utilities    • Threatened with loss of utilities: No     No results found.    Objective   /60   Pulse 74   Temp 97.8 °F (36.6 °C)   Resp 16   Ht 5' 4\" (1.626 m)   Wt 69.8 kg (153 lb 12.8 oz)   BMI 26.40 kg/m²     Physical Exam  Vitals and nursing note reviewed.   Constitutional:       General: She is not in acute distress.     Appearance: She is well-developed. She is not diaphoretic.   HENT:      Head: Normocephalic and atraumatic.      Right " Ear: External ear normal.      Left Ear: External ear normal.      Nose: Nose normal.      Mouth/Throat:      Pharynx: No oropharyngeal exudate.   Eyes:      General: No scleral icterus.        Right eye: No discharge.         Left eye: No discharge.      Pupils: Pupils are equal, round, and reactive to light.   Neck:      Thyroid: No thyromegaly.   Cardiovascular:      Rate and Rhythm: Normal rate.      Heart sounds: Normal heart sounds. No murmur heard.  Pulmonary:      Effort: Pulmonary effort is normal. No respiratory distress.      Breath sounds: Normal breath sounds. No wheezing.   Abdominal:      General: Bowel sounds are normal. There is no distension.      Palpations: Abdomen is soft. There is no mass.      Tenderness: There is no abdominal tenderness. There is no guarding or rebound.   Musculoskeletal:         General: Normal range of motion.   Skin:     General: Skin is warm and dry.      Findings: No erythema or rash.   Neurological:      Mental Status: She is alert.      Coordination: Coordination normal.      Deep Tendon Reflexes: Reflexes normal.   Psychiatric:         Behavior: Behavior normal.

## 2025-03-07 ENCOUNTER — HOSPITAL ENCOUNTER (OUTPATIENT)
Dept: RADIOLOGY | Facility: HOSPITAL | Age: 75
Discharge: HOME | End: 2025-03-07
Payer: MEDICARE

## 2025-03-07 VITALS — WEIGHT: 153 LBS | HEIGHT: 64 IN | BODY MASS INDEX: 26.12 KG/M2

## 2025-03-07 DIAGNOSIS — Z12.31 VISIT FOR SCREENING MAMMOGRAM: ICD-10-CM

## 2025-03-07 PROCEDURE — 77063 BREAST TOMOSYNTHESIS BI: CPT

## 2025-03-07 PROCEDURE — 77067 SCR MAMMO BI INCL CAD: CPT

## 2025-03-18 ENCOUNTER — HOSPITAL ENCOUNTER (OUTPATIENT)
Dept: CT IMAGING | Facility: HOSPITAL | Age: 75
Discharge: HOME | End: 2025-03-18
Payer: COMMERCIAL

## 2025-03-18 DIAGNOSIS — I10 BENIGN ESSENTIAL HTN: ICD-10-CM

## 2025-03-18 PROCEDURE — 75571 CT HRT W/O DYE W/CA TEST: CPT

## 2025-03-24 ENCOUNTER — RESULTS FOLLOW-UP (OUTPATIENT)
Dept: FAMILY MEDICINE CLINIC | Facility: CLINIC | Age: 75
End: 2025-03-24

## 2025-04-11 ENCOUNTER — OFFICE VISIT (OUTPATIENT)
Dept: URGENT CARE | Facility: CLINIC | Age: 75
End: 2025-04-11
Payer: MEDICARE

## 2025-04-11 VITALS
BODY MASS INDEX: 26.61 KG/M2 | TEMPERATURE: 97.4 F | SYSTOLIC BLOOD PRESSURE: 138 MMHG | DIASTOLIC BLOOD PRESSURE: 68 MMHG | WEIGHT: 155 LBS | RESPIRATION RATE: 16 BRPM | HEART RATE: 54 BPM | OXYGEN SATURATION: 99 %

## 2025-04-11 DIAGNOSIS — J06.9 ACUTE URI: Primary | ICD-10-CM

## 2025-04-11 PROCEDURE — 99213 OFFICE O/P EST LOW 20 MIN: CPT

## 2025-04-11 RX ORDER — METHYLPREDNISOLONE 4 MG/1
TABLET ORAL
Qty: 21 EACH | Refills: 0 | Status: SHIPPED | OUTPATIENT
Start: 2025-04-11

## 2025-04-11 RX ORDER — FLUTICASONE PROPIONATE 50 MCG
1 SPRAY, SUSPENSION (ML) NASAL DAILY
Qty: 9.9 ML | Refills: 0 | Status: SHIPPED | OUTPATIENT
Start: 2025-04-11

## 2025-04-11 NOTE — PROGRESS NOTES
[facility]        NAME: Patricia Cabrera is a 75 y.o. female  : 1950    MRN: [id number]  DATE: 2025  TIME: 10:30 AM    Assessment and Plan   Acute URI [J06.9]  1. Acute URI  fluticasone (FLONASE) 50 mcg/act nasal spray    methylPREDNISolone 4 MG tablet therapy pack        Uri symptoms x 1 week, will trial OTC medication. Methylprednisolone given for if symptoms not improving.     Patient Instructions       Most upper respiratory infections are viral and resolve on their own within 10-14 days. Antibiotics are not indicated for the viral infection, and are only prescribed if there is evidence for a bacterial infection. Viral infections are the most common, with bacterial infections only accounting for 0.5-2 percent of cases. Sometimes an upper respiratory infection may lead to secondary bacterial infection, such as bacterial sinusitis, in which case antibiotics would be indicated at that time. If your symptoms continue beyond 10-14 days or if you experience ongoing fevers, productive cough with green, brown, bloody phlegm production, you may have developed a bacterial infection. For the uncomplicated viral upper respiratory infection conservative management includes:    Fever and pain control:  Ibuprofen (Motrin) 600mg every 6 hours for fever, headaches, body aches   Ibuprofen is an NSAID. Please stop medication if you experience stomach/abdominal pain and report to your primary care provider.   Ask your primary care provider before you take NSAIDs if you are on any blood thinners, or if you have a history of heart disease, kidney disease, gastric bypass surgery, GI bleed, or poorly controlled high blood pressure.   May use acetaminophen (Tylenol) as directed on the bottle between doses of ibuprofen. Do not exceed 4,000mg of Tylenol a day.   Cough & Congestion:  Guaifenesin (Mucinex) as directed on the bottle for congestion and mucous-y cough.   Dextromethorphan (Delsym, Robitussin) for dry  cough and cough suppression   Pseudoephedrine (Sudafed) for congestion and sinus pressure   Sudafed may cause increased heart rate, irregular heart rate, and an increase in blood pressure. Please do not take Sudafed if you have a history of heart disease or high blood pressure.   Sudafed should not be taken if you are on anti-depressants such as those belonging to the class MAOIs or tricyclics.  Coricidin HBP (chlorpheniramine maleate) can be used as a decongestant in place of other options for those unable to take Sudafed.   Combination cough and cold such as Dimetapp and Mucinex DM also available  Sudafed PE Head Congestion +Flu Severe contains a combination of Sudafed, Tylenol, Mucinex, and Delsym  If prescribed, take Tessalon Pearles or Bromfed/Phenergan DM as directed  Avoid taking prescription cough/congestion medication and OTC options at the same time  Sore Throat:  Cepacol lozenges  Chloraseptic spray  Throat Coat tea  Warm salt water gargles   Vitamin/Minerals:  Vitamin D3 2,000 IU daily  Vitamin C 1000mg twice a day  Some studies suggest that Zinc 12.5-15mg every 2 hours while awake for 5 days may shorten symptom duration by 1-2 days  Other:   Plenty of fluids and rest  Cool mist humidifiers  Nasal sinus rinses such as NettiPot, [place], or Navage can be used to help flush out sinuses  Please only use distilled/sterile water that can be purchased at your local pharmacy  Nasal spray options:  Nasal steroid sprays such as Flonase, Nasonex, Nasacort may help with sinus congestion, itchy/watery eyes, clogged ears  These options must be used consistently for at least 2 weeks for full effect  Afrin nasal spray for quick acting congestion relief  Saline nasal spray for dry nose, irritation of the nasal passages  Follow up with PCP in 3-5 days  Proceed to the ED if symptoms worsen      If tests are performed, our office will contact you with results only if changes need to made to the care plan discussed with you  at the visit. You can [facility] full [facility] on [facility]    Chief Complaint     Chief Complaint   Patient presents with    URI     Pt c/o cough, head and chest congestion, sore throat that started 1 week ago         History of Present Illness       URI   This is a new problem. The current episode started 1 to 4 weeks ago. The problem has been gradually worsening. There has been no fever. Associated symptoms include congestion, coughing, rhinorrhea and a sore throat. Pertinent negatives include no abdominal pain, chest pain, headaches, nausea or vomiting. Treatments tried: DayQuil x 1 dose. The treatment provided mild relief.       Review of Systems   Review of Systems   Constitutional:  Negative for chills and fever.   HENT:  Positive for congestion, rhinorrhea and sore throat. Negative for postnasal drip, sinus pressure and trouble swallowing.    Respiratory:  Positive for cough. Negative for chest tightness and shortness of breath.    Cardiovascular:  Negative for chest pain and palpitations.   Gastrointestinal:  Negative for abdominal pain, nausea and vomiting.   Genitourinary:  Negative for difficulty urinating.   Musculoskeletal:  Negative for myalgias.   Neurological:  Negative for dizziness and headaches.         Current Medications       Current Outpatient Medications:     amLODIPine-valsartan (EXFORGE) 5-160 MG per tablet, Take 1 tablet by mouth daily, Disp: 90 tablet, Rfl: 3    Calcium Carb-Cholecalciferol (CALCIUM 500 +D PO), Take by mouth, Disp: , Rfl:     fluticasone (FLONASE) 50 mcg/act nasal spray, 1 spray into each nostril daily, Disp: 9.9 mL, Rfl: 0    methylPREDNISolone 4 MG tablet therapy pack, Use as directed on package, Disp: 21 each, Rfl: 0    simvastatin (ZOCOR) 40 mg tablet, Take 1 tablet (40 mg total) by mouth daily at bedtime, Disp: 90 tablet, Rfl: 3    Zinc Sulfate (ZINC 15 PO), Take by mouth, Disp: , Rfl:     Current Allergies     Allergies as of 04/11/2025 - Reviewed  04/11/2025   Allergen Reaction Noted    Adhesive [medical tape] Rash 09/12/2018            The following portions of the patient's history were reviewed and updated as appropriate: allergies, current medications, past family history, past medical history, past social history, past surgical history and problem list.     Past Medical History:   Diagnosis Date    Abnormal blood chemistry     Abnormal glucose     Breast cancer (HCC) 2018    left    Cancer (HCC)     left breast    Cervical polyp     Fracture of ankle     Hemorrhoid     Herpes zoster     History of chemotherapy     History of radiation therapy     Hyperglycemia     Hyperlipidemia     Hypertension     Insomnia     Leiomyoma of uterus     Osteopenia     Seborrheic keratosis     Shingles     Spider bite wound        Past Surgical History:   Procedure Laterality Date    ANKLE SURGERY Right     BREAST BIOPSY Left     BREAST CYST ASPIRATION Right 1992    BREAST LUMPECTOMY Left 01/01/2018    CYST REMOVAL      right eyelid    DILATION AND CURETTAGE OF UTERUS      FRACTURE SURGERY      ankle plates and screws    INTRAOPERATIVE RADIATION THERAPY (IORT) Left 01/12/2018    Procedure: BREAST IORT BY DR PATEL;  Surgeon: Pardeep Madison MD;  [facility];  Service: Surgical Oncology    MAMMO NEEDLE LOCALIZATION LEFT (ALL INC) Left 01/12/2018    NV BX/EXC LYMPH NODE OPEN SUPERFICIAL Left 01/12/2018    Procedure: LYMPHOSCINTIGRAPHY; INTRAOPERATIVE LYMPHATIC MAPPING; SENTINEL LYMPH NODE BIOPSY (INJECT AT 1215);  Surgeon: Pardeep Madison MD;  [facility];  Service: Surgical Oncology    NV COLONOSCOPY FLX DX W/COLLJ SPEC WHEN PFRMD N/A 09/14/2018    Procedure: COLONOSCOPY;  Surgeon: Wilmer Rice MD;  Location: [facility] LAB;  Service: Gastroenterology    NV PERQ DEVICE PLACEMENT BREAST LOC 1ST LES W/GDNCE Left 01/12/2018    Procedure: BREAST LUMPECTOMY; BREAST NEEDLE LOCALIZATION (NEEDLE LOC AT 1130); FROZEN SECTION;  Surgeon: Pardeep Madison MD;  [facility];   Service: Surgical Oncology    SENTINEL LYMPH NODE BIOPSY Left     US GUIDED BREAST BIOPSY LEFT COMPLETE Left 11/30/2017       Family History   Problem Relation Age of Onset    Hypertension Mother     Heart disease Mother     Thyroid disease Mother     Leukemia Father     Emphysema Maternal Grandfather     Colon cancer Paternal Grandmother     Heart disease Paternal Grandmother     Colon cancer Paternal Grandfather     Breast cancer additional onset Maternal Aunt     Breast cancer Maternal Aunt 30    Breast cancer Maternal Aunt 40    Ovarian cancer Maternal Aunt     Prostate cancer Maternal Uncle     Colon cancer Paternal Aunt     Colon cancer Paternal Uncle     Mental illness Neg Hx          Medications have been verified.        Objective   /68   Pulse (!) 54   Temp (!) 97.4 °F (36.3 °C) (Tympanic)   Resp 16   Wt 70.3 kg (155 lb)   SpO2 99%   BMI 26.61 kg/m²        Physical Exam     Physical Exam  Constitutional:       General: She is not in acute distress.     Appearance: She is not ill-appearing.   HENT:      Right Ear: Tympanic membrane, ear canal and external ear normal.      Left Ear: Tympanic membrane, ear canal and external ear normal.      Nose: Congestion and rhinorrhea present.      Mouth/Throat:      Mouth: Mucous membranes are moist.      Pharynx: Oropharynx is clear.   Eyes:      Pupils: Pupils are equal, round, and reactive to light.   Cardiovascular:      Rate and Rhythm: Normal rate and regular rhythm.      Pulses: Normal pulses.      Heart sounds: Normal heart sounds. No murmur heard.     No gallop.   Pulmonary:      Effort: Pulmonary effort is normal. No respiratory distress.      Breath sounds: Normal breath sounds. No stridor. No wheezing, rhonchi or rales.   Abdominal:      General: Abdomen is flat. Bowel sounds are normal. There is no distension.      Palpations: Abdomen is soft.      Tenderness: There is no abdominal tenderness.   Musculoskeletal:         General: Normal range of  motion.      Cervical back: Normal range of motion.   Skin:     General: Skin is warm and dry.      Capillary Refill: Capillary refill takes less than 2 seconds.   Neurological:      Mental Status: She is alert and oriented to person, place, and time.

## 2025-04-30 ENCOUNTER — HOSPITAL ENCOUNTER (OUTPATIENT)
Dept: RADIOLOGY | Facility: HOSPITAL | Age: 75
Discharge: HOME | End: 2025-04-30
Payer: MEDICARE

## 2025-04-30 DIAGNOSIS — Z12.39 BREAST CANCER SCREENING OTHER THAN MAMMOGRAM: ICD-10-CM

## 2025-04-30 DIAGNOSIS — R92.333 HETEROGENEOUSLY DENSE TISSUE OF BOTH BREASTS ON MAMMOGRAPHY: ICD-10-CM

## 2025-04-30 PROCEDURE — 76641 ULTRASOUND BREAST COMPLETE: CPT

## 2025-06-07 ENCOUNTER — OFFICE VISIT (OUTPATIENT)
Dept: URGENT CARE | Facility: CLINIC | Age: 75
End: 2025-06-07
Payer: MEDICARE

## 2025-06-07 VITALS
DIASTOLIC BLOOD PRESSURE: 76 MMHG | OXYGEN SATURATION: 98 % | HEIGHT: 64 IN | RESPIRATION RATE: 18 BRPM | TEMPERATURE: 97.6 F | WEIGHT: 154 LBS | BODY MASS INDEX: 26.29 KG/M2 | SYSTOLIC BLOOD PRESSURE: 126 MMHG | HEART RATE: 67 BPM

## 2025-06-07 DIAGNOSIS — L23.7 POISON IVY: Primary | ICD-10-CM

## 2025-06-07 PROCEDURE — 99213 OFFICE O/P EST LOW 20 MIN: CPT | Performed by: PHYSICIAN ASSISTANT

## 2025-06-07 RX ORDER — PREDNISONE 10 MG/1
TABLET ORAL
Qty: 21 TABLET | Refills: 0 | Status: SHIPPED | OUTPATIENT
Start: 2025-06-07 | End: 2025-06-16

## 2025-06-07 RX ORDER — BETAMETHASONE DIPROPIONATE 0.5 MG/G
OINTMENT, AUGMENTED TOPICAL 2 TIMES DAILY
Qty: 30 G | Refills: 0 | Status: SHIPPED | OUTPATIENT
Start: 2025-06-07

## 2025-06-07 NOTE — PROGRESS NOTES
[facility]        NAME: Patricia Cabrera is a 75 y.o. female  : 1950    MRN: [id number]  DATE: 2025  TIME: 9:40 AM    Assessment and Plan   Poison ivy [L23.7]  1. Poison ivy  predniSONE 10 mg tablet    betamethasone, augmented, (DIPROLENE) 0.05 % ointment        Since the rash is close to the patient's right eye and recommend she follow-up with her eye doctor.  She does have an outpatient eye doctor who she will call on Monday.  If she develops any blurry or double vision she will go to the emergency room.    Patient Instructions     Patient Instructions   Patient Education     Poison [place], Poison [place], Poison [place] Discharge Instructions   About this topic   Poison ivy, oak, and sumac are plants that may cause rashes on the skin. The plants can be found anywhere, including the woods or your yard. The rash is caused by the oily sap of the plants. It does not smell and it is clear so you probably can't tell that it is there. Sometimes, you get a rash by touching the plants. Other times, it is from touching something else, like clothes, pets, another person, or gardening tools that have touched a plant. Smoke from burning these plants can also cause a rash.  Not everyone who comes in contact with the sap will get a reaction, but most people will. A reaction may happen a few hours after you touch the sap or it may happen up to 5 days later. If the sap gets on your skin, it may become red, swollen, and very itchy. Blisters may also form. You can't catch a rash from someone else, but you can get it from someone if they transfer the sap to you. The rash may last 1 to 3 weeks.  What care is needed at home?   Ask your doctor what you need to do when you go home. Make sure you ask questions if you do not understand what the doctor says. This way you will know what you need to do.  If you have a rash:  Do not scratch or rub your skin. This can lead to infection or spread the rash.  Try putting a cool,  wet cloth on your rash.  Creams and lotions, like hydrocortisone cream and Calamine lotion, may help itching and blistering.  Take a cool shower to help ease the itching.  Take a bath using lukewarm water. Hot water can make itching worse. Adding oatmeal bath products or baking soda may also help.  After bathing or showering, blot your body dry rather than rubbing to avoid spread of the rash.  What follow-up care is needed?   Your doctor may ask you to make visits to the office to check on your progress. Be sure to keep these visits.  What drugs may be needed?   If you have a very bad rash, your doctor may give you drugs to help with swelling and itching.  What can be done to prevent this health problem?   Learn what the plants look like and stay away from them. Poison ivy and poison oak have three leaves on one stem. Poison sumac has 7 to 13 leaves that grow in pairs.  Wear long pants, long sleeves, and gloves if you must be around these plants.  Wash your hands with soap and water within 15 minutes if you have contact with the plants.  Scrub your fingernails carefully to prevent spreading to other parts of your body.  Take a shower if the plants have touched your arms, legs, or other body parts. Wash well with soap and water.  Wash clothing and shoes with soap and hot water. Wash anything else, like gardening tools that may have touched a plant.  Wash your pets to remove oil from the fur. Pets do not get this rash but you may get it from touching oil on their fur.  Do not burn these plants. This will spread the oil into the air. It can cause very bad problems with other people if the wind is blowing.  If you are prone to getting a reaction from these plants, you may want to consider using over-the-counter products that can help block the oil from getting into the skin.  When do I need to call the doctor?   Signs of infection. These include a fever of 100.4°F (38°C) or higher, chills, wound that will not  heal.  Trouble breathing or swallowing  Swelling of the face and lips or swelling that makes your eyes puffy or partially shut  A rash that covers a large part of your body, or that is spreading quickly  Pain, swelling, warmth, pus around the rash  A rash in your eyes, mouth, or genital area  Very bad itching that doesn't get better or keeps you awake at night  You are not feeling better in 2 to 3 days or you are feeling worse  Teach Back: Helping You Understand   The Teach Back Method helps you understand the information we are giving you. After you talk with the staff, tell them in your own words what you learned. This helps to make sure the staff has described each thing clearly. It also helps to explain things that may have been confusing. Before going home, make sure you can do these:  I can tell you about my condition.  I can tell you how to care for my rash.  I can tell you how I will take extra care to prevent this from happening in the future.  I can tell you what I will do if I have trouble breathing or swallowing, or if the rash covers a large part of my face or my body.  Last Reviewed Date   2021-11-15  Consumer Information Use and Disclaimer   This generalized information is a limited summary of diagnosis, treatment, and/or medication information. It is not meant to be comprehensive and should be used as a tool to help the user understand and/or assess potential diagnostic and treatment options. It does NOT include all information about conditions, treatments, medications, side effects, or risks that may apply to a specific patient. It is not intended to be medical advice or a substitute for the medical advice, diagnosis, or treatment of a health care provider based on the health care provider's examination and assessment of a patient’s specific and unique circumstances. Patients must speak with a health care provider for complete information about their health, medical questions, and treatment options,  including any risks or benefits regarding use of medications. This information does not endorse any treatments or medications as safe, effective, or approved for treating a specific patient. Up[employer] Inc[employer] and its affiliates disclaim any warranty or liability relating to this information or the use thereof. The use of this information is governed by the Terms of Use, available at https://www.[employer]ot[employer]/en/know/clinical-effectiveness-terms   Copyright   Copyright © 2024 Up[employer], Inc[employer] and its affiliates and/or licensors. All rights reserved.        Follow up with PCP in 3-5 days.  Proceed to  ER if symptoms worsen.    Chief Complaint     Chief Complaint   Patient presents with    Rash     Rash around the eyes, arms and finger. She thinks its poison ivy that started Wednesday          History of Present Illness       The patient is a 75-year-old female presenting today for rash.  She reports that she has a rash on her arms and face.  The rash is spreading between her fingers and up her arms.  Did pull off a vine that she thinks may have been poison ivy prior to this starting.  The rash has been present for 4 days.  Denies prior nodes.  Denies any blurry or double vision.        Review of Systems   Review of Systems   Constitutional:  Negative for activity change, appetite change, chills, fatigue and fever.   HENT:  Negative for congestion, ear pain, rhinorrhea, sinus pressure, sinus pain and sore throat.    Eyes:  Negative for pain and visual disturbance.   Respiratory:  Negative for cough, chest tightness and shortness of breath.    Cardiovascular:  Negative for chest pain and palpitations.   Gastrointestinal:  Negative for abdominal pain, diarrhea, nausea and vomiting.   Genitourinary:  Negative for dysuria and hematuria.   Musculoskeletal:  Negative for arthralgias, back pain and myalgias.   Skin:  Positive for rash. Negative for color change and pallor.   Neurological:  Negative for seizures, syncope and  "headaches.   All other systems reviewed and are negative.        Current Medications     Current Medications[1]    Current Allergies     Allergies as of 06/07/2025 - Reviewed 06/07/2025   Allergen Reaction Noted    Adhesive [medical tape] Rash 09/12/2018            The following portions of the patient's history were reviewed and updated as appropriate: allergies, current medications, past family history, past medical history, past social history, past surgical history and problem list.     Past Medical History[2]    Past Surgical History[3]    Family History[4]      Medications have been verified.        Objective   /76   Pulse 67   Temp 97.6 °F (36.4 °C)   Resp 18   Ht 5' 4\" (1.626 m)   Wt 69.9 kg (154 lb)   SpO2 98%   BMI 26.43 kg/m²        Physical Exam     Physical Exam  Vitals and nursing note reviewed.   Constitutional:       General: She is not in acute distress.     Appearance: Normal appearance. She is normal weight. She is not ill-appearing, toxic-appearing or diaphoretic.   HENT:      Head: Normocephalic and atraumatic.     Cardiovascular:      Rate and Rhythm: Normal rate and regular rhythm.      Heart sounds: Normal heart sounds. No murmur heard.     No friction rub. No gallop.   Pulmonary:      Effort: Pulmonary effort is normal. No respiratory distress.      Breath sounds: Normal breath sounds. No stridor. No wheezing, rhonchi or rales.   Chest:      Chest wall: No tenderness.     Skin:     General: Skin is warm and dry.      Capillary Refill: Capillary refill takes less than 2 seconds.      Findings: Erythema and rash present.      Comments: Rash on the patient's forehead, next to her right eye and left lower jaw.  Rash on patient's hands spreading up her arms.  The rash is red and maculopapular.  No drainage.     Neurological:      Mental Status: She is alert.                        [1]   Current Outpatient Medications:     amLODIPine-valsartan (EXFORGE) 5-160 MG per tablet, Take 1 " tablet by mouth daily, Disp: 90 tablet, Rfl: 3    betamethasone, augmented, (DIPROLENE) 0.05 % ointment, Apply topically 2 (two) times a day, Disp: 30 g, Rfl: 0    Calcium Carb-Cholecalciferol (CALCIUM 500 +D PO), Take by mouth, Disp: , Rfl:     predniSONE 10 mg tablet, Take 4 tablets (40 mg total) by mouth daily for 3 days, THEN 2 tablets (20 mg total) daily for 3 days, THEN 1 tablet (10 mg total) daily for 3 days., Disp: 21 tablet, Rfl: 0    simvastatin (ZOCOR) 40 mg tablet, Take 1 tablet (40 mg total) by mouth daily at bedtime, Disp: 90 tablet, Rfl: 3    Zinc Sulfate (ZINC 15 PO), Take by mouth, Disp: , Rfl:     fluticasone (FLONASE) 50 mcg/act nasal spray, 1 spray into each nostril daily, Disp: 9.9 mL, Rfl: 0    methylPREDNISolone 4 MG tablet therapy pack, Use as directed on package, Disp: 21 each, Rfl: 0  [2]   Past Medical History:  Diagnosis Date    Abnormal blood chemistry     Abnormal glucose     Breast cancer (HCC) 2018    left    Cancer (HCC)     left breast    Cervical polyp     Fracture of ankle     Hemorrhoid     Herpes zoster     History of chemotherapy     History of radiation therapy     Hyperglycemia     Hyperlipidemia     Hypertension     Insomnia     Leiomyoma of uterus     Osteopenia     Seborrheic keratosis     Shingles     Spider bite wound    [3]   Past Surgical History:  Procedure Laterality Date    ANKLE SURGERY Right     BREAST BIOPSY Left     BREAST CYST ASPIRATION Right 1992    BREAST LUMPECTOMY Left 01/01/2018    CYST REMOVAL      right eyelid    DILATION AND CURETTAGE OF UTERUS      FRACTURE SURGERY      ankle plates and screws    INTRAOPERATIVE RADIATION THERAPY (IORT) Left 01/12/2018    Procedure: BREAST IORT BY DR PATEL;  Surgeon: Pardeep Madison MD;  [facility];  Service: Surgical Oncology    MAMMO NEEDLE LOCALIZATION LEFT (ALL INC) Left 01/12/2018    ID BX/EXC LYMPH NODE OPEN SUPERFICIAL Left 01/12/2018    Procedure: LYMPHOSCINTIGRAPHY; INTRAOPERATIVE LYMPHATIC MAPPING;  SENTINEL LYMPH NODE BIOPSY (INJECT AT 1215);  Surgeon: Pardeep Madison MD;  [facility];  Service: Surgical Oncology    TX COLONOSCOPY FLX DX W/COLLJ SPEC WHEN PFRMD N/A 09/14/2018    Procedure: COLONOSCOPY;  Surgeon: Wilmer Rice MD;  Location: [facility] GI LAB;  Service: Gastroenterology    TX PERQ DEVICE PLACEMENT BREAST LOC 1ST LES W/GDNCE Left 01/12/2018    Procedure: BREAST LUMPECTOMY; BREAST NEEDLE LOCALIZATION (NEEDLE LOC AT 1130); FROZEN SECTION;  Surgeon: Pardeep Madison MD;  [facility];  Service: Surgical Oncology    SENTINEL LYMPH NODE BIOPSY Left     US GUIDED BREAST BIOPSY LEFT COMPLETE Left 11/30/2017   [4]   Family History  Problem Relation Name Age of Onset    Hypertension Mother      Heart disease Mother      Thyroid disease Mother      Leukemia Father      Emphysema Maternal Grandfather      Colon cancer Paternal Grandmother      Heart disease Paternal Grandmother      Colon cancer Paternal Grandfather      Breast cancer additional onset Maternal Aunt earnestine     Breast cancer Maternal Aunt earnestine 30    Breast cancer Maternal Aunt leonel 40    Ovarian cancer Maternal Aunt emerita     Prostate cancer Maternal Uncle      Colon cancer Paternal Aunt      Colon cancer Paternal Uncle      Mental illness Neg Hx

## 2025-06-07 NOTE — PATIENT INSTRUCTIONS
Patient Education     Poison [place], Poison [place], Poison [place] Discharge Instructions   About this topic   Poison ivy, oak, and sumac are plants that may cause rashes on the skin. The plants can be found anywhere, including the woods or your yard. The rash is caused by the oily sap of the plants. It does not smell and it is clear so you probably can't tell that it is there. Sometimes, you get a rash by touching the plants. Other times, it is from touching something else, like clothes, pets, another person, or gardening tools that have touched a plant. Smoke from burning these plants can also cause a rash.  Not everyone who comes in contact with the sap will get a reaction, but most people will. A reaction may happen a few hours after you touch the sap or it may happen up to 5 days later. If the sap gets on your skin, it may become red, swollen, and very itchy. Blisters may also form. You can't catch a rash from someone else, but you can get it from someone if they transfer the sap to you. The rash may last 1 to 3 weeks.  What care is needed at home?   Ask your doctor what you need to do when you go home. Make sure you ask questions if you do not understand what the doctor says. This way you will know what you need to do.  If you have a rash:  Do not scratch or rub your skin. This can lead to infection or spread the rash.  Try putting a cool, wet cloth on your rash.  Creams and lotions, like hydrocortisone cream and Calamine lotion, may help itching and blistering.  Take a cool shower to help ease the itching.  Take a bath using lukewarm water. Hot water can make itching worse. Adding oatmeal bath products or baking soda may also help.  After bathing or showering, blot your body dry rather than rubbing to avoid spread of the rash.  What follow-up care is needed?   Your doctor may ask you to make visits to the office to check on your progress. Be sure to keep these visits.  What drugs may be needed?   If you have a very  bad rash, your doctor may give you drugs to help with swelling and itching.  What can be done to prevent this health problem?   Learn what the plants look like and stay away from them. Poison ivy and poison oak have three leaves on one stem. Poison sumac has 7 to 13 leaves that grow in pairs.  Wear long pants, long sleeves, and gloves if you must be around these plants.  Wash your hands with soap and water within 15 minutes if you have contact with the plants.  Scrub your fingernails carefully to prevent spreading to other parts of your body.  Take a shower if the plants have touched your arms, legs, or other body parts. Wash well with soap and water.  Wash clothing and shoes with soap and hot water. Wash anything else, like gardening tools that may have touched a plant.  Wash your pets to remove oil from the fur. Pets do not get this rash but you may get it from touching oil on their fur.  Do not burn these plants. This will spread the oil into the air. It can cause very bad problems with other people if the wind is blowing.  If you are prone to getting a reaction from these plants, you may want to consider using over-the-counter products that can help block the oil from getting into the skin.  When do I need to call the doctor?   Signs of infection. These include a fever of 100.4°F (38°C) or higher, chills, wound that will not heal.  Trouble breathing or swallowing  Swelling of the face and lips or swelling that makes your eyes puffy or partially shut  A rash that covers a large part of your body, or that is spreading quickly  Pain, swelling, warmth, pus around the rash  A rash in your eyes, mouth, or genital area  Very bad itching that doesn't get better or keeps you awake at night  You are not feeling better in 2 to 3 days or you are feeling worse  Teach Back: Helping You Understand   The Teach Back Method helps you understand the information we are giving you. After you talk with the staff, tell them in your  own words what you learned. This helps to make sure the staff has described each thing clearly. It also helps to explain things that may have been confusing. Before going home, make sure you can do these:  I can tell you about my condition.  I can tell you how to care for my rash.  I can tell you how I will take extra care to prevent this from happening in the future.  I can tell you what I will do if I have trouble breathing or swallowing, or if the rash covers a large part of my face or my body.  Last Reviewed Date   2021-11-15  Consumer Information Use and Disclaimer   This generalized information is a limited summary of diagnosis, treatment, and/or medication information. It is not meant to be comprehensive and should be used as a tool to help the user understand and/or assess potential diagnostic and treatment options. It does NOT include all information about conditions, treatments, medications, side effects, or risks that may apply to a specific patient. It is not intended to be medical advice or a substitute for the medical advice, diagnosis, or treatment of a health care provider based on the health care provider's examination and assessment of a patient’s specific and unique circumstances. Patients must speak with a health care provider for complete information about their health, medical questions, and treatment options, including any risks or benefits regarding use of medications. This information does not endorse any treatments or medications as safe, effective, or approved for treating a specific patient. [employer] and its affiliates disclaim any warranty or liability relating to this information or the use thereof. The use of this information is governed by the Terms of Use, available at https://www.[employer].com/en/know/clinical-effectiveness-terms   Copyright   Copyright © 2024 [employer] and its affiliates and/or licensors. All rights reserved.

## 2025-08-14 ENCOUNTER — TELEPHONE (OUTPATIENT)
Dept: OTHER | Facility: OTHER | Age: 75
End: 2025-08-14

## (undated) DEVICE — "MH-443 SUCTION VALVE F/EVIS140 EVIS160": Brand: SUCTION VALVE

## (undated) DEVICE — GAUZE SPONGES,16 PLY: Brand: CURITY

## (undated) DEVICE — SUT VICRYL 3-0 SH 27 IN J416H

## (undated) DEVICE — BRUSH ENDO CLEANING DBL-HEADER

## (undated) DEVICE — SOLIDIFIER FLUID WASTE CONTROL 1500ML

## (undated) DEVICE — STRL UNIVERSAL MINOR GENERAL: Brand: CARDINAL HEALTH

## (undated) DEVICE — TUBING AUX CHANNEL

## (undated) DEVICE — TUBING SUCTION 5MM X 12 FT

## (undated) DEVICE — SUT VICRYL 2-0 REEL 54 IN J286G

## (undated) DEVICE — SINGLE-USE BIOPSY FORCEPS: Brand: RADIAL JAW 4

## (undated) DEVICE — GLOVE EXAM NON-STRL NTRL PLUS LRG PF

## (undated) DEVICE — "MH-438 A/W VLVE F/140 EVIS-140": Brand: AIR/WATER VALVE

## (undated) DEVICE — LIGHT HANDLE COVER SLEEVE DISP BLUE STELLAR

## (undated) DEVICE — AIRLIFE™  ADULT CUSHION NASAL CANNULA WITH 7 FOOT (2.1 M) CRUSH-RESISTANT OXYGEN TUBING, AND U/CONNECT-IT ADAPTER: Brand: AIRLIFE™

## (undated) DEVICE — ADHESIVE SKN CLSR HISTOACRYL FLEX 0.5ML LF

## (undated) DEVICE — TUBING BUBBLE CLEAR 5MM X 100 FT NS

## (undated) DEVICE — DRAPE PROBE NEO-PROBE/ULTRASOUND

## (undated) DEVICE — ENDOCUFF VISION SMALL PURPLE ID 10.4

## (undated) DEVICE — MEDI-VAC YANK SUCT HNDL W/TPRD BULBOUS TIP: Brand: CARDINAL HEALTH

## (undated) DEVICE — GAUZE SPONGES,USP TYPE VII GAUZE, 12 PLY: Brand: CURITY

## (undated) DEVICE — GLOVE SRG BIOGEL 7

## (undated) DEVICE — VIAL DECANTER

## (undated) DEVICE — NEEDLE 25G X 1 1/2

## (undated) DEVICE — REM POLYHESIVE ADULT PATIENT RETURN ELECTRODE: Brand: VALLEYLAB

## (undated) DEVICE — LUBRICANT SURGILUBE TUBE 4 OZ  FLIP TOP

## (undated) DEVICE — MEDI-VAC YANKAUER SUCTION HANDLE: Brand: CARDINAL HEALTH

## (undated) DEVICE — 1200CC GUARDIAN II: Brand: GUARDIAN

## (undated) DEVICE — MARGIN MARKER SET

## (undated) DEVICE — CHLORAPREP HI-LITE 26ML ORANGE

## (undated) DEVICE — DISPOSABLE BIOPSY VALVE MAJ-1555: Brand: SINGLE USE BIOPSY VALVE (STERILE)

## (undated) DEVICE — 3M™ STERI-STRIP™ REINFORCED ADHESIVE SKIN CLOSURES, R1547, 1/2 IN X 4 IN (12 MM X 100 MM), 6 STRIPS/ENVELOPE: Brand: 3M™ STERI-STRIP™

## (undated) DEVICE — INTENDED FOR TISSUE SEPARATION, AND OTHER PROCEDURES THAT REQUIRE A SHARP SURGICAL BLADE TO PUNCTURE OR CUT.: Brand: BARD-PARKER SAFETY BLADES SIZE 15, STERILE

## (undated) DEVICE — "MAJ-901 WATER CONTAINER SET CV-160/140": Brand: WATER CONTAINER

## (undated) DEVICE — SMOKE EVACUATION TUBING WITH 8 IN INTEGRAL WAND AND SPONGE GUARD: Brand: BUFFALO FILTER

## (undated) DEVICE — SUT MONOCRYL 4-0 PS-2 18 IN Y496G

## (undated) DEVICE — SCD SEQUENTIAL COMPRESSION COMFORT SLEEVE MEDIUM KNEE LENGTH: Brand: KENDALL SCD